# Patient Record
Sex: MALE | Race: WHITE | Employment: OTHER | URBAN - METROPOLITAN AREA
[De-identification: names, ages, dates, MRNs, and addresses within clinical notes are randomized per-mention and may not be internally consistent; named-entity substitution may affect disease eponyms.]

---

## 2020-06-24 ENCOUNTER — HOSPITAL ENCOUNTER (EMERGENCY)
Age: 75
Discharge: LEFT BEFORE DISCHARGE | DRG: 551 | End: 2020-06-24
Attending: EMERGENCY MEDICINE | Admitting: EMERGENCY MEDICINE
Payer: MEDICARE

## 2020-06-24 VITALS
TEMPERATURE: 98.7 F | DIASTOLIC BLOOD PRESSURE: 63 MMHG | OXYGEN SATURATION: 93 % | HEART RATE: 100 BPM | RESPIRATION RATE: 16 BRPM | WEIGHT: 235 LBS | SYSTOLIC BLOOD PRESSURE: 110 MMHG

## 2020-06-24 DIAGNOSIS — N17.9 AKI (ACUTE KIDNEY INJURY) (HCC): Primary | ICD-10-CM

## 2020-06-24 DIAGNOSIS — R53.1 WEAKNESS: ICD-10-CM

## 2020-06-24 LAB
ALBUMIN SERPL-MCNC: 3.3 G/DL (ref 3.5–5)
ALBUMIN/GLOB SERPL: 0.8 {RATIO} (ref 1.1–2.2)
ALP SERPL-CCNC: 56 U/L (ref 45–117)
ALT SERPL-CCNC: 8 U/L (ref 12–78)
ANION GAP SERPL CALC-SCNC: 5 MMOL/L (ref 5–15)
AST SERPL-CCNC: 24 U/L (ref 15–37)
BASOPHILS # BLD: 0.1 K/UL (ref 0–0.1)
BASOPHILS NFR BLD: 1 % (ref 0–1)
BILIRUB SERPL-MCNC: 0.6 MG/DL (ref 0.2–1)
BUN SERPL-MCNC: 53 MG/DL (ref 6–20)
BUN/CREAT SERPL: 18 (ref 12–20)
CALCIUM SERPL-MCNC: 8.8 MG/DL (ref 8.5–10.1)
CHLORIDE SERPL-SCNC: 101 MMOL/L (ref 97–108)
CO2 SERPL-SCNC: 25 MMOL/L (ref 21–32)
COMMENT, HOLDF: NORMAL
CREAT SERPL-MCNC: 3.01 MG/DL (ref 0.7–1.3)
DIFFERENTIAL METHOD BLD: ABNORMAL
EOSINOPHIL # BLD: 0.5 K/UL (ref 0–0.4)
EOSINOPHIL NFR BLD: 5 % (ref 0–7)
ERYTHROCYTE [DISTWIDTH] IN BLOOD BY AUTOMATED COUNT: 15.2 % (ref 11.5–14.5)
GLOBULIN SER CALC-MCNC: 4.1 G/DL (ref 2–4)
GLUCOSE SERPL-MCNC: 128 MG/DL (ref 65–100)
HCT VFR BLD AUTO: 29.2 % (ref 36.6–50.3)
HGB BLD-MCNC: 9.4 G/DL (ref 12.1–17)
IMM GRANULOCYTES # BLD AUTO: 0.1 K/UL (ref 0–0.04)
IMM GRANULOCYTES NFR BLD AUTO: 1 % (ref 0–0.5)
LYMPHOCYTES # BLD: 0.7 K/UL (ref 0.8–3.5)
LYMPHOCYTES NFR BLD: 7 % (ref 12–49)
MCH RBC QN AUTO: 29.1 PG (ref 26–34)
MCHC RBC AUTO-ENTMCNC: 32.2 G/DL (ref 30–36.5)
MCV RBC AUTO: 90.4 FL (ref 80–99)
MONOCYTES # BLD: 1 K/UL (ref 0–1)
MONOCYTES NFR BLD: 10 % (ref 5–13)
NEUTS SEG # BLD: 7.1 K/UL (ref 1.8–8)
NEUTS SEG NFR BLD: 76 % (ref 32–75)
NRBC # BLD: 0 K/UL (ref 0–0.01)
NRBC BLD-RTO: 0 PER 100 WBC
PLATELET # BLD AUTO: 151 K/UL (ref 150–400)
PMV BLD AUTO: 9.2 FL (ref 8.9–12.9)
POTASSIUM SERPL-SCNC: 5.7 MMOL/L (ref 3.5–5.1)
PROT SERPL-MCNC: 7.4 G/DL (ref 6.4–8.2)
RBC # BLD AUTO: 3.23 M/UL (ref 4.1–5.7)
RBC MORPH BLD: ABNORMAL
SAMPLES BEING HELD,HOLD: NORMAL
SODIUM SERPL-SCNC: 131 MMOL/L (ref 136–145)
WBC # BLD AUTO: 9.5 K/UL (ref 4.1–11.1)

## 2020-06-24 PROCEDURE — 93005 ELECTROCARDIOGRAM TRACING: CPT

## 2020-06-24 PROCEDURE — 80053 COMPREHEN METABOLIC PANEL: CPT

## 2020-06-24 PROCEDURE — 36415 COLL VENOUS BLD VENIPUNCTURE: CPT

## 2020-06-24 PROCEDURE — 99283 EMERGENCY DEPT VISIT LOW MDM: CPT

## 2020-06-24 PROCEDURE — 85025 COMPLETE CBC W/AUTO DIFF WBC: CPT

## 2020-06-25 ENCOUNTER — APPOINTMENT (OUTPATIENT)
Dept: CT IMAGING | Age: 75
DRG: 551 | End: 2020-06-25
Attending: INTERNAL MEDICINE
Payer: MEDICARE

## 2020-06-25 ENCOUNTER — APPOINTMENT (OUTPATIENT)
Dept: VASCULAR SURGERY | Age: 75
DRG: 551 | End: 2020-06-25
Attending: INTERNAL MEDICINE
Payer: MEDICARE

## 2020-06-25 ENCOUNTER — HOSPITAL ENCOUNTER (INPATIENT)
Age: 75
LOS: 7 days | Discharge: NURSING, REHAB OR LONG-TERM CARE | DRG: 551 | End: 2020-07-02
Attending: EMERGENCY MEDICINE | Admitting: INTERNAL MEDICINE
Payer: MEDICARE

## 2020-06-25 ENCOUNTER — APPOINTMENT (OUTPATIENT)
Dept: CT IMAGING | Age: 75
DRG: 551 | End: 2020-06-25
Attending: EMERGENCY MEDICINE
Payer: MEDICARE

## 2020-06-25 ENCOUNTER — APPOINTMENT (OUTPATIENT)
Dept: GENERAL RADIOLOGY | Age: 75
DRG: 551 | End: 2020-06-25
Attending: EMERGENCY MEDICINE
Payer: MEDICARE

## 2020-06-25 DIAGNOSIS — M48.00 SPINAL STENOSIS, MULTILEVEL: ICD-10-CM

## 2020-06-25 DIAGNOSIS — M79.604 BILATERAL LEG PAIN: ICD-10-CM
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
## 2020-06-25 DIAGNOSIS — G20 PARKINSON DISEASE (HCC): ICD-10-CM

## 2020-06-25 DIAGNOSIS — R53.81 DEBILITY: ICD-10-CM

## 2020-06-25 DIAGNOSIS — N17.9 AKI (ACUTE KIDNEY INJURY) (HCC): ICD-10-CM

## 2020-06-25 DIAGNOSIS — M79.605 BILATERAL LEG PAIN: ICD-10-CM

## 2020-06-25 DIAGNOSIS — A41.9 SEVERE SEPSIS (HCC): ICD-10-CM

## 2020-06-25 DIAGNOSIS — G93.41 METABOLIC ENCEPHALOPATHY: Primary | ICD-10-CM

## 2020-06-25 DIAGNOSIS — N28.9 RENAL INSUFFICIENCY: ICD-10-CM

## 2020-06-25 DIAGNOSIS — R65.20 SEVERE SEPSIS (HCC): ICD-10-CM

## 2020-06-25 PROBLEM — E11.9 TYPE II DIABETES MELLITUS (HCC): Status: ACTIVE | Noted: 2020-06-25

## 2020-06-25 PROBLEM — I10 HTN (HYPERTENSION): Status: ACTIVE | Noted: 2020-06-25

## 2020-06-25 PROBLEM — R79.89 ELEVATED LACTIC ACID LEVEL: Status: ACTIVE | Noted: 2020-06-25

## 2020-06-25 PROBLEM — R33.9 URINARY RETENTION: Status: ACTIVE | Noted: 2020-06-25

## 2020-06-25 PROBLEM — D64.9 ANEMIA: Status: ACTIVE | Noted: 2020-06-25
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
## 2020-06-25 LAB
ALBUMIN SERPL-MCNC: 3.5 G/DL (ref 3.5–5)
ALBUMIN/GLOB SERPL: 0.9 {RATIO} (ref 1.1–2.2)
ALP SERPL-CCNC: 61 U/L (ref 45–117)
ALT SERPL-CCNC: 23 U/L (ref 12–78)
AMPHET UR QL SCN: NEGATIVE
ANION GAP SERPL CALC-SCNC: 6 MMOL/L (ref 5–15)
APPEARANCE UR: CLEAR
AST SERPL-CCNC: 28 U/L (ref 15–37)
ATRIAL RATE: 88 BPM
ATRIAL RATE: 99 BPM
BACTERIA URNS QL MICRO: NEGATIVE /HPF
BARBITURATES UR QL SCN: NEGATIVE
BASOPHILS # BLD: 0.1 K/UL (ref 0–0.1)
BASOPHILS NFR BLD: 1 % (ref 0–1)
BENZODIAZ UR QL: NEGATIVE
BILIRUB SERPL-MCNC: 0.6 MG/DL (ref 0.2–1)
BILIRUB UR QL: NEGATIVE
BUN SERPL-MCNC: 47 MG/DL (ref 6–20)
BUN/CREAT SERPL: 20 (ref 12–20)
CALCIUM SERPL-MCNC: 9 MG/DL (ref 8.5–10.1)
CALCULATED P AXIS, ECG09: 36 DEGREES
CALCULATED P AXIS, ECG09: 42 DEGREES
CALCULATED R AXIS, ECG10: 1 DEGREES
CALCULATED R AXIS, ECG10: 12 DEGREES
CALCULATED T AXIS, ECG11: 16 DEGREES
CALCULATED T AXIS, ECG11: 35 DEGREES
CANNABINOIDS UR QL SCN: NEGATIVE
CHLORIDE SERPL-SCNC: 103 MMOL/L (ref 97–108)
CK MB CFR SERPL CALC: 0.4 % (ref 0–2.5)
CK MB SERPL-MCNC: 1.9 NG/ML (ref 5–25)
CK SERPL-CCNC: 518 U/L (ref 39–308)
CO2 SERPL-SCNC: 26 MMOL/L (ref 21–32)
COCAINE UR QL SCN: NEGATIVE
COLOR UR: ABNORMAL
COMMENT, HOLDF: NORMAL
CREAT SERPL-MCNC: 2.32 MG/DL (ref 0.7–1.3)
CREAT UR-MCNC: 24 MG/DL
CRP SERPL-MCNC: 12.8 MG/DL (ref 0–0.6)
D DIMER PPP FEU-MCNC: 2.06 MG/L FEU (ref 0–0.65)
DIAGNOSIS, 93000: NORMAL
DIAGNOSIS, 93000: NORMAL
DIFFERENTIAL METHOD BLD: ABNORMAL
DRUG SCRN COMMENT,DRGCM: ABNORMAL
EOSINOPHIL # BLD: 0.1 K/UL (ref 0–0.4)
EOSINOPHIL #/AREA URNS HPF: NEGATIVE /[HPF]
EOSINOPHIL NFR BLD: 1 % (ref 0–7)
EPITH CASTS URNS QL MICRO: ABNORMAL /LPF
ERYTHROCYTE [DISTWIDTH] IN BLOOD BY AUTOMATED COUNT: 15.5 % (ref 11.5–14.5)
ETHANOL SERPL-MCNC: <10 MG/DL
FERRITIN SERPL-MCNC: 104 NG/ML (ref 26–388)
GLOBULIN SER CALC-MCNC: 4.1 G/DL (ref 2–4)
GLUCOSE BLD STRIP.AUTO-MCNC: 153 MG/DL (ref 65–100)
GLUCOSE BLD STRIP.AUTO-MCNC: 185 MG/DL (ref 65–100)
GLUCOSE BLD STRIP.AUTO-MCNC: 207 MG/DL (ref 65–100)
GLUCOSE BLD STRIP.AUTO-MCNC: 207 MG/DL (ref 65–100)
GLUCOSE SERPL-MCNC: 125 MG/DL (ref 65–100)
GLUCOSE UR STRIP.AUTO-MCNC: NEGATIVE MG/DL
HCT VFR BLD AUTO: 30.6 % (ref 36.6–50.3)
HGB BLD-MCNC: 10 G/DL (ref 12.1–17)
HGB UR QL STRIP: NEGATIVE
HYALINE CASTS URNS QL MICRO: ABNORMAL /LPF (ref 0–5)
IMM GRANULOCYTES # BLD AUTO: 0.1 K/UL (ref 0–0.04)
IMM GRANULOCYTES NFR BLD AUTO: 1 % (ref 0–0.5)
KETONES UR QL STRIP.AUTO: NEGATIVE MG/DL
LACTATE SERPL-SCNC: 1.6 MMOL/L (ref 0.4–2)
LACTATE SERPL-SCNC: 2.2 MMOL/L (ref 0.4–2)
LEUKOCYTE ESTERASE UR QL STRIP.AUTO: ABNORMAL
LYMPHOCYTES # BLD: 0.7 K/UL (ref 0.8–3.5)
LYMPHOCYTES NFR BLD: 8 % (ref 12–49)
MAGNESIUM SERPL-MCNC: 1.6 MG/DL (ref 1.6–2.4)
MCH RBC QN AUTO: 29.2 PG (ref 26–34)
MCHC RBC AUTO-ENTMCNC: 32.7 G/DL (ref 30–36.5)
MCV RBC AUTO: 89.2 FL (ref 80–99)
METHADONE UR QL: NEGATIVE
MONOCYTES # BLD: 0.8 K/UL (ref 0–1)
MONOCYTES NFR BLD: 9 % (ref 5–13)
NEUTS SEG # BLD: 7.1 K/UL (ref 1.8–8)
NEUTS SEG NFR BLD: 81 % (ref 32–75)
NITRITE UR QL STRIP.AUTO: NEGATIVE
NRBC # BLD: 0 K/UL (ref 0–0.01)
NRBC BLD-RTO: 0 PER 100 WBC
OPIATES UR QL: POSITIVE
P-R INTERVAL, ECG05: 156 MS
P-R INTERVAL, ECG05: 172 MS
PCP UR QL: NEGATIVE
PH UR STRIP: 5.5 [PH] (ref 5–8)
PHOSPHATE SERPL-MCNC: 2.8 MG/DL (ref 2.6–4.7)
PLATELET # BLD AUTO: 162 K/UL (ref 150–400)
PMV BLD AUTO: 9.6 FL (ref 8.9–12.9)
POTASSIUM SERPL-SCNC: 4.7 MMOL/L (ref 3.5–5.1)
PROCALCITONIN SERPL-MCNC: 0.18 NG/ML
PROT SERPL-MCNC: 7.6 G/DL (ref 6.4–8.2)
PROT UR STRIP-MCNC: NEGATIVE MG/DL
Q-T INTERVAL, ECG07: 336 MS
Q-T INTERVAL, ECG07: 356 MS
QRS DURATION, ECG06: 74 MS
QRS DURATION, ECG06: 74 MS
QTC CALCULATION (BEZET), ECG08: 430 MS
QTC CALCULATION (BEZET), ECG08: 431 MS
RBC # BLD AUTO: 3.43 M/UL (ref 4.1–5.7)
RBC #/AREA URNS HPF: ABNORMAL /HPF (ref 0–5)
SAMPLES BEING HELD,HOLD: NORMAL
SARS-COV-2, COV2: NOT DETECTED
SERVICE CMNT-IMP: ABNORMAL
SODIUM SERPL-SCNC: 135 MMOL/L (ref 136–145)
SODIUM UR-SCNC: 53 MMOL/L
SOURCE, COVRS: NORMAL
SP GR UR REFRACTOMETRY: 1.01 (ref 1–1.03)
SPECIMEN SOURCE, FCOV2M: NORMAL
TROPONIN I SERPL-MCNC: <0.05 NG/ML
UA: UC IF INDICATED,UAUC: ABNORMAL
UROBILINOGEN UR QL STRIP.AUTO: 0.2 EU/DL (ref 0.2–1)
VENTRICULAR RATE, ECG03: 88 BPM
VENTRICULAR RATE, ECG03: 99 BPM
WBC # BLD AUTO: 8.8 K/UL (ref 4.1–11.1)
WBC URNS QL MICRO: ABNORMAL /HPF (ref 0–4)

## 2020-06-25 PROCEDURE — 70450 CT HEAD/BRAIN W/O DYE: CPT

## 2020-06-25 PROCEDURE — 85379 FIBRIN DEGRADATION QUANT: CPT

## 2020-06-25 PROCEDURE — 82962 GLUCOSE BLOOD TEST: CPT

## 2020-06-25 PROCEDURE — 99285 EMERGENCY DEPT VISIT HI MDM: CPT

## 2020-06-25 PROCEDURE — 82550 ASSAY OF CK (CPK): CPT

## 2020-06-25 PROCEDURE — 74176 CT ABD & PELVIS W/O CONTRAST: CPT

## 2020-06-25 PROCEDURE — 74011250637 HC RX REV CODE- 250/637: Performed by: NURSE PRACTITIONER

## 2020-06-25 PROCEDURE — 93005 ELECTROCARDIOGRAM TRACING: CPT

## 2020-06-25 PROCEDURE — 74011636637 HC RX REV CODE- 636/637: Performed by: INTERNAL MEDICINE

## 2020-06-25 PROCEDURE — 65270000029 HC RM PRIVATE

## 2020-06-25 PROCEDURE — 96374 THER/PROPH/DIAG INJ IV PUSH: CPT

## 2020-06-25 PROCEDURE — 51798 US URINE CAPACITY MEASURE: CPT

## 2020-06-25 PROCEDURE — 80053 COMPREHEN METABOLIC PANEL: CPT

## 2020-06-25 PROCEDURE — 84100 ASSAY OF PHOSPHORUS: CPT

## 2020-06-25 PROCEDURE — 87086 URINE CULTURE/COLONY COUNT: CPT

## 2020-06-25 PROCEDURE — 74011000250 HC RX REV CODE- 250: Performed by: EMERGENCY MEDICINE

## 2020-06-25 PROCEDURE — 81001 URINALYSIS AUTO W/SCOPE: CPT

## 2020-06-25 PROCEDURE — 86140 C-REACTIVE PROTEIN: CPT

## 2020-06-25 PROCEDURE — 94762 N-INVAS EAR/PLS OXIMTRY CONT: CPT

## 2020-06-25 PROCEDURE — 87040 BLOOD CULTURE FOR BACTERIA: CPT

## 2020-06-25 PROCEDURE — 74011250637 HC RX REV CODE- 250/637: Performed by: INTERNAL MEDICINE

## 2020-06-25 PROCEDURE — 84484 ASSAY OF TROPONIN QUANT: CPT

## 2020-06-25 PROCEDURE — 80307 DRUG TEST PRSMV CHEM ANLYZR: CPT

## 2020-06-25 PROCEDURE — 71250 CT THORAX DX C-: CPT

## 2020-06-25 PROCEDURE — 84300 ASSAY OF URINE SODIUM: CPT

## 2020-06-25 PROCEDURE — 77030019905 HC CATH URETH INTMIT MDII -A

## 2020-06-25 PROCEDURE — 82728 ASSAY OF FERRITIN: CPT

## 2020-06-25 PROCEDURE — 85025 COMPLETE CBC W/AUTO DIFF WBC: CPT

## 2020-06-25 PROCEDURE — 74011000258 HC RX REV CODE- 258: Performed by: INTERNAL MEDICINE

## 2020-06-25 PROCEDURE — 82553 CREATINE MB FRACTION: CPT

## 2020-06-25 PROCEDURE — 84145 PROCALCITONIN (PCT): CPT

## 2020-06-25 PROCEDURE — 87635 SARS-COV-2 COVID-19 AMP PRB: CPT

## 2020-06-25 PROCEDURE — 71045 X-RAY EXAM CHEST 1 VIEW: CPT

## 2020-06-25 PROCEDURE — 96375 TX/PRO/DX INJ NEW DRUG ADDON: CPT

## 2020-06-25 PROCEDURE — 74011250636 HC RX REV CODE- 250/636: Performed by: EMERGENCY MEDICINE

## 2020-06-25 PROCEDURE — 83605 ASSAY OF LACTIC ACID: CPT

## 2020-06-25 PROCEDURE — 82570 ASSAY OF URINE CREATININE: CPT

## 2020-06-25 PROCEDURE — 65660000000 HC RM CCU STEPDOWN

## 2020-06-25 PROCEDURE — 93970 EXTREMITY STUDY: CPT

## 2020-06-25 PROCEDURE — 87205 SMEAR GRAM STAIN: CPT

## 2020-06-25 PROCEDURE — 36415 COLL VENOUS BLD VENIPUNCTURE: CPT

## 2020-06-25 PROCEDURE — 74011250636 HC RX REV CODE- 250/636: Performed by: INTERNAL MEDICINE

## 2020-06-25 PROCEDURE — 83735 ASSAY OF MAGNESIUM: CPT

## 2020-06-25 RX ORDER — LORATADINE 10 MG/1
10 TABLET ORAL DAILY
Status: DISCONTINUED | OUTPATIENT
Start: 2020-06-26 | End: 2020-07-02 | Stop reason: HOSPADM

## 2020-06-25 RX ORDER — FINASTERIDE 5 MG/1
5 TABLET, FILM COATED ORAL DAILY
Status: DISCONTINUED | OUTPATIENT
Start: 2020-06-25 | End: 2020-07-02 | Stop reason: HOSPADM

## 2020-06-25 RX ORDER — GLIPIZIDE 5 MG/1
5 TABLET, FILM COATED, EXTENDED RELEASE ORAL DAILY
Status: ON HOLD | COMMUNITY
End: 2021-01-01

## 2020-06-25 RX ORDER — FLUTICASONE PROPIONATE 50 MCG
2 SPRAY, SUSPENSION (ML) NASAL DAILY
Status: ON HOLD | COMMUNITY
End: 2021-01-01

## 2020-06-25 RX ORDER — DEXTROSE 50 % IN WATER (D50W) INTRAVENOUS SYRINGE
12.5-25 AS NEEDED
Status: DISCONTINUED | OUTPATIENT
Start: 2020-06-25 | End: 2020-07-02 | Stop reason: HOSPADM

## 2020-06-25 RX ORDER — CARBIDOPA AND LEVODOPA 25; 100 MG/1; MG/1
2 TABLET ORAL 4 TIMES DAILY
Status: DISCONTINUED | OUTPATIENT
Start: 2020-06-25 | End: 2020-07-02 | Stop reason: HOSPADM

## 2020-06-25 RX ORDER — PRAMIPEXOLE DIHYDROCHLORIDE 0.25 MG/1
0.5 TABLET ORAL 2 TIMES DAILY
Status: DISCONTINUED | OUTPATIENT
Start: 2020-06-25 | End: 2020-07-02 | Stop reason: HOSPADM

## 2020-06-25 RX ORDER — SODIUM CHLORIDE 0.9 % (FLUSH) 0.9 %
5-10 SYRINGE (ML) INJECTION AS NEEDED
Status: DISCONTINUED | OUTPATIENT
Start: 2020-06-25 | End: 2020-07-02 | Stop reason: HOSPADM

## 2020-06-25 RX ORDER — PRAMIPEXOLE DIHYDROCHLORIDE 0.5 MG/1
0.25 TABLET ORAL 4 TIMES DAILY
Status: ON HOLD | COMMUNITY
End: 2021-01-01

## 2020-06-25 RX ORDER — MELATONIN
1000 DAILY
Status: DISCONTINUED | OUTPATIENT
Start: 2020-06-26 | End: 2020-07-02 | Stop reason: HOSPADM

## 2020-06-25 RX ORDER — TORSEMIDE 100 MG/1
50 TABLET ORAL DAILY
COMMUNITY
End: 2020-07-02

## 2020-06-25 RX ORDER — SODIUM CHLORIDE 0.9 % (FLUSH) 0.9 %
5-40 SYRINGE (ML) INJECTION EVERY 8 HOURS
Status: DISCONTINUED | OUTPATIENT
Start: 2020-06-25 | End: 2020-07-02 | Stop reason: HOSPADM

## 2020-06-25 RX ORDER — ATORVASTATIN CALCIUM 20 MG/1
20 TABLET, FILM COATED ORAL DAILY
COMMUNITY

## 2020-06-25 RX ORDER — ACETAMINOPHEN 325 MG/1
650 TABLET ORAL
Status: DISCONTINUED | OUTPATIENT
Start: 2020-06-25 | End: 2020-06-26

## 2020-06-25 RX ORDER — MELATONIN
2000 DAILY
COMMUNITY

## 2020-06-25 RX ORDER — GABAPENTIN 300 MG/1
300 CAPSULE ORAL 3 TIMES DAILY
Status: DISCONTINUED | OUTPATIENT
Start: 2020-06-25 | End: 2020-06-27

## 2020-06-25 RX ORDER — ENTACAPONE 200 MG/1
200 TABLET ORAL 4 TIMES DAILY
Status: DISCONTINUED | OUTPATIENT
Start: 2020-06-25 | End: 2020-07-02 | Stop reason: HOSPADM

## 2020-06-25 RX ORDER — CARBIDOPA AND LEVODOPA 25; 100 MG/1; MG/1
2 TABLET ORAL 4 TIMES DAILY
COMMUNITY
End: 2021-01-01 | Stop reason: SDUPTHER

## 2020-06-25 RX ORDER — INSULIN GLARGINE 100 [IU]/ML
40 INJECTION, SOLUTION SUBCUTANEOUS 2 TIMES DAILY
Status: ON HOLD | COMMUNITY
End: 2020-06-30 | Stop reason: SDUPTHER

## 2020-06-25 RX ORDER — PANTOPRAZOLE SODIUM 40 MG/1
40 TABLET, DELAYED RELEASE ORAL
Status: DISCONTINUED | OUTPATIENT
Start: 2020-06-26 | End: 2020-07-02 | Stop reason: HOSPADM

## 2020-06-25 RX ORDER — HEPARIN SODIUM 5000 [USP'U]/ML
5000 INJECTION, SOLUTION INTRAVENOUS; SUBCUTANEOUS EVERY 8 HOURS
Status: DISCONTINUED | OUTPATIENT
Start: 2020-06-25 | End: 2020-07-02 | Stop reason: HOSPADM

## 2020-06-25 RX ORDER — TAMSULOSIN HYDROCHLORIDE 0.4 MG/1
0.4 CAPSULE ORAL DAILY
Status: DISCONTINUED | OUTPATIENT
Start: 2020-06-26 | End: 2020-07-02 | Stop reason: HOSPADM

## 2020-06-25 RX ORDER — MAGNESIUM SULFATE 100 %
4 CRYSTALS MISCELLANEOUS AS NEEDED
Status: DISCONTINUED | OUTPATIENT
Start: 2020-06-25 | End: 2020-07-02 | Stop reason: HOSPADM

## 2020-06-25 RX ORDER — INSULIN LISPRO 100 [IU]/ML
INJECTION, SOLUTION INTRAVENOUS; SUBCUTANEOUS
Status: DISCONTINUED | OUTPATIENT
Start: 2020-06-25 | End: 2020-06-28

## 2020-06-25 RX ORDER — DOCUSATE SODIUM 100 MG/1
100 CAPSULE, LIQUID FILLED ORAL 2 TIMES DAILY
COMMUNITY
End: 2021-01-01

## 2020-06-25 RX ORDER — LORATADINE 10 MG/1
10 TABLET ORAL DAILY
Status: ON HOLD | COMMUNITY
End: 2021-01-01

## 2020-06-25 RX ORDER — ASPIRIN 325 MG
325 TABLET, DELAYED RELEASE (ENTERIC COATED) ORAL DAILY
COMMUNITY
End: 2021-01-01

## 2020-06-25 RX ORDER — GABAPENTIN 600 MG/1
600 TABLET ORAL 3 TIMES DAILY
COMMUNITY
End: 2021-01-01

## 2020-06-25 RX ORDER — HYDROCODONE BITARTRATE AND ACETAMINOPHEN 5; 325 MG/1; MG/1
1 TABLET ORAL
Status: DISCONTINUED | OUTPATIENT
Start: 2020-06-25 | End: 2020-06-26

## 2020-06-25 RX ORDER — INSULIN GLARGINE 100 [IU]/ML
10 INJECTION, SOLUTION SUBCUTANEOUS 2 TIMES DAILY
Status: DISCONTINUED | OUTPATIENT
Start: 2020-06-25 | End: 2020-06-28

## 2020-06-25 RX ORDER — INSULIN ASPART 100 [IU]/ML
INJECTION, SOLUTION INTRAVENOUS; SUBCUTANEOUS
COMMUNITY
End: 2020-07-02

## 2020-06-25 RX ORDER — SPIRONOLACTONE 25 MG/1
25 TABLET ORAL DAILY
COMMUNITY
End: 2020-07-02

## 2020-06-25 RX ORDER — VANCOMYCIN/0.9 % SOD CHLORIDE 1.5G/250ML
1500 PLASTIC BAG, INJECTION (ML) INTRAVENOUS EVERY 24 HOURS
Status: DISCONTINUED | OUTPATIENT
Start: 2020-06-26 | End: 2020-06-26

## 2020-06-25 RX ORDER — CARVEDILOL 6.25 MG/1
6.25 TABLET ORAL 2 TIMES DAILY WITH MEALS
COMMUNITY
End: 2021-01-01

## 2020-06-25 RX ORDER — NALOXONE HYDROCHLORIDE 0.4 MG/ML
0.4 INJECTION, SOLUTION INTRAMUSCULAR; INTRAVENOUS; SUBCUTANEOUS AS NEEDED
Status: DISCONTINUED | OUTPATIENT
Start: 2020-06-25 | End: 2020-07-02 | Stop reason: HOSPADM

## 2020-06-25 RX ORDER — CARVEDILOL 3.12 MG/1
6.25 TABLET ORAL 2 TIMES DAILY WITH MEALS
Status: DISCONTINUED | OUTPATIENT
Start: 2020-06-25 | End: 2020-07-02 | Stop reason: HOSPADM

## 2020-06-25 RX ORDER — ATORVASTATIN CALCIUM 20 MG/1
20 TABLET, FILM COATED ORAL
Status: DISCONTINUED | OUTPATIENT
Start: 2020-06-25 | End: 2020-07-02 | Stop reason: HOSPADM

## 2020-06-25 RX ORDER — SODIUM CHLORIDE 0.9 % (FLUSH) 0.9 %
5-40 SYRINGE (ML) INJECTION AS NEEDED
Status: DISCONTINUED | OUTPATIENT
Start: 2020-06-25 | End: 2020-07-02 | Stop reason: HOSPADM

## 2020-06-25 RX ORDER — ASPIRIN 325 MG
325 TABLET, DELAYED RELEASE (ENTERIC COATED) ORAL DAILY
Status: DISCONTINUED | OUTPATIENT
Start: 2020-06-25 | End: 2020-07-02 | Stop reason: HOSPADM

## 2020-06-25 RX ORDER — DOCUSATE SODIUM 100 MG/1
400 CAPSULE, LIQUID FILLED ORAL DAILY
Status: DISCONTINUED | OUTPATIENT
Start: 2020-06-25 | End: 2020-07-02 | Stop reason: HOSPADM

## 2020-06-25 RX ORDER — ENTACAPONE 200 MG/1
200 TABLET ORAL
COMMUNITY
End: 2021-01-01 | Stop reason: SDUPTHER

## 2020-06-25 RX ORDER — TRAZODONE HYDROCHLORIDE 50 MG/1
50-100 TABLET ORAL
Status: ON HOLD | COMMUNITY
End: 2021-01-01

## 2020-06-25 RX ORDER — B-COMPLEX WITH VITAMIN C
1 TABLET ORAL DAILY
Status: DISCONTINUED | OUTPATIENT
Start: 2020-06-25 | End: 2020-07-02 | Stop reason: HOSPADM

## 2020-06-25 RX ORDER — METFORMIN HYDROCHLORIDE 1000 MG/1
1000 TABLET ORAL 2 TIMES DAILY WITH MEALS
Status: ON HOLD | COMMUNITY
End: 2021-01-01

## 2020-06-25 RX ORDER — HYDROCODONE BITARTRATE AND ACETAMINOPHEN 5; 325 MG/1; MG/1
1 TABLET ORAL
COMMUNITY
End: 2020-07-02

## 2020-06-25 RX ORDER — FLUTICASONE PROPIONATE 50 MCG
2 SPRAY, SUSPENSION (ML) NASAL DAILY
Status: DISCONTINUED | OUTPATIENT
Start: 2020-06-26 | End: 2020-07-02 | Stop reason: HOSPADM

## 2020-06-25 RX ORDER — TRAZODONE HYDROCHLORIDE 50 MG/1
50 TABLET ORAL
Status: DISCONTINUED | OUTPATIENT
Start: 2020-06-25 | End: 2020-07-02 | Stop reason: HOSPADM

## 2020-06-25 RX ORDER — TAMSULOSIN HYDROCHLORIDE 0.4 MG/1
0.4 CAPSULE ORAL DAILY
COMMUNITY
End: 2021-01-01 | Stop reason: SDUPTHER

## 2020-06-25 RX ORDER — MULTIVIT WITH MINERALS/HERBS
1 TABLET ORAL DAILY
COMMUNITY
End: 2021-01-01

## 2020-06-25 RX ORDER — OMEPRAZOLE 40 MG/1
40 CAPSULE, DELAYED RELEASE ORAL DAILY
COMMUNITY
End: 2021-01-01 | Stop reason: SDUPTHER

## 2020-06-25 RX ORDER — LISINOPRIL 5 MG/1
5 TABLET ORAL DAILY
Status: ON HOLD | COMMUNITY
End: 2021-01-01

## 2020-06-25 RX ADMIN — VANCOMYCIN HYDROCHLORIDE 2000 MG: 1 INJECTION, POWDER, LYOPHILIZED, FOR SOLUTION INTRAVENOUS at 06:42

## 2020-06-25 RX ADMIN — HEPARIN SODIUM 5000 UNITS: 5000 INJECTION INTRAVENOUS; SUBCUTANEOUS at 17:55

## 2020-06-25 RX ADMIN — ENTACAPONE 200 MG: 200 TABLET, FILM COATED ORAL at 18:06

## 2020-06-25 RX ADMIN — TRAZODONE HYDROCHLORIDE 50 MG: 50 TABLET ORAL at 22:25

## 2020-06-25 RX ADMIN — ACETAMINOPHEN 650 MG: 325 TABLET ORAL at 21:23

## 2020-06-25 RX ADMIN — PRAMIPEXOLE DIHYDROCHLORIDE 0.5 MG: 0.25 TABLET ORAL at 17:54

## 2020-06-25 RX ADMIN — Medication 10 ML: at 21:24

## 2020-06-25 RX ADMIN — CARBIDOPA AND LEVODOPA 2 TABLET: 25; 100 TABLET ORAL at 21:23

## 2020-06-25 RX ADMIN — Medication 10 ML: at 09:15

## 2020-06-25 RX ADMIN — INSULIN LISPRO 2 UNITS: 100 INJECTION, SOLUTION INTRAVENOUS; SUBCUTANEOUS at 17:57

## 2020-06-25 RX ADMIN — ASPIRIN 325 MG: 325 TABLET, COATED ORAL at 17:55

## 2020-06-25 RX ADMIN — FINASTERIDE 5 MG: 5 TABLET, FILM COATED ORAL at 17:54

## 2020-06-25 RX ADMIN — GABAPENTIN 300 MG: 300 CAPSULE ORAL at 17:55

## 2020-06-25 RX ADMIN — WATER 2 G: 1 INJECTION INTRAMUSCULAR; INTRAVENOUS; SUBCUTANEOUS at 06:42

## 2020-06-25 RX ADMIN — CARVEDILOL 6.25 MG: 3.12 TABLET, FILM COATED ORAL at 17:55

## 2020-06-25 RX ADMIN — HEPARIN SODIUM 5000 UNITS: 5000 INJECTION INTRAVENOUS; SUBCUTANEOUS at 22:26

## 2020-06-25 RX ADMIN — VITAMIN C 1 TABLET: TAB at 18:06

## 2020-06-25 RX ADMIN — SODIUM CHLORIDE 2052 ML: 900 INJECTION, SOLUTION INTRAVENOUS at 05:39

## 2020-06-25 RX ADMIN — ATORVASTATIN CALCIUM 20 MG: 20 TABLET, FILM COATED ORAL at 21:23

## 2020-06-25 RX ADMIN — ENTACAPONE 200 MG: 200 TABLET, FILM COATED ORAL at 21:23

## 2020-06-25 RX ADMIN — Medication 10 ML: at 14:00

## 2020-06-25 RX ADMIN — CEFEPIME HYDROCHLORIDE 2 G: 2 INJECTION, POWDER, FOR SOLUTION INTRAVENOUS at 17:59

## 2020-06-25 RX ADMIN — HYDROCODONE BITARTRATE AND ACETAMINOPHEN 1 TABLET: 5; 325 TABLET ORAL at 17:53

## 2020-06-25 RX ADMIN — INSULIN GLARGINE 10 UNITS: 100 INJECTION, SOLUTION SUBCUTANEOUS at 17:57

## 2020-06-25 RX ADMIN — ACETAMINOPHEN 650 MG: 325 TABLET ORAL at 14:00

## 2020-06-25 RX ADMIN — CARBIDOPA AND LEVODOPA 2 TABLET: 25; 100 TABLET ORAL at 17:54

## 2020-06-25 RX ADMIN — GABAPENTIN 300 MG: 300 CAPSULE ORAL at 21:23

## 2020-06-25 NOTE — CONSULTS
Full note to follow. Asked to admit for \"severe sepsis\". No clear source of infection. Given fall and poor historian, would be prudent to check CT c/a/p to rule out fractures, acute intra-abd processes. Agree with empiric IV abx for now pending more data. SARS-COV-2 sent.

## 2020-06-25 NOTE — PROGRESS NOTES
11:04 AM 
CM reviewed EMR and spoke to pt's wife over the phone for the initial evaluation. Reason for Admission:   Fall RUR Score:   12% Plan for utilizing home health:    Was open to [facility] several weeks ago but pt quit after a week PCP: First and Last name: Jasmin Molina Name of Practice:  
 Are you a current patient: Yes/No: Yes Approximate date of last visit: 3 weeks ago Can you participate in a virtual visit with your PCP: Yes Current Advanced Directive/Advance Care Plan: Pt is a full code status, pt's wife is the emergency contact and can be reached at [phone]. Transition of Care Plan:   Pt resides at home with his spouse. They recently moved to the area in January 2020 and are working on establishing physicians. So far, he has established a PCP and was seen by an ortho specialist for gout-resulting in PT and OT at home. Pt has a rollator and quad cane at home. PTA he was completing all of his own care but has become weak. He has had several falls. He was seen three weeks ago at his PCP and he was diagnosed with dehydration. She has noticed a change in his mental status. 1). Pt admitted for medical management 2). Pt's spouse will transport at dc 
3). CM will follow for dc needs Care Management Interventions PCP Verified by CM: Yes(saw in office three weeks ago) Mode of Transport at Discharge: Other (see comment)(Spouse will transport at dc) Transition of Care Consult (CM Consult): Discharge Planning Discharge Durable Medical Equipment: No 
Physical Therapy Consult: No 
Occupational Therapy Consult: No 
Speech Therapy Consult: No 
Current Support Network: Lives with Spouse Discharge Location Discharge Placement: Unable to determine at this time

## 2020-06-25 NOTE — ED PROVIDER NOTES
The patient is a 41-year-old male with a past medical history significant for Parkinson's disease, diabetes, hypertension and hypercholesterolemia who presents to the ED by EMS for evaluation for altered mental status and generalized weakness. I spoke directly to the wife who called 911 and she stated that the patient was found on the floor this evening after he allegedly fell and appeared to be laying in the pool of urine while he was attempted to go to the bathroom. Wife states that the patient appeared to have skinned knee which led her to believe that he probably slipped and fell. The patient states that he has been extremely weak and his legs have been giving out for no apparent reason. Wife stated this is the third time this week that he has had similar symptoms. He was just evaluated in the ER several hours earlier for the same symptoms and decided to leave the hospital [address] because he was feeling better. The patient also appeared confused and disoriented this evening. The patient currently denies any headache, neck pain, back pain, sore throat, cough or congestion, chest pain or shortness of breath, abdominal pain, diarrhea, constipation, dysuria, skin rash, sick contact, unusual food sources and recent travel. Patient wife said that they had an appointment to see his neurologist today because of the frequency of the symptoms. No past medical history on file. No past surgical history on file. No family history on file. Social History Socioeconomic History  Marital status:  Spouse name: Not on file  Number of children: Not on file  Years of education: Not on file  Highest education level: Not on file Occupational History  Not on file Social Needs  Financial resource strain: Not on file  Food insecurity Worry: Not on file Inability: Not on file  Transportation needs Medical: Not on file Non-medical: Not on file Tobacco Use  Smoking status: Not on file Substance and Sexual Activity  Alcohol use: Not on file  Drug use: Not on file  Sexual activity: Not on file Lifestyle  Physical activity Days per week: Not on file Minutes per session: Not on file  Stress: Not on file Relationships  Social connections Talks on phone: Not on file Gets together: Not on file Attends Protestant service: Not on file Active member of club or organization: Not on file Attends meetings of clubs or organizations: Not on file Relationship status: Not on file  Intimate partner violence Fear of current or ex partner: Not on file Emotionally abused: Not on file Physically abused: Not on file Forced sexual activity: Not on file Other Topics Concern  Not on file Social History Narrative  Not on file ALLERGIES: Patient has no known allergies. Review of Systems All other systems reviewed and are negative. Vitals:  
 06/25/20 0424 BP: 109/64 Pulse: (!) 106 Resp: 16 Temp: 99.2 °F (37.3 °C) SpO2: 93% Physical Exam 
Vitals signs and nursing note reviewed. Exam conducted with a chaperone present. CONSTITUTIONAL: Well-appearing; well-nourished; in no apparent distress HEAD: Normocephalic; atraumatic EYES: PERRL; EOM intact; conjunctiva and sclera are clear bilaterally. ENT: No rhinorrhea; normal pharynx with no tonsillar hypertrophy; mucous membranes pink/moist, no erythema, no exudate. NECK: Supple; non-tender; no cervical lymphadenopathy CARD: Normal S1, S2; no murmurs, rubs, or gallops. Regular rate and rhythm. RESP: Normal respiratory effort; breath sounds clear and equal bilaterally; no wheezes, rhonchi, or rales. ABD: Normal bowel sounds; non-distended; non-tender; no palpable organomegaly, no masses, no bruits.  
Back Exam: Normal inspection; no vertebral point tenderness, no CVA tenderness. Normal range of motion. EXT: Normal ROM in all four extremities; non-tender to palpation; no swelling or deformity; distal pulses are normal, no edema. SKIN: Warm; dry; no rash. NEURO:Alert and oriented x place  and self, coherent, NICHOLAS-XII grossly intact, sensory and motor are non-focal. 
 
 
 
MDM Number of Diagnoses or Management Options Diagnosis management comments: Assessment: 75year-old altered mental status, frequent fall, generalized weakness and confusion rule out sepsis with occult bacteremia including COVID-19. The patient is hemodynamically stable at this time. The patient will also need evaluation for CVA however he has no focal deficit at his NIH stroke scale is 0 at this time. The patient is VAN negative Plan: CT scan of the head/lab/IV fluid/EKG/chest x-ray/broad-spectrum antibiotics/consult hospitalist/ Monitor and Reevaluate. Amount and/or Complexity of Data Reviewed Clinical lab tests: ordered and reviewed Tests in the radiology section of CPT®: reviewed and ordered Tests in the medicine section of CPT®: reviewed and ordered Discussion of test results with the performing providers: yes Decide to obtain previous medical records or to obtain history from someone other than the patient: yes Obtain history from someone other than the patient: yes Review and summarize past medical records: yes Discuss the patient with other providers: yes Independent visualization of images, tracings, or specimens: yes Risk of Complications, Morbidity, and/or Mortality Presenting problems: moderate Diagnostic procedures: moderate Management options: moderate Procedures ED EKG interpretation: 
Rhythm: normal sinus rhythm; and regular . Rate (approx.): 88; Axis: normal; P wave: normal; QRS interval: normal ; ST/T wave: normal; in  Lead: Diffusely; Other findings: abnormal ekg. This EKG was interpreted by Don Fraire MD,ED Provider.  06:20 AM 
 
 XRAY INTERPRETATION (ED MD) Chest Xray No acute process seen. Normal heart size. No bony abnormalities. No infiltrate. Ashley Schultz MD 6:10 AM 
 
 
PROGRESS NOTE: 
Pt has been reexamined by Ashley Schultz MD all available results have been reviewed with pt and any available family. Pt understands sx, dx, and tx in ED. Care plan has been outlined and questions have been answered. Pt and any available family understands and agrees to need for admission to hospital for further tx not available in ED. Pt is ready for admission. Written by Alfonso Goss MD,  6:10 AM 
 
Hospitalist Perfect Serve for Admission 6:31 AM 
 
ED Room Number: CQ77/31 Patient Name and age:  Ida De La Fuente 75 y.o.  male Working Diagnosis: 1. Metabolic encephalopathy 2. Severe sepsis ([address] 3. LYN (acute kidney injury) ([address] COVID-19 Suspicion:  yes Code Status:  Full Code Readmission: No 
Isolation Requirements:  no 
Recommended Level of Care:  telemetry Department:[facility] ED - [phone] Other:   
 
CONSULT NOTE: 
Ashley Schultz MD spoke with Dr. Leane Litten of the adult hospitalist team. Discussed patient's presentation, history, physical assessment, and available diagnostic results. He will evaluate, write orders and admit the patient to the hospital. 07:11 AM 
 
IMPRESSION: 
1. Metabolic encephalopathy 2. Severe sepsis ([address] 3. LYN (acute kidney injury) ([address] - Sepsis order set entered: YES 
- Broad Spectrum Antibiotics given: Cefepime and Vancomycin - Repeat lactic acid ordered for time 07:35 AM 
- Sepsis Re-assessment performed at time 07:45 and clinical condition stable. If Septic Shock (SBP<90, MAP<65, Lactate >4): - IVF:  30cc/kg ideal Body Weight, pt obese with BMI >30 
- Vasopressors: Not indicated due to Septic Shock not present Plan: 
Admit to Telemetry Total critical care time spent exclusive of procedures:  60 minutes Alfonso Goss MD 
 
.

## 2020-06-25 NOTE — PROGRESS NOTES
[facility] Dosing Services: Antimicrobial Stewardship Daily Doc Consult for antibiotic dosing of Vancomycin by Dr. Emily Rodriguez Indication: Sepsis - unclear source; UA - trace leukocytes; LYN: SCr = 2.32 (down from 3.01 yesterday pm; Uncertain baseline); Estimated CrCl ~30-40ml/min. WBC = 8.8; Lactic acid 2.2-->1.6; D-dimer = 2.06;  Procal = 0.18; Afeb. Per MD: Asked to admit for \"severe sepsis\". No clear source of infection. Given fall and poor historian, would be prudent to check CT c/a/p to rule out fractures, acute intra-abd processes. Agree with empiric IV abx for now pending more data. SARS-COV-2 sent. Ht Readings from Last 1 Encounters:  
06/25/20 172.7 cm (68\") Wt Readings from Last 1 Encounters:  
06/24/20 106.6 kg (235 lb) Vancomycin therapy: 
Loading dose: 2000mg IV x 1 at 8840 (6/25/20) Maintenance dose: 1500mg IV Q24hrs starting 6/26 at 0700; Dose may need to be modified after renal function assessed in am.  Current SCr = 2.32; Unclear baseline. Dose calculated to approximate a therapeutic trough of 15-20mcg/mL. Last trough level: New start Plan for level / Adjustment in Therapy: Prior to 3rd total dose or sooner if clinically warranted Dose administration notes:    
 
Date Dose & Interval Measured (mcg/mL) Extrapolated (mcg/mL) ? ? ? ?  
? ? ? ?  
? ? ? ? Non-Kinetic Antimicrobial Dosing Regimen:  
Current Regimen:  Cefepime 2g IV Q8hrs --> Changed to 2g IV Q12hrs for estimated CrCl ~30-40ml/min. Dose administration notes: Other Antimicrobial  
(not dosed by pharmacist) Cultures 6/25 SARS-COV-2 pending 6/25 Urine pending 6/25 Blood pending Serum Creatinine Lab Results Component Value Date/Time Creatinine 2.32 (H) 06/25/2020 04:59 AM  
  
  
Creatinine Clearance Estimated Creatinine Clearance: 32.6 mL/min (A) (based on SCr of 2.32 mg/dL (H)). Temp Temp: 100 °F (37.8 °C) WBC Lab Results Component Value Date/Time WBC 8.8 06/25/2020 04:59 AM  
 
  
H/H Lab Results Component Value Date/Time HGB 10.0 (L) 06/25/2020 04:59 AM  
 
  
Platelets Lab Results Component Value Date/Time PLATELET 546 79/18/4132 04:59 AM  
 
  
 
 
 
For Antifungals, Metronidazole, and Nafcillin:  ALT:        AST:      Alk Phos:      T Bili: 
 
Leila Clark, Pharm. D. [address] [place] [address] Dr Mustafa

## 2020-06-25 NOTE — CONSULTS
[place]: [address] [facility] [address] [facility] [phone] Name:   Dutch Frank Medical record #: [id number] Admission Date: 6/25/2020 Who Consulted: Dr. Catie Kam Reason for Consult:  Parkinson's, frequent falls HISTORY OF PRESENT ILLNESS:  
 
This is a 75 y.o. male who is admitted for AMS, weakness. Mr. Conrado Waters presented to the ED with after his wife found him on the floor tafter he allegedly fell and appeared to be laying in the pool of urine while he was attempted to go to the bathroom. Wife stated this is the third time this week that he has had similar symptoms. The patient states that he has been extremely weak and his legs have been giving out for no apparent reason. Per medical record review he was evaluated in the ER several hours earlier on the day of admission for the same symptoms and decided to leave the hospital [address] because he was feeling better. Upon arrival to the ED he appeared to be confused and disoriented and was oriented to place and self. The Neurology Service is asked to evaluate for weakness. He is on Sinemet  mg presumably for Parkinsons disease but we do not have any of his medical records. He has not been seen at [facility] or [facility] for this. Neuro-imaging:  
 
CT Head: No acute process. EKG: normal sinus rhythm. Care Plan discussed with: 
Patient x Family RN Care Manager Consultant/Specialist:    
 
 
Thank you for allowing the Neurology Service the pleasure of participating in the care of your patient. This patient will be discussed with my collaborating care team physician Dr. Digna Ram, and he may have further recommendations regarding this patient's care Mindy Magaña, OWENP-BC 
==================== Attending Attestation:  
 
 
 
NEUROLOGY ATTENDING ADDENDUM: 
 
June 26, 2020 Pt personally seen and examined. Chart reviewed.   Agree with advanced NP's history, exam and A/P with changes/additions. 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 Wife also helps with history. Dr Xi Winslow reports that he had 2 L-spine surgeries/ lumbar hardware many years ago and is certain he has cervical spondylosis. Wife describes that 2 days ago they were coming in from garage and pt's legs gave out, fell to ground and banged up left knee. Came to ER, evaluated, recommended to be admitted but pt decided to leave. She says early yesterday AM (~3AM), wife found him on bathroom floor, lying in urine. Pt vaguely recalls falling in the bathroom. Denies any muscle aching (arms or legs), upper extremity weakness, but has significant left knee pain. Also found to have urinary retention, renal insufficiency. Cr improved after sánchez placement. Pt to be evaluated for enlarged prostate as outpatient. Pt sees Dr Arce Sensor disorders specialist at [facility] for his Parkinson's. He/ wife describe his baseline as ambulating with forward leaning gait, using rolling walker or cane, sometimes withotu assistive device. Pt thinks he leans forward in part to alleivate severe back pain. Exam  
 
Awake, alert, conversant, appears to be in mild-moderate distress regarding left knee pain. Neck supple. Cardiac and Lungs not examined. CN: EOMI, Face symmetric, Facial sensation intact bilaterally, tongue midline, soft palate elevates symmetric, Hearing grossly normal, Language normal (no aphasia, no dysarthria), Shrug symmetric Motor: 5/5 strength in both arms and right leg. Limited movement of left leg due to knee pain (observed 2/5 strength). No muscle tenderness on exam 
 
Sensory: intact LT, vibration in all exts Cerebellar: no rest, postural, or intention tremor DTRs: 1+ biceps, 1+ right patellar, left patellar deferred, absent achilles Plantars: neutral bilateral 
 
Gait: not tested Impression/ Plan 
 
75 y.o. male with Parkinson's. 2 recent episodes of falls (6-24-20, 6-25-20) due to legs giving out. No upper extremity weakness or right lower extremity weakness on exam.  Left leg strength is limited due to knee pain. No muscle tenderness on exam (ie myopathic symptoms). Will check MRI Cervical and Thoracic Spine (w/o contrast) to eval for significant spinal stenosis causing episodic leg weakness and falls. He/ wife deny that he had any falls prior to the recent ones. Will leave his Parkinson's medications as is. Added CK to AM labs but I think myopathy is less likely. Dr Long Bhatt will be taking over this afternoon. Thank you for the consultation. Signed By: Mary Enrique MD   
 June 26, 2020 Review of Systems: 10 point ROS was performed. Pertinent positives listed in HPI. Negative ROS is as follows. Pt denies: angina, palpitations, paresthesias, weakness, vision loss, slurred speech, aphasia, confusion, fever, chills, falls, headache, diplopia, back pain, neck pain, prior episodes of vertigo, hallucinations, new medications or dosage changes. PHYSICAL EXAM:  
 
Visit Vitals /69 (BP 1 Location: Left arm, BP Patient Position: At rest) Pulse 92 Temp 98.2 °F (36.8 °C) Resp 16 Ht 5' 8\" (1.727 m) Wt 115.6 kg (254 lb 12.8 oz) SpO2 96% BMI 38.74 kg/m² Allergies:  
No Known Allergies Outpatient Meds No current facility-administered medications on file prior to encounter. Current Outpatient Medications on File Prior to Encounter Medication Sig Dispense Refill  carvediloL (COREG) 6.25 mg tablet Take 6.25 mg by mouth two (2) times daily (with meals).  aspirin delayed-release 325 mg tablet Take 325 mg by mouth daily.  glipiZIDE SR (GLUCOTROL XL) 5 mg CR tablet Take 5 mg by mouth daily.  lisinopriL (PRINIVIL, ZESTRIL) 5 mg tablet Take 5 mg by mouth daily.     
 metFORMIN (GLUCOPHAGE) 1,000 mg tablet Take 1,000 mg by mouth two (2) times daily (with meals).  tamsulosin (FLOMAX) 0.4 mg capsule Take 0.4 mg by mouth daily.  pramipexole (MIRAPEX) 0.5 mg tablet Take 0.5 mg by mouth four (4) times daily.  carbidopa-levodopa (Sinemet)  mg per tablet Take 2 Tabs by mouth four (4) times daily. Indications: parkinsonism due to degeneration in the brain  gabapentin (NEURONTIN) 600 mg tablet Take 600 mg by mouth four (4) times daily.  HYDROcodone-acetaminophen (Norco) 5-325 mg per tablet Take 1 Tab by mouth two (2) times daily as needed for Pain.  torsemide (DEMADEX) 100 mg tablet Take 50 mg by mouth daily.  omeprazole (PRILOSEC) 40 mg capsule Take 40 mg by mouth daily.  atorvastatin (LIPITOR) 20 mg tablet Take 20 mg by mouth daily.  docusate sodium (COLACE) 100 mg capsule Take 400 mg by mouth daily.  cholecalciferol (Vitamin D3) (1000 Units /25 mcg) tablet Take 1,000 Units by mouth daily.  loratadine (CLARITIN) 10 mg tablet Take 10 mg by mouth daily.  fluticasone propionate (Flonase Allergy Relief) 50 mcg/actuation nasal spray 2 Sprays by Both Nostrils route daily.  entacapone (COMTAN) 200 mg tablet Take 200 mg by mouth four (4) times daily.  b complex vitamins tablet Take 1 Tab by mouth daily.  spironolactone (ALDACTONE) 25 mg tablet Take 25 mg by mouth daily.  traZODone (DESYREL) 50 mg tablet Take  mg by mouth nightly as needed for Sleep.  insulin glargine (LANTUS,BASAGLAR) 100 unit/mL (3 mL) inpn 40 Units by SubCUTAneous route two (2) times a day.  insulin aspart U-100 (NovoLOG Flexpen U-100 Insulin) 100 unit/mL (3 mL) inpn by SubCUTAneous route Before breakfast, lunch, and dinner. Uses as sliding scale TIDAC (unknown scale) Inpatient Meds Current Facility-Administered Medications Medication Dose Route Frequency Provider Last Rate Last Dose  
 HYDROcodone-acetaminophen (NORCO) 5-325 mg per tablet 2 Tab  2 Tab Oral Q6H PRN Hilario Palumbo MD   2 Tab at 06/26/20 1302  sodium chloride (NS) flush 5-10 mL  5-10 mL IntraVENous PRN Hilario Palumbo MD      
 sodium chloride (NS) flush 5-40 mL  5-40 mL IntraVENous Q8H Hilario Palumbo MD   10 mL at 06/26/20 1303  sodium chloride (NS) flush 5-40 mL  5-40 mL IntraVENous PRN Hilario Palumbo MD      
 naloxone [facility]) injection 0.4 mg  0.4 mg IntraVENous PRN Hilario Palumbo MD      
 cefepime (MAXIPIME) 2 g in 0.9% sodium chloride (MBP/ADV) 100 mL  2 g IntraVENous Q12H Hilario Palumbo  mL/hr at 06/26/20 0631 2 g at 06/26/20 0631  
 heparin (porcine) injection 5,000 Units  5,000 Units SubCUTAneous Mack Elias MD   5,000 Units at 06/26/20 8427  aspirin delayed-release tablet 325 mg  325 mg Oral DAILY Hilario Palumbo MD   325 mg at 06/26/20 0747  
 atorvastatin (LIPITOR) tablet 20 mg  20 mg Oral Yudelka Amador MD   20 mg at 06/25/20 2123  carbidopa-levodopa (SINEMET)  mg per tablet 2 Tab  2 Tab Oral QID Hilario Palumbo MD   2 Tab at 06/26/20 1302  carvediloL (COREG) tablet 6.25 mg  6.25 mg Oral BID WITH MEALS Hilario Palumbo MD   6.25 mg at 06/26/20 3039  cholecalciferol (VITAMIN D3) (1000 Units /25 mcg) tablet 1 Tab  1,000 Units Oral DAILY Hilario Palumbo MD   1 Tab at 06/26/20 0747  
 docusate sodium (COLACE) capsule 400 mg  400 mg Oral DAILY Hilario Palumbo MD      
 entacapone Ragena Hidden) tablet 200 mg  200 mg Oral QID Hilario Palumbo MD   200 mg at 06/26/20 1302  fluticasone propionate (FLONASE) 50 mcg/actuation nasal spray 2 Spray  2 Spray Both Nostrils DAILY Hilario Palumbo MD   2 Spray at 06/26/20 1103  
 gabapentin (NEURONTIN) capsule 300 mg  300 mg Oral TID Hilario Palumbo MD   300 mg at 06/26/20 7874  insulin glargine (LANTUS) injection 10 Units  10 Units SubCUTAneous BID Hilario Palumbo MD   10 Units at 06/26/20 4444  loratadine (CLARITIN) tablet 10 mg  10 mg Oral DAILY Hilario Palumbo MD   10 mg at 06/26/20 1473  pantoprazole (PROTONIX) tablet 40 mg  40 mg Oral ACB Concetta Olszewski, MD      
 pramipexole (MIRAPEX) tablet 0.5 mg  0.5 mg Oral BID Concetta Olszewski, MD   0.5 mg at 06/26/20 8051  tamsulosin (FLOMAX) capsule 0.4 mg  0.4 mg Oral DAILY Concetta Olszewski, MD   0.4 mg at 06/26/20 2009  traZODone (DESYREL) tablet 50 mg  50 mg Oral QHS PRN Concetta Olszewski, MD   50 mg at 06/25/20 2225  insulin lispro (HUMALOG) injection   SubCUTAneous AC&HS Concetta Olszewski, MD   2 Units at 06/26/20 1302  
 glucose chewable tablet 16 g  4 Tab Oral PRN Concetta Olszewski, MD      
 dextrose (D50W) injection syrg 12.5-25 g  12.5-25 g IntraVENous PRN Concetta Olszewski, MD      
 glucagon [facility]) injection 1 mg  1 mg IntraMUSCular PRN Concetta Olszewski, MD      
 finasteride (PROSCAR) tablet 5 mg  5 mg Oral DAILY Roise Samples D, NP   5 mg at 06/26/20 5682  b-complex with vitamin c tablet 1 Tab  1 Tab Oral DAILY Concetta Olszewski, MD   1 Tab at 06/26/20 1131 No past medical history on file. No past surgical history on file. family history is not on file. Lab Results (last 24 hrs) Recent Results (from the past 24 hour(s)) GLUCOSE, POC Collection Time: 06/25/20  4:29 PM  
Result Value Ref Range Glucose (POC) 185 (H) 65 - 100 mg/dL Performed by Jerzy Ferreira (PCT) CREATININE, UR, RANDOM Collection Time: 06/25/20  4:30 PM  
Result Value Ref Range Creatinine, urine 24.00 mg/dL SODIUM, UR, RANDOM Collection Time: 06/25/20  4:30 PM  
Result Value Ref Range Sodium,urine random 53 MMOL/L  
EOSINOPHILS, URINE Collection Time: 06/25/20  4:30 PM  
Result Value Ref Range Eosinophils,urine Negative GLUCOSE, POC Collection Time: 06/25/20  9:48 PM  
Result Value Ref Range Glucose (POC) 153 (H) 65 - 100 mg/dL Performed by Trang Day (PCT) METABOLIC PANEL, COMPREHENSIVE Collection Time: 06/26/20  3:26 AM  
Result Value Ref Range  Sodium 139 136 - 145 mmol/L  
 Potassium 4.2 3.5 - 5.1 mmol/L Chloride 110 (H) 97 - 108 mmol/L  
 CO2 25 21 - 32 mmol/L Anion gap 4 (L) 5 - 15 mmol/L Glucose 115 (H) 65 - 100 mg/dL BUN 22 (H) 6 - 20 MG/DL Creatinine 1.41 (H) 0.70 - 1.30 MG/DL  
 BUN/Creatinine ratio 16 12 - 20 GFR est AA 59 (L) >60 ml/min/1.73m2 GFR est non-AA 49 (L) >60 ml/min/1.73m2 Calcium 9.0 8.5 - 10.1 MG/DL Bilirubin, total 0.8 0.2 - 1.0 MG/DL  
 ALT (SGPT) 7 (L) 12 - 78 U/L  
 AST (SGOT) 29 15 - 37 U/L Alk. phosphatase 62 45 - 117 U/L Protein, total 7.8 6.4 - 8.2 g/dL Albumin 3.2 (L) 3.5 - 5.0 g/dL Globulin 4.6 (H) 2.0 - 4.0 g/dL A-G Ratio 0.7 (L) 1.1 - 2.2    
CBC WITH AUTOMATED DIFF Collection Time: 06/26/20  3:26 AM  
Result Value Ref Range WBC 6.9 4.1 - 11.1 K/uL  
 RBC 3.73 (L) 4.10 - 5.70 M/uL  
 HGB 10.7 (L) 12.1 - 17.0 g/dL HCT 34.0 (L) 36.6 - 50.3 % MCV 91.2 80.0 - 99.0 FL  
 MCH 28.7 26.0 - 34.0 PG  
 MCHC 31.5 30.0 - 36.5 g/dL  
 RDW 15.1 (H) 11.5 - 14.5 % PLATELET 097 512 - 131 K/uL MPV 9.5 8.9 - 12.9 FL  
 NRBC 0.0 0  WBC ABSOLUTE NRBC 0.00 0.00 - 0.01 K/uL NEUTROPHILS 75 32 - 75 % LYMPHOCYTES 12 12 - 49 % MONOCYTES 8 5 - 13 % EOSINOPHILS 3 0 - 7 % BASOPHILS 1 0 - 1 % IMMATURE GRANULOCYTES 1 (H) 0.0 - 0.5 % ABS. NEUTROPHILS 5.3 1.8 - 8.0 K/UL  
 ABS. LYMPHOCYTES 0.8 0.8 - 3.5 K/UL  
 ABS. MONOCYTES 0.6 0.0 - 1.0 K/UL  
 ABS. EOSINOPHILS 0.2 0.0 - 0.4 K/UL  
 ABS. BASOPHILS 0.0 0.0 - 0.1 K/UL  
 ABS. IMM. GRANS. 0.1 (H) 0.00 - 0.04 K/UL  
 DF AUTOMATED MAGNESIUM Collection Time: 06/26/20  3:26 AM  
Result Value Ref Range Magnesium 1.7 1.6 - 2.4 mg/dL PHOSPHORUS Collection Time: 06/26/20  3:26 AM  
Result Value Ref Range Phosphorus 2.7 2.6 - 4.7 MG/DL FERRITIN Collection Time: 06/26/20  3:26 AM  
Result Value Ref Range Ferritin 135 26 - 388 NG/ML  
FOLATE Collection Time: 06/26/20  3:26 AM  
Result Value Ref Range Folate 25.7 (H) 5.0 - 21.0 ng/mL IRON PROFILE Collection Time: 06/26/20  3:26 AM  
Result Value Ref Range Iron 17 (L) 35 - 150 ug/dL TIBC 257 250 - 450 ug/dL Iron % saturation 7 (L) 20 - 50 % VITAMIN B12 Collection Time: 06/26/20  3:26 AM  
Result Value Ref Range Vitamin B12 1,019 (H) 193 - 986 pg/mL LD Collection Time: 06/26/20  3:26 AM  
Result Value Ref Range  85 - 241 U/L  
HAPTOGLOBIN Collection Time: 06/26/20  3:26 AM  
Result Value Ref Range Haptoglobin 387 (H) 30 - 200 mg/dL HEMOGLOBIN A1C WITH EAG Collection Time: 06/26/20  3:26 AM  
Result Value Ref Range Hemoglobin A1c 7.0 (H) 4.0 - 5.6 % Est. average glucose 154 mg/dL GLUCOSE, POC Collection Time: 06/26/20  6:39 AM  
Result Value Ref Range Glucose (POC) 187 (H) 65 - 100 mg/dL Performed by Ewa Benitez GLUCOSE, POC Collection Time: 06/26/20 12:50 PM  
Result Value Ref Range Glucose (POC) 192 (H) 65 - 100 mg/dL Performed by Carlos Mendez (PCT)

## 2020-06-25 NOTE — CONSULTS
New Urology Consult Note Patient: Sarthak Umanzor MRN: [id number]  SSN: [id number] YOB: 1945  Age: 75 y.o. Sex: male Assessment:  
 
Sarthak Umanzor is a 75 y.o. male with HTN, DM, ? CKD, BPH, Parkinson's disease who presents with falls. Currently admitted for metabolic encephalopathy, urinary retention, renal insufficiency, anemia and lung nodule. We are asked to see him in consultation for retention, enlarged prostate, and hydronephrosis. Recommendations:  
 
1. Will need sánchez for 5-7 days. 2. Avoid tamsulosin with recent falls. 3. Start finasteride. 4. Monitor renal function. Thank you for this consult. Please contact [facility] with any further questions/concerns. Tristian Stapleton NP [phone] History of Present Illness:  
 
Reason for Consult: LYN vs CKD4. Hydronephrosis from enlarged prostate. Sarthak Umanzor is seen in consultation for reasons noted above at the request of Adria Prince MD. This is a 75 y.o. male with a history of HTN, DM, ? CKD, BPH, Parkinson's disease who presents with falls. Chart reviewed and spoke to nursing. Was evaluated in the ED this morning for weakness and falls, left AMA. Creatinine 2.32, UA negative. CT with prostatomegaly with bladder distention and mild bilateral hydronephrosis. Apparently straight cath done in the ED and nurse reported some bleeding. PVR now > 1000cc. Subjective Past Medical History No past medical history on file. Past Surgical History: No past surgical history on file. Medication: 
Current Facility-Administered Medications Medication Dose Route Frequency Provider Last Rate Last Dose  sodium chloride (NS) flush 5-10 mL  5-10 mL IntraVENous PRN Isabel Jenkins MD      
 sodium chloride (NS) flush 5-40 mL  5-40 mL IntraVENous Q8H Adria Prince MD   10 mL at 06/25/20 1541  sodium chloride (NS) flush 5-40 mL  5-40 mL IntraVENous PRN Marshall Hutchison, Matilde Finn MD      
 acetaminophen (TYLENOL) tablet 650 mg  650 mg Oral Q6H PRN Lilli Womack MD      
 naloxone [facility]) injection 0.4 mg  0.4 mg IntraVENous PRN Lilli Womack MD      
[address] [place] [address] [START ON 6/26/2020] vancomycin (VANCOCIN) 1500 mg in  ml infusion  1,500 mg IntraVENous Q24H Lilli Womack MD      
 cefepime (MAXIPIME) 2 g in 0.9% sodium chloride (MBP/ADV) 100 mL  2 g IntraVENous Q12H Lilli Womack MD      
 heparin (porcine) injection 5,000 Units  5,000 Units SubCUTAneous Q8H Lilli Womack MD      
 insulin lispro (HUMALOG) injection   SubCUTAneous AC&HS Lilli Womack MD      
 glucose chewable tablet 16 g  4 Tab Oral PRN Lilli Womack MD      
 dextrose (D50W) injection syrg 12.5-25 g  12.5-25 g IntraVENous PRN Lilli Womack MD      
 glucagon [facility]) injection 1 mg  1 mg IntraMUSCular PRN Lilli Womack MD      
 
 
Allergies: 
No Known Allergies Social History: 
Social History Tobacco Use  Smoking status: Not on file Substance Use Topics  Alcohol use: Not on file  Drug use: Not on file Family History No family history on file. Review of Systems Patient not seen due to COVID precautions Objective:  
 
Vital signs in last 24 hours: 
Visit Vitals /87 (BP 1 Location: Right arm, BP Patient Position: Supine) Pulse 93 Temp 97.8 °F (36.6 °C) Resp 17 Ht 5' 8\" (1.727 m) SpO2 94% BMI 35.73 kg/m² Intake/Output last 3 shifts: 
 
 
 
Physical Exam 
Patient not seen due to COVID precautions. From outside room, NAD, No resp distress Lab/Imaging Review: Most Recent Labs: 
Lab Results Component Value Date/Time WBC 8.8 06/25/2020 04:59 AM  
 HGB 10.0 (L) 06/25/2020 04:59 AM  
 HCT 30.6 (L) 06/25/2020 04:59 AM  
 PLATELET 829 30/72/0299 04:59 AM  
 MCV 89.2 06/25/2020 04:59 AM  
  
 
Lab Results Component Value Date/Time  Sodium 135 (L) 06/25/2020 04:59 AM  
 Potassium 4.7 06/25/2020 04:59 AM  
 Chloride 103 06/25/2020 04:59 AM  
 CO2 26 06/25/2020 04:59 AM  
 Anion gap 6 06/25/2020 04:59 AM  
 Glucose 125 (H) 06/25/2020 04:59 AM  
 BUN 47 (H) 06/25/2020 04:59 AM  
 Creatinine 2.32 (H) 06/25/2020 04:59 AM  
 BUN/Creatinine ratio 20 06/25/2020 04:59 AM  
 GFR est AA 33 (L) 06/25/2020 04:59 AM  
 GFR est non-AA 28 (L) 06/25/2020 04:59 AM  
 Calcium 9.0 06/25/2020 04:59 AM  
 Bilirubin, total 0.6 06/25/2020 04:59 AM  
 Alk. phosphatase 61 06/25/2020 04:59 AM  
 Protein, total 7.6 06/25/2020 04:59 AM  
 Albumin 3.5 06/25/2020 04:59 AM  
 Globulin 4.1 (H) 06/25/2020 04:59 AM  
 A-G Ratio 0.9 (L) 06/25/2020 04:59 AM  
 ALT (SGPT) 23 06/25/2020 04:59 AM  
  
 
No results found for: PSA, Halina Sat, PSAR3, [id number], [id number], PSALT, 95963, PSAEXT COAGS:  No results found for: APTT, PTP, INR, INREXT No results found for: HBA1C, HGBE8, RCO0CXGU, UER0BHNY Lab Results Component Value Date/Time CK - MB 1.9 06/25/2020 04:59 AM  
 CK-MB Index 0.4 06/25/2020 04:59 AM  
 Troponin-I, Qt. <0.05 06/25/2020 04:59 AM  
  
 
 
Urine/Blood Cultures: 
Results Procedure Component Value Units Date/Time Brooke Rodarte [[id number]] Collected:  06/25/20 8978 Order Status:  Completed Specimen:  Urine from Clean catch Updated:  06/25/20 1102 CULTURE, BLOOD, PAIRED [[id number]] Collected:  06/25/20 0458 Order Status:  Completed Specimen:  Blood Updated:  06/25/20 0945 Special Requests: NO SPECIAL REQUESTS Culture result: NO GROWTH AFTER 1 HOUR     
 URINE CULTURE HOLD SAMPLE [[id number]] Order Status:  Sent Specimen:  Urine IMAGING: 
Ct Head Wo Cont Result Date: 6/25/2020 INDICATION: Weakness/ frequent fall/ AMS EXAM:  HEAD CT WITHOUT CONTRAST COMPARISON: None TECHNIQUE:  Routine noncontrast axial head CT was performed. Sagittal and coronal reconstructions were generated.   CT dose reduction was achieved through use of a standardized protocol tailored for this examination and automatic exposure control for dose modulation. FINDINGS: Ventricles: Midline, no hydrocephalus. Intracranial Hemorrhage: None. Brain Parenchyma/Brainstem: Normal for age. Basal Cisterns: Normal. Paranasal Sinuses: Visualized sinuses are clear. Additional Comments: N/A. IMPRESSION: No acute process. Ct Chest Wo Cont Result Date: 6/25/2020 INDICATION: Unwitnessed fall. Confusion change in mental status, weakness. Evaluation for sepsis. Clinical metabolic encephalopathy and acute kidney injury COMPARISON: None CONTRAST: None. TECHNIQUE:  5 mm axial images were obtained through the chest, abdomen, and pelvis. Oral contrast was not given. Coronal and sagittal reformats were generated. CT dose reduction was achieved through use of a standardized protocol tailored for this examination and automatic exposure control for dose modulation. The absence of intravenous contrast reduces the sensitivity for evaluation of the mediastinum, tyshawn, vasculature, and upper abdominal organs. FINDINGS: CHEST WALL: No mass or axillary lymphadenopathy. THYROID: No nodule. MEDIASTINUM: No mass or lymphadenopathy. TYSHAWN: No mass or lymphadenopathy. THORACIC AORTA: No aneurysm. MAIN PULMONARY ARTERY: Normal in caliber. TRACHEA/BRONCHI: Patent. ESOPHAGUS: No wall thickening or dilatation. HEART: Normal in size. Coronary artery calcification. PLEURA: No effusion or pneumothorax. LUNGS: 9.5 mm pleural parenchymal nodule in the left lower lobe (series 2, image 51). LIVER: No mass or biliary dilatation. GALLBLADDER: Unremarkable. SPLEEN: No mass. PANCREAS: No mass or ductal dilatation. ADRENALS: Unremarkable. KIDNEYS/URETERS: Mild bilateral hydronephrosis and hydroureter. STOMACH: Unremarkable. SMALL BOWEL: No dilatation or wall thickening. COLON: No dilatation or wall thickening. APPENDIX: Unremarkable. Axial image 89 PERITONEUM: No ascites or pneumoperitoneum. RETROPERITONEUM: No lymphadenopathy or aortic aneurysm. REPRODUCTIVE ORGANS: Prostate enlargement with the central prostate hypertrophy URINARY BLADDER: 14 x 7 mm calculus in the bladder (series 2, image 105). BONES: Posterior fusion of L3, L4 and L5 with hardware at L3-4. Disc desiccation at the L5-S1 level. Associated posterior decompression with placement of lateral bone graft material, incompletely incorporated. ADDITIONAL COMMENTS: N/A IMPRESSION: Prostatomegaly with bladder distention and mild bilateral hydroureter and hydronephrosis Left lower lobe 9.5 cm nodule. Coronary artery disease, bladder calculus Ct Abd Pelv Wo Cont Result Date: 6/25/2020 INDICATION: Unwitnessed fall. Confusion change in mental status, weakness. Evaluation for sepsis. Clinical metabolic encephalopathy and acute kidney injury COMPARISON: None CONTRAST: None. TECHNIQUE:  5 mm axial images were obtained through the chest, abdomen, and pelvis. Oral contrast was not given. Coronal and sagittal reformats were generated. CT dose reduction was achieved through use of a standardized protocol tailored for this examination and automatic exposure control for dose modulation. The absence of intravenous contrast reduces the sensitivity for evaluation of the mediastinum, tyshawn, vasculature, and upper abdominal organs. FINDINGS: CHEST WALL: No mass or axillary lymphadenopathy. THYROID: No nodule. MEDIASTINUM: No mass or lymphadenopathy. TYSHAWN: No mass or lymphadenopathy. THORACIC AORTA: No aneurysm. MAIN PULMONARY ARTERY: Normal in caliber. TRACHEA/BRONCHI: Patent. ESOPHAGUS: No wall thickening or dilatation. HEART: Normal in size. Coronary artery calcification. PLEURA: No effusion or pneumothorax. LUNGS: 9.5 mm pleural parenchymal nodule in the left lower lobe (series 2, image 51). LIVER: No mass or biliary dilatation. GALLBLADDER: Unremarkable. SPLEEN: No mass. PANCREAS: No mass or ductal dilatation. ADRENALS: Unremarkable.  KIDNEYS/URETERS: Mild bilateral hydronephrosis and hydroureter. STOMACH: Unremarkable. SMALL BOWEL: No dilatation or wall thickening. COLON: No dilatation or wall thickening. APPENDIX: Unremarkable. Axial image 89 PERITONEUM: No ascites or pneumoperitoneum. RETROPERITONEUM: No lymphadenopathy or aortic aneurysm. REPRODUCTIVE ORGANS: Prostate enlargement with the central prostate hypertrophy URINARY BLADDER: 14 x 7 mm calculus in the bladder (series 2, image 105). BONES: Posterior fusion of L3, L4 and L5 with hardware at L3-4. Disc desiccation at the L5-S1 level. Associated posterior decompression with placement of lateral bone graft material, incompletely incorporated. ADDITIONAL COMMENTS: N/A IMPRESSION: Prostatomegaly with bladder distention and mild bilateral hydroureter and hydronephrosis Left lower lobe 9.5 cm nodule. Coronary artery disease, bladder calculus Xr Chest [facility] Result Date: 6/25/2020 INDICATION: meets SIRS criteria EXAM:  AP CHEST RADIOGRAPH COMPARISON: None FINDINGS: AP portable view of the chest demonstrates a normal cardiomediastinal silhouette. There is no edema, effusion, consolidation, or pneumothorax. The osseous structures are unremarkable. IMPRESSION: No acute process. Signed By: Sia Orellana NP  - June 25, 2020

## 2020-06-25 NOTE — ED NOTES
TRANSFER - OUT REPORT: 
 
Verbal report given to pedro rn (name) on Gloria Luna  being transferred to [address] [place] [address] (unit) for routine progression of care Report consisted of patients Situation, Background, Assessment and  
Recommendations(SBAR). Information from the following report(s) SBAR, ED Summary, [facility] and Recent Results was reviewed with the receiving nurse. Lines:  
Peripheral IV 06/25/20 Left Antecubital (Active) Peripheral IV 06/25/20 Right Hand (Active) Site Assessment Clean, dry, & intact 6/25/2020  5:58 AM  
Phlebitis Assessment 0 6/25/2020  5:58 AM  
Infiltration Assessment 0 6/25/2020  5:58 AM  
Dressing Status Clean, dry, & intact 6/25/2020  5:58 AM  
Dressing Type Tape;Transparent 6/25/2020  5:58 AM  
Hub Color/Line Status Patent; Flushed;Blue 6/25/2020  5:58 AM  
  
 
Opportunity for questions and clarification was provided. Patient transported with: 
 Registered Nurse

## 2020-06-25 NOTE — PROGRESS NOTES
Asked to admit for \"severe sepsis\". No clear source of infection. Given fall and poor historian, would be prudent to check CT c/a/p to rule out fractures, acute intra-abd processes. Agree with empiric IV abx for now pending more data. SARS-COV-2 sent.

## 2020-06-25 NOTE — H&P
[address] 
[phone] Hospitalist Admission Note NAME:  Rubina Kothari :   1945 MRN:  [id number] PCP:  Boni Villegas MD  
 
Date of Service/Time:  2020 1:56 PM 
 
 
  
Assessment / Plan:   
  
76 yo WM w/ hx of HTN, DM, ? CKD, BPH, Parkinson's disease presenting with weakness, fall, admitted for acute encephalopathy Acute metabolic encephalopathy: improving already. Likely multifactorial, including urinary retention, LYN, etc. No focal s/sx to suggest primary CNS process. Head CT negative. Tx as below. Supportive care Tachycardia: Tmax 100. Given elevated elevated lactic acid level, there was concern for sepsis. However, pt quickly improving. Empiric IV vanc and cefepime for now but would deescalate ASAP. Follow cultures. Urinary retention: CT a/p showing distended bladder, enlarged prostate, and hydronephrosis. Bladder scan showed ~1L. Place sánchez catheter. Urology consult Renal insufficiency: unclear baseline renal function, but suspect LYN. HOLD metformin, ACEi, torsemide. Renally dose meds. Follow BMP Anemia: suspect ACD. Send iron panel, ferritin, B12, folate, FOBT. Parkinson disease: with falls at home. Continue Sinemet. PT/OT, fall precautions Type II diabetes mellitus: appears controlled. Hold glipizide and metformin. Start SSI alone for now. Send A1c.  
 
  HTN (hypertension): BP controlled. Monitor off lisinopril and torsemide. Continue Coreg with parameters Lung nodule: 9.5 mm pleural parenchymal nodule in the left lower lobe. Will need to clarify smoking hx and RFs. Will need repeat chest imaging Code Status: FULL Surrogate decision maker: wife Previous notes and lab results reviewed. Total time spent with patient: 70 Minutes Time spent in the care of this patient included reviewing records, discussing with nursing, obtaining history and examining the patient, and discussing treatment plans, with >50% time spent counseling/coordinating care Risk of deterioration: High Care Plan discussed with: ED provider, Patient, Nursing Staff and >50% of time spent in counseling and coordination of care Discussed:  Care Plan and D/C Planning Prophylaxis:  Hep SQ Disposition:   PT, OT, RN Subjective: CHIEF COMPLAINT: falling, weakness HISTORY OF PRESENT ILLNESS:    
Mr. Keegan Gibson is a 75 y.o. male w/ hx of HTN, DM, ? CKD, BPH, Parkinson's disease who presents with falls. Pt is a poor historian however he denies syncope, stating he fell to the ground as his legs gave out. Per chart review, pt was found on the floor yesterday evening after reportedly fell and appeared to be laying in a pool of urine while he was attempted to go to the bathroom. Wife and patient generalized weakness and his legs have been giving out for no apparent reason. Wife stated this is the third time this week that he has had similar symptoms. He was just evaluated in the ER several hours prior for the same however he left AMA after feeling better. Pt denied HA, dizziness, CP, SOB, cough, f/c, abd pain, n/v/d. ED workup showed Temp 100. Borderline hypotension on initial presentation. Elevated lactate. CXR, UA negative. Mr. Keegan Gibson is admitted for further evaluation and management. PMH:  HTN, DM, ? CKD, BPH, Parkinson's disease Social History Tobacco Use  Smoking status: denies Substance Use Topics  Alcohol use: denies Family History: unable to obtain, poor historian No Known Allergies Prior to Admission medications Medication Sig Start Date End Date Taking? Authorizing Provider  
carvediloL (COREG) 6.25 mg tablet Take 6.25 mg by mouth two (2) times daily (with meals).    Yes Provider, Historical  
 aspirin delayed-release 325 mg tablet Take 325 mg by mouth daily. Yes Provider, Historical  
glipiZIDE SR (GLUCOTROL XL) 5 mg CR tablet Take 5 mg by mouth daily. Yes Provider, Historical  
lisinopriL (PRINIVIL, ZESTRIL) 5 mg tablet Take 5 mg by mouth daily. Yes Provider, Historical  
metFORMIN (GLUCOPHAGE) 1,000 mg tablet Take 1,000 mg by mouth two (2) times daily (with meals). Yes Provider, Historical  
tamsulosin (FLOMAX) 0.4 mg capsule Take 0.4 mg by mouth daily. Yes Provider, Historical  
pramipexole (MIRAPEX) 0.5 mg tablet Take 0.5 mg by mouth four (4) times daily. Yes Provider, Historical  
carbidopa-levodopa (Sinemet)  mg per tablet Take 2 Tabs by mouth four (4) times daily. 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  
gabapentin (NEURONTIN) 600 mg tablet Take 600 mg by mouth four (4) times daily. Yes Provider, Historical  
HYDROcodone-acetaminophen (Norco) 5-325 mg per tablet Take 1 Tab by mouth two (2) times daily as needed for Pain. Yes Provider, Historical  
torsemide (DEMADEX) 100 mg tablet Take 50 mg by mouth daily. Yes Provider, Historical  
omeprazole (PRILOSEC) 40 mg capsule Take 40 mg by mouth daily. Yes Provider, Historical  
atorvastatin (LIPITOR) 20 mg tablet Take 20 mg by mouth daily. Yes Provider, Historical  
docusate sodium (COLACE) 100 mg capsule Take 400 mg by mouth daily. Yes Provider, Historical  
cholecalciferol (Vitamin D3) (1000 Units /25 mcg) tablet Take 1,000 Units by mouth daily. Yes Provider, Historical  
loratadine (CLARITIN) 10 mg tablet Take 10 mg by mouth daily. Yes Provider, Historical  
fluticasone propionate (Flonase Allergy Relief) 50 mcg/actuation nasal spray 2 Sprays by Both Nostrils route daily. Yes Provider, Historical  
entacapone (COMTAN) 200 mg tablet Take 200 mg by mouth four (4) times daily. Yes Provider, Historical  
b complex vitamins tablet Take 1 Tab by mouth daily.    Yes Provider, Historical  
spironolactone (ALDACTONE) 25 mg tablet Take 25 mg by mouth daily. Yes Provider, Historical  
traZODone (DESYREL) 50 mg tablet Take  mg by mouth nightly as needed for Sleep. Yes Provider, Historical  
insulin glargine (LANTUS,BASAGLAR) 100 unit/mL (3 mL) inpn 40 Units by SubCUTAneous route two (2) times a day. Yes Provider, Historical  
insulin aspart U-100 (NovoLOG Flexpen U-100 Insulin) 100 unit/mL (3 mL) inpn by SubCUTAneous route Before breakfast, lunch, and dinner. Uses as sliding scale TIDAC (unknown scale)   Yes Provider, Historical  
 
 
Review of Systems: 
(bold if positive, if negative) Gen:  fatigueEyes:  ENT:  CVS:  Pulm:  GI:  :  MS:Pain, weakness,Skin:  Psych:  Endo:  Hem:  Renal:  Neuro:  weakness Objective: VITALS:   
Vital signs reviewed; most recent are: 
 
Visit Vitals /87 (BP 1 Location: Right arm, BP Patient Position: Supine) Pulse 93 Temp 97.8 °F (36.6 °C) Resp 17 Ht 5' 8\" (1.727 m) SpO2 94% BMI 35.73 kg/m² SpO2 Readings from Last 6 Encounters:  
06/25/20 94% 06/24/20 93% No intake or output data in the 24 hours ending 06/25/20 1356 Exam:  
 
Physical Exam: 
 
Gen:  Elderly, well-developed, well-nourished, in no acute distress HEENT:  No scleral icterus, hearing intact to voice, moist mucous membranes Neck:  Supple, without masses. Thyroid non-tender Resp:  No accessory muscle use. CTAB without wheezing, rales, rhonchi 
Card: RRR. Normal S1 and S2 without murmurs, rubs, or gallops. No peripheral lower extremity edema. No JVD. Peripheral pulses in tact. Abd:  Normoactive bowel sounds. Soft, non-tender, non-distended. No rebound, no guarding. No appreciable hepatosplenomegaly Lymph:  No cervical adenopathy Musc:  No cyanosis or clubbing Skin:  No rashes or ulcers; turgor intact. Cap refill ~2 secs Neuro:  Cranial nerves are grossly intact, no focal motor weakness but rather generalized weakness, follows commands appropriately Psych:  Fair insight, normal affect. Alert, oriented to self and hospital and situation. Answers questions appropriately Labs: 
 
Recent Labs  
  06/25/20 0459 WBC 8.8 HGB 10.0* HCT 30.6*  Recent Labs  
  06/25/20 0459 * K 4.7  CO2 26 * BUN 47* CREA 2.32* CA 9.0 MG 1.6 PHOS 2.8 ALB 3.5 ALT 23 No components found for: [place] No results for input(s): PH, PCO2, PO2, HCO3, FIO2 in the last 72 hours. No results for input(s): INR, INREXT in the last 72 hours. No results found for: SDES Lab Results Component Value Date/Time Culture result: NO GROWTH AFTER 1 HOUR 06/25/2020 04:58 AM  
 
All other current labs reviewed in the computer. Imaging/Studies: 
 
Ct Head Wo Cont Result Date: 6/25/2020 IMPRESSION: No acute process. Ct Chest Wo Cont Result Date: 6/25/2020 IMPRESSION: Prostatomegaly with bladder distention and mild bilateral hydroureter and hydronephrosis Left lower lobe 9.5 cm nodule. Coronary artery disease, bladder calculus Ct Abd Pelv Wo Cont Result Date: 6/25/2020 IMPRESSION: Prostatomegaly with bladder distention and mild bilateral hydroureter and hydronephrosis Left lower lobe 9.5 cm nodule. Coronary artery disease, bladder calculus Xr Chest [facility] Result Date: 6/25/2020 IMPRESSION: No acute process. Imaging personally reviewed. EKG: NSR, Q waves EKG personally reviewed 
 
___________________________________________________ Attending Physician: Alejo Bethea MD

## 2020-06-25 NOTE — PROGRESS NOTES
[facility]: MED RECONCILIATION Comments/Recommendations:  
-Due to patient's confusion, contacted wife Cleo Gracia (Emergency Contact) to review his home medications.  
-Wife reviewed medication list - provided names and doses of meds; Stated she believed he had most of his morning meds yesterday. 
-Wife stated he had recently seen his NP who reduced his Pramipexole dose but she did not recall how dose had been reduced.   
-Upon review of patient's insulin regimen, she stated he was taking Lantus 40 units BID and had Novolog Flex pen sliding scale, however did not recall scale. Also reported recent low blood sugars and as a result had not been using the Novolog (x ~6 weeks); Was uncertain when last dose of Lantus was administered.   
-Currently taking Gabapentin 600mg PO 4x/day;  Given estimated CrCl~35ml/min, suggest reducing dose to 300mg PO TID. Medications added: All meds listed were added as new meds Medications removed: 
 
None Medications adjusted: 
 
None Information obtained from: Wife, Rx Query Allergies: Patient has no known allergies. Prior to Admission Medications:  
Prior to Admission Medications Prescriptions Last Dose Informant Taking? HYDROcodone-acetaminophen (Norco) 5-325 mg per tablet  Significant Other Yes Sig: Take 1 Tab by mouth two (2) times daily as needed for Pain. aspirin delayed-release 325 mg tablet 6/24/2020 at am Significant Other Yes Sig: Take 325 mg by mouth daily. atorvastatin (LIPITOR) 20 mg tablet 6/24/2020 at am Significant Other Yes Sig: Take 20 mg by mouth daily. b complex vitamins tablet 6/24/2020 at Unknown time Significant Other Yes Sig: Take 1 Tab by mouth daily. carbidopa-levodopa (Sinemet)  mg per tablet 6/24/2020 at Unknown time Significant Other Yes Sig: Take 2 Tabs by mouth four (4) times daily. 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 cholecalciferol (Vitamin D3) (1000 Units /25 mcg) tablet 2020 at am Significant Other Yes Sig: Take 1,000 Units by mouth daily. docusate sodium (COLACE) 100 mg capsule 2020 at Unknown time Significant Other Yes Sig: Take 400 mg by mouth daily. entacapone (COMTAN) 200 mg tablet 2020 at Unknown time Significant Other Yes Sig: Take 200 mg by mouth four (4) times daily. fluticasone propionate (Flonase Allergy Relief) 50 mcg/actuation nasal spray 2020 at Unknown time Significant Other Yes Si Sprays by Both Nostrils route daily. gabapentin (NEURONTIN) 600 mg tablet 2020 at Unknown time Significant Other Yes Sig: Take 600 mg by mouth four (4) times daily. glipiZIDE SR (GLUCOTROL XL) 5 mg CR tablet 2020 at am Significant Other Yes Sig: Take 5 mg by mouth daily. insulin aspart U-100 (NovoLOG Flexpen U-100 Insulin) 100 unit/mL (3 mL) inpn  Significant Other Yes Sig: by SubCUTAneous route Before breakfast, lunch, and dinner. Uses as sliding scale TIDAC (unknown scale)  
insulin glargine (LANTUS,BASAGLAR) 100 unit/mL (3 mL) inpn 2020 at Unknown time Significant Other Yes Si Units by SubCUTAneous route two (2) times a day. lisinopriL (PRINIVIL, ZESTRIL) 5 mg tablet 2020 at am Significant Other Yes Sig: Take 5 mg by mouth daily. loratadine (CLARITIN) 10 mg tablet 2020 at Unknown time Significant Other Yes Sig: Take 10 mg by mouth daily. metFORMIN (GLUCOPHAGE) 1,000 mg tablet 2020 at am Significant Other Yes Sig: Take 1,000 mg by mouth two (2) times daily (with meals). omeprazole (PRILOSEC) 40 mg capsule 2020 at am Significant Other Yes Sig: Take 40 mg by mouth daily. pramipexole (MIRAPEX) 0.5 mg tablet 2020 at Unknown time Significant Other Yes Sig: Take 0.5 mg by mouth four (4) times daily. spironolactone (ALDACTONE) 25 mg tablet 6/24/2020 at am Significant Other Yes Sig: Take 25 mg by mouth daily. tamsulosin (FLOMAX) 0.4 mg capsule 6/24/2020 at am Significant Other Yes Sig: Take 0.4 mg by mouth daily. torsemide (DEMADEX) 100 mg tablet 6/24/2020 at am Significant Other Yes Sig: Take 50 mg by mouth daily. traZODone (DESYREL) 50 mg tablet  Significant Other Yes Sig: Take  mg by mouth nightly as needed for Sleep. Facility-Administered Medications: None Pepe Muniz, Pharm. D. [address] [place] [address] Dr [address]

## 2020-06-25 NOTE — ACP (ADVANCE CARE PLANNING)
11:50 AM 
 
Advance Care Planning Advance Care Planning Activator (Inpatient) Conversation Note Date of ACP Conversation: 06/25/20 Conversation Conducted with:   Patient with Decision Making Capacity Spoke to pt's spouse--she will bring in pt's Advance Directives. ACP Activator: Jasper Cox *When Decision Maker makes decisions on behalf of the incapacitated patient: Decision Maker is asked to consider and make decisions based on patient values, known preferences, or best interests. Health Care Decision Maker: 
 
Current Designated Health Care Decision Maker:  
(If there is a valid [address] named in the [address] [place] [address] Makers\" box in the ACP activity, but it is not visible above, be sure to open that field and then select the health care decision maker relationship (ie \"primary\") in the blank space to the right of the name.) Validate  this information as still accurate & up-to-date; edit [address] field as needed.) Note: Assess and validate information in current ACP documents, as indicated. If no Decision Maker listed above or available through scanned documents, then: 
 
If no Authorized Decision Maker has previously been identified, then patient chooses [address]: \"Who would you like to name as your primary health care decision-maker? \" Name: Paresh Frausto    Relationship: Spouse Phone number: [phone] \"Can this person be reached easily? \" Brenda Sullivan Note: If the relationship of these Decision-Makers to the patient does NOT follow your state's Next of Kin hierarchy, recommend that patient complete ACP document that meets state-specific requirements to allow them to act on the patient's behalf when appropriate. Care Preferences Ventilation: \"If you were in your present state of health and suddenly became very ill and were unable to breathe on your own, what would your preference be about the use of a ventilator (breathing machine) if it were available to you? \" If patient would desire the use of a ventilator (breathing machine), answer \"yes\", if not \"no\":no \"If your health worsens and it becomes clear that your chance of recovery is unlikely, what would your preference be about the use of a ventilator (breathing machine) if it were available to you? \" Would the patient desire the use of a ventilator (breathing machine)? NO Resuscitation \"CPR works best to restart the heart when there is a sudden event, like a heart attack, in someone who is otherwise healthy. Unfortunately, CPR does not typically restart the heart for people who have serious health conditions or who are very sick. \" \"In the event your heart stopped as a result of an underlying serious health condition, would you want attempts to be made to restart your heart (answer \"yes\" for attempt to resuscitate) or would you prefer a natural death (answer \"no\" for do not attempt to resuscitate)? \" no NOTE: If the patient has a valid advance directive AND now provides care preference(s) that are inconsistent with that prior directive, advise the patient to consider either: creating a new advance directive that complies with state-specific requirements; or, if that is not possible, orally revoking that prior directive in accordance with state-specific requirements, which must be documented in the EHR. [x] Yes  [] No   Educated Patient / Addy Ebbs regarding differences between Advance Directives and portable DNR orders. Length of ACP Conversation in minutes:   
 
Conversation Outcomes: 
[x] ACP discussion completed 
[] Existing advance directive reviewed with patient; no changes to patient's previously recorded wishes 
 
 [] New Advance Directive completed 
 [] Portable Do Not Resuscitate prepared for Provider review and signature 
[] POLST/POST/MOLST/MOST prepared for Provider review and signature Follow-up plan:   
[] Schedule follow-up conversation to continue planning 
[] Referred individual to Provider for additional questions/concerns  
[] Advised patient/agent/surrogate to review completed ACP document and update if needed with changes in condition, patient preferences or care setting  
 
[] This note routed to one or more involved healthcare providers

## 2020-06-25 NOTE — ED PROVIDER NOTES
76 yo male with h/o DM, COPD, Parkinson presents with fatigue. Pt unsure of why he is in ED. Spoke to wife on phone: pt has been weak, not eating regularly for past 3-4 weeks. Family doc diagnosed with dehydration. Wife thinks he is getting dementia. Fatigue No past medical history on file. No past surgical history on file. No family history on file. Social History Socioeconomic History  Marital status:  Spouse name: Not on file  Number of children: Not on file  Years of education: Not on file  Highest education level: Not on file Occupational History  Not on file Social Needs  Financial resource strain: Not on file  Food insecurity Worry: Not on file Inability: Not on file  Transportation needs Medical: Not on file Non-medical: Not on file Tobacco Use  Smoking status: Not on file Substance and Sexual Activity  Alcohol use: Not on file  Drug use: Not on file  Sexual activity: Not on file Lifestyle  Physical activity Days per week: Not on file Minutes per session: Not on file  Stress: Not on file Relationships  Social connections Talks on phone: Not on file Gets together: Not on file Attends Jewish service: Not on file Active member of club or organization: Not on file Attends meetings of clubs or organizations: Not on file Relationship status: Not on file  Intimate partner violence Fear of current or ex partner: Not on file Emotionally abused: Not on file Physically abused: Not on file Forced sexual activity: Not on file Other Topics Concern  Not on file Social History Narrative  Not on file ALLERGIES: Patient has no known allergies. Review of Systems Unable to perform ROS: Dementia Constitutional: Positive for fatigue. Vitals:  
 06/24/20 1817 BP: 110/63 Pulse: 100 Resp: 16 Temp: 98.7 °F (37.1 °C) SpO2: 93% Weight: 106.6 kg (235 lb) Physical Exam 
Vitals signs and nursing note reviewed. Constitutional:   
   Appearance: He is well-developed. He is obese. HENT:  
   Head: Normocephalic and atraumatic. Eyes:  
   General: No scleral icterus. Neck: Musculoskeletal: No neck rigidity. Trachea: No tracheal deviation. Cardiovascular:  
   Rate and Rhythm: Normal rate. Pulmonary:  
   Effort: Pulmonary effort is normal.  
Abdominal:  
   General: There is no distension. Musculoskeletal:     
   General: No deformity. Skin: 
   General: Skin is warm and dry. Neurological:  
   General: No focal deficit present. Mental Status: He is alert. Psychiatric:     
   Mood and Affect: Mood normal.  
 
  
 
MDM Number of Diagnoses or Management Options LYN (acute kidney injury) [facility]):  
Weakness:  
Diagnosis management comments: Pt refused to stay for further evaluation and treatment. Unsure of what baseline creatinine. He left AMA but was given discharge instructions and follow-up. He was able to verbalize the risks of leaving which include death and permanent disability. I advised him to return if he should change his mind. I encouraged him to follow up with his family doctor which he agreed to see tomorrow. Procedures

## 2020-06-25 NOTE — ED NOTES
Bedside and Verbal shift change report given to [facility] by [facility]. Report included the following information SBAR, Kardex, ED Summary and Recent Results.

## 2020-06-26 ENCOUNTER — APPOINTMENT (OUTPATIENT)
Dept: MRI IMAGING | Age: 75
DRG: 551 | End: 2020-06-26
Attending: NURSE PRACTITIONER
Payer: MEDICARE

## 2020-06-26 ENCOUNTER — HOSPITAL ENCOUNTER (INPATIENT)
Dept: MRI IMAGING | Age: 75
Discharge: HOME | DRG: 551 | End: 2020-06-26
Attending: NURSE PRACTITIONER
Payer: MEDICARE

## 2020-06-26 ENCOUNTER — APPOINTMENT (OUTPATIENT)
Dept: MRI IMAGING | Age: 75
DRG: 551 | End: 2020-06-26
Attending: INTERNAL MEDICINE
Payer: MEDICARE

## 2020-06-26 LAB
ALBUMIN SERPL-MCNC: 3.2 G/DL (ref 3.5–5)
ALBUMIN/GLOB SERPL: 0.7 {RATIO} (ref 1.1–2.2)
ALP SERPL-CCNC: 62 U/L (ref 45–117)
ALT SERPL-CCNC: 7 U/L (ref 12–78)
ANION GAP SERPL CALC-SCNC: 4 MMOL/L (ref 5–15)
AST SERPL-CCNC: 29 U/L (ref 15–37)
BACTERIA SPEC CULT: NORMAL
BASOPHILS # BLD: 0 K/UL (ref 0–0.1)
BASOPHILS NFR BLD: 1 % (ref 0–1)
BILIRUB SERPL-MCNC: 0.8 MG/DL (ref 0.2–1)
BUN SERPL-MCNC: 22 MG/DL (ref 6–20)
BUN/CREAT SERPL: 16 (ref 12–20)
CALCIUM SERPL-MCNC: 9 MG/DL (ref 8.5–10.1)
CHLORIDE SERPL-SCNC: 110 MMOL/L (ref 97–108)
CO2 SERPL-SCNC: 25 MMOL/L (ref 21–32)
CREAT SERPL-MCNC: 1.41 MG/DL (ref 0.7–1.3)
DIFFERENTIAL METHOD BLD: ABNORMAL
EOSINOPHIL # BLD: 0.2 K/UL (ref 0–0.4)
EOSINOPHIL NFR BLD: 3 % (ref 0–7)
ERYTHROCYTE [DISTWIDTH] IN BLOOD BY AUTOMATED COUNT: 15.1 % (ref 11.5–14.5)
EST. AVERAGE GLUCOSE BLD GHB EST-MCNC: 154 MG/DL
FERRITIN SERPL-MCNC: 135 NG/ML (ref 26–388)
FOLATE SERPL-MCNC: 25.7 NG/ML (ref 5–21)
GLOBULIN SER CALC-MCNC: 4.6 G/DL (ref 2–4)
GLUCOSE BLD STRIP.AUTO-MCNC: 179 MG/DL (ref 65–100)
GLUCOSE BLD STRIP.AUTO-MCNC: 187 MG/DL (ref 65–100)
GLUCOSE BLD STRIP.AUTO-MCNC: 192 MG/DL (ref 65–100)
GLUCOSE BLD STRIP.AUTO-MCNC: 245 MG/DL (ref 65–100)
GLUCOSE SERPL-MCNC: 115 MG/DL (ref 65–100)
HAPTOGLOB SERPL-MCNC: 387 MG/DL (ref 30–200)
HBA1C MFR BLD: 7 % (ref 4–5.6)
HCT VFR BLD AUTO: 34 % (ref 36.6–50.3)
HGB BLD-MCNC: 10.7 G/DL (ref 12.1–17)
IMM GRANULOCYTES # BLD AUTO: 0.1 K/UL (ref 0–0.04)
IMM GRANULOCYTES NFR BLD AUTO: 1 % (ref 0–0.5)
IRON SATN MFR SERPL: 7 % (ref 20–50)
IRON SERPL-MCNC: 17 UG/DL (ref 35–150)
LDH SERPL L TO P-CCNC: 232 U/L (ref 85–241)
LYMPHOCYTES # BLD: 0.8 K/UL (ref 0.8–3.5)
LYMPHOCYTES NFR BLD: 12 % (ref 12–49)
MAGNESIUM SERPL-MCNC: 1.7 MG/DL (ref 1.6–2.4)
MCH RBC QN AUTO: 28.7 PG (ref 26–34)
MCHC RBC AUTO-ENTMCNC: 31.5 G/DL (ref 30–36.5)
MCV RBC AUTO: 91.2 FL (ref 80–99)
MONOCYTES # BLD: 0.6 K/UL (ref 0–1)
MONOCYTES NFR BLD: 8 % (ref 5–13)
NEUTS SEG # BLD: 5.3 K/UL (ref 1.8–8)
NEUTS SEG NFR BLD: 75 % (ref 32–75)
NRBC # BLD: 0 K/UL (ref 0–0.01)
NRBC BLD-RTO: 0 PER 100 WBC
PHOSPHATE SERPL-MCNC: 2.7 MG/DL (ref 2.6–4.7)
PLATELET # BLD AUTO: 163 K/UL (ref 150–400)
PMV BLD AUTO: 9.5 FL (ref 8.9–12.9)
POTASSIUM SERPL-SCNC: 4.2 MMOL/L (ref 3.5–5.1)
PROT SERPL-MCNC: 7.8 G/DL (ref 6.4–8.2)
RBC # BLD AUTO: 3.73 M/UL (ref 4.1–5.7)
SERVICE CMNT-IMP: ABNORMAL
SERVICE CMNT-IMP: NORMAL
SODIUM SERPL-SCNC: 139 MMOL/L (ref 136–145)
TIBC SERPL-MCNC: 257 UG/DL (ref 250–450)
VIT B12 SERPL-MCNC: 1019 PG/ML (ref 193–986)
WBC # BLD AUTO: 6.9 K/UL (ref 4.1–11.1)

## 2020-06-26 PROCEDURE — 72148 MRI LUMBAR SPINE W/O DYE: CPT

## 2020-06-26 PROCEDURE — 83540 ASSAY OF IRON: CPT

## 2020-06-26 PROCEDURE — 72141 MRI NECK SPINE W/O DYE: CPT

## 2020-06-26 PROCEDURE — 97161 PT EVAL LOW COMPLEX 20 MIN: CPT

## 2020-06-26 PROCEDURE — 82728 ASSAY OF FERRITIN: CPT

## 2020-06-26 PROCEDURE — 97530 THERAPEUTIC ACTIVITIES: CPT

## 2020-06-26 PROCEDURE — 74011250637 HC RX REV CODE- 250/637: Performed by: NURSE PRACTITIONER

## 2020-06-26 PROCEDURE — 84100 ASSAY OF PHOSPHORUS: CPT

## 2020-06-26 PROCEDURE — 65660000000 HC RM CCU STEPDOWN

## 2020-06-26 PROCEDURE — 74011636637 HC RX REV CODE- 636/637: Performed by: INTERNAL MEDICINE

## 2020-06-26 PROCEDURE — 74011250637 HC RX REV CODE- 250/637: Performed by: INTERNAL MEDICINE

## 2020-06-26 PROCEDURE — 74011250636 HC RX REV CODE- 250/636: Performed by: INTERNAL MEDICINE

## 2020-06-26 PROCEDURE — 83615 LACTATE (LD) (LDH) ENZYME: CPT

## 2020-06-26 PROCEDURE — 83010 ASSAY OF HAPTOGLOBIN QUANT: CPT

## 2020-06-26 PROCEDURE — 85025 COMPLETE CBC W/AUTO DIFF WBC: CPT

## 2020-06-26 PROCEDURE — 82962 GLUCOSE BLOOD TEST: CPT

## 2020-06-26 PROCEDURE — 80053 COMPREHEN METABOLIC PANEL: CPT

## 2020-06-26 PROCEDURE — 82607 VITAMIN B-12: CPT

## 2020-06-26 PROCEDURE — 83036 HEMOGLOBIN GLYCOSYLATED A1C: CPT

## 2020-06-26 PROCEDURE — 82746 ASSAY OF FOLIC ACID SERUM: CPT

## 2020-06-26 PROCEDURE — 72146 MRI CHEST SPINE W/O DYE: CPT

## 2020-06-26 PROCEDURE — 83735 ASSAY OF MAGNESIUM: CPT

## 2020-06-26 PROCEDURE — 74011000258 HC RX REV CODE- 258: Performed by: INTERNAL MEDICINE

## 2020-06-26 PROCEDURE — 36415 COLL VENOUS BLD VENIPUNCTURE: CPT

## 2020-06-26 RX ORDER — HYDROCODONE BITARTRATE AND ACETAMINOPHEN 5; 325 MG/1; MG/1
2 TABLET ORAL
Status: DISCONTINUED | OUTPATIENT
Start: 2020-06-26 | End: 2020-07-01

## 2020-06-26 RX ADMIN — HEPARIN SODIUM 5000 UNITS: 5000 INJECTION INTRAVENOUS; SUBCUTANEOUS at 18:02

## 2020-06-26 RX ADMIN — VITAMIN C 1 TABLET: TAB at 07:49

## 2020-06-26 RX ADMIN — INSULIN LISPRO 3 UNITS: 100 INJECTION, SOLUTION INTRAVENOUS; SUBCUTANEOUS at 18:02

## 2020-06-26 RX ADMIN — ACETAMINOPHEN 650 MG: 325 TABLET ORAL at 03:35

## 2020-06-26 RX ADMIN — ENTACAPONE 200 MG: 200 TABLET, FILM COATED ORAL at 13:02

## 2020-06-26 RX ADMIN — LORATADINE 10 MG: 10 TABLET ORAL at 07:48

## 2020-06-26 RX ADMIN — HYDROCODONE BITARTRATE AND ACETAMINOPHEN 2 TABLET: 5; 325 TABLET ORAL at 07:43

## 2020-06-26 RX ADMIN — CARVEDILOL 6.25 MG: 3.12 TABLET, FILM COATED ORAL at 07:47

## 2020-06-26 RX ADMIN — INSULIN LISPRO 2 UNITS: 100 INJECTION, SOLUTION INTRAVENOUS; SUBCUTANEOUS at 07:53

## 2020-06-26 RX ADMIN — FLUTICASONE PROPIONATE 2 SPRAY: 50 SPRAY, METERED NASAL at 11:03

## 2020-06-26 RX ADMIN — CEFEPIME HYDROCHLORIDE 2 G: 2 INJECTION, POWDER, FOR SOLUTION INTRAVENOUS at 18:02

## 2020-06-26 RX ADMIN — CEFEPIME HYDROCHLORIDE 2 G: 2 INJECTION, POWDER, FOR SOLUTION INTRAVENOUS at 06:31

## 2020-06-26 RX ADMIN — ENTACAPONE 200 MG: 200 TABLET, FILM COATED ORAL at 07:47

## 2020-06-26 RX ADMIN — HYDROCODONE BITARTRATE AND ACETAMINOPHEN 2 TABLET: 5; 325 TABLET ORAL at 13:02

## 2020-06-26 RX ADMIN — PRAMIPEXOLE DIHYDROCHLORIDE 0.5 MG: 0.25 TABLET ORAL at 18:02

## 2020-06-26 RX ADMIN — HYDROCODONE BITARTRATE AND ACETAMINOPHEN 1 TABLET: 5; 325 TABLET ORAL at 00:56

## 2020-06-26 RX ADMIN — GABAPENTIN 300 MG: 300 CAPSULE ORAL at 18:02

## 2020-06-26 RX ADMIN — CARBIDOPA AND LEVODOPA 2 TABLET: 25; 100 TABLET ORAL at 22:28

## 2020-06-26 RX ADMIN — CARBIDOPA AND LEVODOPA 2 TABLET: 25; 100 TABLET ORAL at 13:02

## 2020-06-26 RX ADMIN — FINASTERIDE 5 MG: 5 TABLET, FILM COATED ORAL at 07:48

## 2020-06-26 RX ADMIN — PRAMIPEXOLE DIHYDROCHLORIDE 0.5 MG: 0.25 TABLET ORAL at 07:47

## 2020-06-26 RX ADMIN — ENTACAPONE 200 MG: 200 TABLET, FILM COATED ORAL at 18:02

## 2020-06-26 RX ADMIN — CARBIDOPA AND LEVODOPA 2 TABLET: 25; 100 TABLET ORAL at 18:02

## 2020-06-26 RX ADMIN — ASPIRIN 325 MG: 325 TABLET, COATED ORAL at 07:47

## 2020-06-26 RX ADMIN — INSULIN GLARGINE 10 UNITS: 100 INJECTION, SOLUTION SUBCUTANEOUS at 22:29

## 2020-06-26 RX ADMIN — INSULIN LISPRO 2 UNITS: 100 INJECTION, SOLUTION INTRAVENOUS; SUBCUTANEOUS at 13:02

## 2020-06-26 RX ADMIN — TAMSULOSIN HYDROCHLORIDE 0.4 MG: 0.4 CAPSULE ORAL at 07:48

## 2020-06-26 RX ADMIN — Medication 10 ML: at 06:32

## 2020-06-26 RX ADMIN — GABAPENTIN 300 MG: 300 CAPSULE ORAL at 22:28

## 2020-06-26 RX ADMIN — ENTACAPONE 200 MG: 200 TABLET, FILM COATED ORAL at 22:28

## 2020-06-26 RX ADMIN — CARBIDOPA AND LEVODOPA 2 TABLET: 25; 100 TABLET ORAL at 07:47

## 2020-06-26 RX ADMIN — DOCUSATE SODIUM 400 MG: 100 CAPSULE, LIQUID FILLED ORAL at 22:29

## 2020-06-26 RX ADMIN — Medication 10 ML: at 22:29

## 2020-06-26 RX ADMIN — HEPARIN SODIUM 5000 UNITS: 5000 INJECTION INTRAVENOUS; SUBCUTANEOUS at 07:52

## 2020-06-26 RX ADMIN — GABAPENTIN 300 MG: 300 CAPSULE ORAL at 07:48

## 2020-06-26 RX ADMIN — ATORVASTATIN CALCIUM 20 MG: 20 TABLET, FILM COATED ORAL at 22:28

## 2020-06-26 RX ADMIN — CARVEDILOL 6.25 MG: 3.12 TABLET, FILM COATED ORAL at 18:02

## 2020-06-26 RX ADMIN — Medication 10 ML: at 13:03

## 2020-06-26 RX ADMIN — MELATONIN 1 TABLET: at 07:47

## 2020-06-26 RX ADMIN — HYDROCODONE BITARTRATE AND ACETAMINOPHEN 2 TABLET: 5; 325 TABLET ORAL at 19:13

## 2020-06-26 RX ADMIN — INSULIN GLARGINE 10 UNITS: 100 INJECTION, SOLUTION SUBCUTANEOUS at 07:53

## 2020-06-26 NOTE — PROGRESS NOTES
Order received and chart reviewed. Pt just returned back to bed after working with PT. Per PT, pt was max assist x 2 with chair to bed transfer. Wife present in the room. Wife asking OT to come back after he get his pain meds. Will f/u later if able or will place on weekend schedule. Thanks! Attempted 2x to evaluate pt. Pt wanted to rest and eat his yogurt. He kindly asked if OT could return to tomorrow. Will put pt on weekend schedule.

## 2020-06-26 NOTE — PROGRESS NOTES
Problem: Mobility Impaired (Adult and Pediatric) Goal: *Acute Goals and Plan of Care (Insert Text) Description: FUNCTIONAL STATUS PRIOR TO ADMISSION: Patient required wife's minimal assistance for basic and instrumental ADLs. For last 8+ months amb only short household distances with Rollator prior to 2 GLF. Unable to amb into ED- [facility] by ambulance HOME SUPPORT PRIOR TO ADMISSION: The patient lived with wife. Physical Therapy Goals Initiated 6/26/2020 1. Patient will move from supine to sit and sit to supine , scoot up and down and roll side to side in bed with minimal assistance/contact guard assist within 7 day(s). 2.  Patient will transfer from bed to chair and chair to bed with minimal assistance/contact guard assist using the least restrictive device within 7 day(s). 3.  Patient will perform sit to stand with minimal assistance/contact guard assist within 7 day(s). 4.  Patient will ambulate with minimal assistance/contact guard assist for 25 feet with the least restrictive device within 7 day(s). Note: PHYSICAL THERAPY EVALUATION Patient: Cesar Locke [de-identified]75 y.o. male) Date: 6/26/2020 Primary Diagnosis: Fall [W19. [place] Precautions:  Bed Alarm, Fall ASSESSMENT Based on the objective data described below, the patient presents with poor tolerance for activity, morbid obesity, left >right foot pain and edema, decreased strength and ROM throughout BLEs, intermittent confusion, and poor balance. This results in patient needing overall max-mod x 2 for transfers and to complete a few steps with RW today. Patient adm yesterday 6/25 via ambulance to ED after his second GLF at home. Wife and patient report his B knees just give way and he radha falling to the floor. Wife states after an injection to knee, patient slightly improved and participated in one week of HHPT and HHOT through [employer] -4 days that week.  He was worked hard in therapy and then declined to allow [facility] therapist to schedule to come back the next week. This was several weeks ago prior to this fall. In the interim patient has been sitting or laying in bed. Wife gives majority of history as patient appeared fatigued from transfer back to bed and bed mobility with PT. Patient has extensive DME at home: rollator, bathroom set up with [facility] and walk in shower chair with a back and the folded chair outside shower to sit after bathing (safe?) . Wife assists with all ADLs. She reports that at least for last 8 months patient has had decline in function and barely able to amb short distances in the home with rollator. Uses electric cart if in community. Patient should benefit from PT to improve functional mobility with RW. He will need a RW and a rental W/C at discharge if he returns home with wife. He will not consent to idea of DC to a SNF so will need to return to his PLOF of Minimal assistance level before return to home. No BRANDON at home and all one level. Current Level of Function Impacting Discharge (mobility/balance): max- mod x 2 overall today Functional Outcome Measure: The patient scored 15/100 on the Barthel Index outcome measure. Other factors to consider for discharge: wife's ability to assist, insight and realistic expectations Patient will benefit from skilled therapy intervention to address the above noted impairments. PLAN : 
Recommendations and Planned Interventions: bed mobility training, transfer training, gait training, therapeutic exercises, neuromuscular re-education, patient and family training/education, and therapeutic activities Frequency/Duration: Patient will be followed by physical therapy:  5 times a week to address goals. Recommendation for discharge: (in order for the patient to meet his/her long term goals) Therapy up to 5 days/week in SNF setting or intensive home health therapy program; 24/7 supervision This discharge recommendation: Has not yet been discussed the attending provider and/or case management IF patient discharges home will need the following DME: rolling walker and wheelchair SUBJECTIVE:  
Patient stated I think I will be able to go home again.  OBJECTIVE DATA SUMMARY:  
HISTORY:   
No past medical history on file. No past surgical history on file. Personal factors and/or comorbidities impacting plan of care: obesity, confusion, Falls, Parkinson's 
 
Home Situation Home Environment: Private residence # Steps to Enter: 0 One/Two Story Residence: One story Living Alone: No 
Support Systems: Spouse/Significant Other/Partner Patient Expects to be Discharged to[de-identified] Private residence Current DME Used/Available at Home: Cane, quad, Commode, bedside, Raised toilet seat, Shower chair, Walker, rollator Tub or Shower Type: Shower EXAMINATION/PRESENTATION/DECISION MAKING:  
Critical Behavior: 
Neurologic State: Alert Orientation Level: Oriented X4 Cognition: Decreased attention/concentration, Impaired decision making, Memory loss Safety/Judgement: Insight into deficits Hearing: Auditory Auditory Impairment: None Range Of Motion: 
AROM: Generally decreased, functional 
 
Strength:   
Strength: Grossly decreased, non-functional 
 
Tone & Sensation:  
 
Sensation: Impaired Coordination: 
Coordination: Grossly decreased, non-functional 
 
Functional Mobility: 
Bed Mobility: 
Rolling: Moderate assistance Supine to Sit: Moderate assistance; Additional time;Assist x2;Bed Modified Sit to Supine: Maximum assistance; Additional time;Assist x1 Scooting: Moderate assistance Transfers: 
Sit to Stand: Moderate assistance;Maximum assistance; Additional time;Assist x2 Stand to Sit: Minimum assistance; Additional time;Assist x2 Stand Pivot Transfers: Moderate assistance; Additional time;Assist x2 Bed to Chair: Moderate assistance;Maximum assistance; Additional time;Assist x2 Balance: Sitting: Intact; Without support Standing: Impaired Standing - Static: Constant support; Fair 
Standing - Dynamic : Constant support;Poor Ambulation/Gait Training: 
Distance (ft): 1 Feet (ft) Assistive Device: Gait belt;Walker, rolling Ambulation - Level of Assistance: Moderate assistance; Additional time;Assist x2 Gait Abnormalities: Antalgic;Decreased step clearance Right Side Weight Bearing: As tolerated Left Side Weight Bearing: As tolerated(painful ) Base of Support: Widened Speed/Jonelle: Shuffled; Slow Stairs - Level of Assistance: (NT) Therapeutic Exercises: Ankle pumps, hypersensitive to movement and tactile Left LE/foot Functional Measure: 
Barthel Index: 
 
Bathin Bladder: 0 Bowels: 0 Groomin Dressin Feedin Mobility: 0 Stairs: 0 Toilet Use: 5 Transfer (Bed to Chair and Back): 5 Total: 15/100 The Barthel ADL Index: Guidelines 1. The index should be used as a record of what a patient does, not as a record of what a patient could do. 2. The main aim is to establish degree of independence from any help, physical or verbal, however minor and for whatever reason. 3. The need for supervision renders the patient not independent. 4. A patient's performance should be established using the best available evidence. Asking the patient, friends/relatives and nurses are the usual sources, but direct observation and common sense are also important. However direct testing is not needed. 5. Usually the patient's performance over the preceding 24-48 hours is important, but occasionally longer periods will be relevant. 6. Middle categories imply that the patient supplies over 50 per cent of the effort. 7. Use of aids to be independent is allowed. Migel Ahn., Barthel, D.W. (8111). Functional evaluation: the Barthel Index. [facility] (14)2.  
YIFAN Steel, Ulices Shahid, Nessa Lozada, Cheyenne Fleming. (1999). Measuring the change indisability after inpatient rehabilitation; comparison of the responsiveness of the Barthel Index and Functional [place]lee Measure. Journal of Neurology, Neurosurgery, and Psychiatry, 66(4), 123-497. LYDIA Rodríguez, LIDIA Jurado, & Az Allison M.A. (2004.) Assessment of post-stroke quality of life in cost-effectiveness studies: The usefulness of the Barthel Index and the EuroQoL-5D. [facility], 13, 458-02 Physical Therapy Evaluation Charge Determination History Examination Presentation Decision-Making HIGH Complexity :3+ comorbidities / personal factors will impact the outcome/ POC  HIGH Complexity : 4+ Standardized tests and measures addressing body structure, function, activity limitation and / or participation in recreation  HIGH Complexity : Unstable and unpredictable characteristics  Other outcome measures Barthel Index  HIGH Based on the above components, the patient evaluation is determined to be of the following complexity level: HIGH Pain Ratin/10 left foot Activity Tolerance:  
Poor and requires rest breaks Please refer to the flowsheet for vital signs taken during this treatment. After treatment patient left in no apparent distress:  
Supine in bed, Call bell within reach, Bed / chair alarm activated, Caregiver / family present, and Side rails x 3 
 
COMMUNICATION/EDUCATION:  
The patients plan of care was discussed with: Registered nurse. Fall prevention education was provided and the patient/caregiver indicated understanding. and Patient understands intent and goals of therapy, but is neutral about his/her participation. Thank you for this referral. 
Maddy Griggs, PT, DPT Time Calculation: 30 mins

## 2020-06-26 NOTE — PROGRESS NOTES
Problem: Pressure Injury - Risk of 
Goal: *Prevention of pressure injury Description: Document Larry Scale and appropriate interventions in the flowsheet. Outcome: Progressing Towards Goal 
Note: Pressure Injury Interventions: 
  
 
Moisture Interventions: Apply protective barrier, creams and emollients Activity Interventions: Increase time out of bed, Pressure redistribution bed/mattress(bed type), PT/OT evaluation Mobility Interventions: HOB 30 degrees or less, Pressure redistribution bed/mattress (bed type), PT/OT evaluation Nutrition Interventions: Document food/fluid/supplement intake, Discuss nutritional consult with provider, Offer support with meals,snacks and hydration Friction and Shear Interventions: Apply protective barrier, creams and emollients, Minimize layers Problem: Patient Education: Go to Patient Education Activity Goal: Patient/Family Education Outcome: Progressing Towards Goal 
  
Problem: Falls - Risk of 
Goal: *Absence of Falls Description: Document Aleksandar Hayes Fall Risk and appropriate interventions in the flowsheet. Outcome: Progressing Towards Goal 
Note: Fall Risk Interventions: 
Mobility Interventions: Bed/chair exit alarm, Communicate number of staff needed for ambulation/transfer, Patient to call before getting OOB, Utilize walker, cane, or other assistive device, Utilize gait belt for transfers/ambulation Medication Interventions: Bed/chair exit alarm, Patient to call before getting OOB, Teach patient to arise slowly, Utilize gait belt for transfers/ambulation Elimination Interventions: Bed/chair exit alarm, Call light in reach, Patient to call for help with toileting needs, Stay With Me (per policy), Urinal in reach History of Falls Interventions: Bed/chair exit alarm, Investigate reason for fall, Room close to nurse's station Problem: Patient Education: Go to Patient Education Activity Goal: Patient/Family Education Outcome: Progressing Towards Goal 
  
Problem: General Medical Care Plan Goal: *Vital signs within specified parameters Outcome: Progressing Towards Goal 
Goal: *Labs within defined limits Outcome: Progressing Towards Goal 
Goal: *Absence of infection signs and symptoms Outcome: Progressing Towards Goal 
Goal: *Optimal pain control at patient's stated goal 
Outcome: Progressing Towards Goal 
Goal: *Skin integrity maintained Outcome: Progressing Towards Goal 
Goal: *Fluid volume balance Outcome: Progressing Towards Goal 
Goal: *Anxiety reduced or absent Outcome: Progressing Towards Goal 
  
Problem: Pain Goal: *Control of Pain Outcome: Progressing Towards Goal

## 2020-06-26 NOTE — PROGRESS NOTES
Transition of Care RUR 21 % 1- follow for progress and approvals of rehab referrals 2- coordinate transfer to accepting facility 3- transportation anticipated by juan jose. 4:16 PM 
Patient pleasantly confused, wife not in the room. He asked cm to take out all the drains and sánchez, said he appreciated the visits but he is tired of all this and plans to discuss with his wife and son that he has had  A long nice life and is ready to relax. Does not understand today the options being discussed but is not refusing. SLP will benefit him for cognition as well. Awaiting review of referrals from area SNF and IRF 
 
3:15 PM 
Wife came to [facility] and asked for discussion to occur with patient as well. She said she feels much better, not as upset as earlier and sees that this discussion of rehabilitation at IRF or SNF is to get him better to come home. She agreed for referrals to also go to SNF in their area, but not [facility], sent in [facility]. 2:25 PM 
Medicals sent in [employer] to [address] [place] [address] rehab and [facility], because [address] [place] [address] is uncertain as to their bed availability. [facility] does not have beds in [place] and wife does not want to consider [facility] at this time. 1:00pm 
Patient eating lunch, agreed for wife to meet with cm in the [place]. Reviewed discharge planning options and she would like to talk with a liaison with [address] [place], [place] [address]ab. SNF reviewed and she said patient refuses to discuss SNF, but with his mentation at this time, and their recent experience with him letting At [address] therapists go after a week, she feels he needs more in the way of rehab. Couple moved here in January and she said that he was diagnosed with Parkinsons three years ago, but has been in a form of denial until prior to their move he had started going to a parkinsons support group at their previous location.   Feels he needs more education and supports for Parkinsons and rehabilitation to resume previous level of function at home which was independence with ADLS and ambulation.

## 2020-06-26 NOTE — PROGRESS NOTES
Problem: Pressure Injury - Risk of 
Goal: *Prevention of pressure injury Description: Document Larry Scale and appropriate interventions in the flowsheet. Outcome: Progressing Towards Goal 
Note: Pressure Injury Interventions: 
  
 
Moisture Interventions: Absorbent underpads, Internal/External urinary devices Activity Interventions: Increase time out of bed, Pressure redistribution bed/mattress(bed type), PT/OT evaluation Mobility Interventions: HOB 30 degrees or less, Pressure redistribution bed/mattress (bed type), PT/OT evaluation, Turn and reposition approx. every two hours(pillow and wedges) Nutrition Interventions: Document food/fluid/supplement intake Friction and Shear Interventions: Apply protective barrier, creams and emollients Problem: Patient Education: Go to Patient Education Activity Goal: Patient/Family Education Outcome: Progressing Towards Goal 
  
Problem: Falls - Risk of 
Goal: *Absence of Falls Description: Document Gaston Trinh Fall Risk and appropriate interventions in the flowsheet. Outcome: Progressing Towards Goal 
Note: Fall Risk Interventions: 
Mobility Interventions: Bed/chair exit alarm, Communicate number of staff needed for ambulation/transfer, Patient to call before getting OOB, PT Consult for mobility concerns, PT Consult for assist device competence, Strengthening exercises (ROM-active/passive), Utilize walker, cane, or other assistive device, Utilize gait belt for transfers/ambulation Medication Interventions: Bed/chair exit alarm, Evaluate medications/consider consulting pharmacy, Patient to call before getting OOB, Teach patient to arise slowly, Utilize gait belt for transfers/ambulation Elimination Interventions: Bed/chair exit alarm, Call light in reach, Elevated toilet seat, Patient to call for help with toileting needs, Stay With Me (per policy), Toileting schedule/hourly rounds, Toilet paper/wipes in reach History of Falls Interventions: Bed/chair exit alarm, Consult care management for discharge planning, Door open when patient unattended, Evaluate medications/consider consulting pharmacy, Investigate reason for fall, Room close to nurse's station, Utilize gait belt for transfer/ambulation, Assess for delayed presentation/identification of injury for 48 hrs (comment for end date) Problem: Patient Education: Go to Patient Education Activity Goal: Patient/Family Education Outcome: Progressing Towards Goal 
  
Problem: Pain Goal: *Control of Pain Outcome: Progressing Towards Goal

## 2020-06-26 NOTE — PROGRESS NOTES
Consult Follow-up Note Patient: Naomy Bagley MRN: [id number]  SSN: [id number] YOB: 1945  Age: 75 y.o. Sex: male Assessment: - Naomy Bagley is a 75 y.o. male with HTN, DM, ? CKD, BPH, Parkinson's disease who presents with falls. Currently admitted for metabolic encephalopathy, urinary retention, renal insufficiency, anemia and lung nodule. We are asked to see him in consultation for retention, enlarged prostate, and hydronephrosis. Active Problems: 
  Acute metabolic encephalopathy () Renal insufficiency (2020) Elevated lactic acid level (2020) Anemia (2020) Fall (2020) Parkinson disease ([facility] [address] (2020) Urinary retention (2020) Type II diabetes mellitus ([facility] [address] (2020) HTN (hypertension) (2020) Plan: - LYN - Cr improved with sánchez placement . Will need for 5-7 days then will attempt VT . If pt discharged before 5 days then pt can be discharged with sánchez and call VU for void trail  [phone] 
 - Retention - will need outpt follow up with VU for evaluation of enlarged prostate with retention  
 
------------------------------------------------------------------------------------------------------------------- Naomy Bagley was admitted on 2020 to Lilli Womack MD by Minna Sahu MD for Fall [W19. XXXA]. Vitals: Temp (24hrs), Av.1 °F (36.7 °C), Min:97.7 °F (36.5 °C), Max:98.4 °F (36.9 °C) Blood pressure (!) 167/92, pulse 90, temperature 98.4 °F (36.9 °C), resp. rate 17, height 5' 8\" (1.727 m), weight 115.6 kg (254 lb 12.8 oz), SpO2 94 %. Intake and Output: 
1 -  0700 In: 125 [P.O.:125] Out: 3200 [[id number]] No intake/output data recorded. Current Facility-Administered Medications Medication Dose Route Frequency  HYDROcodone-acetaminophen (NORCO) 5-325 mg per tablet 2 Tab  2 Tab Oral Q6H PRN  
  sodium chloride (NS) flush 5-10 mL  5-10 mL IntraVENous PRN  
 sodium chloride (NS) flush 5-40 mL  5-40 mL IntraVENous Q8H  
 sodium chloride (NS) flush 5-40 mL  5-40 mL IntraVENous PRN  
 naloxone (NARCAN) injection 0.4 mg  0.4 mg IntraVENous PRN  
 cefepime (MAXIPIME) 2 g in 0.9% sodium chloride (MBP/ADV) 100 mL  2 g IntraVENous Q12H  
 heparin (porcine) injection 5,000 Units  5,000 Units SubCUTAneous Q8H  
 aspirin delayed-release tablet 325 mg  325 mg Oral DAILY  atorvastatin (LIPITOR) tablet 20 mg  20 mg Oral QHS  carbidopa-levodopa (SINEMET)  mg per tablet 2 Tab  2 Tab Oral QID  carvediloL (COREG) tablet 6.25 mg  6.25 mg Oral BID WITH MEALS  cholecalciferol (VITAMIN D3) (1000 Units /25 mcg) tablet 1 Tab  1,000 Units Oral DAILY  docusate sodium (COLACE) capsule 400 mg  400 mg Oral DAILY  entacapone (COMTAN) tablet 200 mg  200 mg Oral QID  fluticasone propionate (FLONASE) 50 mcg/actuation nasal spray 2 Spray  2 Spray Both Nostrils DAILY  gabapentin (NEURONTIN) capsule 300 mg  300 mg Oral TID  insulin glargine (LANTUS) injection 10 Units  10 Units SubCUTAneous BID  loratadine (CLARITIN) tablet 10 mg  10 mg Oral DAILY  pantoprazole (PROTONIX) tablet 40 mg  40 mg Oral ACB  pramipexole (MIRAPEX) tablet 0.5 mg  0.5 mg Oral BID  tamsulosin (FLOMAX) capsule 0.4 mg  0.4 mg Oral DAILY  traZODone (DESYREL) tablet 50 mg  50 mg Oral QHS PRN  
 insulin lispro (HUMALOG) injection   SubCUTAneous AC&HS  
 glucose chewable tablet 16 g  4 Tab Oral PRN  
 dextrose (D50W) injection syrg 12.5-25 g  12.5-25 g IntraVENous PRN  
 glucagon (GLUCAGEN) injection 1 mg  1 mg IntraMUSCular PRN  finasteride (PROSCAR) tablet 5 mg  5 mg Oral DAILY  b-complex with vitamin c tablet 1 Tab  1 Tab Oral DAILY Exam: EXAMINATION:   
Appearance: well-developed NAD Respiratory Effort:  breathing easily Abdomen/Flank:  soft non-tender without masses; no CVA tenderness Liver/Spleen:  no organomegaly Hernia: no ventral hernia Scrotum: without lesions Testes: bilaterally non-tender, no masses Epididymes: bilaterally no masses palpated, non-tender Penis: no plaques; no lesions Meatus: patent sánchez draining jabier urine Mood/Affect:  periods of confusion Labs: 
Recent Labs  
  06/26/20 
0326 06/25/20 
0459 06/24/20 
1829 WBC 6.9 8.8 9.5 HGB 10.7* 10.0* 9.4* HCT 34.0* 30.6* 29.2*  
 162 151 Recent Labs  
  06/26/20 
0326 06/25/20 
0459 06/24/20 
1829  135* 131* K 4.2 4.7 5.7*  
* 103 101 CO2 25 26 25 * 125* 128* BUN 22* 47* 53* CREA 1.41* 2.32* 3.01* CA 9.0 9.0 8.8 Plan continue with Sánchez for 5-7 days pt can be discharged home with sánchez and follow up with  Plan discussed with  Dr. Jone Arriaza Signed By: Natividad Sykes NP - June 26, 2020

## 2020-06-27 ENCOUNTER — APPOINTMENT (OUTPATIENT)
Dept: GENERAL RADIOLOGY | Age: 75
DRG: 551 | End: 2020-06-27
Attending: PHYSICIAN ASSISTANT
Payer: MEDICARE

## 2020-06-27 LAB
ALBUMIN SERPL-MCNC: 3 G/DL (ref 3.5–5)
ANION GAP SERPL CALC-SCNC: 4 MMOL/L (ref 5–15)
BUN SERPL-MCNC: 16 MG/DL (ref 6–20)
BUN/CREAT SERPL: 13 (ref 12–20)
CALCIUM SERPL-MCNC: 9.1 MG/DL (ref 8.5–10.1)
CHLORIDE SERPL-SCNC: 108 MMOL/L (ref 97–108)
CK SERPL-CCNC: 144 U/L (ref 39–308)
CO2 SERPL-SCNC: 26 MMOL/L (ref 21–32)
COMMENT, HOLDF: NORMAL
CREAT SERPL-MCNC: 1.27 MG/DL (ref 0.7–1.3)
GLUCOSE BLD STRIP.AUTO-MCNC: 177 MG/DL (ref 65–100)
GLUCOSE BLD STRIP.AUTO-MCNC: 212 MG/DL (ref 65–100)
GLUCOSE BLD STRIP.AUTO-MCNC: 246 MG/DL (ref 65–100)
GLUCOSE BLD STRIP.AUTO-MCNC: 360 MG/DL (ref 65–100)
GLUCOSE SERPL-MCNC: 165 MG/DL (ref 65–100)
MAGNESIUM SERPL-MCNC: 1.7 MG/DL (ref 1.6–2.4)
PHOSPHATE SERPL-MCNC: 2.1 MG/DL (ref 2.6–4.7)
POTASSIUM SERPL-SCNC: 4.6 MMOL/L (ref 3.5–5.1)
SAMPLES BEING HELD,HOLD: NORMAL
SERVICE CMNT-IMP: ABNORMAL
SODIUM SERPL-SCNC: 138 MMOL/L (ref 136–145)

## 2020-06-27 PROCEDURE — 83735 ASSAY OF MAGNESIUM: CPT

## 2020-06-27 PROCEDURE — 80069 RENAL FUNCTION PANEL: CPT

## 2020-06-27 PROCEDURE — 82962 GLUCOSE BLOOD TEST: CPT

## 2020-06-27 PROCEDURE — 73562 X-RAY EXAM OF KNEE 3: CPT

## 2020-06-27 PROCEDURE — 82550 ASSAY OF CK (CPK): CPT

## 2020-06-27 PROCEDURE — 74011000258 HC RX REV CODE- 258: Performed by: INTERNAL MEDICINE

## 2020-06-27 PROCEDURE — 74011250636 HC RX REV CODE- 250/636: Performed by: INTERNAL MEDICINE

## 2020-06-27 PROCEDURE — 74011250637 HC RX REV CODE- 250/637: Performed by: NURSE PRACTITIONER

## 2020-06-27 PROCEDURE — 65660000000 HC RM CCU STEPDOWN

## 2020-06-27 PROCEDURE — 74011636637 HC RX REV CODE- 636/637: Performed by: INTERNAL MEDICINE

## 2020-06-27 PROCEDURE — 74011250636 HC RX REV CODE- 250/636: Performed by: PHYSICIAN ASSISTANT

## 2020-06-27 PROCEDURE — 74011250637 HC RX REV CODE- 250/637: Performed by: INTERNAL MEDICINE

## 2020-06-27 RX ORDER — DEXAMETHASONE SODIUM PHOSPHATE 4 MG/ML
8 INJECTION, SOLUTION INTRA-ARTICULAR; INTRALESIONAL; INTRAMUSCULAR; INTRAVENOUS; SOFT TISSUE EVERY 6 HOURS
Status: COMPLETED | OUTPATIENT
Start: 2020-06-27 | End: 2020-06-29

## 2020-06-27 RX ORDER — AMLODIPINE BESYLATE 5 MG/1
5 TABLET ORAL DAILY
Status: DISCONTINUED | OUTPATIENT
Start: 2020-06-27 | End: 2020-07-02 | Stop reason: HOSPADM

## 2020-06-27 RX ADMIN — INSULIN LISPRO 10 UNITS: 100 INJECTION, SOLUTION INTRAVENOUS; SUBCUTANEOUS at 23:14

## 2020-06-27 RX ADMIN — ENTACAPONE 200 MG: 200 TABLET, FILM COATED ORAL at 12:35

## 2020-06-27 RX ADMIN — HEPARIN SODIUM 5000 UNITS: 5000 INJECTION INTRAVENOUS; SUBCUTANEOUS at 23:13

## 2020-06-27 RX ADMIN — PRAMIPEXOLE DIHYDROCHLORIDE 0.5 MG: 0.25 TABLET ORAL at 08:12

## 2020-06-27 RX ADMIN — CARBIDOPA AND LEVODOPA 2 TABLET: 25; 100 TABLET ORAL at 08:01

## 2020-06-27 RX ADMIN — ASPIRIN 325 MG: 325 TABLET, COATED ORAL at 08:01

## 2020-06-27 RX ADMIN — ENTACAPONE 200 MG: 200 TABLET, FILM COATED ORAL at 17:07

## 2020-06-27 RX ADMIN — CARVEDILOL 6.25 MG: 3.12 TABLET, FILM COATED ORAL at 17:07

## 2020-06-27 RX ADMIN — INSULIN GLARGINE 10 UNITS: 100 INJECTION, SOLUTION SUBCUTANEOUS at 08:00

## 2020-06-27 RX ADMIN — INSULIN LISPRO 3 UNITS: 100 INJECTION, SOLUTION INTRAVENOUS; SUBCUTANEOUS at 12:35

## 2020-06-27 RX ADMIN — INSULIN LISPRO 2 UNITS: 100 INJECTION, SOLUTION INTRAVENOUS; SUBCUTANEOUS at 17:10

## 2020-06-27 RX ADMIN — Medication 10 ML: at 17:10

## 2020-06-27 RX ADMIN — CARVEDILOL 6.25 MG: 3.12 TABLET, FILM COATED ORAL at 08:01

## 2020-06-27 RX ADMIN — AMLODIPINE BESYLATE 5 MG: 5 TABLET ORAL at 12:35

## 2020-06-27 RX ADMIN — HYDROCODONE BITARTRATE AND ACETAMINOPHEN 2 TABLET: 5; 325 TABLET ORAL at 17:06

## 2020-06-27 RX ADMIN — FLUTICASONE PROPIONATE 2 SPRAY: 50 SPRAY, METERED NASAL at 08:01

## 2020-06-27 RX ADMIN — Medication 10 ML: at 06:55

## 2020-06-27 RX ADMIN — MELATONIN 1 TABLET: at 08:01

## 2020-06-27 RX ADMIN — GABAPENTIN 300 MG: 300 CAPSULE ORAL at 08:01

## 2020-06-27 RX ADMIN — PANTOPRAZOLE SODIUM 40 MG: 40 TABLET, DELAYED RELEASE ORAL at 06:55

## 2020-06-27 RX ADMIN — INSULIN GLARGINE 10 UNITS: 100 INJECTION, SOLUTION SUBCUTANEOUS at 20:41

## 2020-06-27 RX ADMIN — ENTACAPONE 200 MG: 200 TABLET, FILM COATED ORAL at 08:01

## 2020-06-27 RX ADMIN — HYDROCODONE BITARTRATE AND ACETAMINOPHEN 2 TABLET: 5; 325 TABLET ORAL at 23:13

## 2020-06-27 RX ADMIN — INSULIN LISPRO 3 UNITS: 100 INJECTION, SOLUTION INTRAVENOUS; SUBCUTANEOUS at 08:00

## 2020-06-27 RX ADMIN — PRAMIPEXOLE DIHYDROCHLORIDE 0.5 MG: 0.25 TABLET ORAL at 17:06

## 2020-06-27 RX ADMIN — GABAPENTIN 400 MG: 300 CAPSULE ORAL at 17:07

## 2020-06-27 RX ADMIN — ENTACAPONE 200 MG: 200 TABLET, FILM COATED ORAL at 20:41

## 2020-06-27 RX ADMIN — ATORVASTATIN CALCIUM 20 MG: 20 TABLET, FILM COATED ORAL at 20:41

## 2020-06-27 RX ADMIN — HYDROCODONE BITARTRATE AND ACETAMINOPHEN 2 TABLET: 5; 325 TABLET ORAL at 07:01

## 2020-06-27 RX ADMIN — CARBIDOPA AND LEVODOPA 2 TABLET: 25; 100 TABLET ORAL at 20:41

## 2020-06-27 RX ADMIN — FINASTERIDE 5 MG: 5 TABLET, FILM COATED ORAL at 08:01

## 2020-06-27 RX ADMIN — CEFEPIME HYDROCHLORIDE 2 G: 2 INJECTION, POWDER, FOR SOLUTION INTRAVENOUS at 06:55

## 2020-06-27 RX ADMIN — DEXAMETHASONE SODIUM PHOSPHATE 8 MG: 4 INJECTION, SOLUTION INTRAMUSCULAR; INTRAVENOUS at 17:07

## 2020-06-27 RX ADMIN — HEPARIN SODIUM 5000 UNITS: 5000 INJECTION INTRAVENOUS; SUBCUTANEOUS at 17:07

## 2020-06-27 RX ADMIN — CARBIDOPA AND LEVODOPA 2 TABLET: 25; 100 TABLET ORAL at 17:06

## 2020-06-27 RX ADMIN — CARBIDOPA AND LEVODOPA 2 TABLET: 25; 100 TABLET ORAL at 12:35

## 2020-06-27 RX ADMIN — LORATADINE 10 MG: 10 TABLET ORAL at 08:01

## 2020-06-27 RX ADMIN — VITAMIN C 1 TABLET: TAB at 08:01

## 2020-06-27 RX ADMIN — HEPARIN SODIUM 5000 UNITS: 5000 INJECTION INTRAVENOUS; SUBCUTANEOUS at 08:01

## 2020-06-27 RX ADMIN — Medication 10 ML: at 20:41

## 2020-06-27 RX ADMIN — TAMSULOSIN HYDROCHLORIDE 0.4 MG: 0.4 CAPSULE ORAL at 08:01

## 2020-06-27 RX ADMIN — DOCUSATE SODIUM 400 MG: 100 CAPSULE, LIQUID FILLED ORAL at 20:41

## 2020-06-27 RX ADMIN — GABAPENTIN 400 MG: 300 CAPSULE ORAL at 20:41

## 2020-06-27 RX ADMIN — DEXAMETHASONE SODIUM PHOSPHATE 8 MG: 4 INJECTION, SOLUTION INTRAMUSCULAR; INTRAVENOUS at 23:13

## 2020-06-27 NOTE — PROGRESS NOTES
[id number] [address]  
[address] [id number] Fax Case discussed with Dr Otilia Burgess Pt with PD followed by Dr Nancey Bence falls C spine and t spine MRI ordered C spine without etiology ie no myelopathy T spine MRI pnd Await MRI t spine result If this shows anything ominus will return Hope Crystal MD

## 2020-06-27 NOTE — PROGRESS NOTES
Problem: Pressure Injury - Risk of 
Goal: *Prevention of pressure injury Description: Document Larry Scale and appropriate interventions in the flowsheet. Outcome: Progressing Towards Goal 
Note: Pressure Injury Interventions: 
  
 
Moisture Interventions: Absorbent underpads, Internal/External urinary devices Activity Interventions: Increase time out of bed, Pressure redistribution bed/mattress(bed type), PT/OT evaluation Mobility Interventions: HOB 30 degrees or less, Pressure redistribution bed/mattress (bed type), PT/OT evaluation, Turn and reposition approx. every two hours(pillow and wedges) Nutrition Interventions: Document food/fluid/supplement intake Friction and Shear Interventions: Apply protective barrier, creams and emollients Problem: Patient Education: Go to Patient Education Activity Goal: Patient/Family Education Outcome: Progressing Towards Goal

## 2020-06-27 NOTE — PROGRESS NOTES
Received phone call from MRI - Dr. Saida Shankar recommending MRI of lumbar spine without contrast based on initial MRI images. Telephone order with read-back received from Dr. Brian Guzman. Order placed in [facility] and primary RN notified.

## 2020-06-27 NOTE — CONSULTS
ORTHO CONSULT Subjective:  
 
Date of Consultation:  June 27, 2020 Referring Physician:  Dr. 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 ) we are asked to see for R knee pain and Lower extremity weakness with associated spinal stenosis. Pt. Proves to be a reliable historian for the most part, some forgetfulness at times during interview. States he fell earlier this week, doesn't remember it. Hx of lumbar stenosis, laminectomy and fusion L3-5 over 10 years ago. No spine issues since then. Not currently seeing NS or Ortho Spine in [place] where he has retired. Denies numbness, c/o severe pain from lower back into the LLE. Urinary retention per Urology associated with Enlarged Prostate and Hydronephrosis. Patient Active Problem List  
 Diagnosis Date Noted  Acute metabolic encephalopathy 79/47/7741  Renal insufficiency 06/25/2020  Elevated lactic acid level 06/25/2020  Anemia 06/25/2020  Fall 06/25/2020  Parkinson disease ([facility] [address] 06/25/2020  Urinary retention 06/25/2020  Type II diabetes mellitus ([facility] [address] 06/25/2020  
 HTN (hypertension) 06/25/2020 No family history on file. Social History Tobacco Use  Smoking status: Not on file Substance Use Topics  Alcohol use: Not on file No past medical history on file. No past surgical history on file. Prior to Admission medications Medication Sig Start Date End Date Taking? Authorizing Provider  
carvediloL (COREG) 6.25 mg tablet Take 6.25 mg by mouth two (2) times daily (with meals). Yes Provider, Historical  
aspirin delayed-release 325 mg tablet Take 325 mg by mouth daily. Yes Provider, Historical  
glipiZIDE SR (GLUCOTROL XL) 5 mg CR tablet Take 5 mg by mouth daily. Yes Provider, Historical  
lisinopriL (PRINIVIL, ZESTRIL) 5 mg tablet Take 5 mg by mouth daily.    Yes Provider, Historical  
metFORMIN (GLUCOPHAGE) 1,000 mg tablet Take 1,000 mg by mouth two (2) times daily (with meals). Yes Provider, Historical  
tamsulosin (FLOMAX) 0.4 mg capsule Take 0.4 mg by mouth daily. Yes Provider, Historical  
pramipexole (MIRAPEX) 0.5 mg tablet Take 0.5 mg by mouth four (4) times daily. Yes Provider, Historical  
carbidopa-levodopa (Sinemet)  mg per tablet Take 2 Tabs by mouth four (4) times daily. 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  
gabapentin (NEURONTIN) 600 mg tablet Take 600 mg by mouth four (4) times daily. Yes Provider, Historical  
HYDROcodone-acetaminophen (Norco) 5-325 mg per tablet Take 1 Tab by mouth two (2) times daily as needed for Pain. Yes Provider, Historical  
torsemide (DEMADEX) 100 mg tablet Take 50 mg by mouth daily. Yes Provider, Historical  
omeprazole (PRILOSEC) 40 mg capsule Take 40 mg by mouth daily. Yes Provider, Historical  
atorvastatin (LIPITOR) 20 mg tablet Take 20 mg by mouth daily. Yes Provider, Historical  
docusate sodium (COLACE) 100 mg capsule Take 400 mg by mouth daily. Yes Provider, Historical  
cholecalciferol (Vitamin D3) (1000 Units /25 mcg) tablet Take 1,000 Units by mouth daily. Yes Provider, Historical  
loratadine (CLARITIN) 10 mg tablet Take 10 mg by mouth daily. Yes Provider, Historical  
fluticasone propionate (Flonase Allergy Relief) 50 mcg/actuation nasal spray 2 Sprays by Both Nostrils route daily. Yes Provider, Historical  
entacapone (COMTAN) 200 mg tablet Take 200 mg by mouth four (4) times daily. Yes Provider, Historical  
b complex vitamins tablet Take 1 Tab by mouth daily. Yes Provider, Historical  
spironolactone (ALDACTONE) 25 mg tablet Take 25 mg by mouth daily. Yes Provider, Historical  
traZODone (DESYREL) 50 mg tablet Take  mg by mouth nightly as needed for Sleep.    Yes Provider, Historical  
insulin glargine (LANTUS,BASAGLAR) 100 unit/mL (3 mL) inpn 40 Units by SubCUTAneous route two (2) times a day. Yes Provider, Historical  
insulin aspart U-100 (NovoLOG Flexpen U-100 Insulin) 100 unit/mL (3 mL) inpn by SubCUTAneous route Before breakfast, lunch, and dinner. Uses as sliding scale TIDAC (unknown scale)   Yes Provider, Historical  
 
Current Facility-Administered Medications Medication Dose Route Frequency  amLODIPine (NORVASC) tablet 5 mg  5 mg Oral DAILY  gabapentin (NEURONTIN) capsule 400 mg  400 mg Oral TID  
 HYDROcodone-acetaminophen (NORCO) 5-325 mg per tablet 2 Tab  2 Tab Oral Q6H PRN  
 sodium chloride (NS) flush 5-10 mL  5-10 mL IntraVENous PRN  
 sodium chloride (NS) flush 5-40 mL  5-40 mL IntraVENous Q8H  
 sodium chloride (NS) flush 5-40 mL  5-40 mL IntraVENous PRN  
 naloxone (NARCAN) injection 0.4 mg  0.4 mg IntraVENous PRN  
 heparin (porcine) injection 5,000 Units  5,000 Units SubCUTAneous Q8H  
 aspirin delayed-release tablet 325 mg  325 mg Oral DAILY  atorvastatin (LIPITOR) tablet 20 mg  20 mg Oral QHS  carbidopa-levodopa (SINEMET)  mg per tablet 2 Tab  2 Tab Oral QID  carvediloL (COREG) tablet 6.25 mg  6.25 mg Oral BID WITH MEALS  cholecalciferol (VITAMIN D3) (1000 Units /25 mcg) tablet 1 Tab  1,000 Units Oral DAILY  docusate sodium (COLACE) capsule 400 mg  400 mg Oral DAILY  entacapone (COMTAN) tablet 200 mg  200 mg Oral QID  fluticasone propionate (FLONASE) 50 mcg/actuation nasal spray 2 Spray  2 Spray Both Nostrils DAILY  insulin glargine (LANTUS) injection 10 Units  10 Units SubCUTAneous BID  loratadine (CLARITIN) tablet 10 mg  10 mg Oral DAILY  pantoprazole (PROTONIX) tablet 40 mg  40 mg Oral ACB  pramipexole (MIRAPEX) tablet 0.5 mg  0.5 mg Oral BID  tamsulosin (FLOMAX) capsule 0.4 mg  0.4 mg Oral DAILY  traZODone (DESYREL) tablet 50 mg  50 mg Oral QHS PRN  
 insulin lispro (HUMALOG) injection   SubCUTAneous AC&HS  
 glucose chewable tablet 16 g  4 Tab Oral PRN  
  dextrose (D50W) injection syrg 12.5-25 g  12.5-25 g IntraVENous PRN  
 glucagon (GLUCAGEN) injection 1 mg  1 mg IntraMUSCular PRN  finasteride (PROSCAR) tablet 5 mg  5 mg Oral DAILY  b-complex with vitamin c tablet 1 Tab  1 Tab Oral DAILY No Known Allergies Review of Systems:  A comprehensive review of systems was negative except for that written in the HPI. Objective:  
 
Visit Vitals /82 (BP 1 Location: Left arm, BP Patient Position: At rest) Pulse 93 Temp 98.8 °F (37.1 °C) Resp 22 Ht 5' 8\" (1.727 m) Wt 116.6 kg (256 lb 15.8 oz) SpO2 95% BMI 39.08 kg/m² EXAM: I examined pt's R knee. Moderate effusion, no erythema, pain with flexion and extension in medial and lateral joint spaces. Motor/sensory intact distally RLE. I examined pt's C/T/L spine. No Spinous process pain. Bilateral L2-4 paraspinous process pain on exam. Pt. Unable to straight leg raise LLE due to severe radicular pain from the Lumbar. 4/5 straight leg raise in RLE, hindered due to knee pain. DTR 1+ BLE's, +Dorsi/plantar flexion BLE's. NVI distally BLE's. Cap. Refill <2secs all toes. No ankle clonus RLE, unable to assess due to radicular pain in LLE. Neg. Hoffmans sign. No saddle anesthesia. XR Results (most recent): IMPRESSION: 
Status post laminectomy and fusion L3-L4. Laminectomy L4-5. 
  
Moderate broad-based protrusion at L2-L3 with severe canal stenosis at L2-L3. Assessment/Plan:  
 
Encounter Diagnoses ICD-10-CM ICD-9-CM 1. Metabolic encephalopathy V85.12 348.31  
2. Severe sepsis (HCC) A41.9 038.9  
 R65.20 995.92  
3. LYN (acute kidney injury) ([facility] [address] N17.9 584.9 4. Parkinson disease ([facility] [address] G20 332.0 5. Fall, subsequent encounter W19. XXXD V58.89 I063.4 L2-3 Severe Stenosis No cuada equini at this time. Will start IV Decadron (ok with Hospitalist) to help decompress and reduce radicular symptoms. Xray of R knee pending s/p fall with effusion. Dr. Swati Jaime agree with current plan. Thank you for allowing us to take part in this patients care. Clair Duran PA-C Orthopaedic Surgery PA

## 2020-06-27 NOTE — PROGRESS NOTES
Vinicius Izquierdo [facility] 79 
[address] [facility], [place], [address] [place] [address] 
[phone] Medical Progress Note NAME: Leona Blanco :  1945 MRM:  [id number] Date/Time: 2020 Assessment / Plan:  
 
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 CKD, BPH, Parkinson's disease presenting with weakness, fall, admitted for acute encephalopathy. Hospital course complicated by problems as listed below: 
 
  Cervico-Thoraco-lumbar spinal stenosis: Multilevel degenerative change with extensive epidural lipomatosis. Moderate to severe canal stenosis with minimal cord compression large related to epidural lipomatosis at C6-7. Moderate to severe canal stenosis at T8-T9 and moderate canal stenosis at T7-T8. Status post laminectomy and fusion L3-L4. Laminectomy L4-5. Moderate broad-based protrusion at L2-L3 with severe canal stenosis at L2-L3. Discussed with orthopedic surgery PA. Starting IV dexamethasone today, to review case with ortho-spine. PT/OT as per ortho. Will need placement. Acute metabolic encephalopathy: with intermittent delirium but overall improved. Multifactorial, including urinary retention, LYN, etc. No focal s/sx to suggest primary CNS process. Head CT negative. Tx as below. Supportive care. UCx showed no UTI. Stopped IV abx (was on cefepime and vanc) 
  
  Urinary retention: CT a/p showing distended bladder, enlarged prostate, and hydronephrosis. Bladder scan showed ~1L. Greer catheter placed. Urology following 
  
 LYN: unclear baseline renal function but likely closely to normal. Renal function improving daily. Holding metformin, ACEi, torsemide. Renally dose meds. Follow BMP Parkinson disease: with frequent falls at home. MRI findings as above. Continue Sinemet.  PT/OT, fall precautions.   
  Anemia: suspect multifactorial. Needs age-appropriate CA screening including colon CA.  
   
 Type II diabetes mellitus: controlled. A1c 7.0%. SSI alone for now. Holding glipizide and metformin.  
  
  HTN (hypertension): BP controlled. Monitor off lisinopril and torsemide. Continue Coreg  
  
  Lung nodule: 9.5 mm pleural parenchymal nodule in the left lower lobe. Will need to clarify smoking hx and RFs. Will need repeat chest imaging 
  
 
Total time spent: 40 minutes, d/w wife at bedside. D/w ortho Time spent in the care of this patient including reviewing records, discussing with nursing and/or other providers on the treatment team, obtaining history and examining the patient, and discussing treatment plans. Care Plan discussed with: Patient, Nursing Staff and >50% of time spent in counseling and coordination of care Discussed:  Care Plan and D/C Planning Prophylaxis:  Hep SQ Disposition:  SNF/LTC Subjective: Chief Complaint:  Follow up weakness Chart/notes/labs/studies reviewed, patient examined. Confused last night, better today. No CP or SOB. No leg numbness, +weak. No fevers Objective:  
 
 
Vitals:  
 
  
Last 24hrs VS reviewed since prior progress note. Most recent are: 
 
Visit Vitals /82 (BP 1 Location: Right arm, BP Patient Position: At rest) Pulse 93 Temp 98.7 °F (37.1 °C) Resp 18 Ht 5' 8\" (1.727 m) Wt 116.6 kg (256 lb 15.8 oz) SpO2 95% BMI 39.08 kg/m² SpO2 Readings from Last 6 Encounters:  
06/27/20 95% 06/24/20 93% Intake/Output Summary (Last 24 hours) at 6/27/2020 1648 Last data filed at 6/27/2020 1426 Gross per 24 hour Intake 150 ml Output 1450 ml Net -1300 ml Exam:  
 
Physical Exam: 
 
Gen:  Elderly, well-developed, well-nourished, in no acute distress. Pleasant. Wife at bedside HEENT:  No scleral icterus, hearing intact to voice, moist mucous membranes Neck:  Supple, without masses. Resp:  No accessory muscle use. CTAB without wheezing, rales, rhonchi Card: RRR. Normal S1 and S2 without murmurs, rubs, or gallops. No peripheral lower extremity edema. No JVD. Peripheral pulses in tact. Abd:  Normoactive bowel sounds. Soft, non-tender, non-distended. No rebound, no guarding. Musc:  No cyanosis or clubbing Skin:  No rashes or ulcers; turgor intact. Neuro:  Cranial nerves are grossly intact, no focal motor weakness but rather generalized weakness, follows commands appropriately. No LE numbness Psych:  Fair insight, normal affect. Alert, oriented to self and hospital and situation. Answers questions appropriately 
  
  
Medications Reviewed: (see below) Lab Data Reviewed: (see below) 
 
______________________________________________________________________ Medications:  
 
Current Facility-Administered Medications Medication Dose Route Frequency  amLODIPine (NORVASC) tablet 5 mg  5 mg Oral DAILY  gabapentin (NEURONTIN) capsule 400 mg  400 mg Oral TID  dexamethasone (DECADRON) 4 mg/mL injection 8 mg  8 mg IntraVENous Q6H  
 HYDROcodone-acetaminophen (NORCO) 5-325 mg per tablet 2 Tab  2 Tab Oral Q6H PRN  
 sodium chloride (NS) flush 5-10 mL  5-10 mL IntraVENous PRN  
 sodium chloride (NS) flush 5-40 mL  5-40 mL IntraVENous Q8H  
 sodium chloride (NS) flush 5-40 mL  5-40 mL IntraVENous PRN  
 naloxone (NARCAN) injection 0.4 mg  0.4 mg IntraVENous PRN  
 heparin (porcine) injection 5,000 Units  5,000 Units SubCUTAneous Q8H  
 aspirin delayed-release tablet 325 mg  325 mg Oral DAILY  atorvastatin (LIPITOR) tablet 20 mg  20 mg Oral QHS  carbidopa-levodopa (SINEMET)  mg per tablet 2 Tab  2 Tab Oral QID  carvediloL (COREG) tablet 6.25 mg  6.25 mg Oral BID WITH MEALS  cholecalciferol (VITAMIN D3) (1000 Units /25 mcg) tablet 1 Tab  1,000 Units Oral DAILY  docusate sodium (COLACE) capsule 400 mg  400 mg Oral DAILY  entacapone (COMTAN) tablet 200 mg  200 mg Oral QID  fluticasone propionate (FLONASE) 50 mcg/actuation nasal spray 2 Spray  2 Spray Both Nostrils DAILY  insulin glargine (LANTUS) injection 10 Units  10 Units SubCUTAneous BID  loratadine (CLARITIN) tablet 10 mg  10 mg Oral DAILY  pantoprazole (PROTONIX) tablet 40 mg  40 mg Oral ACB  pramipexole (MIRAPEX) tablet 0.5 mg  0.5 mg Oral BID  tamsulosin (FLOMAX) capsule 0.4 mg  0.4 mg Oral DAILY  traZODone (DESYREL) tablet 50 mg  50 mg Oral QHS PRN  
 insulin lispro (HUMALOG) injection   SubCUTAneous AC&HS  
 glucose chewable tablet 16 g  4 Tab Oral PRN  
 dextrose (D50W) injection syrg 12.5-25 g  12.5-25 g IntraVENous PRN  
 glucagon (GLUCAGEN) injection 1 mg  1 mg IntraMUSCular PRN  finasteride (PROSCAR) tablet 5 mg  5 mg Oral DAILY  b-complex with vitamin c tablet 1 Tab  1 Tab Oral DAILY Lab Review:  
 
Recent Labs  
  06/26/20 
0326 06/25/20 0459 06/24/20 
1829 WBC 6.9 8.8 9.5 HGB 10.7* 10.0* 9.4* HCT 34.0* 30.6* 29.2*  
 162 151 Recent Labs  
  06/27/20 
0319 06/26/20 
0326 06/25/20 0459 06/24/20 
1829  139 135* 131* K 4.6 4.2 4.7 5.7*  
 110* 103 101 CO2 26 25 26 25 * 115* 125* 128* BUN 16 22* 47* 53* CREA 1.27 1.41* 2.32* 3.01* CA 9.1 9.0 9.0 8.8 MG 1.7 1.7 1.6  --   
PHOS 2.1* 2.7 2.8  --   
ALB 3.0* 3.2* 3.5 3.3* ALT  --  7* 23 8* No components found for: [place] No results for input(s): PH, PCO2, PO2, HCO3, FIO2 in the last 72 hours. No results for input(s): INR, INREXT, INREXT in the last 72 hours. No results found for: SDES Lab Results Component Value Date/Time Culture result: No significant growth, <10,000 CFU/mL 06/25/2020 06:07 AM  
 Culture result: NO GROWTH 2 DAYS 06/25/2020 04:58 AM  
 
        
___________________________________________________ Attending Physician: Eladio Slade MD

## 2020-06-27 NOTE — PROGRESS NOTES
Occupational Therapy Chart reviewed in prep for evaluation, noted patient pending additional MRI of thoracic spine, will follow up after completed as indicated. Thank you.  
Soumya Meyer OTR/L

## 2020-06-27 NOTE — PROGRESS NOTES
Vinicius Pruittelsen [facility] 79 
[address] [facility], [address] [place] [address] 
[phone] Medical Progress Note NAME: Cathy Small :  1945 MRM:  [id number] Date/Time: 2020 Assessment / Plan:  
 
76 yo WM w/ hx of HTN, DM, ? CKD, BPH, Parkinson's disease presenting with weakness, fall, admitted for acute encephalopathy  
  
  Acute metabolic encephalopathy: improved, likely close to baseline now. Likely multifactorial, including urinary retention, LYN, etc. No focal s/sx to suggest primary CNS process. Head CT negative. Tx as below. Supportive care 
  
 Tachycardia: Tmax 100. Given elevated elevated lactic acid level, there was concern for sepsis. However, pt quickly improved. Stop IV vanc. Cont cefepime. Follow cultures 
  
  Urinary retention: CT a/p showing distended bladder, enlarged prostate, and hydronephrosis. Bladder scan showed ~1L. Greer catheter placed. Urology following 
  
 LYN: unclear baseline renal function, but renal function much improved today. Holding metformin, ACEi, torsemide. Renally dose meds. Follow BMP Parkinson disease: with frequent falls at home. Continue Sinemet. PT/OT, fall precautions. Appreciate neurology eval, MRI C and T spine ordered. DC planning, may need placement 
  
  Anemia: suspect multifactorial. Needs age-appropriate CA screening including colon CA.  
   
  Type II diabetes mellitus: controlled. A1c 7.0%. SSI alone for now. Holding glipizide and metformin.  
  
  HTN (hypertension): BP controlled. Monitor off lisinopril and torsemide. Continue Coreg  
  
  Lung nodule: 9.5 mm pleural parenchymal nodule in the left lower lobe. Will need to clarify smoking hx and RFs. Will need repeat chest imaging 
  
 
Total time spent: 35 minutes, d/w wife at bedside Time spent in the care of this patient including reviewing records, discussing with nursing and/or other providers on the treatment team, obtaining history and examining the patient, and discussing treatment plans. Care Plan discussed with: Patient, Nursing Staff and >50% of time spent in counseling and coordination of care Discussed:  Care Plan and D/C Planning Prophylaxis:  Hep SQ Disposition:  SNF/LTC Subjective: Chief Complaint:  Follow up weakness Chart/notes/labs/studies reviewed, patient examined. Feels better. No fever. No CP or SOB Objective:  
 
 
Vitals:  
 
  
Last 24hrs VS reviewed since prior progress note. Most recent are: 
 
Visit Vitals /72 (BP 1 Location: Left arm, BP Patient Position: At rest) Pulse 95 Temp 98.2 °F (36.8 °C) Resp 16 Ht 5' 8\" (1.727 m) Wt 115.6 kg (254 lb 12.8 oz) SpO2 94% BMI 38.74 kg/m² SpO2 Readings from Last 6 Encounters:  
06/26/20 94% 06/24/20 93% Intake/Output Summary (Last 24 hours) at 6/26/2020 2034 Last data filed at 6/26/2020 9200 Gross per 24 hour Intake 200 ml Output 3250 ml Net -3050 ml Exam:  
 
Physical Exam: 
 
Gen:  Elderly, well-developed, well-nourished, in no acute distress. Pleasant. Wife at bedside HEENT:  No scleral icterus, hearing intact to voice, moist mucous membranes Neck:  Supple, without masses. Resp:  No accessory muscle use. CTAB without wheezing, rales, rhonchi 
Card: RRR. Normal S1 and S2 without murmurs, rubs, or gallops. No peripheral lower extremity edema. No JVD. Peripheral pulses in tact. Abd:  Normoactive bowel sounds. Soft, non-tender, non-distended. No rebound, no guarding. Musc:  No cyanosis or clubbing Skin:  No rashes or ulcers; turgor intact. Neuro:  Cranial nerves are grossly intact, no focal motor weakness but rather generalized weakness, follows commands appropriately Psych:  Fair insight, normal affect. Alert, oriented to self and hospital and situation. Answers questions appropriately 
  
  
Medications Reviewed: (see below) Lab Data Reviewed: (see below) 
 
______________________________________________________________________ Medications:  
 
Current Facility-Administered Medications Medication Dose Route Frequency  HYDROcodone-acetaminophen (NORCO) 5-325 mg per tablet 2 Tab  2 Tab Oral Q6H PRN  
 sodium chloride (NS) flush 5-10 mL  5-10 mL IntraVENous PRN  
 sodium chloride (NS) flush 5-40 mL  5-40 mL IntraVENous Q8H  
 sodium chloride (NS) flush 5-40 mL  5-40 mL IntraVENous PRN  
 naloxone (NARCAN) injection 0.4 mg  0.4 mg IntraVENous PRN  
 cefepime (MAXIPIME) 2 g in 0.9% sodium chloride (MBP/ADV) 100 mL  2 g IntraVENous Q12H  
 heparin (porcine) injection 5,000 Units  5,000 Units SubCUTAneous Q8H  
 aspirin delayed-release tablet 325 mg  325 mg Oral DAILY  atorvastatin (LIPITOR) tablet 20 mg  20 mg Oral QHS  carbidopa-levodopa (SINEMET)  mg per tablet 2 Tab  2 Tab Oral QID  carvediloL (COREG) tablet 6.25 mg  6.25 mg Oral BID WITH MEALS  cholecalciferol (VITAMIN D3) (1000 Units /25 mcg) tablet 1 Tab  1,000 Units Oral DAILY  docusate sodium (COLACE) capsule 400 mg  400 mg Oral DAILY  entacapone (COMTAN) tablet 200 mg  200 mg Oral QID  fluticasone propionate (FLONASE) 50 mcg/actuation nasal spray 2 Spray  2 Spray Both Nostrils DAILY  gabapentin (NEURONTIN) capsule 300 mg  300 mg Oral TID  insulin glargine (LANTUS) injection 10 Units  10 Units SubCUTAneous BID  loratadine (CLARITIN) tablet 10 mg  10 mg Oral DAILY  pantoprazole (PROTONIX) tablet 40 mg  40 mg Oral ACB  pramipexole (MIRAPEX) tablet 0.5 mg  0.5 mg Oral BID  tamsulosin (FLOMAX) capsule 0.4 mg  0.4 mg Oral DAILY  traZODone (DESYREL) tablet 50 mg  50 mg Oral QHS PRN  
 insulin lispro (HUMALOG) injection   SubCUTAneous AC&HS  
 glucose chewable tablet 16 g  4 Tab Oral PRN  
 dextrose (D50W) injection syrg 12.5-25 g  12.5-25 g IntraVENous PRN  
 glucagon (GLUCAGEN) injection 1 mg  1 mg IntraMUSCular PRN  
  finasteride (PROSCAR) tablet 5 mg  5 mg Oral DAILY  b-complex with vitamin c tablet 1 Tab  1 Tab Oral DAILY Lab Review:  
 
Recent Labs  
  06/26/20 0326 06/25/20 0459 06/24/20 
1829 WBC 6.9 8.8 9.5 HGB 10.7* 10.0* 9.4* HCT 34.0* 30.6* 29.2*  
 162 151 Recent Labs  
  06/26/20 0326 06/25/20 0459 06/24/20 
1829  135* 131* K 4.2 4.7 5.7*  
* 103 101 CO2 25 26 25 * 125* 128* BUN 22* 47* 53* CREA 1.41* 2.32* 3.01* CA 9.0 9.0 8.8 MG 1.7 1.6  --   
PHOS 2.7 2.8  --   
ALB 3.2* 3.5 3.3* ALT 7* 23 8* No components found for: [place] No results for input(s): PH, PCO2, PO2, HCO3, FIO2 in the last 72 hours. No results for input(s): INR, INREXT in the last 72 hours. No results found for: SDES Lab Results Component Value Date/Time Culture result: No significant growth, <10,000 CFU/mL 06/25/2020 06:07 AM  
 Culture result: NO GROWTH 1 DAY 06/25/2020 04:58 AM  
 
        
___________________________________________________ Attending Physician: Alona Hewitt MD

## 2020-06-28 ENCOUNTER — APPOINTMENT (OUTPATIENT)
Dept: VASCULAR SURGERY | Age: 75
DRG: 551 | End: 2020-06-28
Attending: INTERNAL MEDICINE
Payer: MEDICARE

## 2020-06-28 LAB
ALBUMIN SERPL-MCNC: 2.6 G/DL (ref 3.5–5)
ANION GAP SERPL CALC-SCNC: 6 MMOL/L (ref 5–15)
ANION GAP SERPL CALC-SCNC: 8 MMOL/L (ref 5–15)
BASOPHILS # BLD: 0 K/UL (ref 0–0.1)
BASOPHILS NFR BLD: 0 % (ref 0–1)
BUN SERPL-MCNC: 20 MG/DL (ref 6–20)
BUN SERPL-MCNC: 36 MG/DL (ref 6–20)
BUN/CREAT SERPL: 16 (ref 12–20)
BUN/CREAT SERPL: 25 (ref 12–20)
CALCIUM SERPL-MCNC: 9.2 MG/DL (ref 8.5–10.1)
CALCIUM SERPL-MCNC: 9.3 MG/DL (ref 8.5–10.1)
CHLORIDE SERPL-SCNC: 107 MMOL/L (ref 97–108)
CHLORIDE SERPL-SCNC: 109 MMOL/L (ref 97–108)
CO2 SERPL-SCNC: 21 MMOL/L (ref 21–32)
CO2 SERPL-SCNC: 23 MMOL/L (ref 21–32)
CREAT SERPL-MCNC: 1.25 MG/DL (ref 0.7–1.3)
CREAT SERPL-MCNC: 1.46 MG/DL (ref 0.7–1.3)
DIFFERENTIAL METHOD BLD: ABNORMAL
EOSINOPHIL # BLD: 0 K/UL (ref 0–0.4)
EOSINOPHIL NFR BLD: 0 % (ref 0–7)
ERYTHROCYTE [DISTWIDTH] IN BLOOD BY AUTOMATED COUNT: 14.9 % (ref 11.5–14.5)
GLUCOSE BLD STRIP.AUTO-MCNC: 297 MG/DL (ref 65–100)
GLUCOSE BLD STRIP.AUTO-MCNC: 364 MG/DL (ref 65–100)
GLUCOSE BLD STRIP.AUTO-MCNC: 367 MG/DL (ref 65–100)
GLUCOSE BLD STRIP.AUTO-MCNC: 462 MG/DL (ref 65–100)
GLUCOSE SERPL-MCNC: 273 MG/DL (ref 65–100)
GLUCOSE SERPL-MCNC: 388 MG/DL (ref 65–100)
HCT VFR BLD AUTO: 31.2 % (ref 36.6–50.3)
HGB BLD-MCNC: 9.9 G/DL (ref 12.1–17)
IMM GRANULOCYTES # BLD AUTO: 0.1 K/UL (ref 0–0.04)
IMM GRANULOCYTES NFR BLD AUTO: 1 % (ref 0–0.5)
LYMPHOCYTES # BLD: 0.5 K/UL (ref 0.8–3.5)
LYMPHOCYTES NFR BLD: 9 % (ref 12–49)
MAGNESIUM SERPL-MCNC: 2.2 MG/DL (ref 1.6–2.4)
MCH RBC QN AUTO: 28.4 PG (ref 26–34)
MCHC RBC AUTO-ENTMCNC: 31.7 G/DL (ref 30–36.5)
MCV RBC AUTO: 89.4 FL (ref 80–99)
MONOCYTES # BLD: 0.3 K/UL (ref 0–1)
MONOCYTES NFR BLD: 4 % (ref 5–13)
NEUTS SEG # BLD: 5.2 K/UL (ref 1.8–8)
NEUTS SEG NFR BLD: 86 % (ref 32–75)
NRBC # BLD: 0 K/UL (ref 0–0.01)
NRBC BLD-RTO: 0 PER 100 WBC
PHOSPHATE SERPL-MCNC: 2.7 MG/DL (ref 2.6–4.7)
PLATELET # BLD AUTO: 169 K/UL (ref 150–400)
PMV BLD AUTO: 9.4 FL (ref 8.9–12.9)
POTASSIUM SERPL-SCNC: 4.3 MMOL/L (ref 3.5–5.1)
POTASSIUM SERPL-SCNC: 4.7 MMOL/L (ref 3.5–5.1)
RBC # BLD AUTO: 3.49 M/UL (ref 4.1–5.7)
SERVICE CMNT-IMP: ABNORMAL
SODIUM SERPL-SCNC: 136 MMOL/L (ref 136–145)
SODIUM SERPL-SCNC: 138 MMOL/L (ref 136–145)
WBC # BLD AUTO: 6.1 K/UL (ref 4.1–11.1)

## 2020-06-28 PROCEDURE — 80048 BASIC METABOLIC PNL TOTAL CA: CPT

## 2020-06-28 PROCEDURE — 74011636637 HC RX REV CODE- 636/637: Performed by: INTERNAL MEDICINE

## 2020-06-28 PROCEDURE — 97535 SELF CARE MNGMENT TRAINING: CPT

## 2020-06-28 PROCEDURE — 97165 OT EVAL LOW COMPLEX 30 MIN: CPT

## 2020-06-28 PROCEDURE — 74011250637 HC RX REV CODE- 250/637: Performed by: NURSE PRACTITIONER

## 2020-06-28 PROCEDURE — 97530 THERAPEUTIC ACTIVITIES: CPT

## 2020-06-28 PROCEDURE — 82962 GLUCOSE BLOOD TEST: CPT

## 2020-06-28 PROCEDURE — 74011250636 HC RX REV CODE- 250/636: Performed by: INTERNAL MEDICINE

## 2020-06-28 PROCEDURE — 36415 COLL VENOUS BLD VENIPUNCTURE: CPT

## 2020-06-28 PROCEDURE — 80069 RENAL FUNCTION PANEL: CPT

## 2020-06-28 PROCEDURE — 74011250636 HC RX REV CODE- 250/636: Performed by: PHYSICIAN ASSISTANT

## 2020-06-28 PROCEDURE — 83735 ASSAY OF MAGNESIUM: CPT

## 2020-06-28 PROCEDURE — 65660000000 HC RM CCU STEPDOWN

## 2020-06-28 PROCEDURE — 74011250637 HC RX REV CODE- 250/637: Performed by: INTERNAL MEDICINE

## 2020-06-28 PROCEDURE — 85025 COMPLETE CBC W/AUTO DIFF WBC: CPT

## 2020-06-28 PROCEDURE — 93970 EXTREMITY STUDY: CPT

## 2020-06-28 RX ORDER — INSULIN LISPRO 100 [IU]/ML
INJECTION, SOLUTION INTRAVENOUS; SUBCUTANEOUS
Status: DISCONTINUED | OUTPATIENT
Start: 2020-06-28 | End: 2020-07-02 | Stop reason: HOSPADM

## 2020-06-28 RX ORDER — INSULIN LISPRO 100 [IU]/ML
16 INJECTION, SOLUTION INTRAVENOUS; SUBCUTANEOUS ONCE
Status: COMPLETED | OUTPATIENT
Start: 2020-06-28 | End: 2020-06-28

## 2020-06-28 RX ORDER — FLUTICASONE PROPIONATE AND SALMETEROL 250; 50 UG/1; UG/1
1 POWDER RESPIRATORY (INHALATION) DAILY
Status: DISCONTINUED | OUTPATIENT
Start: 2020-06-29 | End: 2020-07-02 | Stop reason: HOSPADM

## 2020-06-28 RX ORDER — GABAPENTIN 300 MG/1
600 CAPSULE ORAL 3 TIMES DAILY
Status: DISCONTINUED | OUTPATIENT
Start: 2020-06-28 | End: 2020-07-02 | Stop reason: HOSPADM

## 2020-06-28 RX ORDER — FLUTICASONE PROPIONATE AND SALMETEROL 250; 50 UG/1; UG/1
1 POWDER RESPIRATORY (INHALATION) DAILY
COMMUNITY
End: 2021-01-01

## 2020-06-28 RX ORDER — INSULIN GLARGINE 100 [IU]/ML
20 INJECTION, SOLUTION SUBCUTANEOUS 2 TIMES DAILY
Status: DISCONTINUED | OUTPATIENT
Start: 2020-06-28 | End: 2020-07-02 | Stop reason: HOSPADM

## 2020-06-28 RX ORDER — INSULIN GLARGINE 100 [IU]/ML
10 INJECTION, SOLUTION SUBCUTANEOUS ONCE
Status: COMPLETED | OUTPATIENT
Start: 2020-06-28 | End: 2020-06-28

## 2020-06-28 RX ORDER — SODIUM CHLORIDE 9 MG/ML
75 INJECTION, SOLUTION INTRAVENOUS CONTINUOUS
Status: DISCONTINUED | OUTPATIENT
Start: 2020-06-28 | End: 2020-06-29

## 2020-06-28 RX ADMIN — CARBIDOPA AND LEVODOPA 2 TABLET: 25; 100 TABLET ORAL at 12:19

## 2020-06-28 RX ADMIN — PANTOPRAZOLE SODIUM 40 MG: 40 TABLET, DELAYED RELEASE ORAL at 05:54

## 2020-06-28 RX ADMIN — PRAMIPEXOLE DIHYDROCHLORIDE 0.5 MG: 0.25 TABLET ORAL at 07:57

## 2020-06-28 RX ADMIN — SODIUM CHLORIDE 75 ML/HR: 900 INJECTION, SOLUTION INTRAVENOUS at 21:32

## 2020-06-28 RX ADMIN — PRAMIPEXOLE DIHYDROCHLORIDE 0.5 MG: 0.25 TABLET ORAL at 16:56

## 2020-06-28 RX ADMIN — CARBIDOPA AND LEVODOPA 2 TABLET: 25; 100 TABLET ORAL at 07:57

## 2020-06-28 RX ADMIN — CARBIDOPA AND LEVODOPA 2 TABLET: 25; 100 TABLET ORAL at 21:12

## 2020-06-28 RX ADMIN — GABAPENTIN 600 MG: 300 CAPSULE ORAL at 13:59

## 2020-06-28 RX ADMIN — GABAPENTIN 400 MG: 300 CAPSULE ORAL at 07:57

## 2020-06-28 RX ADMIN — ENTACAPONE 200 MG: 200 TABLET, FILM COATED ORAL at 07:57

## 2020-06-28 RX ADMIN — INSULIN GLARGINE 20 UNITS: 100 INJECTION, SOLUTION SUBCUTANEOUS at 21:12

## 2020-06-28 RX ADMIN — INSULIN LISPRO 5 UNITS: 100 INJECTION, SOLUTION INTRAVENOUS; SUBCUTANEOUS at 07:57

## 2020-06-28 RX ADMIN — HEPARIN SODIUM 5000 UNITS: 5000 INJECTION INTRAVENOUS; SUBCUTANEOUS at 23:45

## 2020-06-28 RX ADMIN — Medication 10 ML: at 21:12

## 2020-06-28 RX ADMIN — CARVEDILOL 6.25 MG: 3.12 TABLET, FILM COATED ORAL at 16:56

## 2020-06-28 RX ADMIN — FINASTERIDE 5 MG: 5 TABLET, FILM COATED ORAL at 07:57

## 2020-06-28 RX ADMIN — HYDROCODONE BITARTRATE AND ACETAMINOPHEN 2 TABLET: 5; 325 TABLET ORAL at 14:01

## 2020-06-28 RX ADMIN — INSULIN GLARGINE 10 UNITS: 100 INJECTION, SOLUTION SUBCUTANEOUS at 07:57

## 2020-06-28 RX ADMIN — DEXAMETHASONE SODIUM PHOSPHATE 8 MG: 4 INJECTION, SOLUTION INTRAMUSCULAR; INTRAVENOUS at 05:54

## 2020-06-28 RX ADMIN — ENTACAPONE 200 MG: 200 TABLET, FILM COATED ORAL at 21:12

## 2020-06-28 RX ADMIN — MELATONIN 1 TABLET: at 07:57

## 2020-06-28 RX ADMIN — Medication 10 ML: at 13:59

## 2020-06-28 RX ADMIN — HEPARIN SODIUM 5000 UNITS: 5000 INJECTION INTRAVENOUS; SUBCUTANEOUS at 07:58

## 2020-06-28 RX ADMIN — ASPIRIN 325 MG: 325 TABLET, COATED ORAL at 07:57

## 2020-06-28 RX ADMIN — ATORVASTATIN CALCIUM 20 MG: 20 TABLET, FILM COATED ORAL at 21:12

## 2020-06-28 RX ADMIN — DEXAMETHASONE SODIUM PHOSPHATE 8 MG: 4 INJECTION, SOLUTION INTRAMUSCULAR; INTRAVENOUS at 12:19

## 2020-06-28 RX ADMIN — Medication 10 ML: at 05:54

## 2020-06-28 RX ADMIN — TAMSULOSIN HYDROCHLORIDE 0.4 MG: 0.4 CAPSULE ORAL at 07:57

## 2020-06-28 RX ADMIN — CARBIDOPA AND LEVODOPA 2 TABLET: 25; 100 TABLET ORAL at 16:56

## 2020-06-28 RX ADMIN — INSULIN LISPRO 14 UNITS: 100 INJECTION, SOLUTION INTRAVENOUS; SUBCUTANEOUS at 16:54

## 2020-06-28 RX ADMIN — INSULIN LISPRO 12 UNITS: 100 INJECTION, SOLUTION INTRAVENOUS; SUBCUTANEOUS at 12:20

## 2020-06-28 RX ADMIN — HEPARIN SODIUM 5000 UNITS: 5000 INJECTION INTRAVENOUS; SUBCUTANEOUS at 16:54

## 2020-06-28 RX ADMIN — GABAPENTIN 600 MG: 300 CAPSULE ORAL at 21:13

## 2020-06-28 RX ADMIN — LORATADINE 10 MG: 10 TABLET ORAL at 07:57

## 2020-06-28 RX ADMIN — FLUTICASONE PROPIONATE 2 SPRAY: 50 SPRAY, METERED NASAL at 07:58

## 2020-06-28 RX ADMIN — HYDROCODONE BITARTRATE AND ACETAMINOPHEN 2 TABLET: 5; 325 TABLET ORAL at 07:07

## 2020-06-28 RX ADMIN — INSULIN GLARGINE 10 UNITS: 100 INJECTION, SOLUTION SUBCUTANEOUS at 10:22

## 2020-06-28 RX ADMIN — CARVEDILOL 6.25 MG: 3.12 TABLET, FILM COATED ORAL at 07:57

## 2020-06-28 RX ADMIN — INSULIN LISPRO 16 UNITS: 100 INJECTION, SOLUTION INTRAVENOUS; SUBCUTANEOUS at 21:12

## 2020-06-28 RX ADMIN — DEXAMETHASONE SODIUM PHOSPHATE 8 MG: 4 INJECTION, SOLUTION INTRAMUSCULAR; INTRAVENOUS at 23:45

## 2020-06-28 RX ADMIN — ENTACAPONE 200 MG: 200 TABLET, FILM COATED ORAL at 12:19

## 2020-06-28 RX ADMIN — DEXAMETHASONE SODIUM PHOSPHATE 8 MG: 4 INJECTION, SOLUTION INTRAMUSCULAR; INTRAVENOUS at 16:56

## 2020-06-28 RX ADMIN — ENTACAPONE 200 MG: 200 TABLET, FILM COATED ORAL at 16:56

## 2020-06-28 RX ADMIN — VITAMIN C 1 TABLET: TAB at 07:57

## 2020-06-28 RX ADMIN — AMLODIPINE BESYLATE 5 MG: 5 TABLET ORAL at 07:57

## 2020-06-28 RX ADMIN — DOCUSATE SODIUM 400 MG: 100 CAPSULE, LIQUID FILLED ORAL at 21:13

## 2020-06-28 NOTE — PROGRESS NOTES
Patient's HS blood sugar came back at 360. Dr. Chloé Osorio was called and informed. She ordered to give him 10 units of humalog. Will continue to monitor patient.

## 2020-06-28 NOTE — PROGRESS NOTES
Problem: Mobility Impaired (Adult and Pediatric) Goal: *Acute Goals and Plan of Care (Insert Text) Description: FUNCTIONAL STATUS PRIOR TO ADMISSION: Patient required wife's minimal assistance for basic and instrumental ADLs. For last 8+ months amb only short household distances with Rollator prior to 2 GLF. Unable to amb into ED- [facility] by ambulance HOME SUPPORT PRIOR TO ADMISSION: The patient lived with wife. Physical Therapy Goals Initiated 6/26/2020 1. Patient will move from supine to sit and sit to supine , scoot up and down and roll side to side in bed with minimal assistance/contact guard assist within 7 day(s). 2.  Patient will transfer from bed to chair and chair to bed with minimal assistance/contact guard assist using the least restrictive device within 7 day(s). 3.  Patient will perform sit to stand with minimal assistance/contact guard assist within 7 day(s). 4.  Patient will ambulate with minimal assistance/contact guard assist for 25 feet with the least restrictive device within 7 day(s). Outcome: Progressing Towards Goal 
 PHYSICAL THERAPY TREATMENT Patient: Urbano Starkey [de-identified]75 y.o. male) Date: 6/28/2020 Diagnosis: Fall [W19. Anderson Kevin <principal problem not specified> Precautions: Bed Alarm, Fall Chart, physical therapy assessment, plan of care and goals were reviewed. ASSESSMENT Patient continues with skilled PT services and is progressing towards goals. He requires decreased assistance for transfers and bed mobility this date, ambulates increased distance, and sits edge of bed for 12 mins during static and dynamic activities. Pt is eager to perform mobility as independently as possible. Pt educated regarding bed mobility techniques to minimize twisting at spine. He complaints intermittently of R knee pain as well as L knee pain. Current Level of Function Impacting Discharge (mobility/balance): up to moderate assistance x 2 for transfers Other factors to consider for discharge: none additional 
    
 
PLAN : 
Patient continues to benefit from skilled intervention to address the above impairments. Continue treatment per established plan of care. to address goals. Recommendation for discharge: (in order for the patient to meet his/her long term goals) Therapy up to 5 days/week in SNF setting This discharge recommendation: A follow-up discussion with the attending provider and/or case management is planned IF patient discharges home will need the following DME: to be determined (TBD) SUBJECTIVE:  
Patient stated I want to do it on my own, re: bed mobility. Pt received supine, agreeable to PT and cleared by RN. OBJECTIVE DATA SUMMARY:  
Critical Behavior: 
Neurologic State: Alert Orientation Level: Oriented to person, Oriented to place, Oriented to situation Cognition: Follows commands Safety/Judgement: Insight into deficits Functional Mobility Training: 
Bed Mobility: 
Rolling: Moderate assistance Supine to Sit: Additional time;Minimum assistance;Assist x2 (significantly increased time) Sit to Supine: Moderate assistance;Contact guard assistance; Additional time Scooting: Stand-by assistance Transfers: 
Sit to Stand: Moderate assistance;Assist x2; Additional time Stand to Sit: Moderate assistance;Assist x2(uncontrolled descent ) Balance: 
Sitting: Intact Standing: Impaired Standing - Static: Constant support Standing - Dynamic : Fair;Constant support Ambulation/Gait Training: 
Distance (ft): 3 Feet (ft) Assistive Device: Walker, rolling;Gait belt Ambulation - Level of Assistance: Minimal assistance; Additional time;Assist x2 Gait Abnormalities: Decreased step clearance Base of Support: Widened Speed/Jonelle: Pace decreased (<100 feet/min) Pain Ratin/10 \"right knee\" Offered repositioning, education, rest. 
 
 Activity Tolerance:  
requires rest breaks Please refer to the flowsheet for vital signs taken during this treatment. After treatment patient left in no apparent distress:  
Supine in bed, Call bell within reach, Bed / chair alarm activated, and Side rails x 3 
 
COMMUNICATION/COLLABORATION:  
The patients plan of care was discussed with: Occupational therapist, Registered nurse, and Rehabilitation technician. Carlon Brunner, PT, DPT Time Calculation: 35 mins

## 2020-06-28 NOTE — PROGRESS NOTES
Problem: Self Care Deficits Care Plan (Adult) Goal: *Acute Goals and Plan of Care (Insert Text) Description: Description: FUNCTIONAL STATUS PRIOR TO ADMISSION: Patient required wife's minimal assistance for basic and instrumental ADLs. For last 8+ months amb only short household distances with Rollator prior to 2 GLF. Unable to amb into ED- [facility] by ambulance HOME SUPPORT PRIOR TO ADMISSION: The patient lived with wife. Occupational Therapy Goals Initiated 6/28/2020 1. Patient will perform upper body dressing with supervision/set-up within 7 day(s). 2.  Patient will perform lower body dressing with moderate assistance, using AE PRN,  within 7 day(s). 3.  Patient will perform toilet transfers with minimal assistance/contact guard assist within 7 day(s). 4.  Patient will perform all aspects of toileting with minimal assistance/contact guard assist within 7 day(s). 5.  Patient will verbalize/demonstrate 3/3 back precautions during ADL tasks without cues within 7 days. Outcome: Progressing Towards Goal 
 OCCUPATIONAL THERAPY EVALUATION Patient: Gabriella Leo [de-identified]75 y.o. male) Date: 6/28/2020 Primary Diagnosis: Fall [W19. Jimmye Plough Precautions:   Bed Alarm, Fall ASSESSMENT Based on the objective data described below, the patient presents with decreased functional mobility, decreased activity tolerance, LLE pain with mobility, and decreased balance in standing, following hospital admission secondary to 2 ground level falls at home. Patient with history of PD and reports over past 8 months patient has required assistance with ADL tasks due to functional decline. Patient spouse present at start of evaluation but left during. Patient agreeable to activity today. Socks donned with total assist while patient in supine. Education for log roll technique provided and patient performed with mod assist to roll onto right side. Patient requesting time to perform tasks as independently as possible to minimize pain. Patient able to push from sidelying to sitting with min assist x 2 and verbal cues. Patient maintains balance in sitting. Mod/max assist x 2 for sit to stand with bed elevated. Patient requires mod x 2 for side stepping to [facility]. Returned to supine with mod x 2 and cues for spine safety. Patient pleased with activity today and left in no distress. . 
 
Current Level of Function Impacting Discharge (ADLs/self-care): mod x 2 for mobility, up to total assist for LE ADLs Functional Outcome Measure: The patient scored 40/100 on the Barthel Index  outcome measure. Other factors to consider for discharge: requires assist at home Patient will benefit from skilled therapy intervention to address the above noted impairments. PLAN : 
Recommendations and Planned Interventions: self care training, functional mobility training, therapeutic exercise, balance training, therapeutic activities, endurance activities, patient education, home safety training, and family training/education Frequency/Duration: Patient will be followed by occupational therapy 5 times a week to address goals. Recommendation for discharge: (in order for the patient to meet his/her long term goals) Therapy up to 5 days/week in SNF setting This discharge recommendation: 
Has not yet been discussed the attending provider and/or case management IF patient discharges home will need the following DME: TBD SUBJECTIVE:  
Patient stated Delight Bent you for your help.  OBJECTIVE DATA SUMMARY:  
HISTORY:  
No past medical history on file. No past surgical history on file. Expanded or extensive additional review of patient history:  
 
Home Situation Home Environment: Private residence # Steps to Enter: 0 One/Two Story Residence: One story Living Alone: No 
Support Systems: Spouse/Significant Other/Partner Patient Expects to be Discharged to[de-identified] Private residence Current DME Used/Available at Home: Walker, rolling Tub or Shower Type: Shower Hand dominance: Right EXAMINATION OF PERFORMANCE DEFICITS: 
Cognitive/Behavioral Status: 
Neurologic State: Alert Orientation Level: Oriented to person;Oriented to place;Oriented to situation Cognition: Follows commands Perception: Appears intact Perseveration: No perseveration noted Safety/Judgement: Insight into deficits Skin: intact as seen Edema: none noted in UEs Hearing: Auditory Auditory Impairment: None Vision/Perceptual:   
    
    
    
  
    
    
  
 
Range of Motion: 
AROM: Within functional limits Strength: 
Strength: Within functional limits Coordination: 
Coordination: Within functional limits Fine Motor Skills-Upper: Left Intact; Right Intact Gross Motor Skills-Upper: Left Intact; Right Intact Tone & Sensation: 
Tone: Normal 
Sensation: Intact(for UEs) Balance: 
Sitting: Intact Standing: Impaired Standing - Static: Constant support Standing - Dynamic : Fair;Constant support Functional Mobility and Transfers for ADLs: 
Bed Mobility: 
Rolling: Moderate assistance Supine to Sit: Additional time;Minimum assistance;Assist x2 Sit to Supine: Moderate assistance;Contact guard assistance; Additional time Scooting: Stand-by assistance Transfers: 
Sit to Stand: Moderate assistance;Assist x2; Additional time Stand to Sit: Moderate assistance;Assist x2(uncontrolled descent ) Toilet Transfer : Moderate assistance;Assist x2; Additional time(to [facility] due to min distance ) Assistive Device : Walker, rolling ADL Assessment: 
Feeding: Independent Oral Facial Hygiene/Grooming: Setup Bathing: Moderate assistance Upper Body Dressing: Minimum assistance Lower Body Dressing: Total assistance Toileting: Total assistance ADL Intervention and task modifications: 
  
 
  
 
  
 
  
 
  
 
  
 
  
 
Cognitive Retraining Safety/Judgement: Insight into deficits Therapeutic Exercise: 
  
Functional Measure: 
Barthel Index: 
 
Bathin Bladder: 0 Bowels: 10 
Groomin Dressin Feeding: 10 Mobility: 0 Stairs: 0 Toilet Use: 5 Transfer (Bed to Chair and Back): 5 Total: 40/100 The Barthel ADL Index: Guidelines 1. The index should be used as a record of what a patient does, not as a record of what a patient could do. 2. The main aim is to establish degree of independence from any help, physical or verbal, however minor and for whatever reason. 3. The need for supervision renders the patient not independent. 4. A patient's performance should be established using the best available evidence. Asking the patient, friends/relatives and nurses are the usual sources, but direct observation and common sense are also important. However direct testing is not needed. 5. Usually the patient's performance over the preceding 24-48 hours is important, but occasionally longer periods will be relevant. 6. Middle categories imply that the patient supplies over 50 per cent of the effort. 7. Use of aids to be independent is allowed. Tosha Cunningham., Barthel, DSinaiW. (7128). Functional evaluation: the Barthel Index. [facility] (14)2. YIFAN Roche, Monse Izquierdo., Sammuel Hatchet.[facility], [address] [place] [address] (). Measuring the change indisability after inpatient rehabilitation; comparison of the responsiveness of the Barthel Index and Functional [place] Measure. Journal of Neurology, Neurosurgery, and Psychiatry, 66(4), 065-246. Liliana Monet, [place].NADYA, LIDIA Jurado, & Raissa Stewart MSinaiA. (2004.) Assessment of post-stroke quality of life in cost-effectiveness studies: The usefulness of the Barthel Index and the EuroQoL-5D. [facility], 13, 730-57 Occupational Therapy Evaluation Charge Determination History Examination Decision-Making LOW Complexity : Brief history review  LOW Complexity : 1-3 performance deficits relating to physical, cognitive , or psychosocial skils that result in activity limitations and / or participation restrictions  LOW Complexity : No comorbidities that affect functional and no verbal or physical assistance needed to complete eval tasks Based on the above components, the patient evaluation is determined to be of the following complexity level: LOW Pain Rating: 
Patient does not rate pain, but reports pain with mobility. Reporting pain to LLE popliteal fossa Activity Tolerance:  
Fair and requires rest breaks Please refer to the flowsheet for vital signs taken during this treatment. After treatment patient left in no apparent distress:   
Supine in bed, Call bell within reach, Bed / chair alarm activated, and Side rails x 3 
 
COMMUNICATION/EDUCATION:  
The patients plan of care was discussed with: Physical therapist and Registered nurse. Home safety education was provided and the patient/caregiver indicated understanding., Patient/family have participated as able in goal setting and plan of care. , and Patient/family agree to work toward stated goals and plan of care. This patients plan of care is appropriate for delegation to [facility]. Thank you for this referral. 
Joana Noble, OTR/L Time Calculation: 28 mins

## 2020-06-28 NOTE — PROGRESS NOTES
Vinicius Izquierdo [facility] 
[address] [facility], [address] [place] [address] 
[phone] Medical Progress Note NAME: Jaylen Chávez :  1945 MRM:  [id number] Date/Time: 2020 Assessment / Plan:  
 
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 CKD, BPH, Parkinson's disease presenting with weakness, fall, admitted for acute encephalopathy. Hospital course complicated by problems as listed below: 
 
  Cervico-Thoraco-lumbar spinal stenosis: Multilevel degenerative change with extensive epidural lipomatosis. Moderate to severe canal stenosis with minimal cord compression large related to epidural lipomatosis at C6-7. Moderate to severe canal stenosis at T8-T9 and moderate canal stenosis at T7-T8. Status post laminectomy and fusion L3-L4. Laminectomy L4-5. Moderate broad-based protrusion at L2-L3 with severe canal stenosis at L2-L3. Discussed with orthopedic surgery PA. Continue IV dexamethasone. PT/OT as per ortho. DC planning for placement. Acute metabolic encephalopathy:  Multifactorial, including urinary retention, LYN, etc. With mild intermittent delirium but largely improved. No focal s/sx to suggest primary CNS process. Head CT negative. Tx as below. Supportive care. UCx showed no UTI. Off IV abx (was on cefepime and vanc) 
  
  Urinary retention: CT a/p showing distended bladder, enlarged prostate, and hydronephrosis. Bladder scan showed ~1L requiringfoley catheter placement. Will need to follow up with urology 
  
 LYN: unclear baseline renal function but likely closely to normal. Renal function improved. Holding metformin, ACEi, torsemide. Renally dose meds. Follow BMP Parkinson disease: with frequent falls at home. MRI findings as above. Continue Sinemet.  PT/OT, fall precautions.   
 
  Anemia: likely multifactorial. Needs age-appropriate CA screening including colon CA if not yet done 
   
 Type II diabetes mellitus: controlled. A1c 7.0%. SSI alone for now. Holding glipizide and metformin.  
  
  HTN (hypertension): BP controlled. Continue Coreg and monitor off lisinopril and torsemide. 
  
  Lung nodule: 9.5 mm pleural parenchymal nodule in the left lower lobe. Will need to clarify smoking hx and RFs. Will need repeat chest imaging 
  
 
Total time spent: 35 minutes, lengthy discussion w/ wife at bedside. D/w ortho Time spent in the care of this patient including reviewing records, discussing with nursing and/or other providers on the treatment team, obtaining history and examining the patient, and discussing treatment plans. Care Plan discussed with: Patient, Nursing Staff and >50% of time spent in counseling and coordination of care Discussed:  Care Plan and D/C Planning Prophylaxis:  Hep SQ Disposition:  SNF/LTC Subjective: Chief Complaint:  Follow up weakness Chart/notes/labs/studies reviewed, patient examined. Severe leg pain. BLE negative for DVT. No CP or SOB. No F/C Objective:  
 
 
Vitals:  
 
  
Last 24hrs VS reviewed since prior progress note. Most recent are: 
 
Visit Vitals /72 (BP 1 Location: Left arm, BP Patient Position: At rest) Pulse 87 Temp 97.8 °F (36.6 °C) Resp 18 Ht 5' 8\" (1.727 m) Wt 116.6 kg (256 lb 15.8 oz) SpO2 93% BMI 39.08 kg/m² SpO2 Readings from Last 6 Encounters:  
06/28/20 93% 06/24/20 93% Intake/Output Summary (Last 24 hours) at 6/28/2020 1601 Last data filed at 6/28/2020 1047 Gross per 24 hour Intake  Output 850 ml Net -850 ml Exam:  
 
Physical Exam: 
 
Gen:  Elderly, well-developed, well-nourished, in no acute distress. Pleasant. Wife at bedside HEENT:  No scleral icterus, hearing intact to voice, moist mucous membranes Neck:  Supple, without masses. Resp:  No accessory muscle use. CTAB without wheezing, rales, rhonchi Card: RRR. Normal S1 and S2 without murmurs, rubs, or gallops. trace peripheral lower extremity edema. No JVD. Peripheral pulses in tact. Abd:  Normoactive bowel sounds. Soft, non-tender, non-distended. No rebound, no guarding. Musc:  No cyanosis or clubbing Skin:  No rashes or ulcers; turgor intact. Neuro:  Cranial nerves are grossly intact, no focal motor weakness but rather generalized weakness, follows commands appropriately. No LE numbness Psych:  Fair insight, normal affect. Alert, oriented to self and hospital and situation. Answers questions appropriately 
  
  
Medications Reviewed: (see below) Lab Data Reviewed: (see below) 
 
______________________________________________________________________ Medications:  
 
Current Facility-Administered Medications Medication Dose Route Frequency  insulin glargine (LANTUS) injection 20 Units  20 Units SubCUTAneous BID  insulin lispro (HUMALOG) injection   SubCUTAneous AC&HS  
 gabapentin (NEURONTIN) capsule 600 mg  600 mg Oral TID  amLODIPine (NORVASC) tablet 5 mg  5 mg Oral DAILY  dexamethasone (DECADRON) 4 mg/mL injection 8 mg  8 mg IntraVENous Q6H  
 HYDROcodone-acetaminophen (NORCO) 5-325 mg per tablet 2 Tab  2 Tab Oral Q6H PRN  
 sodium chloride (NS) flush 5-10 mL  5-10 mL IntraVENous PRN  
 sodium chloride (NS) flush 5-40 mL  5-40 mL IntraVENous Q8H  
 sodium chloride (NS) flush 5-40 mL  5-40 mL IntraVENous PRN  
 naloxone (NARCAN) injection 0.4 mg  0.4 mg IntraVENous PRN  
 heparin (porcine) injection 5,000 Units  5,000 Units SubCUTAneous Q8H  
 aspirin delayed-release tablet 325 mg  325 mg Oral DAILY  atorvastatin (LIPITOR) tablet 20 mg  20 mg Oral QHS  carbidopa-levodopa (SINEMET)  mg per tablet 2 Tab  2 Tab Oral QID  carvediloL (COREG) tablet 6.25 mg  6.25 mg Oral BID WITH MEALS  cholecalciferol (VITAMIN D3) (1000 Units /25 mcg) tablet 1 Tab  1,000 Units Oral DAILY  docusate sodium (COLACE) capsule 400 mg  400 mg Oral DAILY  entacapone (COMTAN) tablet 200 mg  200 mg Oral QID  fluticasone propionate (FLONASE) 50 mcg/actuation nasal spray 2 Spray  2 Spray Both Nostrils DAILY  loratadine (CLARITIN) tablet 10 mg  10 mg Oral DAILY  pantoprazole (PROTONIX) tablet 40 mg  40 mg Oral ACB  pramipexole (MIRAPEX) tablet 0.5 mg  0.5 mg Oral BID  tamsulosin (FLOMAX) capsule 0.4 mg  0.4 mg Oral DAILY  traZODone (DESYREL) tablet 50 mg  50 mg Oral QHS PRN  
 glucose chewable tablet 16 g  4 Tab Oral PRN  
 dextrose (D50W) injection syrg 12.5-25 g  12.5-25 g IntraVENous PRN  
 glucagon (GLUCAGEN) injection 1 mg  1 mg IntraMUSCular PRN  finasteride (PROSCAR) tablet 5 mg  5 mg Oral DAILY  b-complex with vitamin c tablet 1 Tab  1 Tab Oral DAILY Lab Review:  
 
Recent Labs  
  06/28/20 
0431 06/26/20 
0326 WBC 6.1 6.9 HGB 9.9* 10.7* HCT 31.2* 34.0*  
 163 Recent Labs  
  06/28/20 
0431 06/27/20 
0319 06/26/20 
0326  138 139  
K 4.7 4.6 4.2 * 108 110* CO2 23 26 25 * 165* 115* BUN 20 16 22* CREA 1.25 1.27 1.41* CA 9.3 9.1 9.0 MG 2.2 1.7 1.7 PHOS 2.7 2.1* 2.7 ALB 2.6* 3.0* 3.2* ALT  --   --  7* No components found for: [place] No results for input(s): PH, PCO2, PO2, HCO3, FIO2 in the last 72 hours. No results for input(s): INR, INREXT, INREXT in the last 72 hours. No results found for: SDES Lab Results Component Value Date/Time Culture result: No significant growth, <10,000 CFU/mL 06/25/2020 06:07 AM  
 Culture result: NO GROWTH 3 DAYS 06/25/2020 04:58 AM  
 
        
___________________________________________________ Attending Physician: Maki Llanes MD

## 2020-06-28 NOTE — PROGRESS NOTES
Ortho Progress Note S:  Sitting up in bed. More alert today. O:  EXAM: I examined pt's R knee. Mild effusion, no erythema, no pain with flexion and extension. Motor/sensory intact distally RLE. 
  
I examined pt's C/T/L spine. No Spinous process pain. Bilateral L2-4 paraspinous process pain on exam. Pt. Unable to straight leg raise LLE due to severe radicular pain from the Lumbar. However, has increased rotation and movement of the leg today with pain controlled. 5/5 straight leg raise in RLE, DTR 1+ BLE's, +Dorsi/plantar flexion BLE's. NVI distally BLE's. Cap. Refill <2secs all toes. No ankle clonus RLE, unable to assess due to radicular pain in LLE. Neg. Hoffmans sign. No saddle anesthesia. A:  Lumbar Stenosis P:  Continue IV Decadron. Dr. Moe Guadalupe will discuss case with [facility] tomorrow. Continue PT Dr. Moe Guadalupe agrees with plan. Thank you for allowing us to take part in this patients care. Vinicio Martinez PA-C Orthopaedic Surgery PA

## 2020-06-29 LAB
ALBUMIN SERPL-MCNC: 2.5 G/DL (ref 3.5–5)
ANION GAP SERPL CALC-SCNC: 7 MMOL/L (ref 5–15)
BUN SERPL-MCNC: 37 MG/DL (ref 6–20)
BUN/CREAT SERPL: 27 (ref 12–20)
CALCIUM SERPL-MCNC: 8.9 MG/DL (ref 8.5–10.1)
CHLORIDE SERPL-SCNC: 109 MMOL/L (ref 97–108)
CO2 SERPL-SCNC: 21 MMOL/L (ref 21–32)
CREAT SERPL-MCNC: 1.36 MG/DL (ref 0.7–1.3)
GLUCOSE BLD STRIP.AUTO-MCNC: 346 MG/DL (ref 65–100)
GLUCOSE BLD STRIP.AUTO-MCNC: 356 MG/DL (ref 65–100)
GLUCOSE BLD STRIP.AUTO-MCNC: 365 MG/DL (ref 65–100)
GLUCOSE BLD STRIP.AUTO-MCNC: 387 MG/DL (ref 65–100)
GLUCOSE SERPL-MCNC: 354 MG/DL (ref 65–100)
MAGNESIUM SERPL-MCNC: 2.3 MG/DL (ref 1.6–2.4)
PHOSPHATE SERPL-MCNC: 2.9 MG/DL (ref 2.6–4.7)
POTASSIUM SERPL-SCNC: 4.4 MMOL/L (ref 3.5–5.1)
SERVICE CMNT-IMP: ABNORMAL
SODIUM SERPL-SCNC: 137 MMOL/L (ref 136–145)

## 2020-06-29 PROCEDURE — 74011636637 HC RX REV CODE- 636/637: Performed by: INTERNAL MEDICINE

## 2020-06-29 PROCEDURE — 97535 SELF CARE MNGMENT TRAINING: CPT

## 2020-06-29 PROCEDURE — 74011250637 HC RX REV CODE- 250/637: Performed by: INTERNAL MEDICINE

## 2020-06-29 PROCEDURE — 74011250636 HC RX REV CODE- 250/636: Performed by: PHYSICIAN ASSISTANT

## 2020-06-29 PROCEDURE — 74011250636 HC RX REV CODE- 250/636: Performed by: INTERNAL MEDICINE

## 2020-06-29 PROCEDURE — 97530 THERAPEUTIC ACTIVITIES: CPT

## 2020-06-29 PROCEDURE — 80069 RENAL FUNCTION PANEL: CPT

## 2020-06-29 PROCEDURE — 74011250637 HC RX REV CODE- 250/637: Performed by: NURSE PRACTITIONER

## 2020-06-29 PROCEDURE — 82962 GLUCOSE BLOOD TEST: CPT

## 2020-06-29 PROCEDURE — 97110 THERAPEUTIC EXERCISES: CPT

## 2020-06-29 PROCEDURE — 94640 AIRWAY INHALATION TREATMENT: CPT

## 2020-06-29 PROCEDURE — 83735 ASSAY OF MAGNESIUM: CPT

## 2020-06-29 PROCEDURE — 65660000000 HC RM CCU STEPDOWN

## 2020-06-29 PROCEDURE — 87635 SARS-COV-2 COVID-19 AMP PRB: CPT

## 2020-06-29 PROCEDURE — 36415 COLL VENOUS BLD VENIPUNCTURE: CPT

## 2020-06-29 PROCEDURE — 51798 US URINE CAPACITY MEASURE: CPT

## 2020-06-29 RX ORDER — IPRATROPIUM BROMIDE AND ALBUTEROL SULFATE 2.5; .5 MG/3ML; MG/3ML
3 SOLUTION RESPIRATORY (INHALATION)
Status: DISCONTINUED | OUTPATIENT
Start: 2020-06-29 | End: 2020-07-02 | Stop reason: HOSPADM

## 2020-06-29 RX ADMIN — CARBIDOPA AND LEVODOPA 2 TABLET: 25; 100 TABLET ORAL at 21:26

## 2020-06-29 RX ADMIN — INSULIN LISPRO 10 UNITS: 100 INJECTION, SOLUTION INTRAVENOUS; SUBCUTANEOUS at 17:35

## 2020-06-29 RX ADMIN — ATORVASTATIN CALCIUM 20 MG: 20 TABLET, FILM COATED ORAL at 21:27

## 2020-06-29 RX ADMIN — ASPIRIN 325 MG: 325 TABLET, COATED ORAL at 08:49

## 2020-06-29 RX ADMIN — PRAMIPEXOLE DIHYDROCHLORIDE 0.5 MG: 0.25 TABLET ORAL at 08:49

## 2020-06-29 RX ADMIN — VITAMIN C 1 TABLET: TAB at 08:49

## 2020-06-29 RX ADMIN — Medication 10 ML: at 05:20

## 2020-06-29 RX ADMIN — FLUTICASONE PROPIONATE AND SALMETEROL 1 PUFF: 250; 50 POWDER RESPIRATORY (INHALATION) at 08:01

## 2020-06-29 RX ADMIN — HEPARIN SODIUM 5000 UNITS: 5000 INJECTION INTRAVENOUS; SUBCUTANEOUS at 08:48

## 2020-06-29 RX ADMIN — GABAPENTIN 600 MG: 300 CAPSULE ORAL at 17:35

## 2020-06-29 RX ADMIN — INSULIN LISPRO 10 UNITS: 100 INJECTION, SOLUTION INTRAVENOUS; SUBCUTANEOUS at 12:50

## 2020-06-29 RX ADMIN — SODIUM CHLORIDE 75 ML/HR: 900 INJECTION, SOLUTION INTRAVENOUS at 11:23

## 2020-06-29 RX ADMIN — ENTACAPONE 200 MG: 200 TABLET, FILM COATED ORAL at 12:51

## 2020-06-29 RX ADMIN — PANTOPRAZOLE SODIUM 40 MG: 40 TABLET, DELAYED RELEASE ORAL at 05:20

## 2020-06-29 RX ADMIN — HYDROCODONE BITARTRATE AND ACETAMINOPHEN 2 TABLET: 5; 325 TABLET ORAL at 14:39

## 2020-06-29 RX ADMIN — Medication 10 ML: at 21:30

## 2020-06-29 RX ADMIN — GABAPENTIN 600 MG: 300 CAPSULE ORAL at 21:27

## 2020-06-29 RX ADMIN — ENTACAPONE 200 MG: 200 TABLET, FILM COATED ORAL at 08:49

## 2020-06-29 RX ADMIN — DEXAMETHASONE SODIUM PHOSPHATE 8 MG: 4 INJECTION, SOLUTION INTRAMUSCULAR; INTRAVENOUS at 05:20

## 2020-06-29 RX ADMIN — TAMSULOSIN HYDROCHLORIDE 0.4 MG: 0.4 CAPSULE ORAL at 08:50

## 2020-06-29 RX ADMIN — FLUTICASONE PROPIONATE 2 SPRAY: 50 SPRAY, METERED NASAL at 08:01

## 2020-06-29 RX ADMIN — INSULIN LISPRO 10 UNITS: 100 INJECTION, SOLUTION INTRAVENOUS; SUBCUTANEOUS at 08:44

## 2020-06-29 RX ADMIN — FINASTERIDE 5 MG: 5 TABLET, FILM COATED ORAL at 08:49

## 2020-06-29 RX ADMIN — GABAPENTIN 600 MG: 300 CAPSULE ORAL at 08:50

## 2020-06-29 RX ADMIN — CARBIDOPA AND LEVODOPA 2 TABLET: 25; 100 TABLET ORAL at 12:51

## 2020-06-29 RX ADMIN — HEPARIN SODIUM 5000 UNITS: 5000 INJECTION INTRAVENOUS; SUBCUTANEOUS at 17:35

## 2020-06-29 RX ADMIN — HYDROCODONE BITARTRATE AND ACETAMINOPHEN 2 TABLET: 5; 325 TABLET ORAL at 08:43

## 2020-06-29 RX ADMIN — INSULIN GLARGINE 20 UNITS: 100 INJECTION, SOLUTION SUBCUTANEOUS at 21:27

## 2020-06-29 RX ADMIN — LORATADINE 10 MG: 10 TABLET ORAL at 08:50

## 2020-06-29 RX ADMIN — PRAMIPEXOLE DIHYDROCHLORIDE 0.5 MG: 0.25 TABLET ORAL at 18:13

## 2020-06-29 RX ADMIN — MELATONIN 1 TABLET: at 08:49

## 2020-06-29 RX ADMIN — Medication 10 ML: at 13:05

## 2020-06-29 RX ADMIN — AMLODIPINE BESYLATE 5 MG: 5 TABLET ORAL at 08:49

## 2020-06-29 RX ADMIN — DOCUSATE SODIUM 400 MG: 100 CAPSULE, LIQUID FILLED ORAL at 21:27

## 2020-06-29 RX ADMIN — CARBIDOPA AND LEVODOPA 2 TABLET: 25; 100 TABLET ORAL at 08:49

## 2020-06-29 RX ADMIN — CARVEDILOL 6.25 MG: 3.12 TABLET, FILM COATED ORAL at 17:35

## 2020-06-29 RX ADMIN — ENTACAPONE 200 MG: 200 TABLET, FILM COATED ORAL at 21:27

## 2020-06-29 RX ADMIN — INSULIN LISPRO 5 UNITS: 100 INJECTION, SOLUTION INTRAVENOUS; SUBCUTANEOUS at 21:28

## 2020-06-29 RX ADMIN — ENTACAPONE 200 MG: 200 TABLET, FILM COATED ORAL at 18:41

## 2020-06-29 RX ADMIN — INSULIN GLARGINE 20 UNITS: 100 INJECTION, SOLUTION SUBCUTANEOUS at 08:44

## 2020-06-29 RX ADMIN — DEXAMETHASONE SODIUM PHOSPHATE 8 MG: 4 INJECTION, SOLUTION INTRAMUSCULAR; INTRAVENOUS at 12:51

## 2020-06-29 RX ADMIN — HEPARIN SODIUM 5000 UNITS: 5000 INJECTION INTRAVENOUS; SUBCUTANEOUS at 23:00

## 2020-06-29 RX ADMIN — TRAZODONE HYDROCHLORIDE 50 MG: 50 TABLET ORAL at 22:48

## 2020-06-29 RX ADMIN — CARVEDILOL 6.25 MG: 3.12 TABLET, FILM COATED ORAL at 08:49

## 2020-06-29 RX ADMIN — HYDROCODONE BITARTRATE AND ACETAMINOPHEN 2 TABLET: 5; 325 TABLET ORAL at 22:47

## 2020-06-29 RX ADMIN — CARBIDOPA AND LEVODOPA 2 TABLET: 25; 100 TABLET ORAL at 18:13

## 2020-06-29 NOTE — PROGRESS NOTES
2:25pm:  Pt has been accepted by [address] but he will need a more recent COVID-19 test prior to his admission. [facility], and [facility] do not have any available beds at this time. Joseph Lyons LCSW Transition of Care Plan - RUR 25%: 1. CM following for progress and approvals of rehab referrals. Referrals have been sent to the following facilities: [facility], [facility], [facility] and [facility] acute rehab. Awaiting responses on all facilities with the exception of [facility]- they do not anticipate any bed availability until mid-week. 2. coordinate transfer to accepting facility 3. transportation anticipated by stretcher.  
 
Joseph Lyons LCSW

## 2020-06-29 NOTE — PROGRESS NOTES
Ortho update: 
 
S: Pt resting in bed. Pt states his symptoms are improved. He has minimal back pain/ no further radicular pain. O: VSS Breathing even/ nonlabored. Bilateral LE: negative SLR bilaterally. 5/5 strength KE, KF, PF, DF, EHL, FHL. Remigio Negron NVI distally BLE's. No saddle anesthesia. A:  Cervico-Thoraco-lumbar spinal stenosis POA: imaging showed a multilevel degenerative change with extensive epidural lipomatosis. Moderate to severe canal stenosis with minimal cord compression large related to epidural lipomatosis at C6-7. Moderate to severe canal stenosis at T8-T9 and moderate canal stenosis at T7-T8. Status post laminectomy and fusion L3-L4. Laminectomy L4-5. Moderate broad-based protrusion at L2-L3 with severe canal stenosis at L2-L3. P: Continue IV dexamethasone through today. Remigio Negron PT/ OT. Plan for rehab today or tomorrow. Can follow up with spine at [address] [facility] in follow up. WIll sign off. Please reconsult as needed.  
 
Jeanne Arroyo NP

## 2020-06-29 NOTE — PROGRESS NOTES
Problem: Pressure Injury - Risk of 
Goal: *Prevention of pressure injury Description: Document Larry Scale and appropriate interventions in the flowsheet. Outcome: Progressing Towards Goal 
Note: Pressure Injury Interventions: 
Sensory Interventions: Assess changes in LOC, Assess need for specialty bed, Pressure redistribution bed/mattress (bed type) Moisture Interventions: Absorbent underpads Activity Interventions: Increase time out of bed, Pressure redistribution bed/mattress(bed type), PT/OT evaluation Mobility Interventions: HOB 30 degrees or less, Pressure redistribution bed/mattress (bed type), PT/OT evaluation Nutrition Interventions: Document food/fluid/supplement intake, Discuss nutritional consult with provider, Offer support with meals,snacks and hydration Friction and Shear Interventions: Lift team/patient mobility team, HOB 30 degrees or less, Minimize layers Problem: Patient Education: Go to Patient Education Activity Goal: Patient/Family Education Outcome: Progressing Towards Goal

## 2020-06-29 NOTE — PROGRESS NOTES
Problem: Mobility Impaired (Adult and Pediatric) Goal: *Acute Goals and Plan of Care (Insert Text) Description: FUNCTIONAL STATUS PRIOR TO ADMISSION: Patient required wife's minimal assistance for basic and instrumental ADLs. For last 8+ months amb only short household distances with Rollator prior to 2 GLF. Unable to amb into ED- [facility] by ambulance HOME SUPPORT PRIOR TO ADMISSION: The patient lived with wife. Physical Therapy Goals Initiated 6/26/2020 1. Patient will move from supine to sit and sit to supine , scoot up and down and roll side to side in bed with minimal assistance/contact guard assist within 7 day(s). 2.  Patient will transfer from bed to chair and chair to bed with minimal assistance/contact guard assist using the least restrictive device within 7 day(s). 3.  Patient will perform sit to stand with minimal assistance/contact guard assist within 7 day(s). 4.  Patient will ambulate with minimal assistance/contact guard assist for 25 feet with the least restrictive device within 7 day(s). Outcome: Progressing Towards Goal 
 PHYSICAL THERAPY TREATMENT Patient: Naomy Bagley [de-identified]75 y.o. male) Date: 6/29/2020 Diagnosis: Fall [W19. Vonda Sheldon <principal problem not specified> Precautions: Bed Alarm, Fall Chart, physical therapy assessment, plan of care and goals were reviewed. ASSESSMENT Patient continues with skilled PT services and is progressing towards goals. Demonstrated improved activity tolerance compared to last session. Completed 3 sit to stand trials to a RW requiring cues for hand placement and additional time. He required minimal assist x2 to stand from an elevated bed height and moderate x2 from an average height chair. He was able to stand approximately 1 minute to the walker to march in place x5 before fatiguing and requiring a seated rest break.   
The patient is making gains and motivated but below his baseline and at increased risk of falls. Recommend discharge to rehab when medically ready Current Level of Function Impacting Discharge (mobility/balance): moderate x2 for sit to stand from chair PLAN : 
Patient continues to benefit from skilled intervention to address the above impairments. Continue treatment per established plan of care. to address goals. Recommendation for discharge: (in order for the patient to meet his/her long term goals) Therapy up to 5 days/week in SNF setting This discharge recommendation: 
Has been made in collaboration with the attending provider and/or case management IF patient discharges home will need the following DME: to be determined (TBD) SUBJECTIVE:  
Patient stated I think I might need to sit now.  OBJECTIVE DATA SUMMARY:  
Critical Behavior: 
Neurologic State: Alert Orientation Level: Oriented to person, Oriented to place Cognition: Follows commands Safety/Judgement: Insight into deficits Functional Mobility Training: 
Bed Mobility: 
  
  
  
  
  
  
Transfers: 
Sit to Stand: Minimum assistance; Moderate assistance;Assist x2 Stand to Sit: Minimum assistance;Assist x2 Balance: 
Sitting: Intact(Simultaneous filing. User may not have seen previous data.) Standing: Impaired(Simultaneous filing. User may not have seen previous data.) Standing - Static: Fair Standing - Dynamic : Fair;Constant support Ambulation/Gait Training: 
Distance (ft): 4 Feet (ft) Assistive Device: Walker, rolling;Gait belt Ambulation - Level of Assistance: Minimal assistance Gait Abnormalities: Decreased step clearance;Shuffling gait Base of Support: Widened Speed/Jonelle: Pace decreased (<100 feet/min) Marching in place x5 reps with RW and CGA Activity Tolerance:  
Fair Please refer to the flowsheet for vital signs taken during this treatment. After treatment patient left in no apparent distress: Sitting in chair and Call bell within reach COMMUNICATION/COLLABORATION:  
The patients plan of care was discussed with: Registered nurse. Travis Salas, PT, DPT Time Calculation: 13 mins

## 2020-06-29 NOTE — PROGRESS NOTES
[facility] 
[address] [facility], [address] [place] [address] 
[phone] Medical Progress Note NAME:         Christa Mansfield :        1945 MRM:        [id number] Date of service: 2020 Subjective: Patient has been seen and examined as a follow up for multiple medical issues. Chart, labs, diagnostics reviewed. Back pain. Not much distress. No nausea or vomiting. Afebrile Objective: 
 
Vital Signs: 
 
Visit Vitals /83 (BP 1 Location: Left arm, BP Patient Position: At rest;Head of bed elevated (Comment degrees)) Pulse 73 Temp 97.6 °F (36.4 °C) Resp 18 Ht 5' 8\" (1.727 m) Wt 117.2 kg (258 lb 6.1 oz) SpO2 94% BMI 39.29 kg/m² Intake/Output Summary (Last 24 hours) at 2020 1128 Last data filed at 2020 1539 Gross per 24 hour Intake 466.25 ml Output 1200 ml Net -733.75 ml Physical Examination: 
 
General:   Weak looking but not in acute distress Eyes:   pink conjunctivae, PERRLA with no discharge. ENT:   no ottorrhea or rhinorrhea with dry mucous membranes Neck: no masses, thyroid non-tender and trachea central. 
Pulm:  no accessory muscle use, decreased but clear breath sounds without crackles or wheezes Card:  no JVD or murmurs, has regular and normal S1, S2 without thrills, bruits or peripheral edema Abd:  Soft, non-tender, non-distended, normoactive bowel sounds with no palpable organomegaly Musc:  No cyanosis, clubbing, atrophy or deformities. Skin:  No rashes, bruising or ulcers. Neuro: Awake and alert. Generally a non focal exam. Follows commands appropriately Psych:  Has a good insight and is oriented x 3 Current Facility-Administered Medications Medication Dose Route Frequency  insulin glargine (LANTUS) injection 20 Units  20 Units SubCUTAneous BID  insulin lispro (HUMALOG) injection   SubCUTAneous AC&HS  
  gabapentin (NEURONTIN) capsule 600 mg  600 mg Oral TID  
 0.9% sodium chloride infusion  75 mL/hr IntraVENous CONTINUOUS  
 fluticasone-salmeterol (ADVAIR/WIXELA) 250mcg-50mcg/puff (Patient Supplied)  1 Puff Inhalation DAILY  amLODIPine (NORVASC) tablet 5 mg  5 mg Oral DAILY  dexamethasone (DECADRON) 4 mg/mL injection 8 mg  8 mg IntraVENous Q6H  
 HYDROcodone-acetaminophen (NORCO) 5-325 mg per tablet 2 Tab  2 Tab Oral Q6H PRN  
 sodium chloride (NS) flush 5-10 mL  5-10 mL IntraVENous PRN  
 sodium chloride (NS) flush 5-40 mL  5-40 mL IntraVENous Q8H  
 sodium chloride (NS) flush 5-40 mL  5-40 mL IntraVENous PRN  
 naloxone (NARCAN) injection 0.4 mg  0.4 mg IntraVENous PRN  
 heparin (porcine) injection 5,000 Units  5,000 Units SubCUTAneous Q8H  
 aspirin delayed-release tablet 325 mg  325 mg Oral DAILY  atorvastatin (LIPITOR) tablet 20 mg  20 mg Oral QHS  carbidopa-levodopa (SINEMET)  mg per tablet 2 Tab  2 Tab Oral QID  carvediloL (COREG) tablet 6.25 mg  6.25 mg Oral BID WITH MEALS  cholecalciferol (VITAMIN D3) (1000 Units /25 mcg) tablet 1 Tab  1,000 Units Oral DAILY  docusate sodium (COLACE) capsule 400 mg  400 mg Oral DAILY  entacapone (COMTAN) tablet 200 mg  200 mg Oral QID  fluticasone propionate (FLONASE) 50 mcg/actuation nasal spray 2 Spray  2 Spray Both Nostrils DAILY  loratadine (CLARITIN) tablet 10 mg  10 mg Oral DAILY  pantoprazole (PROTONIX) tablet 40 mg  40 mg Oral ACB  pramipexole (MIRAPEX) tablet 0.5 mg  0.5 mg Oral BID  tamsulosin (FLOMAX) capsule 0.4 mg  0.4 mg Oral DAILY  traZODone (DESYREL) tablet 50 mg  50 mg Oral QHS PRN  
 glucose chewable tablet 16 g  4 Tab Oral PRN  
 dextrose (D50W) injection syrg 12.5-25 g  12.5-25 g IntraVENous PRN  
 glucagon (GLUCAGEN) injection 1 mg  1 mg IntraMUSCular PRN  finasteride (PROSCAR) tablet 5 mg  5 mg Oral DAILY  b-complex with vitamin c tablet 1 Tab  1 Tab Oral DAILY Laboratory data and review: 
 
Recent Labs  
  06/28/20 
0431 WBC 6.1 HGB 9.9*  
HCT 31.2*  
 Recent Labs  
  06/29/20 
0535 06/28/20 
2117 06/28/20 
0431 06/27/20 
0319  136 138 138  
K 4.4 4.3 4.7 4.6 * 107 109* 108 CO2 21 21 23 26 * 388* 273* 165* BUN 37* 36* 20 16 CREA 1.36* 1.46* 1.25 1.27  
CA 8.9 9.2 9.3 9.1 MG 2.3  --  2.2 1.7 PHOS 2.9  --  2.7 2.1* ALB 2.5*  --  2.6* 3.0* No components found for: [facility]: Imaging - all reviewed Assessment and Plan: 
 
Cervico-Thoraco-lumbar spinal stenosis POA: imaging showed a multilevel degenerative change with extensive epidural lipomatosis. Moderate to severe canal stenosis with minimal cord compression large related to epidural lipomatosis at C6-7. Moderate to severe canal stenosis at T8-T9 and moderate canal stenosis at T7-T8. Status post laminectomy and fusion L3-L4. Laminectomy L4-5. Moderate broad-based protrusion at L2-L3 with severe canal stenosis at L2-L3. Continue IV dexamethasone through today. PT/OT as tolerated. SNF likely tomorrow.    
  
Acute metabolic encephalopathy:  this seems to have now resolved. Likely multifactorial from urinary retention, LYN. No focal s/sx to suggest primary CNS process. Head CT negative. Supportive care. UCx showed no UTI. Off IV abx (was on cefepime and vanc). No further testing at this time 
  
Urinary retention: CT a/p showing distended bladder, enlarged prostate, and hydronephrosis. Bladder scan showed ~1L requiring sánchez catheter placement. Continue Flomax with an outpatient follow up with Urology.  
  
Type II diabetes mellitus: controlled. A1c 7.0%. Hyperglycemic partly due to steroids. Continue Lantus, SSi. DM diet. Monitor blood glucose once done with steroids. HTN (hypertension): BP controlled. Continue Coreg.  Continue to hold Lisinopril and torsemide. 
  
LYN: unclear baseline renal function but likely closely to normal. Renal function improved. Holding metformin, ACEi, torsemide. Renally dose meds. SCr continues to improve. Monitor  
  
Parkinson disease: with frequent falls at home. Continue Sinemet. PT/OT, fall precautions.   
  
Anemia: likely multifactorial. Needs age-appropriate CA screening including colon CA once he has recovered from current illness. Reiterated to patient. Lung nodule: 9.5 mm pleural parenchymal nodule in the left lower lobe. Needs outpatient follow up as well as re-imaging Total time spent for the patient's care: 30  Minutes Care Plan discussed with: Patient, Care Manager and Nursing Staff Discussed:  Care Plan and D/C Planning Prophylaxis:  Hep SQ Anticipated Disposition:  SNF/LTC 
        
___________________________________________________ Attending Physician:   Michael Crawford MD

## 2020-06-29 NOTE — PROGRESS NOTES
Problem: Self Care Deficits Care Plan (Adult) Goal: *Acute Goals and Plan of Care (Insert Text) Description: Description: FUNCTIONAL STATUS PRIOR TO ADMISSION: Patient required wife's minimal assistance for basic and instrumental ADLs. For last 8+ months amb only short household distances with Rollator prior to 2 GLF. Unable to amb into ED- [facility] by ambulance HOME SUPPORT PRIOR TO ADMISSION: The patient lived with wife. Occupational Therapy Goals Initiated 6/28/2020 1. Patient will perform upper body dressing with supervision/set-up within 7 day(s). 2.  Patient will perform lower body dressing with moderate assistance, using AE PRN,  within 7 day(s). 3.  Patient will perform toilet transfers with minimal assistance/contact guard assist within 7 day(s). 4.  Patient will perform all aspects of toileting with minimal assistance/contact guard assist within 7 day(s). 5.  Patient will verbalize/demonstrate 3/3 back precautions during ADL tasks without cues within 7 days. Outcome: Progressing Towards Goal 
 OCCUPATIONAL THERAPY TREATMENT Patient: Bree Schlute [de-identified]75 y.o. male) Date: 6/29/2020 Diagnosis: Fall [W19. Nicki Dory <principal problem not specified> Precautions: Bed Alarm, Fall Chart, occupational therapy assessment, plan of care, and goals were reviewed. ASSESSMENT Patient continues with skilled OT services and is progressing towards goals. Pt engaged with UE exercises prior to supine to sit and tried to use urinal. After transfer to chair he wanted to stand to use urinal and not able to void both times. Assist x 2 to stand and min assist for cloth mgt and pt not safe to release walker with hands. Pt left seated in chair, RN aware. Current Level of Function Impacting Discharge (ADLs): Min assist x 2 sit to stand Other factors to consider for discharge: PLAN : 
Patient continues to benefit from skilled intervention to address the above impairments. Continue treatment per established plan of care. to address goals. Recommend with staff: Out of bed to chair for activity/meals assist x 2 Recommend next OT session: Cont towards goals Recommendation for discharge: (in order for the patient to meet his/her long term goals) Therapy 3 hours per day 5-7 days per week This discharge recommendation: 
Has not yet been discussed the attending provider and/or case management IF patient discharges home will need the following DME:   
 
 
SUBJECTIVE:  
Patient stated \"I can try.  OBJECTIVE DATA SUMMARY:  
Cognitive/Behavioral Status: 
Neurologic State: Alert Orientation Level: Oriented to person;Oriented to place Cognition: Follows commands Functional Mobility and Transfers for ADLs: 
Bed Mobility: Moderate assist x 1 Transfers: Moderate assist x 2 Balance: 
Sitting: Intact Standing: Impaired ADL Intervention: Toileting Toileting Assistance: Minimum assistance Clothing Management: Minimum assistance Standing Therapeutic Exercises:  
 
EXERCISE Sets Reps Active Active Assist  
Passive Comments Shoulder flex/ext 1 10 [x]           []           [] Chest presses 1 10 [x]           []           [] Elbow flex/ext 1 10 [x]           []           []             
   []           []           []             
   []           []           [] Activity Tolerance:  
Fair Please refer to the flowsheet for vital signs taken during this treatment. After treatment patient left in no apparent distress:  
Sitting in chair COMMUNICATION/COLLABORATION:  
 The patients plan of care was discussed with: Physical therapist, Occupational therapist, and Registered nurse. DIANE Nix Time Calculation: 25 mins

## 2020-06-29 NOTE — PROGRESS NOTES
1650: notified Dr Frandy Harry of . Received order to administer 10 units of Humalog. 1234: Notified Dr. Frandy Harry of . He advised to administer 10 units of humalog . Will continue to monitor. 1944: Notified Dr. Frandy Harry of . He ordered to administer 10 units of humalog. Will continue to monitor.

## 2020-06-30 PROBLEM — M48.00 SPINAL STENOSIS, MULTILEVEL: Status: ACTIVE | Noted: 2020-06-30

## 2020-06-30 PROBLEM — N28.9 RENAL INSUFFICIENCY: Status: RESOLVED | Noted: 2020-06-25 | Resolved: 2020-06-30

## 2020-06-30 PROBLEM — R79.89 ELEVATED LACTIC ACID LEVEL: Status: RESOLVED | Noted: 2020-06-25 | Resolved: 2020-06-30

## 2020-06-30 LAB
BACTERIA SPEC CULT: NORMAL
GLUCOSE BLD STRIP.AUTO-MCNC: 220 MG/DL (ref 65–100)
GLUCOSE BLD STRIP.AUTO-MCNC: 224 MG/DL (ref 65–100)
GLUCOSE BLD STRIP.AUTO-MCNC: 302 MG/DL (ref 65–100)
GLUCOSE BLD STRIP.AUTO-MCNC: 318 MG/DL (ref 65–100)
SARS-COV-2, COV2: NOT DETECTED
SERVICE CMNT-IMP: ABNORMAL
SERVICE CMNT-IMP: NORMAL
SOURCE, COVRS: NORMAL
SPECIMEN SOURCE, FCOV2M: NORMAL

## 2020-06-30 PROCEDURE — 74011636637 HC RX REV CODE- 636/637: Performed by: INTERNAL MEDICINE

## 2020-06-30 PROCEDURE — 94640 AIRWAY INHALATION TREATMENT: CPT

## 2020-06-30 PROCEDURE — 94664 DEMO&/EVAL PT USE INHALER: CPT

## 2020-06-30 PROCEDURE — 74011250637 HC RX REV CODE- 250/637: Performed by: NURSE PRACTITIONER

## 2020-06-30 PROCEDURE — 74011250636 HC RX REV CODE- 250/636: Performed by: INTERNAL MEDICINE

## 2020-06-30 PROCEDURE — 65660000000 HC RM CCU STEPDOWN

## 2020-06-30 PROCEDURE — 74011000250 HC RX REV CODE- 250: Performed by: INTERNAL MEDICINE

## 2020-06-30 PROCEDURE — 97535 SELF CARE MNGMENT TRAINING: CPT

## 2020-06-30 PROCEDURE — 74011250637 HC RX REV CODE- 250/637: Performed by: INTERNAL MEDICINE

## 2020-06-30 PROCEDURE — 82962 GLUCOSE BLOOD TEST: CPT

## 2020-06-30 RX ORDER — FINASTERIDE 5 MG/1
5 TABLET, FILM COATED ORAL DAILY
Qty: 30 TAB | Refills: 0 | Status: SHIPPED
Start: 2020-06-30 | End: 2020-07-30

## 2020-06-30 RX ORDER — HYDROCODONE BITARTRATE AND ACETAMINOPHEN 5; 325 MG/1; MG/1
2 TABLET ORAL
Qty: 5 TAB | Refills: 0 | Status: SHIPPED | OUTPATIENT
Start: 2020-06-30 | End: 2020-07-01

## 2020-06-30 RX ORDER — AMLODIPINE BESYLATE 10 MG/1
10 TABLET ORAL DAILY
Qty: 30 TAB | Refills: 0 | Status: SHIPPED
Start: 2020-06-30 | End: 2020-07-30

## 2020-06-30 RX ORDER — INSULIN GLARGINE 100 [IU]/ML
25 INJECTION, SOLUTION SUBCUTANEOUS 2 TIMES DAILY
Qty: 15 ML | Refills: 0 | Status: SHIPPED
Start: 2020-06-30 | End: 2020-07-30

## 2020-06-30 RX ADMIN — LORATADINE 10 MG: 10 TABLET ORAL at 08:40

## 2020-06-30 RX ADMIN — HYDROCODONE BITARTRATE AND ACETAMINOPHEN 2 TABLET: 5; 325 TABLET ORAL at 09:37

## 2020-06-30 RX ADMIN — DOCUSATE SODIUM 400 MG: 100 CAPSULE, LIQUID FILLED ORAL at 21:28

## 2020-06-30 RX ADMIN — HYDROCODONE BITARTRATE AND ACETAMINOPHEN 2 TABLET: 5; 325 TABLET ORAL at 16:17

## 2020-06-30 RX ADMIN — INSULIN GLARGINE 20 UNITS: 100 INJECTION, SOLUTION SUBCUTANEOUS at 21:46

## 2020-06-30 RX ADMIN — IPRATROPIUM BROMIDE AND ALBUTEROL SULFATE 3 ML: .5; 3 SOLUTION RESPIRATORY (INHALATION) at 01:47

## 2020-06-30 RX ADMIN — ENTACAPONE 200 MG: 200 TABLET, FILM COATED ORAL at 08:40

## 2020-06-30 RX ADMIN — MELATONIN 1 TABLET: at 08:40

## 2020-06-30 RX ADMIN — PRAMIPEXOLE DIHYDROCHLORIDE 0.5 MG: 0.25 TABLET ORAL at 18:29

## 2020-06-30 RX ADMIN — Medication 10 ML: at 21:29

## 2020-06-30 RX ADMIN — INSULIN LISPRO 2 UNITS: 100 INJECTION, SOLUTION INTRAVENOUS; SUBCUTANEOUS at 21:45

## 2020-06-30 RX ADMIN — ENTACAPONE 200 MG: 200 TABLET, FILM COATED ORAL at 18:29

## 2020-06-30 RX ADMIN — ASPIRIN 325 MG: 325 TABLET, COATED ORAL at 08:40

## 2020-06-30 RX ADMIN — VITAMIN C 1 TABLET: TAB at 08:40

## 2020-06-30 RX ADMIN — FINASTERIDE 5 MG: 5 TABLET, FILM COATED ORAL at 08:40

## 2020-06-30 RX ADMIN — CARVEDILOL 6.25 MG: 3.12 TABLET, FILM COATED ORAL at 16:17

## 2020-06-30 RX ADMIN — PRAMIPEXOLE DIHYDROCHLORIDE 0.5 MG: 0.25 TABLET ORAL at 08:40

## 2020-06-30 RX ADMIN — ENTACAPONE 200 MG: 200 TABLET, FILM COATED ORAL at 21:28

## 2020-06-30 RX ADMIN — FLUTICASONE PROPIONATE AND SALMETEROL 1 PUFF: 250; 50 POWDER RESPIRATORY (INHALATION) at 07:53

## 2020-06-30 RX ADMIN — Medication 10 ML: at 14:00

## 2020-06-30 RX ADMIN — FLUTICASONE PROPIONATE 2 SPRAY: 50 SPRAY, METERED NASAL at 08:41

## 2020-06-30 RX ADMIN — TAMSULOSIN HYDROCHLORIDE 0.4 MG: 0.4 CAPSULE ORAL at 08:40

## 2020-06-30 RX ADMIN — GABAPENTIN 600 MG: 300 CAPSULE ORAL at 08:40

## 2020-06-30 RX ADMIN — Medication 10 ML: at 12:46

## 2020-06-30 RX ADMIN — CARVEDILOL 6.25 MG: 3.12 TABLET, FILM COATED ORAL at 08:40

## 2020-06-30 RX ADMIN — CARBIDOPA AND LEVODOPA 2 TABLET: 25; 100 TABLET ORAL at 12:46

## 2020-06-30 RX ADMIN — GABAPENTIN 600 MG: 300 CAPSULE ORAL at 21:28

## 2020-06-30 RX ADMIN — HEPARIN SODIUM 5000 UNITS: 5000 INJECTION INTRAVENOUS; SUBCUTANEOUS at 08:40

## 2020-06-30 RX ADMIN — PANTOPRAZOLE SODIUM 40 MG: 40 TABLET, DELAYED RELEASE ORAL at 06:52

## 2020-06-30 RX ADMIN — HYDROCODONE BITARTRATE AND ACETAMINOPHEN 2 TABLET: 5; 325 TABLET ORAL at 03:30

## 2020-06-30 RX ADMIN — ENTACAPONE 200 MG: 200 TABLET, FILM COATED ORAL at 12:46

## 2020-06-30 RX ADMIN — CARBIDOPA AND LEVODOPA 2 TABLET: 25; 100 TABLET ORAL at 08:40

## 2020-06-30 RX ADMIN — INSULIN LISPRO 10 UNITS: 100 INJECTION, SOLUTION INTRAVENOUS; SUBCUTANEOUS at 12:45

## 2020-06-30 RX ADMIN — HEPARIN SODIUM 5000 UNITS: 5000 INJECTION INTRAVENOUS; SUBCUTANEOUS at 16:18

## 2020-06-30 RX ADMIN — INSULIN LISPRO 4 UNITS: 100 INJECTION, SOLUTION INTRAVENOUS; SUBCUTANEOUS at 16:41

## 2020-06-30 RX ADMIN — GABAPENTIN 600 MG: 300 CAPSULE ORAL at 16:17

## 2020-06-30 RX ADMIN — HYDROCODONE BITARTRATE AND ACETAMINOPHEN 2 TABLET: 5; 325 TABLET ORAL at 21:28

## 2020-06-30 RX ADMIN — CARBIDOPA AND LEVODOPA 2 TABLET: 25; 100 TABLET ORAL at 18:29

## 2020-06-30 RX ADMIN — INSULIN GLARGINE 20 UNITS: 100 INJECTION, SOLUTION SUBCUTANEOUS at 08:41

## 2020-06-30 RX ADMIN — CARBIDOPA AND LEVODOPA 2 TABLET: 25; 100 TABLET ORAL at 21:28

## 2020-06-30 RX ADMIN — Medication 10 ML: at 06:52

## 2020-06-30 RX ADMIN — AMLODIPINE BESYLATE 5 MG: 5 TABLET ORAL at 08:40

## 2020-06-30 RX ADMIN — INSULIN LISPRO 10 UNITS: 100 INJECTION, SOLUTION INTRAVENOUS; SUBCUTANEOUS at 08:41

## 2020-06-30 RX ADMIN — ATORVASTATIN CALCIUM 20 MG: 20 TABLET, FILM COATED ORAL at 21:28

## 2020-06-30 NOTE — PROGRESS NOTES
Problem: Self Care Deficits Care Plan (Adult) Goal: *Acute Goals and Plan of Care (Insert Text) Description: Description: FUNCTIONAL STATUS PRIOR TO ADMISSION: Patient required wife's minimal assistance for basic and instrumental ADLs. For last 8+ months amb only short household distances with Rollator prior to 2 GLF. Unable to amb into ED- [facility] by ambulance HOME SUPPORT PRIOR TO ADMISSION: The patient lived with wife. Occupational Therapy Goals Initiated 6/28/2020 1. Patient will perform upper body dressing with supervision/set-up within 7 day(s). 2.  Patient will perform lower body dressing with moderate assistance, using AE PRN,  within 7 day(s). 3.  Patient will perform toilet transfers with minimal assistance/contact guard assist within 7 day(s). 4.  Patient will perform all aspects of toileting with minimal assistance/contact guard assist within 7 day(s). 5.  Patient will verbalize/demonstrate 3/3 back precautions during ADL tasks without cues within 7 days. Outcome: Progressing Towards Goal 
 OCCUPATIONAL THERAPY TREATMENT Patient: Aston Perez [de-identified]75 y.o. male) Date: 6/30/2020 Diagnosis: Fall [W19. Margert Kawasaki Spinal stenosis, multilevel Precautions: Bed Alarm, Fall Chart, occupational therapy assessment, plan of care, and goals were reviewed. ASSESSMENT Patient continues with skilled OT services and is progressing towards goals. Pt having pain in his shins today more so in L than R. He sat edge of bed and tried to use urinal. Good sitting balance. He stood with height of bed raised with min assist and took side steps towards head of bed. Current Level of Function Impacting Discharge (ADLs): Max assist Lb dress, min assist toileting Other factors to consider for discharge: PLAN : 
Patient continues to benefit from skilled intervention to address the above impairments. Continue treatment per established plan of care. to address goals. Recommend with staff: Out of bed for activity/meals Recommend next OT session: cont towards goals Recommendation for discharge: (in order for the patient to meet his/her long term goals) Therapy 3 hours per day 5-7 days per week This discharge recommendation: 
Has not yet been discussed the attending provider and/or case management IF patient discharges home will need the following DME:   
 
 
SUBJECTIVE:  
Patient stated I hope I can make progress at rehab.  OBJECTIVE DATA SUMMARY:  
Cognitive/Behavioral Status: 
Neurologic State: Alert Orientation Level: Oriented X4 Cognition: Follows commands Functional Mobility and Transfers for ADLs: 
Bed Mobility: 
 Min assist for supine to sit Transfers: 
  
 Min assist sit to stand Balance: 
Sitting: Intact Standing: Impaired ADL Intervention: 
  
Max assist Lb dressing Activity Tolerance:  
Fair Please refer to the flowsheet for vital signs taken during this treatment. After treatment patient left in no apparent distress:  
Supine in bed COMMUNICATION/COLLABORATION:  
The patients plan of care was discussed with: Occupational therapist.  
 
DIANE Scanlon Time Calculation: 15 mins

## 2020-06-30 NOTE — PROGRESS NOTES
Bedside and Verbal shift change report given to   Suki Mann (oncoming nurse) by   Naty Cardenas (offgoing nurse). Report included the following information SBAR, Kardex, Intake/Output, MAR, Accordion, Recent Results and Med Rec Status.

## 2020-06-30 NOTE — PROGRESS NOTES
Spiritual Care Assessment/Progress Note [address] 
 
 
NAME: Nati Muniz      MRN: [id number] AGE: 75 y.o. SEX: male Worship Affiliation: Meri Language: [place] 6/30/2020     Total Time (in minutes): 12 Spiritual Assessment begun in [facility]  MED SURG 2 through conversation with: 
  
    []Patient        [] Family    [] Friend(s) Reason for Consult: Initial/Spiritual assessment, patient floor Spiritual beliefs: (Please include comment if needed) 
   [] Identifies with a natalie tradition:     
   [] Supported by a natalie community:        
   [] Claims no spiritual orientation:       
   [] Seeking spiritual identity:            
   [] Adheres to an individual form of spirituality:       
   [] Not able to assess:                   
 
    
Identified resources for coping:  
   [] Prayer                           
   [] Music                  [] Guided Imagery 
   [] Family/friends                 [] Pet visits [] Devotional reading                         [] Unknown 
   [] Other:                                         
 
 
Interventions offered during this visit: (See comments for more details) Patient Interventions: Affirmation of emotions/emotional suffering, Affirmation of natalie, Normalization of emotional/spiritual concerns, Worship beliefs/image of God discussed, Iconic (affirming the presence of God/Higher Power), Initial/Spiritual assessment, patient floor Plan of Care: 
 
 [] Support spiritual and/or cultural needs  
 [] Support AMD and/or advance care planning process    
 [] Support grieving process 
 [] Coordinate Rites and/or Rituals  
 [] Coordination with community clergy [] No spiritual needs identified at this time 
 [] Detailed Plan of Care below (See Comments)  [] Make referral to Music Therapy 
[] Make referral to Pet Therapy    
[] Make referral to Addiction services 
[] Make referral to [facility] [] Make referral to Spiritual Care Partner 
[] No future visits requested       
[] Follow up visits as needed Comments:  visited pt Mr Latricia Barber, at the Med Surg unit for initial spiritual assessment. Pt welcomed  warmly and initiated story telling about his natalie and his family. Pt was born [place] but converted to [employer]. He stated that the [address] [place] [address] are the most meaningful to him. 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 Pt stated that his current situation has necessitated much spiritual reflection. Family is also a very important coping source, his wife, as well as his step daughter are a great support system.  provided supportive presence and affirmation. Pt was assured of prayer and he thanked  with a broad smile for the support. Spiritual care is still available as needed or as able. Visited by: Bin Beltrán. To page : [phone] ([phone])

## 2020-06-30 NOTE — PROGRESS NOTES
Transition Plan of Care RUR 24% Plan: 1. [address] did accept patient and was ready for him to come tomorrow at 11am. However [facility] the patient and the family would like to go there now. I am waiting to hear back when they can have the patient. 2. Case Management to follow.  
Bello Mondragon BS

## 2020-06-30 NOTE — PROGRESS NOTES
Pharmacist Discharge Medication Reconciliation Discharge Provider:  Dr. Edvin Bains Discharge Medications: My Medications START taking these medications Instructions Each Dose to Equal Morning Noon Evening Bedtime  
amLODIPine 10 mg tablet Commonly known as:  Sari Peterson Your last dose was: Your next dose is: Take 1 Tab by mouth daily for 30 days. Indications: high blood pressure 10 mg 
  
  
  
  
  
finasteride 5 mg tablet Commonly known as:  PROSCAR Your last dose was: Your next dose is: Take 1 Tab by mouth daily for 30 days. 5 mg CHANGE how you take these medications Instructions Each Dose to Equal Morning Noon Evening Bedtime HYDROcodone-acetaminophen 5-325 mg per tablet Commonly known as:  Norco 
What changed:   
how much to take 
when to take this Your last dose was: Your next dose is: Take 2 Tabs by mouth every six (6) hours as needed for Pain for up to 3 doses. Max Daily Amount: 8 Tabs. 2 Tab 
  
  
  
  
  
insulin glargine 100 unit/mL (3 mL) Inpn Commonly known as:  Brian Miranda What changed:  how much to take Your last dose was: Your next dose is:   
 
  
 25 Units by SubCUTAneous route two (2) times a day for 30 days. Indications: type 2 diabetes mellitus 25 Units CONTINUE taking these medications Instructions Each Dose to Equal Morning Noon Evening Bedtime  
aspirin delayed-release 325 mg tablet Your last dose was: Your next dose is: Take 325 mg by mouth daily. 325 mg 
  
  
  
  
  
atorvastatin 20 mg tablet Commonly known as:  LIPITOR Your last dose was: Your next dose is: Take 20 mg by mouth daily. 20 mg 
  
  
  
  
  
b complex vitamins tablet Your last dose was: Your next dose is: Take 1 Tab by mouth daily. 1 Tab 
  
  
  
  
  
carvediloL 6.25 mg tablet Commonly known as:  Nellie Ngo 
 
 Your last dose was: Your next dose is: Take 6.25 mg by mouth two (2) times daily (with meals). 6.25 mg 
  
  
  
  
  
docusate sodium 100 mg capsule Commonly known as:  Culdesac Rafat Your last dose was: Your next dose is: Take 400 mg by mouth daily. 400 mg 
  
  
  
  
  
entacapone 200 mg tablet Commonly known as:  Newaygo Dom Your last dose was: Your next dose is: Take 200 mg by mouth four (4) times daily. 200 mg * Flonase Allergy Relief 50 mcg/actuation nasal spray Generic drug:  fluticasone propionate Your last dose was: Your next dose is: 2 Sprays by Both Nostrils route daily. 2 Spray * fluticasone propionate 250 mcg/actuation Dsdv Your last dose was: Your next dose is: Take 1 Puff by inhalation every morning. 1 Puff 
  
  
  
  
  
gabapentin 600 mg tablet Commonly known as:  NEURONTIN Your last dose was: Your next dose is: Take 600 mg by mouth four (4) times daily. 600 mg 
  
  
  
  
  
glipiZIDE SR 5 mg CR tablet Commonly known as:  GLUCOTROL XL Your last dose was: Your next dose is: Take 5 mg by mouth daily. 5 mg 
  
  
  
  
  
loratadine 10 mg tablet Commonly known as:  [place] Your last dose was: Your next dose is: Take 10 mg by mouth daily. 10 mg 
  
  
  
  
  
metFORMIN 1,000 mg tablet Commonly known as:  GLUCOPHAGE Your last dose was: Your next dose is: Take 1,000 mg by mouth two (2) times daily (with meals). 1,000 mg 
  
  
  
  
  
omeprazole 40 mg capsule Commonly known as:  PRILOSEC Your last dose was: Your next dose is: Take 40 mg by mouth daily. 40 mg 
  
  
  
  
  
pramipexole 0.5 mg tablet Commonly known as:  MIRAPEX Your last dose was: Your next dose is: Take 0.5 mg by mouth four (4) times daily. 0.5 mg 
  
  
  
  
  
Sinemet  mg per tablet Generic drug:  carbidopa-levodopa Your last dose was: Your next dose is: Take 2 Tabs by mouth four (4) times daily. Indications: parkinsonism due to degeneration in the brain 2 Tab 
  
  
  
  
  
tamsulosin 0.4 mg capsule Commonly known as:  FLOMAX Your last dose was: Your next dose is: Take 0.4 mg by mouth daily. 0.4 mg 
  
  
  
  
  
traZODone 50 mg tablet Commonly known as:  [place] Your last dose was: Your next dose is: Take  mg by mouth nightly as needed for Sleep. 
  mg Vitamin D3 (1000 Units /25 mcg) tablet Generic drug:  cholecalciferol Your last dose was: Your next dose is: Take 1,000 Units by mouth daily. 1,000 Units Wixela Inhub 250-50 mcg/dose diskus inhaler Generic drug:  fluticasone propion-salmeteroL Your last dose was: Your next dose is: Take 1 Puff by inhalation daily. 1 Puff * This list has 2 medication(s) that are the same as other medications prescribed for you. Read the directions carefully, and ask your doctor or other care provider to review them with you. STOP taking these medications   
 
lisinopriL 5 mg tablet Commonly known as:  Randesme Ahmadi NovoLOG Flexpen U-100 Insulin 100 unit/mL (3 mL) Inpn Generic drug:  insulin aspart U-100 
  
  
spironolactone 25 mg tablet Commonly known as:  ALDACTONE 
  
  
torsemide 100 mg tablet Commonly known as:  DEMADEX Where to Get Your Medications Information on where to get these meds will be given to you by the nurse or doctor. Ask your nurse or doctor about these medications 
amLODIPine 10 mg tablet 
finasteride 5 mg tablet HYDROcodone-acetaminophen 5-325 mg per tablet 
insulin glargine 100 unit/mL (3 mL) Inpn Holding lisinopril, torsemide, and spironolactone d/t renal dysfunctio Amlodipine and finasteride added Home Lantus dose reduced and Novolog dc'd d/t reported low BG at home. BG elevated during admission d/t dexamethasone which was completed on 6/29. Will need to monitor BG closely following discharge. The patient's chart, MAR, and AVS were reviewed by 
 Joshua Merino, PHARMD, Contact: [phone]

## 2020-06-30 NOTE — PROGRESS NOTES
Vinicius Reyes [place] [address] 
[address] [facility], [address] [place] [address] 
[phone] Medical Progress Note NAME:         Cesar Locke :        1945 MRM:        [id number] Date of service: 2020 Subjective: Patient has been seen and examined as a follow up for multiple medical issues. Chart, labs, diagnostics reviewed. Back pain better. Worse with movement. Now without sánchez catheter. No fever or chills. Objective: 
 
Vital Signs: 
 
Visit Vitals /83 (BP 1 Location: Left arm, BP Patient Position: At rest;Supine) Pulse 73 Temp 97.9 °F (36.6 °C) Resp 17 Ht 5' 8\" (1.727 m) Wt 112.8 kg (248 lb 9.6 oz) SpO2 97% BMI 37.80 kg/m² Intake/Output Summary (Last 24 hours) at 2020 1709 Last data filed at 2020 1637 Gross per 24 hour Intake 430 ml Output 200 ml Net 230 ml Physical Examination: 
 
General:   Weak looking but not in acute distress Eyes:   pink conjunctivae, PERRLA with no discharge. ENT:   no ottorrhea or rhinorrhea with dry mucous membranes Pulm:  decreased but clear breath sounds without crackles or wheezes Card:  no JVD or murmurs, has regular and normal S1, S2 without thrills, bruits or peripheral edema Abd:  Soft, non-tender, non-distended, normoactive bowel sounds Musc:  No cyanosis, clubbing, atrophy or deformities. Skin:  No rashes, bruising or ulcers. Neuro: Awake and alert. Generally a non focal exam. Follows commands appropriately Psych:  Has a good insight and is oriented x 3 Current Facility-Administered Medications Medication Dose Route Frequency  albuterol-ipratropium (DUO-NEB) 2.5 MG-0.5 MG/3 ML  3 mL Nebulization Q6H PRN  
 insulin glargine (LANTUS) injection 20 Units  20 Units SubCUTAneous BID  insulin lispro (HUMALOG) injection   SubCUTAneous AC&HS  
 gabapentin (NEURONTIN) capsule 600 mg  600 mg Oral TID  fluticasone-salmeterol (ADVAIR/WIXELA) 250mcg-50mcg/puff (Patient Supplied)  1 Puff Inhalation DAILY  amLODIPine (NORVASC) tablet 5 mg  5 mg Oral DAILY  HYDROcodone-acetaminophen (NORCO) 5-325 mg per tablet 2 Tab  2 Tab Oral Q6H PRN  
 sodium chloride (NS) flush 5-10 mL  5-10 mL IntraVENous PRN  
 sodium chloride (NS) flush 5-40 mL  5-40 mL IntraVENous Q8H  
 sodium chloride (NS) flush 5-40 mL  5-40 mL IntraVENous PRN  
 naloxone (NARCAN) injection 0.4 mg  0.4 mg IntraVENous PRN  
 heparin (porcine) injection 5,000 Units  5,000 Units SubCUTAneous Q8H  
 aspirin delayed-release tablet 325 mg  325 mg Oral DAILY  atorvastatin (LIPITOR) tablet 20 mg  20 mg Oral QHS  carbidopa-levodopa (SINEMET)  mg per tablet 2 Tab  2 Tab Oral QID  carvediloL (COREG) tablet 6.25 mg  6.25 mg Oral BID WITH MEALS  cholecalciferol (VITAMIN D3) (1000 Units /25 mcg) tablet 1 Tab  1,000 Units Oral DAILY  docusate sodium (COLACE) capsule 400 mg  400 mg Oral DAILY  entacapone (COMTAN) tablet 200 mg  200 mg Oral QID  fluticasone propionate (FLONASE) 50 mcg/actuation nasal spray 2 Spray  2 Spray Both Nostrils DAILY  loratadine (CLARITIN) tablet 10 mg  10 mg Oral DAILY  pantoprazole (PROTONIX) tablet 40 mg  40 mg Oral ACB  pramipexole (MIRAPEX) tablet 0.5 mg  0.5 mg Oral BID  tamsulosin (FLOMAX) capsule 0.4 mg  0.4 mg Oral DAILY  traZODone (DESYREL) tablet 50 mg  50 mg Oral QHS PRN  
 glucose chewable tablet 16 g  4 Tab Oral PRN  
 dextrose (D50W) injection syrg 12.5-25 g  12.5-25 g IntraVENous PRN  
 glucagon (GLUCAGEN) injection 1 mg  1 mg IntraMUSCular PRN  finasteride (PROSCAR) tablet 5 mg  5 mg Oral DAILY  b-complex with vitamin c tablet 1 Tab  1 Tab Oral DAILY Laboratory data and review: 
 
Recent Labs  
  06/28/20 
0431 WBC 6.1 HGB 9.9*  
HCT 31.2*  
 Recent Labs  
  06/29/20 
0535 06/28/20 
2117 06/28/20 
0431  136 138  
K 4.4 4.3 4.7 * 107 109* CO2 21 21 23 * 388* 273* BUN 37* 36* 20  
CREA 1.36* 1.46* 1.25  
CA 8.9 9.2 9.3 MG 2.3  --  2.2 PHOS 2.9  --  2.7 ALB 2.5*  --  2.6* No components found for: [place] [facility]: Imaging - all reviewed Assessment and Plan: 
 
Cervico-Thoraco-lumbar spinal stenosis POA: imaging showed a multilevel degenerative change with extensive epidural lipomatosis. Moderate to severe canal stenosis with minimal cord compression large related to epidural lipomatosis at C6-7. Moderate to severe canal stenosis at T8-T9 and moderate canal stenosis at T7-T8. Status post laminectomy and fusion L3-L4. Laminectomy L4-5. Moderate broad-based protrusion at L2-L3 with severe canal stenosis at L2-L3. Completed IV dexamethasone 6/29. Continue PT/OT as tolerated. SNF for skilled rehab once arrangements are made.     
  
Acute metabolic encephalopathy:  this seems to have now resolved. Likely multifactorial from urinary retention, LYN. No focal s/sx to suggest primary CNS process. Head CT negative. Supportive care. UCx showed no UTI. Off IV abx (was on cefepime and vanc). No further testing at this time 
  
Urinary retention: CT a/p showing distended bladder, enlarged prostate, and hydronephrosis. Needed a sánchez catheter which was discontinued and he has voided well. Continue Flomax with an outpatient follow up with Urology.  
  
Type II diabetes mellitus: controlled. A1c 7.0%. Hyperglycemic partly due to steroids. Continue Lantus, SSi. DM diet. Monitor blood glucose once done with steroids. HTN (hypertension): BP controlled. Continue Coreg. Continue to hold Lisinopril and torsemide. 
  
LYN: unclear baseline renal function but likely closely to normal. Renal function improved. Holding metformin, ACEi, torsemide. SCr continues to improve.  
  
Parkinson disease: with frequent falls at home. Continue Sinemet.  PT/OT, fall precautions.   
  
 Anemia: likely multifactorial. Needs age-appropriate CA screening including colon CA once he has recovered from current illness. Reiterated to patient. Lung nodule: 9.5 mm pleural parenchymal nodule in the left lower lobe. Needs outpatient follow up as well as re-imaging Total time spent for the patient's care: 30  Minutes Care Plan discussed with: Patient, Care Manager and Nursing Staff Discussed:  Care Plan and D/C Planning Prophylaxis:  Hep SQ Anticipated Disposition:  SNF/LTC 
        
___________________________________________________ Attending Physician:   Michael Crawford MD

## 2020-06-30 NOTE — PROGRESS NOTES
Nutrition Assessment: 
 
RECOMMENDATIONS/INTERVENTION(S):  
1. Continue consistent carb diet. 2. Continue to monitor intakes, wt changes, labs/BG. ASSESSMENT:  
6/30: Pt assessed for LOS. Admitted with fall. PMH includes DM, Parkinson's. BMI 37.8, c/w obesity. Pt reports good appetite. Breakfast tray in room, 100% eggs and 50% english muffin eaten. Recorded intake of 100% breakfast yesterday. Says he was eating well PTA , 3 meals/d. Denies any recent weight changes PTA, says it appears he has gained weight since being here but thinks it is just because of the difference between his scale at home and the bed weight. Pt checks BG reguarly at home with CGM. Pt's wife had some questions about consistent carb diet, answered questions. Gave menu. No c/o N/V. Labs- A1c 7.0, -810-206. Meds- B complex w/ Vit. C, insulin. Diet Order: Consistent carb 
% Eaten:   
Patient Vitals for the past 72 hrs: 
 % Diet Eaten  
06/29/20 0801 100 % Pertinent Medications: [x] Reviewed Labs: [x] Reviewed Anthropometrics: Height: 5' 8\" (172.7 cm) Weight: 112.8 kg (248 lb 9.6 oz) IBW (%IBW):   ( ) UBW (%UBW):   (  %) BMI: Body mass index is 37.8 kg/m². This BMI is indicative of: 
 [] Underweight    [] Normal    [] Overweight    [x]  Obesity    []  Extreme Obesity (BMI>40) Estimated Nutrition Needs (Based on): 5621 Kcals/day(1839 x 1.3 AF) , 112 g(0.8-1 g/kg) Protein Carbohydrate: At Least 130 g/day  Fluids: 2391 mL/day (1 ml/kcal) Last BM: 6/25   [x]Active     []Hyperactive  []Hypoactive       [] Absent   BS Skin:    [x] Intact   [] Incision  [] Breakdown   [] DTI   [] Tears/Excoriation/Abrasion  []Edema [] Other: Wt Readings from Last 30 Encounters:  
06/29/20 112.8 kg (248 lb 9.6 oz)  
06/24/20 106.6 kg (235 lb) NUTRITION DIAGNOSES:  
Problem:  Altered nutrition-related lab values Etiology: related to endocrine dysfunction Signs/Symptoms: as evidenced by A1c 7.0, -346-365 mg/dL NUTRITION INTERVENTIONS: 
Meals/Snacks: General/healthful diet GOAL:  
PO intake >75% meals on consistent carb diet with BG <180mg/dL next 5-7 days Cultural, Scientologist, or Ethnic Dietary Needs: None EDUCATION & DISCHARGE NEEDS:  
 [x] None Identified 
 [] Identified and Education Provided/Documented 
 [] Identified and Pt declined/was not appropriate 
  
 [] Interdisciplinary Care Plan Reviewed/Documented  
 [x] Discharge Needs:  Consistent carb diet 
 [] No Nutrition Related Discharge Needs NUTRITION RISK:  
Pt Is At Nutrition Risk  [x] No Nutrition Risk Identified  [] PT SEEN FOR:  
 []  MD Consult: []Calorie Count []Diabetic Diet Education []Diet Education []Electrolyte Management []General Nutrition Management and Supplements []Management of Tube Feeding []TPN Recommendations []  RN Referral:  []MST score >=2 
   []Enteral/Parenteral Nutrition PTA []Pregnant: Gestational DM or Multigestation  
              [] Pressure Ulcer 
 
[]  Low BMI      [x]  Length of Stay       [] Dysphagia Diet         [] Ventilator 
[]  Follow-up Previous Recommendations: 
 [] Implemented          [] Not Implemented          [x] Not Applicable Previous Goal: 
 [] Met              [] Progressing Towards Goal              [] Not Progressing Towards Goal   [x] Not Applicable Solis Corrigan, LISAN Pager [phone] Phone [phone]

## 2020-07-01 LAB
GLUCOSE BLD STRIP.AUTO-MCNC: 200 MG/DL (ref 65–100)
GLUCOSE BLD STRIP.AUTO-MCNC: 205 MG/DL (ref 65–100)
GLUCOSE BLD STRIP.AUTO-MCNC: 350 MG/DL (ref 65–100)
GLUCOSE BLD STRIP.AUTO-MCNC: 433 MG/DL (ref 65–100)
SERVICE CMNT-IMP: ABNORMAL

## 2020-07-01 PROCEDURE — 82962 GLUCOSE BLOOD TEST: CPT

## 2020-07-01 PROCEDURE — 65660000000 HC RM CCU STEPDOWN

## 2020-07-01 PROCEDURE — 74011636637 HC RX REV CODE- 636/637: Performed by: INTERNAL MEDICINE

## 2020-07-01 PROCEDURE — 94664 DEMO&/EVAL PT USE INHALER: CPT

## 2020-07-01 PROCEDURE — 94760 N-INVAS EAR/PLS OXIMETRY 1: CPT

## 2020-07-01 PROCEDURE — 74011250637 HC RX REV CODE- 250/637: Performed by: NURSE PRACTITIONER

## 2020-07-01 PROCEDURE — 74011250636 HC RX REV CODE- 250/636: Performed by: INTERNAL MEDICINE

## 2020-07-01 PROCEDURE — 74011250637 HC RX REV CODE- 250/637: Performed by: INTERNAL MEDICINE

## 2020-07-01 RX ORDER — METHYLPREDNISOLONE 4 MG/1
4 TABLET ORAL
Status: DISCONTINUED | OUTPATIENT
Start: 2020-07-01 | End: 2020-07-02 | Stop reason: HOSPADM

## 2020-07-01 RX ORDER — OXYCODONE HYDROCHLORIDE 5 MG/1
10 TABLET ORAL
Status: DISCONTINUED | OUTPATIENT
Start: 2020-07-01 | End: 2020-07-02 | Stop reason: HOSPADM

## 2020-07-01 RX ORDER — ACETAMINOPHEN 500 MG
1000 TABLET ORAL 3 TIMES DAILY
Status: DISCONTINUED | OUTPATIENT
Start: 2020-07-01 | End: 2020-07-02 | Stop reason: HOSPADM

## 2020-07-01 RX ORDER — FENTANYL CITRATE 50 UG/ML
12.5 INJECTION, SOLUTION INTRAMUSCULAR; INTRAVENOUS
Status: DISCONTINUED | OUTPATIENT
Start: 2020-07-01 | End: 2020-07-02 | Stop reason: HOSPADM

## 2020-07-01 RX ORDER — DEXAMETHASONE SODIUM PHOSPHATE 10 MG/ML
10 INJECTION INTRAMUSCULAR; INTRAVENOUS ONCE
Status: COMPLETED | OUTPATIENT
Start: 2020-07-01 | End: 2020-07-01

## 2020-07-01 RX ORDER — INSULIN LISPRO 100 [IU]/ML
10 INJECTION, SOLUTION INTRAVENOUS; SUBCUTANEOUS ONCE
Status: COMPLETED | OUTPATIENT
Start: 2020-07-01 | End: 2020-07-01

## 2020-07-01 RX ADMIN — CARBIDOPA AND LEVODOPA 2 TABLET: 25; 100 TABLET ORAL at 09:17

## 2020-07-01 RX ADMIN — Medication 10 ML: at 21:27

## 2020-07-01 RX ADMIN — METHYLPREDNISOLONE 4 MG: 4 TABLET ORAL at 11:31

## 2020-07-01 RX ADMIN — INSULIN LISPRO 10 UNITS: 100 INJECTION, SOLUTION INTRAVENOUS; SUBCUTANEOUS at 21:26

## 2020-07-01 RX ADMIN — ENTACAPONE 200 MG: 200 TABLET, FILM COATED ORAL at 22:31

## 2020-07-01 RX ADMIN — ENTACAPONE 200 MG: 200 TABLET, FILM COATED ORAL at 14:07

## 2020-07-01 RX ADMIN — INSULIN GLARGINE 20 UNITS: 100 INJECTION, SOLUTION SUBCUTANEOUS at 09:18

## 2020-07-01 RX ADMIN — INSULIN LISPRO 4 UNITS: 100 INJECTION, SOLUTION INTRAVENOUS; SUBCUTANEOUS at 09:18

## 2020-07-01 RX ADMIN — DOCUSATE SODIUM 400 MG: 100 CAPSULE, LIQUID FILLED ORAL at 21:26

## 2020-07-01 RX ADMIN — ASPIRIN 325 MG: 325 TABLET, COATED ORAL at 09:17

## 2020-07-01 RX ADMIN — PANTOPRAZOLE SODIUM 40 MG: 40 TABLET, DELAYED RELEASE ORAL at 06:52

## 2020-07-01 RX ADMIN — GABAPENTIN 600 MG: 300 CAPSULE ORAL at 07:13

## 2020-07-01 RX ADMIN — CARBIDOPA AND LEVODOPA 2 TABLET: 25; 100 TABLET ORAL at 21:27

## 2020-07-01 RX ADMIN — VITAMIN C 1 TABLET: TAB at 09:17

## 2020-07-01 RX ADMIN — ACETAMINOPHEN 1000 MG: 500 TABLET ORAL at 21:27

## 2020-07-01 RX ADMIN — MELATONIN 1 TABLET: at 09:17

## 2020-07-01 RX ADMIN — PRAMIPEXOLE DIHYDROCHLORIDE 0.5 MG: 0.25 TABLET ORAL at 09:17

## 2020-07-01 RX ADMIN — FINASTERIDE 5 MG: 5 TABLET, FILM COATED ORAL at 09:17

## 2020-07-01 RX ADMIN — INSULIN LISPRO 10 UNITS: 100 INJECTION, SOLUTION INTRAVENOUS; SUBCUTANEOUS at 16:42

## 2020-07-01 RX ADMIN — GABAPENTIN 600 MG: 300 CAPSULE ORAL at 15:33

## 2020-07-01 RX ADMIN — INSULIN LISPRO 4 UNITS: 100 INJECTION, SOLUTION INTRAVENOUS; SUBCUTANEOUS at 11:38

## 2020-07-01 RX ADMIN — AMLODIPINE BESYLATE 5 MG: 5 TABLET ORAL at 09:17

## 2020-07-01 RX ADMIN — TAMSULOSIN HYDROCHLORIDE 0.4 MG: 0.4 CAPSULE ORAL at 09:17

## 2020-07-01 RX ADMIN — PRAMIPEXOLE DIHYDROCHLORIDE 0.5 MG: 0.25 TABLET ORAL at 19:01

## 2020-07-01 RX ADMIN — FLUTICASONE PROPIONATE AND SALMETEROL 1 PUFF: 250; 50 POWDER RESPIRATORY (INHALATION) at 07:45

## 2020-07-01 RX ADMIN — HEPARIN SODIUM 5000 UNITS: 5000 INJECTION INTRAVENOUS; SUBCUTANEOUS at 00:08

## 2020-07-01 RX ADMIN — Medication 10 ML: at 06:53

## 2020-07-01 RX ADMIN — DEXAMETHASONE SODIUM PHOSPHATE 10 MG: 10 INJECTION, SOLUTION INTRAMUSCULAR; INTRAVENOUS at 11:31

## 2020-07-01 RX ADMIN — HYDROCODONE BITARTRATE AND ACETAMINOPHEN 2 TABLET: 5; 325 TABLET ORAL at 02:43

## 2020-07-01 RX ADMIN — HEPARIN SODIUM 5000 UNITS: 5000 INJECTION INTRAVENOUS; SUBCUTANEOUS at 09:17

## 2020-07-01 RX ADMIN — ENTACAPONE 200 MG: 200 TABLET, FILM COATED ORAL at 09:17

## 2020-07-01 RX ADMIN — CARVEDILOL 6.25 MG: 3.12 TABLET, FILM COATED ORAL at 09:17

## 2020-07-01 RX ADMIN — OXYCODONE 10 MG: 5 TABLET ORAL at 15:33

## 2020-07-01 RX ADMIN — Medication 10 ML: at 14:00

## 2020-07-01 RX ADMIN — ACETAMINOPHEN 1000 MG: 500 TABLET ORAL at 15:33

## 2020-07-01 RX ADMIN — INSULIN GLARGINE 20 UNITS: 100 INJECTION, SOLUTION SUBCUTANEOUS at 21:26

## 2020-07-01 RX ADMIN — ENTACAPONE 200 MG: 200 TABLET, FILM COATED ORAL at 19:01

## 2020-07-01 RX ADMIN — HYDROCODONE BITARTRATE AND ACETAMINOPHEN 2 TABLET: 5; 325 TABLET ORAL at 09:17

## 2020-07-01 RX ADMIN — CARBIDOPA AND LEVODOPA 2 TABLET: 25; 100 TABLET ORAL at 19:01

## 2020-07-01 RX ADMIN — GABAPENTIN 600 MG: 300 CAPSULE ORAL at 21:26

## 2020-07-01 RX ADMIN — LORATADINE 10 MG: 10 TABLET ORAL at 09:17

## 2020-07-01 RX ADMIN — CARVEDILOL 6.25 MG: 3.12 TABLET, FILM COATED ORAL at 16:34

## 2020-07-01 RX ADMIN — FLUTICASONE PROPIONATE 2 SPRAY: 50 SPRAY, METERED NASAL at 09:16

## 2020-07-01 RX ADMIN — CARBIDOPA AND LEVODOPA 2 TABLET: 25; 100 TABLET ORAL at 14:07

## 2020-07-01 RX ADMIN — ATORVASTATIN CALCIUM 20 MG: 20 TABLET, FILM COATED ORAL at 21:26

## 2020-07-01 NOTE — PROGRESS NOTES
5:00 PM 
We will see how the patient feels overnight. If he still has pain he will not be able to go to [facility]. However if patient's pain is controlled then [facility] have a bed-[facility] [phone]. Instructions if pt is discharge are in [employer]. Transportation has not been set up. He will need transportation there. ELIZA Christy Transition Plan of Care RUR 24% Plan: 1. Patient was accepted to [address] [address] yesterday, but decided on [facility] they can accept today as well-however pt is in a lot of pain over night Dr. Franyd Harry has consulted a few different doctors regarding his pain as well as palliative. Patient is worried right now over his pain and going to rehab-he stated he did not want to go today until the other doctors have met him and tried to figure this out. He did mention to me about going to [place] where his son lives who is in the medical field and might be a good place to receive care around family. 2. Case Management to follow.  
ELIZA Christy

## 2020-07-01 NOTE — PROGRESS NOTES
Occupational Therapy Note: Pt recently transferred to chair, declined further activity at this time. Will cont to follow.

## 2020-07-01 NOTE — CONSULTS
ORTHOPEDIC SURGERY CONSULT Subjective:  
 
Date of Consultation:  July 1, 2020 Referring Physician:  Dr. Arsh Herrerachristi Chávez is a 75 y.o. male who is being seen for continuing lumbar and bilateral leg pain. Patient is well known to the orthopedic service as we have been managing his lumbar pain since admission. He was initially encephalopathic, but has improved over this admission. He is scheduled to be discharged to [facility] for rehab. He is currently on ASA 325mg and heparin 5000IU q 8 hours. He reports worsening symptoms in both legs last night after cessation of his decadron. He denies bowel or bladder incontinence. Patient Active Problem List  
 Diagnosis Date Noted  Spinal stenosis, multilevel 06/30/2020  Acute metabolic encephalopathy 95/95/0537  Anemia 06/25/2020  Fall 06/25/2020  Parkinson disease ([facility] [address] 06/25/2020  Urinary retention 06/25/2020  Type II diabetes mellitus ([facility] [address] 06/25/2020  
 HTN (hypertension) 06/25/2020 No family history on file. Social History Tobacco Use  Smoking status: Not on file Substance Use Topics  Alcohol use: Not on file No past medical history on file. No past surgical history on file. Prior to Admission medications Medication Sig Start Date End Date Taking? Authorizing Provider  
finasteride (PROSCAR) 5 mg tablet Take 1 Tab by mouth daily for 30 days. 6/30/20 7/30/20 Yes Kayla Marlow MD  
amLODIPine (NORVASC) 10 mg tablet Take 1 Tab by mouth daily for 30 days. Indications: high blood pressure 6/30/20 7/30/20 Yes Kayla Marlow MD  
insulin glargine (LANTUS,BASAGLAR) 100 unit/mL (3 mL) inpn 25 Units by SubCUTAneous route two (2) times a day for 30 days. Indications: type 2 diabetes mellitus 6/30/20 7/30/20 Yes Kayla Marlow MD  
HYDROcodone-acetaminophen (Norco) 5-325 mg per tablet Take 2 Tabs by mouth every six (6) hours as needed for Pain for up to 3 doses.  Max Daily Amount: 8 Tabs. 6/30/20 7/1/20 Yes George Morris MD  
fluticasone propionate 250 mcg/actuation dsdv Take 1 Puff by inhalation every morning. Yes Provider, Historical  
fluticasone propion-salmeteroL (Wixela Inhub) 250-50 mcg/dose diskus inhaler Take 1 Puff by inhalation daily. Yes Provider, Historical  
carvediloL (COREG) 6.25 mg tablet Take 6.25 mg by mouth two (2) times daily (with meals). Yes Provider, Historical  
aspirin delayed-release 325 mg tablet Take 325 mg by mouth daily. Yes Provider, Historical  
glipiZIDE SR (GLUCOTROL XL) 5 mg CR tablet Take 5 mg by mouth daily. Yes Provider, Historical  
lisinopriL (PRINIVIL, ZESTRIL) 5 mg tablet Take 5 mg by mouth daily. Yes Provider, Historical  
metFORMIN (GLUCOPHAGE) 1,000 mg tablet Take 1,000 mg by mouth two (2) times daily (with meals). Yes Provider, Historical  
tamsulosin (FLOMAX) 0.4 mg capsule Take 0.4 mg by mouth daily. Yes Provider, Historical  
pramipexole (MIRAPEX) 0.5 mg tablet Take 0.5 mg by mouth four (4) times daily. Yes Provider, Historical  
carbidopa-levodopa (Sinemet)  mg per tablet Take 2 Tabs by mouth four (4) times daily. 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  
gabapentin (NEURONTIN) 600 mg tablet Take 600 mg by mouth four (4) times daily. Yes Provider, Historical  
HYDROcodone-acetaminophen (Norco) 5-325 mg per tablet Take 1 Tab by mouth two (2) times daily as needed for Pain. Yes Provider, Historical  
torsemide (DEMADEX) 100 mg tablet Take 50 mg by mouth daily. Yes Provider, Historical  
omeprazole (PRILOSEC) 40 mg capsule Take 40 mg by mouth daily. Yes Provider, Historical  
atorvastatin (LIPITOR) 20 mg tablet Take 20 mg by mouth daily. Yes Provider, Historical  
docusate sodium (COLACE) 100 mg capsule Take 400 mg by mouth daily.    Yes Provider, Historical  
cholecalciferol (Vitamin D3) (1000 Units /25 mcg) tablet Take 1,000 Units by mouth daily. Yes Provider, Historical  
loratadine (CLARITIN) 10 mg tablet Take 10 mg by mouth daily. Yes Provider, Historical  
fluticasone propionate (Flonase Allergy Relief) 50 mcg/actuation nasal spray 2 Sprays by Both Nostrils route daily. Yes Provider, Historical  
entacapone (COMTAN) 200 mg tablet Take 200 mg by mouth four (4) times daily. Yes Provider, Historical  
b complex vitamins tablet Take 1 Tab by mouth daily. Yes Provider, Historical  
spironolactone (ALDACTONE) 25 mg tablet Take 25 mg by mouth daily. Yes Provider, Historical  
traZODone (DESYREL) 50 mg tablet Take  mg by mouth nightly as needed for Sleep. Yes Provider, Historical  
insulin aspart U-100 (NovoLOG Flexpen U-100 Insulin) 100 unit/mL (3 mL) inpn by SubCUTAneous route Before breakfast, lunch, and dinner. Uses as sliding scale TIDAC (unknown scale)   Yes Provider, Historical  
 
Current Facility-Administered Medications Medication Dose Route Frequency  methylPREDNISolone (MEDROL) tablet 4 mg  4 mg Oral DAILY WITH BREAKFAST  albuterol-ipratropium (DUO-NEB) 2.5 MG-0.5 MG/3 ML  3 mL Nebulization Q6H PRN  
 insulin glargine (LANTUS) injection 20 Units  20 Units SubCUTAneous BID  insulin lispro (HUMALOG) injection   SubCUTAneous AC&HS  
 gabapentin (NEURONTIN) capsule 600 mg  600 mg Oral TID  fluticasone-salmeterol (ADVAIR/WIXELA) 250mcg-50mcg/puff (Patient Supplied)  1 Puff Inhalation DAILY  amLODIPine (NORVASC) tablet 5 mg  5 mg Oral DAILY  HYDROcodone-acetaminophen (NORCO) 5-325 mg per tablet 2 Tab  2 Tab Oral Q6H PRN  
 sodium chloride (NS) flush 5-10 mL  5-10 mL IntraVENous PRN  
 sodium chloride (NS) flush 5-40 mL  5-40 mL IntraVENous Q8H  
 sodium chloride (NS) flush 5-40 mL  5-40 mL IntraVENous PRN  
 naloxone (NARCAN) injection 0.4 mg  0.4 mg IntraVENous PRN  
 heparin (porcine) injection 5,000 Units  5,000 Units SubCUTAneous Q8H  
  aspirin delayed-release tablet 325 mg  325 mg Oral DAILY  atorvastatin (LIPITOR) tablet 20 mg  20 mg Oral QHS  carbidopa-levodopa (SINEMET)  mg per tablet 2 Tab  2 Tab Oral QID  carvediloL (COREG) tablet 6.25 mg  6.25 mg Oral BID WITH MEALS  cholecalciferol (VITAMIN D3) (1000 Units /25 mcg) tablet 1 Tab  1,000 Units Oral DAILY  docusate sodium (COLACE) capsule 400 mg  400 mg Oral DAILY  entacapone (COMTAN) tablet 200 mg  200 mg Oral QID  fluticasone propionate (FLONASE) 50 mcg/actuation nasal spray 2 Spray  2 Spray Both Nostrils DAILY  loratadine (CLARITIN) tablet 10 mg  10 mg Oral DAILY  pantoprazole (PROTONIX) tablet 40 mg  40 mg Oral ACB  pramipexole (MIRAPEX) tablet 0.5 mg  0.5 mg Oral BID  tamsulosin (FLOMAX) capsule 0.4 mg  0.4 mg Oral DAILY  traZODone (DESYREL) tablet 50 mg  50 mg Oral QHS PRN  
 glucose chewable tablet 16 g  4 Tab Oral PRN  
 dextrose (D50W) injection syrg 12.5-25 g  12.5-25 g IntraVENous PRN  
 glucagon (GLUCAGEN) injection 1 mg  1 mg IntraMUSCular PRN  finasteride (PROSCAR) tablet 5 mg  5 mg Oral DAILY  b-complex with vitamin c tablet 1 Tab  1 Tab Oral DAILY No Known Allergies Review of Systems:  Pertinent items are noted in HPI. Objective:  
 
Patient Vitals for the past 8 hrs: 
 BP Temp Pulse Resp SpO2 Weight 20 0748 163/88 98.1 °F (36.7 °C) 82 18 97 %   
20 0745     98 %   
20 0700   76     
20 0622      114.2 kg (251 lb 12.8 oz) Temp (24hrs), Av.1 °F (36.7 °C), Min:97.8 °F (36.6 °C), Max:98.6 °F (37 °C) EXAM: GEN: Well appearing male in NAD 
PSYCH:  AAO x 3 MUSC/NEURO: Lumbar spine without ecchymosis, erythema or rashes. Left: PF: 4+/5  DF: 5/5 EHL: 5/5 EFL: 5/5 Right: PF: 4+/5. DF: 5/5 EHL: 5/5 EFL: 5/5. S/LT decreased in L1-S1. 1+ right ankle clonus beat. +DP and PT pulses. IMAGING: 
IMPRESSION IMPRESSION: 
 Status post laminectomy and fusion L3-L4. Laminectomy L4-5. 
  
Moderate broad-based protrusion at L2-L3 with severe canal stenosis at L2-L3. Data Review Recent Results (from the past 24 hour(s)) GLUCOSE, POC Collection Time: 06/30/20  4:17 PM  
Result Value Ref Range Glucose (POC) 220 (H) 65 - 100 mg/dL Performed by Karen England (PCT) GLUCOSE, POC Collection Time: 06/30/20  9:14 PM  
Result Value Ref Range Glucose (POC) 224 (H) 65 - 100 mg/dL Performed by Manfred Sanderson (PCT) GLUCOSE, POC Collection Time: 07/01/20  6:20 AM  
Result Value Ref Range Glucose (POC) 205 (H) 65 - 100 mg/dL Performed by Manfred Sanderson (PCT) GLUCOSE, POC Collection Time: 07/01/20 11:29 AM  
Result Value Ref Range Glucose (POC) 200 (H) 65 - 100 mg/dL Performed by Yoana Hess (PCT) Assessment/Plan:  
A: 1. L2-L3 central stenosis 2. T6-7 central stenosis with mild cord compression P:  
1. Long discussion with wife and patient about treatment. I recommended an DIONNA of L2-L3 interlaminar. This can not be done here until Friday or Monday due to staffing/anticoagulant use. Explained to him and his wife that future surgical options would be discussed as outpatient and certainly carry risk considering age, debility, and previous results with surgery. Will resume prednisone taper. Decadron 10 mg IV x 1. I would recommend transfer to [address] [place] [address] rehab- consider DIONNA at [address] [place] [address] since we can not get this done this week. Followup with [facility] once DC from hospital.   
 
Berton Ormond, PA Orthopaedic Surgery  [facility]

## 2020-07-01 NOTE — CONSULTS
Palliative Medicine Consult [place]: [phone] ([phone]) Patient Name: Gabriella Leo YOB: 1945 Date of Initial Consult: 7/1/20 Reason for Consult: overwhelming symptoms Requesting Provider: Dr. Raymundo Villagomez Primary Care Physician: Eliu Tejada MD 
 
 SUMMARY:  
Gabriella Leo is a 75 y.o. male with medical history significant for Parkinson's disease with mild dementia, DM, hypertension, h/o lumbar laminectomy. He presented after a 2nd fall at home in 24 hrs and referred for admission from the ED for acute metabolic encephalopathy. His confusion quickly resolved. Neurology ordered an MRI of his entire spine which revealed cervical degenerative change, stenosis at C6-7 and T8-T9 related to epidural lipomatosis and severe stenosis L2-L3. Palliative medicine was consulted for assistance with pain control in the setting of chronic illness and debility. Psychosocial Hx- Retired orthopaedic surgeon.  18 yrs to [place]. He has two children, a daughter (an NP) in [address] [place] [address] and a son. Justin Trinh has a daughter and grandchild that live locally. PALLIATIVE DIAGNOSES:  
1. Bilateral lower leg pain 2. Debility 3. Severe multilevel spinal stenosis 4. Parkinson's disease with mild dementia 5. DM type 2 
6. Anemia 7. Probable CKD PLAN:  
1. Met with patient and wife at bedside. He presented as alert with good insight. He recognizes he has had some decline in memory. Wife has to correct some history as we talk. 2. He has experienced a significant decline in function over the past 8 months. He walks with a walker, leaning forward. Wife assists with most ADLs. 3. Typically takes Hydrocodone 5/325 mg 1 tab qhs, nothing more. 4. Pain was fairly well controlled this hospitalization with initiation of IV steroids and Hydrocodone-APAP 2 tabs q6h until last night when his pain significantly worsened causing sleep disruption and delay in hospital discharge. 5. Recommend to discontinue Hydrocodone. Schedule APAP 1000 mg TID. Start Oxycodone 10 mg q4h prn. Use IV Fentanyl 12.5 mcg q4h prn only in case of severe episode unrelieved by oral oxycodone. 6. Agree with reduced dose of gabapentin 600 mg TID (from QID) due to renal insufficiency. 7. Reviewed potential SEs of opioids, he does not report lethargy, cognitive changes or constipation thus far related to opioid use. 8. Docusate 400 mg nightly as bowel regimen? Consider stimulant laxative such as senna as a better agent, would start 2 tabs qhs (I didn't change this order yet as did not discuss with patient). 9. Orthopaedic service recommends DIONNA to the lumbar region, this may be deferred to the outpatient setting. 10. Goals of care- patient and wife both hopeful for him to regain balance and strength. 11. Code status- I brought this up because case management initial review states he would not want resuscitative efforts but he is listed as a full code. He confirms he has a DDNR at home. Code status changed to DNR. 12. Thank you for the opportunity to participate in the care of [facility] 13. Please call our team with further questions. 14. Communicated plan of care with: Palliative [facility] Team 
 
 GOALS OF CARE / TREATMENT PREFERENCES:  
 
GOALS OF CARE: 
Patient/Health Care Proxy Stated Goals: Rehabilitation TREATMENT PREFERENCES:  
Code Status: Full Code Advance Care Planning: 
[x] [facility] Interdisciplinary Team has updated the ACP Navigator with Karoline 8 and Patient Capacity Advance Care Planning 6/25/2020 Confirm Advance Directive Yes, not on file Medical Interventions: Limited additional interventions(DNR/I) Other: As far as possible, the palliative care team has discussed with patient / health care proxy about goals of care / treatment preferences for patient.  
 
 HISTORY:  
 
 History obtained from: patient, wife, chart review CHIEF COMPLAINT: pain in calves HPI/SUBJECTIVE: The patient is:  
[x] Verbal and participatory [] Non-participatory due to:  
 
76 yo male admitted after a 2nd fall at home where his knees buckled and legs gave way. He was confused upon arrival in the ED but this apparently cleared by the time our hospitalist performed the admission. He does admit to mild memory loss or \"dementia\" related to his Parkinson's disease. Pain was controlled and he was making progress with therapy until overnight 6/30-7/1 when his pain significantly worsened in his lower legs (calves). Clinical Pain Assessment (nonverbal scale for severity on nonverbal patients):  
Clinical Pain Assessment Severity: 6 Location: both calves Character: burning Duration: weeks to months Effect: limits mobility, frequent falls Factors: worse with ambulation Frequency: constant Duration: for how long has pt been experiencing pain (e.g., 2 days, 1 month, years) Frequency: how often pain is an issue (e.g., several times per day, once every few days, constant) FUNCTIONAL ASSESSMENT:  
 
Palliative Performance Scale (PPS): PPS: 50 PSYCHOSOCIAL/SPIRITUAL SCREENING:  
 
Palliative IDT has assessed this patient for cultural preferences / practices and a referral made as appropriate to needs (Cultural Services, Patient Advocacy, Ethics, etc.) Any spiritual / Anabaptism concerns: 
[] Yes /  [x] No 
 
Caregiver Burnout: 
[] Yes /  [x] No /  [] No Caregiver Present Anticipatory grief assessment:  
[x] Normal  / [] Maladaptive ESAS Anxiety: Anxiety: 0 
 
ESAS Depression:    
 
 
 REVIEW OF SYSTEMS:  
 
Positive and pertinent negative findings in ROS are noted above in HPI. The following systems were [x] reviewed / [] unable to be reviewed as noted in HPI Other findings are noted below.  
Systems: constitutional, ears/nose/mouth/throat, respiratory, gastrointestinal, genitourinary, musculoskeletal, integumentary, neurologic, psychiatric, endocrine. Positive findings noted below. Modified ESAS Completed by: provider Pain: 6 Anxiety: 0 Nausea: 0 Anorexia: 2 Dyspnea: 0 Constipation: No  
  Stool Occurrence(s): 0 PHYSICAL EXAM:  
 
From RN flowsheet: 
Wt Readings from Last 3 Encounters:  
07/01/20 114.2 kg (251 lb 12.8 oz) 06/24/20 106.6 kg (235 lb) Blood pressure 135/72, pulse 86, temperature 98 °F (36.7 °C), resp. rate 18, height 5' 8\" (1.727 m), weight 114.2 kg (251 lb 12.8 oz), SpO2 97 %. Pain Scale 1: Numeric (0 - 10) Pain Intensity 1: 3 Pain Onset 1: 1 week Pain Location 1: Knee Pain Orientation 1: Left Pain Description 1: Aching, Burning, Hypersensitivity Pain Intervention(s) 1: Medication (see MAR) Last bowel movement, if known: Obese white male sitting up in chair, NAD. Sclerae anicteric. Respirations unlabored, lungs clear throughout. RRR, no audible murmur. No peripheral edema. Abd obese, soft, ntnd. bs active. No tenderness on palpation of the entire spine. Skin warm, diaphoretic on the back. A&Ox4. Moves all 4, no tremor or rigidity. Calm, cooperative. No agitation. HISTORY:  
 
Principal Problem: 
  Spinal stenosis, multilevel (6/30/2020) Active Problems: 
  Acute metabolic encephalopathy (5/97/2649) Anemia (6/25/2020) Fall (6/25/2020) Parkinson disease ([facility] [address] (6/25/2020) Urinary retention (6/25/2020) Type II diabetes mellitus ([address] (6/25/2020) HTN (hypertension) (6/25/2020) No past medical history on file. No past surgical history on file. No family history on file. History reviewed, no pertinent family history. Social History Tobacco Use  Smoking status: Not on file Substance Use Topics  Alcohol use: Not on file No Known Allergies Current Facility-Administered Medications Medication Dose Route Frequency  methylPREDNISolone (MEDROL) tablet 4 mg  4 mg Oral DAILY WITH BREAKFAST  acetaminophen (TYLENOL) tablet 1,000 mg  1,000 mg Oral TID  oxyCODONE IR (ROXICODONE) tablet 10 mg  10 mg Oral Q4H PRN  
 fentaNYL citrate (PF) injection 12.5 mcg  12.5 mcg IntraVENous Q4H PRN  
 albuterol-ipratropium (DUO-NEB) 2.5 MG-0.5 MG/3 ML  3 mL Nebulization Q6H PRN  
 insulin glargine (LANTUS) injection 20 Units  20 Units SubCUTAneous BID  insulin lispro (HUMALOG) injection   SubCUTAneous AC&HS  
 gabapentin (NEURONTIN) capsule 600 mg  600 mg Oral TID  fluticasone-salmeterol (ADVAIR/WIXELA) 250mcg-50mcg/puff (Patient Supplied)  1 Puff Inhalation DAILY  amLODIPine (NORVASC) tablet 5 mg  5 mg Oral DAILY  sodium chloride (NS) flush 5-10 mL  5-10 mL IntraVENous PRN  
 sodium chloride (NS) flush 5-40 mL  5-40 mL IntraVENous Q8H  
 sodium chloride (NS) flush 5-40 mL  5-40 mL IntraVENous PRN  
 naloxone (NARCAN) injection 0.4 mg  0.4 mg IntraVENous PRN  
 heparin (porcine) injection 5,000 Units  5,000 Units SubCUTAneous Q8H  
 aspirin delayed-release tablet 325 mg  325 mg Oral DAILY  atorvastatin (LIPITOR) tablet 20 mg  20 mg Oral QHS  carbidopa-levodopa (SINEMET)  mg per tablet 2 Tab  2 Tab Oral QID  carvediloL (COREG) tablet 6.25 mg  6.25 mg Oral BID WITH MEALS  cholecalciferol (VITAMIN D3) (1000 Units /25 mcg) tablet 1 Tab  1,000 Units Oral DAILY  docusate sodium (COLACE) capsule 400 mg  400 mg Oral DAILY  entacapone (COMTAN) tablet 200 mg  200 mg Oral QID  fluticasone propionate (FLONASE) 50 mcg/actuation nasal spray 2 Spray  2 Spray Both Nostrils DAILY  loratadine (CLARITIN) tablet 10 mg  10 mg Oral DAILY  pantoprazole (PROTONIX) tablet 40 mg  40 mg Oral ACB  pramipexole (MIRAPEX) tablet 0.5 mg  0.5 mg Oral BID  tamsulosin (FLOMAX) capsule 0.4 mg  0.4 mg Oral DAILY  traZODone (DESYREL) tablet 50 mg  50 mg Oral QHS PRN  
 glucose chewable tablet 16 g  4 Tab Oral PRN  
  dextrose (D50W) injection syrg 12.5-25 g  12.5-25 g IntraVENous PRN  
 glucagon (GLUCAGEN) injection 1 mg  1 mg IntraMUSCular PRN  finasteride (PROSCAR) tablet 5 mg  5 mg Oral DAILY  b-complex with vitamin c tablet 1 Tab  1 Tab Oral DAILY  
 
 
 
 LAB AND IMAGING FINDINGS:  
 
Lab Results Component Value Date/Time WBC 6.1 06/28/2020 04:31 AM  
 HGB 9.9 (L) 06/28/2020 04:31 AM  
 PLATELET 415 00/28/1048 04:31 AM  
 
Lab Results Component Value Date/Time Sodium 137 06/29/2020 05:35 AM  
 Potassium 4.4 06/29/2020 05:35 AM  
 Chloride 109 (H) 06/29/2020 05:35 AM  
 CO2 21 06/29/2020 05:35 AM  
 BUN 37 (H) 06/29/2020 05:35 AM  
 Creatinine 1.36 (H) 06/29/2020 05:35 AM  
 Calcium 8.9 06/29/2020 05:35 AM  
 Magnesium 2.3 06/29/2020 05:35 AM  
 Phosphorus 2.9 06/29/2020 05:35 AM  
  
Lab Results Component Value Date/Time Alk. phosphatase 62 06/26/2020 03:26 AM  
 Protein, total 7.8 06/26/2020 03:26 AM  
 Albumin 2.5 (L) 06/29/2020 05:35 AM  
 Globulin 4.6 (H) 06/26/2020 03:26 AM  
 
No results found for: INR, PTMR, PTP, PT1, PT2, APTT, INREXT, INREXT Lab Results Component Value Date/Time Iron 17 (L) 06/26/2020 03:26 AM  
 TIBC 257 06/26/2020 03:26 AM  
 Iron % saturation 7 (L) 06/26/2020 03:26 AM  
 Ferritin 135 06/26/2020 03:26 AM  
  
No results found for: PH, PCO2, PO2 No components found for: [place] Lab Results Component Value Date/Time  06/27/2020 03:19 AM  
 CK - MB 1.9 06/25/2020 04:59 AM  
  
 
 
   
 
Total time:  
Counseling / coordination time, spent as noted above:  
> 50% counseling / coordination?:  
 
Prolonged service was provided for  []30 min   []75 min in face to face time in the presence of the patient, spent as noted above. Time Start:  
Time End:  
Note: this can only be billed with 72833 (initial) or 31687 (follow up). If multiple start / stop times, list each separately.

## 2020-07-01 NOTE — PROGRESS NOTES
Physical Therapy Note Attempted to see pt for PT treatment session. Pt recently transferred to chair and declining further activity at this time. Will follow up when appropriate. Thank you.  
 
Jhony Jones PT, DPT

## 2020-07-01 NOTE — PROGRESS NOTES
CMS Note 7/1/2020 Patient received their 2nd IMM letter, patient did not feel comfortable signing the letter for CMS. Patient was given a copy for their record. [place] CMS

## 2020-07-01 NOTE — PROGRESS NOTES
Vinicius Felecia [facility] 79 
380 [facility], [address] [place] [address] 
[phone] Medical Progress Note NAME:         Ray Muñoz :        1945 MRM:        [id number] Date of service: 2020 Subjective: Patient has been seen and examined as a follow up for multiple medical issues. Chart, labs, diagnostics reviewed. He still says he has severe back pain, now limiting his ability to ambulate. No nausea or vomiting. No fever or chills. Objective: 
 
Vital Signs: 
 
Visit Vitals /88 (BP 1 Location: Right arm, BP Patient Position: At rest) Pulse 82 Temp 98.1 °F (36.7 °C) Resp 18 Ht 5' 8\" (1.727 m) Wt 114.2 kg (251 lb 12.8 oz) SpO2 97% BMI 38.29 kg/m² Intake/Output Summary (Last 24 hours) at 2020 1011 Last data filed at 2020 5452 Gross per 24 hour Intake 250 ml Output 725 ml Net -475 ml Physical Examination: 
 
General:   Weak looking but uncomfortable due to pain Eyes:   pink conjunctivae, PERRLA with no discharge. ENT:   no ottorrhea or rhinorrhea with dry mucous membranes Pulm:  decreased but clear breath sounds without crackles or wheezes Card:  has regular and normal S1, S2 without thrills, bruits or peripheral edema Abd:  Soft, non-tender, non-distended, normoactive bowel sounds Musc:  No cyanosis, clubbing, atrophy or deformities. Skin:  No rashes, bruising or ulcers. Neuro: Awake and alert. Generally decreased muscle power especially lower extremities Psych:  Has a fair insight to illness Current Facility-Administered Medications Medication Dose Route Frequency  dexamethasone (PF) (DECADRON) 10 mg/mL injection 10 mg  10 mg IntraVENous ONCE  
 methylPREDNISolone (MEDROL) tablet 4 mg  4 mg Oral DAILY WITH BREAKFAST  albuterol-ipratropium (DUO-NEB) 2.5 MG-0.5 MG/3 ML  3 mL Nebulization Q6H PRN  
  insulin glargine (LANTUS) injection 20 Units  20 Units SubCUTAneous BID  insulin lispro (HUMALOG) injection   SubCUTAneous AC&HS  
 gabapentin (NEURONTIN) capsule 600 mg  600 mg Oral TID  fluticasone-salmeterol (ADVAIR/WIXELA) 250mcg-50mcg/puff (Patient Supplied)  1 Puff Inhalation DAILY  amLODIPine (NORVASC) tablet 5 mg  5 mg Oral DAILY  HYDROcodone-acetaminophen (NORCO) 5-325 mg per tablet 2 Tab  2 Tab Oral Q6H PRN  
 sodium chloride (NS) flush 5-10 mL  5-10 mL IntraVENous PRN  
 sodium chloride (NS) flush 5-40 mL  5-40 mL IntraVENous Q8H  
 sodium chloride (NS) flush 5-40 mL  5-40 mL IntraVENous PRN  
 naloxone (NARCAN) injection 0.4 mg  0.4 mg IntraVENous PRN  
 heparin (porcine) injection 5,000 Units  5,000 Units SubCUTAneous Q8H  
 aspirin delayed-release tablet 325 mg  325 mg Oral DAILY  atorvastatin (LIPITOR) tablet 20 mg  20 mg Oral QHS  carbidopa-levodopa (SINEMET)  mg per tablet 2 Tab  2 Tab Oral QID  carvediloL (COREG) tablet 6.25 mg  6.25 mg Oral BID WITH MEALS  cholecalciferol (VITAMIN D3) (1000 Units /25 mcg) tablet 1 Tab  1,000 Units Oral DAILY  docusate sodium (COLACE) capsule 400 mg  400 mg Oral DAILY  entacapone (COMTAN) tablet 200 mg  200 mg Oral QID  fluticasone propionate (FLONASE) 50 mcg/actuation nasal spray 2 Spray  2 Spray Both Nostrils DAILY  loratadine (CLARITIN) tablet 10 mg  10 mg Oral DAILY  pantoprazole (PROTONIX) tablet 40 mg  40 mg Oral ACB  pramipexole (MIRAPEX) tablet 0.5 mg  0.5 mg Oral BID  tamsulosin (FLOMAX) capsule 0.4 mg  0.4 mg Oral DAILY  traZODone (DESYREL) tablet 50 mg  50 mg Oral QHS PRN  
 glucose chewable tablet 16 g  4 Tab Oral PRN  
 dextrose (D50W) injection syrg 12.5-25 g  12.5-25 g IntraVENous PRN  
 glucagon (GLUCAGEN) injection 1 mg  1 mg IntraMUSCular PRN  finasteride (PROSCAR) tablet 5 mg  5 mg Oral DAILY  b-complex with vitamin c tablet 1 Tab  1 Tab Oral DAILY Laboratory data and review: No results for input(s): WBC, HGB, HCT, PLT, HGBEXT, HCTEXT, PLTEXT, HGBEXT, HCTEXT, PLTEXT in the last 72 hours. Recent Labs  
  06/29/20 
0535 06/28/20 
2117  136  
K 4.4 4.3 * 107 CO2 21 21 * 388* BUN 37* 36* CREA 1.36* 1.46* CA 8.9 9.2 MG 2.3  --   
PHOS 2.9  --   
ALB 2.5*  -- No components found for: [place] [facility]: Imaging - all reviewed Assessment and Plan: 
 
Cervico-Thoraco-lumbar spinal stenosis POA: imaging showed a multilevel degenerative change with extensive epidural lipomatosis. Moderate to severe canal stenosis with minimal cord compression large related to epidural lipomatosis at C6-7. Moderate to severe canal stenosis at T8-T9 and moderate canal stenosis at T7-T8. Status post laminectomy and fusion L3-L4. Laminectomy L4-5. Moderate broad-based protrusion at L2-L3 with severe canal stenosis at L2-L3. Completed IV dexamethasone 6/29. Discussed with Ortho who will re-evaluate him today. Continue Dexamethasone. Will consult palliative care for symptoms management. Continue PT/OT as tolerated. SNF for skilled rehab.    
  
Type II diabetes mellitus: controlled. A1c 7.0%. Hyperglycemic partly due to steroids. Continue Lantus, SSi. DM diet. Monitor blood glucose HTN (hypertension): BP controlled. Continue Coreg. Continue to hold Lisinopril and torsemide. Parkinson disease: with frequent falls at home. Continue Sinemet. PT/OT, fall precautions.   
 
Acute metabolic encephalopathy:  this seems to have now resolved. Likely multifactorial from urinary retention, LYN. No focal s/sx to suggest primary CNS process. Head CT negative. Continue supportive care. UCx showed no UTI. Off IV abx (was on cefepime and vanc). No further testing at this time 
  
Urinary retention: CT a/p showing distended bladder, enlarged prostate, and hydronephrosis.  Needed a sánchez catheter which was discontinued and he has voided well. Continue Flomax with an outpatient follow up with Urology.  
  
LYN: unclear baseline renal function but likely closely to normal. Renal function improved. Holding metformin, ACEi, torsemide. SCr continues to improve.  
  
Anemia: likely multifactorial. Needs age-appropriate CA screening including colon CA once he has recovered from current illness. Reiterated to patient. Lung nodule: 9.5 mm pleural parenchymal nodule in the left lower lobe. Needs outpatient follow up as well as re-imaging Total time spent for the patient's care: 30  Minutes Care Plan discussed with: Patient, Care Manager and Nursing Staff Discussed:  Care Plan and D/C Planning Prophylaxis:  Hep SQ Anticipated Disposition:  SNF/LTC 
        
___________________________________________________ Attending Physician:   Neelam Cary MD

## 2020-07-01 NOTE — PROGRESS NOTES
2355 
 
Bedside and Verbal shift change report given to [id number] (oncoming nurse) by Denver Lora (offgoing nurse). Report included the following information SBAR, Kardex, MAR and Cardiac Rhythm NSR.  
 
9208 Notified Dr. Chelsy Leiva of pt's complaint of neuropathic foot pain due to diabetes. Pt requesting his Norco early. Dr. Chelsy Leiva states to give now. 0414 Per sangeetha Holloway to give gabapentin early for neuropathic pain in his feet. 
 
1721 Bedside and Verbal shift change report given to Zabrina Prakash (oncoming nurse) by [id number] (offgoing nurse). Report included the following information SBAR, Kardex and MAR.

## 2020-07-02 VITALS
WEIGHT: 254.19 LBS | TEMPERATURE: 97.4 F | RESPIRATION RATE: 18 BRPM | HEART RATE: 88 BPM | OXYGEN SATURATION: 97 % | DIASTOLIC BLOOD PRESSURE: 79 MMHG | SYSTOLIC BLOOD PRESSURE: 114 MMHG | BODY MASS INDEX: 38.52 KG/M2 | HEIGHT: 68 IN

## 2020-07-02 LAB
GLUCOSE BLD STRIP.AUTO-MCNC: 293 MG/DL (ref 65–100)
GLUCOSE BLD STRIP.AUTO-MCNC: 324 MG/DL (ref 65–100)
SERVICE CMNT-IMP: ABNORMAL
SERVICE CMNT-IMP: ABNORMAL

## 2020-07-02 PROCEDURE — 74011250636 HC RX REV CODE- 250/636: Performed by: INTERNAL MEDICINE

## 2020-07-02 PROCEDURE — 74011250637 HC RX REV CODE- 250/637: Performed by: NURSE PRACTITIONER

## 2020-07-02 PROCEDURE — 74011250637 HC RX REV CODE- 250/637: Performed by: INTERNAL MEDICINE

## 2020-07-02 PROCEDURE — 74011636637 HC RX REV CODE- 636/637: Performed by: INTERNAL MEDICINE

## 2020-07-02 PROCEDURE — 82962 GLUCOSE BLOOD TEST: CPT

## 2020-07-02 RX ORDER — OXYCODONE HYDROCHLORIDE 10 MG/1
10 TABLET ORAL
Qty: 15 TAB | Refills: 0 | Status: SHIPPED | OUTPATIENT
Start: 2020-07-02 | End: 2020-07-17

## 2020-07-02 RX ORDER — METHYLPREDNISOLONE 4 MG/1
4 TABLET ORAL
Qty: 6 TAB | Refills: 0 | Status: SHIPPED
Start: 2020-07-02 | End: 2020-07-08

## 2020-07-02 RX ORDER — ACETAMINOPHEN 500 MG
1000 TABLET ORAL 3 TIMES DAILY
Qty: 180 TAB | Refills: 0 | Status: SHIPPED
Start: 2020-07-02 | End: 2020-08-01

## 2020-07-02 RX ORDER — INSULIN LISPRO 100 [IU]/ML
INJECTION, SOLUTION INTRAVENOUS; SUBCUTANEOUS
Qty: 1 VIAL | Refills: 0 | Status: ON HOLD
Start: 2020-07-02 | End: 2021-01-01 | Stop reason: SDUPTHER

## 2020-07-02 RX ADMIN — ENTACAPONE 200 MG: 200 TABLET, FILM COATED ORAL at 09:27

## 2020-07-02 RX ADMIN — INSULIN LISPRO 7 UNITS: 100 INJECTION, SOLUTION INTRAVENOUS; SUBCUTANEOUS at 09:27

## 2020-07-02 RX ADMIN — FLUTICASONE PROPIONATE AND SALMETEROL 1 PUFF: 250; 50 POWDER RESPIRATORY (INHALATION) at 09:29

## 2020-07-02 RX ADMIN — FLUTICASONE PROPIONATE 2 SPRAY: 50 SPRAY, METERED NASAL at 09:29

## 2020-07-02 RX ADMIN — OXYCODONE 10 MG: 5 TABLET ORAL at 07:05

## 2020-07-02 RX ADMIN — CARBIDOPA AND LEVODOPA 2 TABLET: 25; 100 TABLET ORAL at 13:31

## 2020-07-02 RX ADMIN — VITAMIN C 1 TABLET: TAB at 09:27

## 2020-07-02 RX ADMIN — GABAPENTIN 600 MG: 300 CAPSULE ORAL at 09:26

## 2020-07-02 RX ADMIN — ENTACAPONE 200 MG: 200 TABLET, FILM COATED ORAL at 13:31

## 2020-07-02 RX ADMIN — ACETAMINOPHEN 1000 MG: 500 TABLET ORAL at 09:27

## 2020-07-02 RX ADMIN — MELATONIN 1 TABLET: at 09:26

## 2020-07-02 RX ADMIN — HEPARIN SODIUM 5000 UNITS: 5000 INJECTION INTRAVENOUS; SUBCUTANEOUS at 00:08

## 2020-07-02 RX ADMIN — METHYLPREDNISOLONE 4 MG: 4 TABLET ORAL at 09:27

## 2020-07-02 RX ADMIN — Medication 10 ML: at 07:05

## 2020-07-02 RX ADMIN — CARBIDOPA AND LEVODOPA 2 TABLET: 25; 100 TABLET ORAL at 09:27

## 2020-07-02 RX ADMIN — FINASTERIDE 5 MG: 5 TABLET, FILM COATED ORAL at 09:27

## 2020-07-02 RX ADMIN — AMLODIPINE BESYLATE 5 MG: 5 TABLET ORAL at 09:27

## 2020-07-02 RX ADMIN — PRAMIPEXOLE DIHYDROCHLORIDE 0.5 MG: 0.25 TABLET ORAL at 09:26

## 2020-07-02 RX ADMIN — INSULIN GLARGINE 20 UNITS: 100 INJECTION, SOLUTION SUBCUTANEOUS at 09:28

## 2020-07-02 RX ADMIN — INSULIN LISPRO 10 UNITS: 100 INJECTION, SOLUTION INTRAVENOUS; SUBCUTANEOUS at 11:05

## 2020-07-02 RX ADMIN — OXYCODONE 10 MG: 5 TABLET ORAL at 00:36

## 2020-07-02 RX ADMIN — TAMSULOSIN HYDROCHLORIDE 0.4 MG: 0.4 CAPSULE ORAL at 09:27

## 2020-07-02 RX ADMIN — ACETAMINOPHEN 1000 MG: 500 TABLET ORAL at 13:31

## 2020-07-02 RX ADMIN — LORATADINE 10 MG: 10 TABLET ORAL at 09:27

## 2020-07-02 RX ADMIN — CARVEDILOL 6.25 MG: 3.12 TABLET, FILM COATED ORAL at 09:26

## 2020-07-02 RX ADMIN — PANTOPRAZOLE SODIUM 40 MG: 40 TABLET, DELAYED RELEASE ORAL at 07:05

## 2020-07-02 RX ADMIN — OXYCODONE 10 MG: 5 TABLET ORAL at 11:05

## 2020-07-02 NOTE — PROGRESS NOTES
PIV removed. Discharge instructions given and gone over with Pt and wife, who is at bedside. Opportunity to ask questions given. Report called to [address] [place], [place] [address]ab. Report given in SBAR format to [address] [place] [address]

## 2020-07-02 NOTE — DISCHARGE SUMMARY
Vinicius Izquierdo [facility] 79 
5204 [facility], [address] [place] [address] Tel: [phone] Physician Discharge Summary Patient ID:    Tawny Freeman Age:              75 y.o.    : 1945 MRN:             [id number] PCP: Janay Zee MD  
 
Date of Admission: 2020 Date of Discharge:  2020 Principal admission Diagnosis: 
 Fall [W19. Kelsie Mourning Discharge Diagnoses: 
Principal Problem: 
  Spinal stenosis, multilevel (2020) Acute metabolic encephalopathy () Anemia (2020) Fall (2020) Parkinson disease ([facility] (2020) Type II diabetes mellitus ([facility] (2020) HTN (hypertension) (2020) Consults: Neurology, Orthopedic Surgery and Urology Hospital Course:  
 
Mr. Mariana Grover is a 75 y.o. admitted to [facility] and treated for the following: 
 
Cervico-Thoraco-lumbar spinal stenosis POA: imaging showed a multilevel degenerative change with extensive epidural lipomatosis. Moderate to severe canal stenosis with minimal cord compression large related to epidural lipomatosis at C6-7. Moderate to severe canal stenosis at T8-T9 and moderate canal stenosis at T7-T8. Status post laminectomy and fusion L3-L4. Laminectomy L4-5. Moderate broad-based protrusion at L2-L3 with severe canal stenosis at L2-L3. He was initially reviewed by ortho and responded to IV dexamethasone. He was re- evaluated by orthopedic surgery and palliative care due to continued pain and medications adjusted. Pain much better controlled this morning. Will be discharged to rehab with dexamethasone and adjusted pain medications. Out patient follow up with ortho.     
  
Type II diabetes mellitus: controlled. A1c 7.0%. Hyperglycemic partly due to steroids. Continue Lantus, Glipizide and metformin as well as sliding scale. DM diet. Monitor blood glucose.  
  
HTN (hypertension): BP controlled. Continue Coreg and Lisinopril.  Continue to hold torsemide due to acute kidney injury 
  
Parkinson disease: with frequent falls at home. Seen by neurology. Continue Sinemet. PT/OT, fall precautions.   
  
Acute metabolic encephalopathy:  this seems to have now resolved. Likely multifactorial from urinary retention, LYN. No focal s/sx to suggest primary CNS process. Head CT negative. Continue supportive care. UCx showed no UTI. Off IV abx (was on cefepime and vanc). No further testing at this time 
  
Urinary retention: CT a/p showing distended bladder, enlarged prostate, and hydronephrosis. Needed a sánchez catheter which was discontinued and he has voided well. Continue Flomax with an outpatient follow up with Urology.  
  
LYN: unclear baseline renal function but likely closely to normal. Renal function improved. Holding ACEi, torsemide. SCr much improved.   
  
Anemia: likely multifactorial. Needs age-appropriate CA screening including colon CA once he has recovered from current illness. Reiterated to patient.  
  
Lung nodule: 9.5 mm pleural parenchymal nodule in the left lower lobe. Needs outpatient follow up as well as re-imaging Discharge Exam:   
Visit Vitals /69 (BP 1 Location: Right arm, BP Patient Position: At rest) Pulse 79 Temp 98.1 °F (36.7 °C) Resp 18 Ht 5' 8\" (1.727 m) Wt 115.3 kg (254 lb 3.1 oz) SpO2 95% BMI 38.65 kg/m² Patient has been seen and examined. General: well looking and stable patient in no acute distress Pulm: clear breath sounds without wheezes Card: no murmurs, normal S1, S2 without thrills, bruits Abd:    soft, non-tender, normoactive bowel sounds Skin: no rashes or ulcers, skin turgor is good Neuro: awake, alert and has a non focal  
 
Activity: Activity as tolerated Diet: Diabetic Diet Current Discharge Medication List  
  
START taking these medications Details  
acetaminophen (TYLENOL) 500 mg tablet Take 2 Tabs by mouth three (3) times daily for 30 days. Indications: pain Qty: 180 Tab, Refills: 0  
  
methylPREDNISolone (MEDROL) 4 mg tab Take 1 Tab by mouth daily (with breakfast) for 6 days. Qty: 6 Tab, Refills: 0  
  
oxyCODONE IR (ROXICODONE) 10 mg tab immediate release tablet Take 1 Tab by mouth every four (4) hours as needed for Pain for up to 15 days. Max Daily Amount: 60 mg. 
Qty: 15 Tab, Refills: 0 Associated Diagnoses: Spinal stenosis, multilevel  
  
insulin lispro (HUMALOG) 100 unit/mL injection Use as directed  Indications: type 2 diabetes mellitus Qty: 1 Vial, Refills: 0  
  
finasteride (PROSCAR) 5 mg tablet Take 1 Tab by mouth daily for 30 days. Qty: 30 Tab, Refills: 0  
  
amLODIPine (NORVASC) 10 mg tablet Take 1 Tab by mouth daily for 30 days. Indications: high blood pressure Qty: 30 Tab, Refills: 0 CONTINUE these medications which have CHANGED Details  
insulin glargine (LANTUS,BASAGLAR) 100 unit/mL (3 mL) inpn 25 Units by SubCUTAneous route two (2) times a day for 30 days. Indications: type 2 diabetes mellitus Qty: 15 mL, Refills: 0 HYDROcodone-acetaminophen (Norco) 5-325 mg per tablet Take 2 Tabs by mouth every six (6) hours as needed for Pain for up to 3 doses. Max Daily Amount: 8 Tabs. Qty: 5 Tab, Refills: 0 Associated Diagnoses: Spinal stenosis, multilevel CONTINUE these medications which have NOT CHANGED Details  
fluticasone propionate 250 mcg/actuation dsdv Take 1 Puff by inhalation every morning. fluticasone propion-salmeteroL (Wixela Inhub) 250-50 mcg/dose diskus inhaler Take 1 Puff by inhalation daily. carvediloL (COREG) 6.25 mg tablet Take 6.25 mg by mouth two (2) times daily (with meals). aspirin delayed-release 325 mg tablet Take 325 mg by mouth daily. glipiZIDE SR (GLUCOTROL XL) 5 mg CR tablet Take 5 mg by mouth daily. lisinopriL (PRINIVIL, ZESTRIL) 5 mg tablet Take 5 mg by mouth daily. metFORMIN (GLUCOPHAGE) 1,000 mg tablet Take 1,000 mg by mouth two (2) times daily (with meals). tamsulosin (FLOMAX) 0.4 mg capsule Take 0.4 mg by mouth daily. pramipexole (MIRAPEX) 0.5 mg tablet Take 0.5 mg by mouth four (4) times daily. carbidopa-levodopa (Sinemet)  mg per tablet Take 2 Tabs by mouth four (4) times daily. Indications: parkinsonism due to degeneration in the brain  
  
gabapentin (NEURONTIN) 600 mg tablet Take 600 mg by mouth four (4) times daily. omeprazole (PRILOSEC) 40 mg capsule Take 40 mg by mouth daily. atorvastatin (LIPITOR) 20 mg tablet Take 20 mg by mouth daily. docusate sodium (COLACE) 100 mg capsule Take 400 mg by mouth daily. cholecalciferol (Vitamin D3) (1000 Units /25 mcg) tablet Take 1,000 Units by mouth daily. loratadine (CLARITIN) 10 mg tablet Take 10 mg by mouth daily. fluticasone propionate (Flonase Allergy Relief) 50 mcg/actuation nasal spray 2 Sprays by Both Nostrils route daily. entacapone (COMTAN) 200 mg tablet Take 200 mg by mouth four (4) times daily. b complex vitamins tablet Take 1 Tab by mouth daily. traZODone (DESYREL) 50 mg tablet Take  mg by mouth nightly as needed for Sleep. STOP taking these medications  
  
 torsemide (DEMADEX) 100 mg tablet Comments:  
Reason for Stopping:   
   
 spironolactone (ALDACTONE) 25 mg tablet Comments:  
Reason for Stopping:   
   
 insulin aspart U-100 (NovoLOG Flexpen U-100 Insulin) 100 unit/mL (3 mL) inpn Comments:  
Reason for Stopping: Follow-up Information Follow up With Specialties Details Why Contact Info Dasia Sheldon MD Neurology In 2 weeks Hospital Follow up [address] [facility] Leroy Morgan 13 
[phone] 200 [address] Villan[address] [facility] [address] [facility] [address] [phone] Karen Reyes MD Orthopedic Surgery Call As needed for increase in symptoms  [address] [address] [facility] 99 [address] [phone] [facility]  Call to schedule follow up and review for prostate enlargement Gale Rucker 38 
[facility] [address] Sonia Gonzalez MD [facility] Schedule an appointment as soon as possible for a visit for the regular follow up and monitoring of your lung nodules [facility] 4 1007 [facility] 
[phone] Verna Preston MD Orthopedic Surgery, Sports Medicine In 3 weeks  [address] [place] [address] 
[phone] Follow-up tests or labs: None Discharge Condition: Stable Disposition: rehab Time taken to arrange discharge:  35 minutes. Signed: 
Jenna Hollingsworth MD     [facility] 7/2/2020   9:43 AM

## 2020-07-02 NOTE — PALLIATIVE CARE DISCHARGE
Goals of Care/Treatment Preferences The Palliative Medicine team was consulted as part of your/your loved one's care in the hospital. Our team is a supportive service; we strive to relieve suffering and improve quality of life. We reviewed advance care planning information, which includes the following: 
Patient's [place] is[de-identified] Legal Next of Kin Confirm Advance Directive: Yes, not on file Does the patient have other document types: Do Not Resuscitate, Power  [address] [facility] Patient/Health Care Proxy Stated Goals: Rehabilitation We reviewed / discussed your code status as: Full Code Full Code means perform CPR in the event of cardiac arrest. 
    DNR means do NOT perform CPR in the event of cardiac arrest. 
    Partial Code means you have specific preferences, please discuss with your healthcare team. 
    Dorcus Ritu means this issue was not addressed / resolved during your stay Medical Interventions: Limited additional interventions(DNR/I) Other Instructions: You shared that you have a Durable Do Not Resuscitate Order in place; this should travel with you. When you are in a facility, this form should be placed on your chart. Once you are home, it is recommended that the [facility] form be placed in a visible location such as on the refrigerator or bedroom door. Because of the importance of this information, we are providing you with a printed copy to share with other healthcare providers after this hospitalization is complete.

## 2020-07-02 NOTE — PROGRESS NOTES
Bedside and Verbal shift change report given to  Jeremie Hyde Rn (oncoming nurse) by   Juventino Harding (offgoing nurse). Report included the following information SBAR, Kardex, Intake/Output, MAR, Accordion, Recent Results, Med Rec Status and Cardiac Rhythm NSR/ Sinus Glorious Begin.

## 2020-07-02 NOTE — PROGRESS NOTES
Pharmacist Discharge Medication Reconciliation Discharge Provider:  Dr. Cordell Padilla Discharge Medications: My Medications START taking these medications Instructions Each Dose to Equal Morning Noon Evening Bedtime  
acetaminophen 500 mg tablet Commonly known as:  TYLENOL Your last dose was: Your next dose is: Take 2 Tabs by mouth three (3) times daily for 30 days. Indications: pain 
 1,000 mg 
  
  
  
  
  
amLODIPine 10 mg tablet Commonly known as:  Artie Colon Your last dose was: Your next dose is: Take 1 Tab by mouth daily for 30 days. Indications: high blood pressure 10 mg 
  
  
  
  
  
finasteride 5 mg tablet Commonly known as:  PROSCAR Your last dose was: Your next dose is: Take 1 Tab by mouth daily for 30 days. 5 mg 
  
  
  
  
  
insulin lispro 100 unit/mL injection Commonly known as:  HUMALOG Your last dose was: Your next dose is:   
 
  
 Use as directed  Indications: type 2 diabetes mellitus 
   
  
  
  
  
methylPREDNISolone 4 mg Tab Commonly known as:  MEDROL Your last dose was: Your next dose is: Take 1 Tab by mouth daily (with breakfast) for 6 days. 4 mg 
  
  
  
  
  
oxyCODONE IR 10 mg Tab immediate release tablet Commonly known as:  Tommie Cart Your last dose was: Your next dose is: Take 1 Tab by mouth every four (4) hours as needed for Pain for up to 15 days. Max Daily Amount: 60 mg. 
 10 mg CHANGE how you take these medications Instructions Each Dose to Equal Morning Noon Evening Bedtime  
insulin glargine 100 unit/mL (3 mL) Inpn Commonly known as:  Brigida Mike What changed:  how much to take Your last dose was: Your next dose is:   
 
  
 25 Units by SubCUTAneous route two (2) times a day for 30 days. Indications: type 2 diabetes mellitus 25 Units CONTINUE taking these medications Instructions Each Dose to Equal Morning Noon Evening Bedtime  
aspirin delayed-release 325 mg tablet Your last dose was: Your next dose is: Take 325 mg by mouth daily. 325 mg 
  
  
  
  
  
atorvastatin 20 mg tablet Commonly known as:  LIPITOR Your last dose was: Your next dose is: Take 20 mg by mouth daily. 20 mg 
  
  
  
  
  
b complex vitamins tablet Your last dose was: Your next dose is: Take 1 Tab by mouth daily. 1 Tab 
  
  
  
  
  
carvediloL 6.25 mg tablet Commonly known as:  Karen Magen Your last dose was: Your next dose is: Take 6.25 mg by mouth two (2) times daily (with meals). 6.25 mg 
  
  
  
  
  
docusate sodium 100 mg capsule Commonly known as:  Jason Duffy Your last dose was: Your next dose is: Take 400 mg by mouth daily. 400 mg 
  
  
  
  
  
entacapone 200 mg tablet Commonly known as:  Princess Curling Your last dose was: Your next dose is: Take 200 mg by mouth four (4) times daily. 200 mg * Flonase Allergy Relief 50 mcg/actuation nasal spray Generic drug:  fluticasone propionate Your last dose was: Your next dose is: 2 Sprays by Both Nostrils route daily. 2 Spray * fluticasone propionate 250 mcg/actuation Dsdv Your last dose was: Your next dose is: Take 1 Puff by inhalation every morning. 1 Puff 
  
  
  
  
  
gabapentin 600 mg tablet Commonly known as:  NEURONTIN Your last dose was: Your next dose is: Take 600 mg by mouth four (4) times daily. 600 mg 
  
  
  
  
  
glipiZIDE SR 5 mg CR tablet Commonly known as:  GLUCOTROL XL Your last dose was: Your next dose is: Take 5 mg by mouth daily. 5 mg 
  
  
  
  
  
lisinopriL 5 mg tablet Commonly known as:  [place] Your last dose was: Your next dose is: Take 5 mg by mouth daily. 5 mg loratadine 10 mg tablet Commonly known as:  Buster Coleman Your last dose was: Your next dose is: Take 10 mg by mouth daily. 10 mg 
  
  
  
  
  
metFORMIN 1,000 mg tablet Commonly known as:  GLUCOPHAGE Your last dose was: Your next dose is: Take 1,000 mg by mouth two (2) times daily (with meals). 1,000 mg 
  
  
  
  
  
omeprazole 40 mg capsule Commonly known as:  PRILOSEC Your last dose was: Your next dose is: Take 40 mg by mouth daily. 40 mg 
  
  
  
  
  
pramipexole 0.5 mg tablet Commonly known as:  MIRAPEX Your last dose was: Your next dose is: Take 0.5 mg by mouth four (4) times daily. 0.5 mg 
  
  
  
  
  
Sinemet  mg per tablet Generic drug:  carbidopa-levodopa Your last dose was: Your next dose is: Take 2 Tabs by mouth four (4) times daily. Indications: parkinsonism due to degeneration in the brain 2 Tab 
  
  
  
  
  
tamsulosin 0.4 mg capsule Commonly known as:  FLOMAX Your last dose was: Your next dose is: Take 0.4 mg by mouth daily. 0.4 mg 
  
  
  
  
  
traZODone 50 mg tablet Commonly known as:  Jam Tony Your last dose was: Your next dose is: Take  mg by mouth nightly as needed for Sleep. 
  mg Vitamin D3 (1000 Units /25 mcg) tablet Generic drug:  cholecalciferol Your last dose was: Your next dose is: Take 1,000 Units by mouth daily. 1,000 Units Wixela Inhub 250-50 mcg/dose diskus inhaler Generic drug:  fluticasone propion-salmeteroL Your last dose was: Your next dose is: Take 1 Puff by inhalation daily. 1 Puff * This list has 2 medication(s) that are the same as other medications prescribed for you. Read the directions carefully, and ask your doctor or other care provider to review them with you. STOP taking these medications Norco 5-325 mg per tablet Generic drug:  HYDROcodone-acetaminophen NovoLOG Flexpen U-100 Insulin 100 unit/mL (3 mL) Inpn Generic drug:  insulin aspart U-100 
  
  
spironolactone 25 mg tablet Commonly known as:  ALDACTONE 
  
  
torsemide 100 mg tablet Commonly known as:  DEMADEX ASK your doctor about these medications Instructions Each Dose to Equal Morning Noon Evening Bedtime HYDROcodone-acetaminophen 5-325 mg per tablet Commonly known as:  Norco 
Ask about: Should I take this medication? Your last dose was: Your next dose is: Take 2 Tabs by mouth every six (6) hours as needed for Pain for up to 3 doses. Max Daily Amount: 8 Tabs. 2 Tab Where to Get Your Medications Information on where to get these meds will be given to you by the nurse or doctor. Ask your nurse or doctor about these medications 
acetaminophen 500 mg tablet 
amLODIPine 10 mg tablet 
finasteride 5 mg tablet HYDROcodone-acetaminophen 5-325 mg per tablet 
insulin glargine 100 unit/mL (3 mL) Inpn 
insulin lispro 100 unit/mL injection 
methylPREDNISolone 4 mg Tab 
oxyCODONE IR 10 mg Tab immediate release tablet The patient's chart, MAR, and AVS were reviewed by 
 Sonia Mccartney, EVERARDOD, Contact: [phone]

## 2020-07-02 NOTE — PROGRESS NOTES
2300: Bedside and Verbal shift change report given to Blanka Martin RN (oncoming nurse) by Ketan Iverson RN (offgoing nurse). Report included the following information SBAR, Kardex, MAR, and Accordion. 0730: Bedside and Verbal shift change report given to Yogesh Mercado RN (oncoming nurse) by Blanka Martin RN (offgoing nurse). Report included the following information SBAR, Kardex, MAR, and Accordion. Problem: Pressure Injury - Risk of 
Goal: *Prevention of pressure injury Description: Document Larry Scale and appropriate interventions in the flowsheet. Outcome: Progressing Towards Goal 
Note: Pressure Injury Interventions: 
Sensory Interventions: Assess changes in LOC, Assess need for specialty bed, Float heels, Keep linens dry and wrinkle-free, Maintain/enhance activity level, Minimize linen layers, Turn and reposition approx. every two hours (pillows and wedges if needed) Moisture Interventions: Absorbent underpads, Maintain skin hydration (lotion/cream) Activity Interventions: Increase time out of bed, Pressure redistribution bed/mattress(bed type) Mobility Interventions: Float heels, HOB 30 degrees or less, Pressure redistribution bed/mattress (bed type), Turn and reposition approx. every two hours(pillow and wedges) Nutrition Interventions: Document food/fluid/supplement intake Friction and Shear Interventions: Lift sheet, Lift team/patient mobility team, Minimize layers

## 2020-07-02 NOTE — PROGRESS NOTES
7-2-2020 CASE MANAGEMENT NOTE: The patient has been accepted by [facility] and is scheduled to be transferred there today by [facility] ambulance- scheduled for 2:00 PM. The patient and his wife have been informed of the arrangements and the RN has been instructed to call report to [phone]. No further needs identified at this time. Care Management Interventions PCP Verified by CM: Yes(Dr. Emiliano Bruce) Mode of Transport at Discharge: BLS Transition of Care Consult (CM Consult): SNF Partner SNF: No 
Reason Why Partner SNF Not Chosen: (Going to [facility]) Discharge Durable Medical Equipment: No 
Physical Therapy Consult: Yes Occupational Therapy Consult: Yes Speech Therapy Consult: No 
Current Support Network: Lives with Spouse, Own Home Confirm Follow Up Transport: Family The Plan for Transition of Care is Related to the Following Treatment Goals : Acute Encephalopathy The Patient and/or Patient Representative was Provided with a Choice of Provider and Agrees with the Discharge Plan?: Yes Name of the Patient Representative Who was Provided with a Choice of Provider and Agrees with the Discharge Plan: Pt and wife Freedom of Choice List was Provided with Basic Dialogue that Supports the Patient's Individualized Plan of Care/Goals, Treatment Preferences and Shares the Quality Data Associated with the Providers?: Yes Discharge Location Discharge Placement: Rehab hospital/unit acute BISHOP Jamil, CM

## 2020-07-02 NOTE — DISCHARGE INSTRUCTIONS
TRANSFER  INSTRUCTIONS    NAME: Brandie Manual   :  1945   MRN:  [id number]     Date/Time:  2020 9:24 AM    ADMIT DATE: 2020     TRANSFER DATE: 2020     DISPOSITION: SNF    DISCHARGE DIAGNOSIS:  Principal Problem:    Spinal stenosis, multilevel (2020)    Acute metabolic encephalopathy (6423)    Anemia (2020)    Fall (2020)    Parkinson disease ([facility] [address] (2020)    Urinary retention (2020)    Type II diabetes mellitus ([facility] [address] (2020)    HTN (hypertension) (2020)    MEDICATIONS: See medication list on the AVS. Monitor blood glucose three times before meals and adjust medications as needed    Recommended diet:  Diabetic Diet    Recommended activity: Activity as tolerated. Fall precautions    Follow Up: Follow-up Information     Follow up With Specialties Details Why Contact Info    Angelia Ma MD Neurology In 2 weeks Hospital Follow up [address] [facility] [address]    [address] [facility]  [phone]    Andrez Awan MD Orthopedic Surgery Call As needed for increase in symptoms  [address] [facility]  [address] [place] [address]  [phone]      [facility]  Call to schedule follow up and review for prostate enlargement Diana Crowley MD [facility] Schedule an appointment as soon as possible for a visit for the regular follow up and monitoring of your lung nodules [address]      Merlinda Rosales, MD Orthopedic Surgery, Sports Medicine In 3 weeks  [address]  [address] [facility]  [phone]            Information obtained by :  I understand that if any problems occur once I am at home I am to contact my physician. I understand and acknowledge receipt of the instructions indicated above. Physician's or R.N.'s Signature                                                                  Date/Time                                                                                                                                              Patient or Representative Signature                                                          Date/Time

## 2020-09-22 ENCOUNTER — HOSPITAL ENCOUNTER (OUTPATIENT)
Dept: PET IMAGING | Age: 75
Discharge: HOME | End: 2020-09-22
Attending: INTERNAL MEDICINE
Payer: MEDICARE

## 2020-09-22 VITALS — HEIGHT: 63 IN | WEIGHT: 251 LBS | BODY MASS INDEX: 44.47 KG/M2

## 2020-09-22 DIAGNOSIS — R91.1 SOLITARY LUNG NODULE: ICD-10-CM

## 2020-09-22 PROCEDURE — A9552 F18 FDG: HCPCS

## 2020-09-22 RX ORDER — SODIUM CHLORIDE 0.9 % (FLUSH) 0.9 %
10 SYRINGE (ML) INJECTION
Status: COMPLETED | OUTPATIENT
Start: 2020-09-22 | End: 2020-09-22

## 2020-09-22 RX ADMIN — Medication 10 ML: at 11:07

## 2021-01-01 ENCOUNTER — HOME CARE VISIT (OUTPATIENT)
Dept: HOSPICE | Facility: HOSPICE | Age: 76
End: 2021-01-01
Payer: MEDICARE

## 2021-01-01 ENCOUNTER — HOME CARE VISIT (OUTPATIENT)
Dept: SCHEDULING | Facility: HOME HEALTH | Age: 76
End: 2021-01-01
Payer: MEDICARE

## 2021-01-01 ENCOUNTER — APPOINTMENT (OUTPATIENT)
Dept: MRI IMAGING | Age: 76
DRG: 864 | End: 2021-01-01
Attending: HOSPITALIST
Payer: MEDICARE

## 2021-01-01 ENCOUNTER — HOSPICE ADMISSION (OUTPATIENT)
Dept: HOSPICE | Facility: HOSPICE | Age: 76
End: 2021-01-01
Payer: MEDICARE

## 2021-01-01 ENCOUNTER — HOSPITAL ENCOUNTER (INPATIENT)
Age: 76
LOS: 4 days | Discharge: HOSPICE | DRG: 698 | End: 2021-09-20
Attending: EMERGENCY MEDICINE | Admitting: INTERNAL MEDICINE
Payer: MEDICARE

## 2021-01-01 ENCOUNTER — APPOINTMENT (OUTPATIENT)
Dept: CT IMAGING | Age: 76
DRG: 698 | End: 2021-01-01
Attending: EMERGENCY MEDICINE
Payer: MEDICARE

## 2021-01-01 ENCOUNTER — HOSPITAL ENCOUNTER (INPATIENT)
Age: 76
LOS: 10 days | Discharge: NURSING, REHAB OR LONG-TERM CARE | DRG: 864 | End: 2021-06-17
Attending: INTERNAL MEDICINE | Admitting: FAMILY MEDICINE
Payer: MEDICARE

## 2021-01-01 ENCOUNTER — APPOINTMENT (OUTPATIENT)
Dept: GENERAL RADIOLOGY | Age: 76
DRG: 698 | End: 2021-01-01
Attending: EMERGENCY MEDICINE
Payer: MEDICARE

## 2021-01-01 ENCOUNTER — APPOINTMENT (OUTPATIENT)
Dept: GENERAL RADIOLOGY | Age: 76
DRG: 864 | End: 2021-01-01
Attending: HOSPITALIST
Payer: MEDICARE

## 2021-01-01 ENCOUNTER — APPOINTMENT (OUTPATIENT)
Dept: MRI IMAGING | Age: 76
DRG: 698 | End: 2021-01-01
Attending: INTERNAL MEDICINE
Payer: MEDICARE

## 2021-01-01 ENCOUNTER — APPOINTMENT (OUTPATIENT)
Dept: GENERAL RADIOLOGY | Age: 76
DRG: 864 | End: 2021-01-01
Attending: INTERNAL MEDICINE
Payer: MEDICARE

## 2021-01-01 VITALS
BODY MASS INDEX: 35.04 KG/M2 | DIASTOLIC BLOOD PRESSURE: 78 MMHG | SYSTOLIC BLOOD PRESSURE: 132 MMHG | OXYGEN SATURATION: 96 % | WEIGHT: 197.8 LBS | RESPIRATION RATE: 16 BRPM | TEMPERATURE: 97.6 F | HEART RATE: 96 BPM

## 2021-01-01 VITALS
RESPIRATION RATE: 14 BRPM | SYSTOLIC BLOOD PRESSURE: 123 MMHG | TEMPERATURE: 98.2 F | DIASTOLIC BLOOD PRESSURE: 61 MMHG | HEART RATE: 97 BPM

## 2021-01-01 VITALS
SYSTOLIC BLOOD PRESSURE: 108 MMHG | TEMPERATURE: 97.9 F | DIASTOLIC BLOOD PRESSURE: 70 MMHG | RESPIRATION RATE: 18 BRPM | OXYGEN SATURATION: 95 % | HEART RATE: 76 BPM

## 2021-01-01 VITALS
DIASTOLIC BLOOD PRESSURE: 72 MMHG | HEART RATE: 75 BPM | SYSTOLIC BLOOD PRESSURE: 120 MMHG | RESPIRATION RATE: 14 BRPM | TEMPERATURE: 97.5 F

## 2021-01-01 VITALS
DIASTOLIC BLOOD PRESSURE: 58 MMHG | TEMPERATURE: 98.2 F | RESPIRATION RATE: 16 BRPM | SYSTOLIC BLOOD PRESSURE: 108 MMHG | HEART RATE: 76 BPM

## 2021-01-01 VITALS
TEMPERATURE: 98.3 F | HEART RATE: 90 BPM | SYSTOLIC BLOOD PRESSURE: 93 MMHG | RESPIRATION RATE: 16 BRPM | DIASTOLIC BLOOD PRESSURE: 59 MMHG

## 2021-01-01 VITALS
SYSTOLIC BLOOD PRESSURE: 120 MMHG | RESPIRATION RATE: 18 BRPM | TEMPERATURE: 97.2 F | HEART RATE: 70 BPM | DIASTOLIC BLOOD PRESSURE: 68 MMHG

## 2021-01-01 VITALS
RESPIRATION RATE: 14 BRPM | DIASTOLIC BLOOD PRESSURE: 64 MMHG | SYSTOLIC BLOOD PRESSURE: 110 MMHG | HEART RATE: 70 BPM | TEMPERATURE: 98.4 F

## 2021-01-01 VITALS
DIASTOLIC BLOOD PRESSURE: 72 MMHG | RESPIRATION RATE: 18 BRPM | SYSTOLIC BLOOD PRESSURE: 117 MMHG | OXYGEN SATURATION: 97 % | HEART RATE: 70 BPM | TEMPERATURE: 98.4 F

## 2021-01-01 VITALS
TEMPERATURE: 97.5 F | DIASTOLIC BLOOD PRESSURE: 57 MMHG | SYSTOLIC BLOOD PRESSURE: 106 MMHG | HEART RATE: 82 BPM | RESPIRATION RATE: 14 BRPM

## 2021-01-01 VITALS — RESPIRATION RATE: 20 BRPM

## 2021-01-01 VITALS
HEART RATE: 78 BPM | DIASTOLIC BLOOD PRESSURE: 54 MMHG | TEMPERATURE: 98.3 F | SYSTOLIC BLOOD PRESSURE: 83 MMHG | OXYGEN SATURATION: 90 % | RESPIRATION RATE: 10 BRPM

## 2021-01-01 VITALS
TEMPERATURE: 98.2 F | DIASTOLIC BLOOD PRESSURE: 68 MMHG | RESPIRATION RATE: 16 BRPM | HEART RATE: 74 BPM | SYSTOLIC BLOOD PRESSURE: 116 MMHG

## 2021-01-01 VITALS
HEART RATE: 77 BPM | SYSTOLIC BLOOD PRESSURE: 102 MMHG | RESPIRATION RATE: 14 BRPM | TEMPERATURE: 97.5 F | DIASTOLIC BLOOD PRESSURE: 61 MMHG

## 2021-01-01 VITALS
HEART RATE: 81 BPM | RESPIRATION RATE: 17 BRPM | TEMPERATURE: 98.6 F | DIASTOLIC BLOOD PRESSURE: 69 MMHG | SYSTOLIC BLOOD PRESSURE: 115 MMHG

## 2021-01-01 VITALS
DIASTOLIC BLOOD PRESSURE: 76 MMHG | TEMPERATURE: 97.5 F | SYSTOLIC BLOOD PRESSURE: 117 MMHG | BODY MASS INDEX: 29.98 KG/M2 | HEART RATE: 76 BPM | HEIGHT: 63 IN | OXYGEN SATURATION: 96 % | RESPIRATION RATE: 18 BRPM | WEIGHT: 169.2 LBS

## 2021-01-01 VITALS
DIASTOLIC BLOOD PRESSURE: 65 MMHG | HEART RATE: 84 BPM | RESPIRATION RATE: 14 BRPM | TEMPERATURE: 97.8 F | SYSTOLIC BLOOD PRESSURE: 110 MMHG

## 2021-01-01 VITALS — SYSTOLIC BLOOD PRESSURE: 111 MMHG | TEMPERATURE: 97.4 F | HEART RATE: 81 BPM | DIASTOLIC BLOOD PRESSURE: 69 MMHG

## 2021-01-01 VITALS
TEMPERATURE: 97.6 F | RESPIRATION RATE: 14 BRPM | SYSTOLIC BLOOD PRESSURE: 112 MMHG | DIASTOLIC BLOOD PRESSURE: 81 MMHG | HEART RATE: 90 BPM

## 2021-01-01 VITALS
SYSTOLIC BLOOD PRESSURE: 123 MMHG | HEART RATE: 70 BPM | TEMPERATURE: 97.6 F | RESPIRATION RATE: 14 BRPM | OXYGEN SATURATION: 97 % | DIASTOLIC BLOOD PRESSURE: 74 MMHG

## 2021-01-01 VITALS
OXYGEN SATURATION: 100 % | RESPIRATION RATE: 16 BRPM | SYSTOLIC BLOOD PRESSURE: 92 MMHG | HEART RATE: 83 BPM | DIASTOLIC BLOOD PRESSURE: 64 MMHG

## 2021-01-01 VITALS
HEART RATE: 67 BPM | RESPIRATION RATE: 14 BRPM | SYSTOLIC BLOOD PRESSURE: 116 MMHG | TEMPERATURE: 97.6 F | DIASTOLIC BLOOD PRESSURE: 68 MMHG

## 2021-01-01 VITALS — RESPIRATION RATE: 18 BRPM | SYSTOLIC BLOOD PRESSURE: 120 MMHG | DIASTOLIC BLOOD PRESSURE: 70 MMHG | HEART RATE: 72 BPM

## 2021-01-01 VITALS
TEMPERATURE: 97.8 F | SYSTOLIC BLOOD PRESSURE: 121 MMHG | DIASTOLIC BLOOD PRESSURE: 72 MMHG | RESPIRATION RATE: 14 BRPM | HEART RATE: 82 BPM

## 2021-01-01 VITALS
RESPIRATION RATE: 14 BRPM | HEART RATE: 72 BPM | DIASTOLIC BLOOD PRESSURE: 57 MMHG | SYSTOLIC BLOOD PRESSURE: 102 MMHG | TEMPERATURE: 97.6 F

## 2021-01-01 VITALS — RESPIRATION RATE: 24 BRPM | HEART RATE: 149 BPM

## 2021-01-01 DIAGNOSIS — R53.81 DEBILITY: ICD-10-CM

## 2021-01-01 DIAGNOSIS — G93.41 ACUTE METABOLIC ENCEPHALOPATHY: ICD-10-CM

## 2021-01-01 DIAGNOSIS — Z71.89 GOALS OF CARE, COUNSELING/DISCUSSION: ICD-10-CM

## 2021-01-01 DIAGNOSIS — G20 PARKINSON DISEASE (HCC): ICD-10-CM

## 2021-01-01 DIAGNOSIS — F02.80 DEMENTIA DUE TO PARKINSON'S DISEASE WITHOUT BEHAVIORAL DISTURBANCE (HCC): ICD-10-CM

## 2021-01-01 DIAGNOSIS — M48.00 SPINAL STENOSIS, MULTILEVEL: Primary | ICD-10-CM

## 2021-01-01 DIAGNOSIS — R64 CACHEXIA (HCC): ICD-10-CM

## 2021-01-01 DIAGNOSIS — R40.1 OBTUNDATION: ICD-10-CM

## 2021-01-01 DIAGNOSIS — G20 DEMENTIA DUE TO PARKINSON'S DISEASE WITHOUT BEHAVIORAL DISTURBANCE (HCC): ICD-10-CM

## 2021-01-01 DIAGNOSIS — R41.82 ALTERED MENTAL STATUS, UNSPECIFIED ALTERED MENTAL STATUS TYPE: Primary | ICD-10-CM

## 2021-01-01 DIAGNOSIS — M48.00 SPINAL STENOSIS, MULTILEVEL: ICD-10-CM

## 2021-01-01 LAB
25(OH)D3 SERPL-MCNC: 33.7 NG/ML (ref 30–100)
ALBUMIN SERPL-MCNC: 2 G/DL (ref 3.5–5)
ALBUMIN SERPL-MCNC: 2.5 G/DL (ref 3.5–5)
ALBUMIN SERPL-MCNC: 2.9 G/DL (ref 3.5–5)
ALBUMIN SERPL-MCNC: 3.1 G/DL (ref 3.5–5)
ALBUMIN/GLOB SERPL: 0.4 {RATIO} (ref 1.1–2.2)
ALBUMIN/GLOB SERPL: 0.7 {RATIO} (ref 1.1–2.2)
ALBUMIN/GLOB SERPL: 0.7 {RATIO} (ref 1.1–2.2)
ALP SERPL-CCNC: 101 U/L (ref 45–117)
ALP SERPL-CCNC: 108 U/L (ref 45–117)
ALP SERPL-CCNC: 92 U/L (ref 45–117)
ALT SERPL-CCNC: 13 U/L (ref 12–78)
ALT SERPL-CCNC: 14 U/L (ref 12–78)
ALT SERPL-CCNC: 16 U/L (ref 12–78)
AMMONIA PLAS-SCNC: 11 UMOL/L
ANION GAP SERPL CALC-SCNC: 4 MMOL/L (ref 5–15)
ANION GAP SERPL CALC-SCNC: 4 MMOL/L (ref 5–15)
ANION GAP SERPL CALC-SCNC: 5 MMOL/L (ref 5–15)
ANION GAP SERPL CALC-SCNC: 7 MMOL/L (ref 5–15)
ANION GAP SERPL CALC-SCNC: 8 MMOL/L (ref 5–15)
APPEARANCE UR: CLEAR
ARTERIAL PATENCY WRIST A: YES
AST SERPL-CCNC: 10 U/L (ref 15–37)
AST SERPL-CCNC: 15 U/L (ref 15–37)
AST SERPL-CCNC: 57 U/L (ref 15–37)
ATRIAL RATE: 113 BPM
ATRIAL RATE: 80 BPM
BACTERIA SPEC CULT: ABNORMAL
BACTERIA SPEC CULT: ABNORMAL
BACTERIA SPEC CULT: NORMAL
BACTERIA SPEC CULT: NORMAL
BACTERIA URNS QL MICRO: NEGATIVE /HPF
BASE EXCESS BLDA CALC-SCNC: 1.5 MMOL/L
BASOPHILS # BLD: 0 K/UL (ref 0–0.1)
BASOPHILS # BLD: 0.1 K/UL (ref 0–0.1)
BASOPHILS # BLD: 0.1 K/UL (ref 0–0.1)
BASOPHILS NFR BLD: 0 % (ref 0–1)
BASOPHILS NFR BLD: 1 % (ref 0–1)
BDY SITE: ABNORMAL
BILIRUB DIRECT SERPL-MCNC: 0.2 MG/DL (ref 0–0.2)
BILIRUB SERPL-MCNC: 0.6 MG/DL (ref 0.2–1)
BILIRUB SERPL-MCNC: 0.7 MG/DL (ref 0.2–1)
BILIRUB SERPL-MCNC: 1.1 MG/DL (ref 0.2–1)
BILIRUB UR QL: NEGATIVE
BNP SERPL-MCNC: 295 PG/ML
BUN SERPL-MCNC: 16 MG/DL (ref 6–20)
BUN SERPL-MCNC: 16 MG/DL (ref 6–20)
BUN SERPL-MCNC: 18 MG/DL (ref 6–20)
BUN SERPL-MCNC: 18 MG/DL (ref 6–20)
BUN SERPL-MCNC: 20 MG/DL (ref 6–20)
BUN SERPL-MCNC: 23 MG/DL (ref 6–20)
BUN SERPL-MCNC: 8 MG/DL (ref 6–20)
BUN SERPL-MCNC: 9 MG/DL (ref 6–20)
BUN/CREAT SERPL: 12 (ref 12–20)
BUN/CREAT SERPL: 12 (ref 12–20)
BUN/CREAT SERPL: 13 (ref 12–20)
BUN/CREAT SERPL: 13 (ref 12–20)
BUN/CREAT SERPL: 18 (ref 12–20)
BUN/CREAT SERPL: 19 (ref 12–20)
BUN/CREAT SERPL: 20 (ref 12–20)
BUN/CREAT SERPL: 21 (ref 12–20)
BUN/CREAT SERPL: 23 (ref 12–20)
BUN/CREAT SERPL: 26 (ref 12–20)
CALCIUM SERPL-MCNC: 8.5 MG/DL (ref 8.5–10.1)
CALCIUM SERPL-MCNC: 8.5 MG/DL (ref 8.5–10.1)
CALCIUM SERPL-MCNC: 8.6 MG/DL (ref 8.5–10.1)
CALCIUM SERPL-MCNC: 8.7 MG/DL (ref 8.5–10.1)
CALCIUM SERPL-MCNC: 8.8 MG/DL (ref 8.5–10.1)
CALCIUM SERPL-MCNC: 9 MG/DL (ref 8.5–10.1)
CALCIUM SERPL-MCNC: 9.1 MG/DL (ref 8.5–10.1)
CALCULATED P AXIS, ECG09: 29 DEGREES
CALCULATED P AXIS, ECG09: 43 DEGREES
CALCULATED R AXIS, ECG10: -15 DEGREES
CALCULATED R AXIS, ECG10: 18 DEGREES
CALCULATED T AXIS, ECG11: 13 DEGREES
CALCULATED T AXIS, ECG11: 33 DEGREES
CC UR VC: ABNORMAL
CHLORIDE SERPL-SCNC: 104 MMOL/L (ref 97–108)
CHLORIDE SERPL-SCNC: 106 MMOL/L (ref 97–108)
CHLORIDE SERPL-SCNC: 107 MMOL/L (ref 97–108)
CHLORIDE SERPL-SCNC: 107 MMOL/L (ref 97–108)
CHLORIDE SERPL-SCNC: 108 MMOL/L (ref 97–108)
CHLORIDE SERPL-SCNC: 108 MMOL/L (ref 97–108)
CHLORIDE SERPL-SCNC: 109 MMOL/L (ref 97–108)
CHLORIDE SERPL-SCNC: 109 MMOL/L (ref 97–108)
CHLORIDE SERPL-SCNC: 110 MMOL/L (ref 97–108)
CHLORIDE SERPL-SCNC: 110 MMOL/L (ref 97–108)
CK SERPL-CCNC: 83 U/L (ref 39–308)
CO2 SERPL-SCNC: 21 MMOL/L (ref 21–32)
CO2 SERPL-SCNC: 22 MMOL/L (ref 21–32)
CO2 SERPL-SCNC: 23 MMOL/L (ref 21–32)
CO2 SERPL-SCNC: 23 MMOL/L (ref 21–32)
CO2 SERPL-SCNC: 24 MMOL/L (ref 21–32)
CO2 SERPL-SCNC: 25 MMOL/L (ref 21–32)
CO2 SERPL-SCNC: 25 MMOL/L (ref 21–32)
CO2 SERPL-SCNC: 28 MMOL/L (ref 21–32)
COLOR UR: ABNORMAL
COMMENT, HOLDF: NORMAL
COMMENT, HOLDF: NORMAL
COVID-19 RAPID TEST, COVR: NOT DETECTED
CREAT SERPL-MCNC: 0.62 MG/DL (ref 0.7–1.3)
CREAT SERPL-MCNC: 0.64 MG/DL (ref 0.7–1.3)
CREAT SERPL-MCNC: 0.66 MG/DL (ref 0.7–1.3)
CREAT SERPL-MCNC: 0.74 MG/DL (ref 0.7–1.3)
CREAT SERPL-MCNC: 0.84 MG/DL (ref 0.7–1.3)
CREAT SERPL-MCNC: 0.85 MG/DL (ref 0.7–1.3)
CREAT SERPL-MCNC: 0.86 MG/DL (ref 0.7–1.3)
CREAT SERPL-MCNC: 0.88 MG/DL (ref 0.7–1.3)
CREAT SERPL-MCNC: 0.88 MG/DL (ref 0.7–1.3)
CREAT SERPL-MCNC: 0.89 MG/DL (ref 0.7–1.3)
CRP SERPL-MCNC: 18.2 MG/DL (ref 0–0.6)
CRP SERPL-MCNC: 3.81 MG/DL (ref 0–0.6)
CRP SERPL-MCNC: 7.55 MG/DL (ref 0–0.6)
DIAGNOSIS, 93000: NORMAL
DIAGNOSIS, 93000: NORMAL
DIFFERENTIAL METHOD BLD: ABNORMAL
EOSINOPHIL # BLD: 0 K/UL (ref 0–0.4)
EOSINOPHIL # BLD: 0.1 K/UL (ref 0–0.4)
EOSINOPHIL # BLD: 0.2 K/UL (ref 0–0.4)
EOSINOPHIL # BLD: 0.3 K/UL (ref 0–0.4)
EOSINOPHIL # BLD: 0.4 K/UL (ref 0–0.4)
EOSINOPHIL NFR BLD: 0 % (ref 0–7)
EOSINOPHIL NFR BLD: 1 % (ref 0–7)
EOSINOPHIL NFR BLD: 2 % (ref 0–7)
EOSINOPHIL NFR BLD: 3 % (ref 0–7)
EOSINOPHIL NFR BLD: 3 % (ref 0–7)
EOSINOPHIL NFR BLD: 5 % (ref 0–7)
EPITH CASTS URNS QL MICRO: NORMAL /LPF
ERYTHROCYTE [DISTWIDTH] IN BLOOD BY AUTOMATED COUNT: 13.5 % (ref 11.5–14.5)
ERYTHROCYTE [DISTWIDTH] IN BLOOD BY AUTOMATED COUNT: 13.7 % (ref 11.5–14.5)
ERYTHROCYTE [DISTWIDTH] IN BLOOD BY AUTOMATED COUNT: 13.9 % (ref 11.5–14.5)
ERYTHROCYTE [DISTWIDTH] IN BLOOD BY AUTOMATED COUNT: 16.6 % (ref 11.5–14.5)
ERYTHROCYTE [DISTWIDTH] IN BLOOD BY AUTOMATED COUNT: 16.7 % (ref 11.5–14.5)
ERYTHROCYTE [DISTWIDTH] IN BLOOD BY AUTOMATED COUNT: 16.8 % (ref 11.5–14.5)
ERYTHROCYTE [SEDIMENTATION RATE] IN BLOOD: 100 MM/HR (ref 0–20)
ERYTHROCYTE [SEDIMENTATION RATE] IN BLOOD: 106 MM/HR (ref 0–20)
ERYTHROCYTE [SEDIMENTATION RATE] IN BLOOD: 109 MM/HR (ref 0–20)
ERYTHROCYTE [SEDIMENTATION RATE] IN BLOOD: 129 MM/HR (ref 0–20)
ERYTHROCYTE [SEDIMENTATION RATE] IN BLOOD: 85 MM/HR (ref 0–20)
EST. AVERAGE GLUCOSE BLD GHB EST-MCNC: 240 MG/DL
FERRITIN SERPL-MCNC: 397 NG/ML (ref 26–388)
GAS FLOW.O2 O2 DELIVERY SYS: 4 L/MIN
GLOBULIN SER CALC-MCNC: 4.4 G/DL (ref 2–4)
GLOBULIN SER CALC-MCNC: 4.5 G/DL (ref 2–4)
GLOBULIN SER CALC-MCNC: 5.6 G/DL (ref 2–4)
GLUCOSE BLD STRIP.AUTO-MCNC: 133 MG/DL (ref 65–117)
GLUCOSE BLD STRIP.AUTO-MCNC: 146 MG/DL (ref 65–117)
GLUCOSE BLD STRIP.AUTO-MCNC: 149 MG/DL (ref 65–117)
GLUCOSE BLD STRIP.AUTO-MCNC: 156 MG/DL (ref 65–117)
GLUCOSE BLD STRIP.AUTO-MCNC: 165 MG/DL (ref 65–117)
GLUCOSE BLD STRIP.AUTO-MCNC: 165 MG/DL (ref 65–117)
GLUCOSE BLD STRIP.AUTO-MCNC: 178 MG/DL (ref 65–117)
GLUCOSE BLD STRIP.AUTO-MCNC: 179 MG/DL (ref 65–117)
GLUCOSE BLD STRIP.AUTO-MCNC: 192 MG/DL (ref 65–117)
GLUCOSE BLD STRIP.AUTO-MCNC: 195 MG/DL (ref 65–117)
GLUCOSE BLD STRIP.AUTO-MCNC: 198 MG/DL (ref 65–117)
GLUCOSE BLD STRIP.AUTO-MCNC: 202 MG/DL (ref 65–117)
GLUCOSE BLD STRIP.AUTO-MCNC: 204 MG/DL (ref 65–117)
GLUCOSE BLD STRIP.AUTO-MCNC: 211 MG/DL (ref 65–117)
GLUCOSE BLD STRIP.AUTO-MCNC: 211 MG/DL (ref 65–117)
GLUCOSE BLD STRIP.AUTO-MCNC: 212 MG/DL (ref 65–117)
GLUCOSE BLD STRIP.AUTO-MCNC: 215 MG/DL (ref 65–117)
GLUCOSE BLD STRIP.AUTO-MCNC: 217 MG/DL (ref 65–117)
GLUCOSE BLD STRIP.AUTO-MCNC: 219 MG/DL (ref 65–117)
GLUCOSE BLD STRIP.AUTO-MCNC: 220 MG/DL (ref 65–117)
GLUCOSE BLD STRIP.AUTO-MCNC: 221 MG/DL (ref 65–117)
GLUCOSE BLD STRIP.AUTO-MCNC: 223 MG/DL (ref 65–117)
GLUCOSE BLD STRIP.AUTO-MCNC: 228 MG/DL (ref 65–117)
GLUCOSE BLD STRIP.AUTO-MCNC: 228 MG/DL (ref 65–117)
GLUCOSE BLD STRIP.AUTO-MCNC: 236 MG/DL (ref 65–117)
GLUCOSE BLD STRIP.AUTO-MCNC: 244 MG/DL (ref 65–117)
GLUCOSE BLD STRIP.AUTO-MCNC: 246 MG/DL (ref 65–117)
GLUCOSE BLD STRIP.AUTO-MCNC: 249 MG/DL (ref 65–117)
GLUCOSE BLD STRIP.AUTO-MCNC: 252 MG/DL (ref 65–117)
GLUCOSE BLD STRIP.AUTO-MCNC: 261 MG/DL (ref 65–117)
GLUCOSE BLD STRIP.AUTO-MCNC: 266 MG/DL (ref 65–117)
GLUCOSE BLD STRIP.AUTO-MCNC: 290 MG/DL (ref 65–117)
GLUCOSE BLD STRIP.AUTO-MCNC: 294 MG/DL (ref 65–117)
GLUCOSE BLD STRIP.AUTO-MCNC: 305 MG/DL (ref 65–117)
GLUCOSE BLD STRIP.AUTO-MCNC: 309 MG/DL (ref 65–117)
GLUCOSE BLD STRIP.AUTO-MCNC: 311 MG/DL (ref 65–117)
GLUCOSE BLD STRIP.AUTO-MCNC: 314 MG/DL (ref 65–117)
GLUCOSE BLD STRIP.AUTO-MCNC: 316 MG/DL (ref 65–117)
GLUCOSE BLD STRIP.AUTO-MCNC: 318 MG/DL (ref 65–117)
GLUCOSE BLD STRIP.AUTO-MCNC: 318 MG/DL (ref 65–117)
GLUCOSE BLD STRIP.AUTO-MCNC: 320 MG/DL (ref 65–117)
GLUCOSE BLD STRIP.AUTO-MCNC: 323 MG/DL (ref 65–117)
GLUCOSE BLD STRIP.AUTO-MCNC: 328 MG/DL (ref 65–117)
GLUCOSE BLD STRIP.AUTO-MCNC: 349 MG/DL (ref 65–117)
GLUCOSE BLD STRIP.AUTO-MCNC: 360 MG/DL (ref 65–117)
GLUCOSE BLD STRIP.AUTO-MCNC: 397 MG/DL (ref 65–117)
GLUCOSE BLD STRIP.AUTO-MCNC: 410 MG/DL (ref 65–117)
GLUCOSE BLD STRIP.AUTO-MCNC: 418 MG/DL (ref 65–117)
GLUCOSE BLD STRIP.AUTO-MCNC: 421 MG/DL (ref 65–117)
GLUCOSE BLD STRIP.AUTO-MCNC: 423 MG/DL (ref 65–117)
GLUCOSE BLD STRIP.AUTO-MCNC: 425 MG/DL (ref 65–117)
GLUCOSE BLD STRIP.AUTO-MCNC: 435 MG/DL (ref 65–117)
GLUCOSE BLD STRIP.AUTO-MCNC: 445 MG/DL (ref 65–117)
GLUCOSE BLD STRIP.AUTO-MCNC: 469 MG/DL (ref 65–117)
GLUCOSE BLD STRIP.AUTO-MCNC: 475 MG/DL (ref 65–117)
GLUCOSE BLD STRIP.AUTO-MCNC: NORMAL MG/DL (ref 65–117)
GLUCOSE SERPL-MCNC: 119 MG/DL (ref 65–100)
GLUCOSE SERPL-MCNC: 124 MG/DL (ref 65–100)
GLUCOSE SERPL-MCNC: 139 MG/DL (ref 65–100)
GLUCOSE SERPL-MCNC: 156 MG/DL (ref 65–100)
GLUCOSE SERPL-MCNC: 159 MG/DL (ref 65–100)
GLUCOSE SERPL-MCNC: 198 MG/DL (ref 65–100)
GLUCOSE SERPL-MCNC: 247 MG/DL (ref 65–100)
GLUCOSE SERPL-MCNC: 267 MG/DL (ref 65–100)
GLUCOSE SERPL-MCNC: 273 MG/DL (ref 65–100)
GLUCOSE SERPL-MCNC: 286 MG/DL (ref 65–100)
GLUCOSE UR STRIP.AUTO-MCNC: NEGATIVE MG/DL
HBA1C MFR BLD: 10 % (ref 4–5.6)
HCO3 BLDA-SCNC: 26 MMOL/L (ref 22–26)
HCT VFR BLD AUTO: 30.8 % (ref 36.6–50.3)
HCT VFR BLD AUTO: 32.8 % (ref 36.6–50.3)
HCT VFR BLD AUTO: 33.6 % (ref 36.6–50.3)
HCT VFR BLD AUTO: 33.8 % (ref 36.6–50.3)
HCT VFR BLD AUTO: 33.8 % (ref 36.6–50.3)
HCT VFR BLD AUTO: 35.9 % (ref 36.6–50.3)
HCT VFR BLD AUTO: 36.6 % (ref 36.6–50.3)
HCT VFR BLD AUTO: 39.5 % (ref 36.6–50.3)
HCT VFR BLD AUTO: 40 % (ref 36.6–50.3)
HGB BLD-MCNC: 10.1 G/DL (ref 12.1–17)
HGB BLD-MCNC: 10.4 G/DL (ref 12.1–17)
HGB BLD-MCNC: 10.4 G/DL (ref 12.1–17)
HGB BLD-MCNC: 10.8 G/DL (ref 12.1–17)
HGB BLD-MCNC: 11.1 G/DL (ref 12.1–17)
HGB BLD-MCNC: 11.4 G/DL (ref 12.1–17)
HGB BLD-MCNC: 11.8 G/DL (ref 12.1–17)
HGB BLD-MCNC: 12.1 G/DL (ref 12.1–17)
HGB BLD-MCNC: 9.7 G/DL (ref 12.1–17)
HGB UR QL STRIP: ABNORMAL
IMM GRANULOCYTES # BLD AUTO: 0 K/UL (ref 0–0.04)
IMM GRANULOCYTES # BLD AUTO: 0.1 K/UL (ref 0–0.04)
IMM GRANULOCYTES NFR BLD AUTO: 0 % (ref 0–0.5)
IMM GRANULOCYTES NFR BLD AUTO: 1 % (ref 0–0.5)
KETONES UR QL STRIP.AUTO: NEGATIVE MG/DL
LACTATE SERPL-SCNC: 0.9 MMOL/L (ref 0.4–2)
LACTATE SERPL-SCNC: 1.1 MMOL/L (ref 0.4–2)
LEUKOCYTE ESTERASE UR QL STRIP.AUTO: ABNORMAL
LIPASE SERPL-CCNC: 21 U/L (ref 73–393)
LYMPHOCYTES # BLD: 0.6 K/UL (ref 0.8–3.5)
LYMPHOCYTES # BLD: 0.7 K/UL (ref 0.8–3.5)
LYMPHOCYTES # BLD: 0.8 K/UL (ref 0.8–3.5)
LYMPHOCYTES # BLD: 0.8 K/UL (ref 0.8–3.5)
LYMPHOCYTES # BLD: 1.2 K/UL (ref 0.8–3.5)
LYMPHOCYTES # BLD: 1.4 K/UL (ref 0.8–3.5)
LYMPHOCYTES # BLD: 1.4 K/UL (ref 0.8–3.5)
LYMPHOCYTES # BLD: 1.5 K/UL (ref 0.8–3.5)
LYMPHOCYTES NFR BLD: 10 % (ref 12–49)
LYMPHOCYTES NFR BLD: 18 % (ref 12–49)
LYMPHOCYTES NFR BLD: 18 % (ref 12–49)
LYMPHOCYTES NFR BLD: 24 % (ref 12–49)
LYMPHOCYTES NFR BLD: 25 % (ref 12–49)
LYMPHOCYTES NFR BLD: 8 % (ref 12–49)
LYMPHOCYTES NFR BLD: 9 % (ref 12–49)
LYMPHOCYTES NFR BLD: 9 % (ref 12–49)
MAGNESIUM SERPL-MCNC: 1.9 MG/DL (ref 1.6–2.4)
MAGNESIUM SERPL-MCNC: 1.9 MG/DL (ref 1.6–2.4)
MAGNESIUM SERPL-MCNC: 2 MG/DL (ref 1.6–2.4)
MAGNESIUM SERPL-MCNC: 2 MG/DL (ref 1.6–2.4)
MAGNESIUM SERPL-MCNC: 2.1 MG/DL (ref 1.6–2.4)
MAGNESIUM SERPL-MCNC: 2.1 MG/DL (ref 1.6–2.4)
MAGNESIUM SERPL-MCNC: 2.2 MG/DL (ref 1.6–2.4)
MAGNESIUM SERPL-MCNC: 2.5 MG/DL (ref 1.6–2.4)
MCH RBC QN AUTO: 25.3 PG (ref 26–34)
MCH RBC QN AUTO: 25.4 PG (ref 26–34)
MCH RBC QN AUTO: 25.6 PG (ref 26–34)
MCH RBC QN AUTO: 27.4 PG (ref 26–34)
MCH RBC QN AUTO: 27.4 PG (ref 26–34)
MCH RBC QN AUTO: 27.5 PG (ref 26–34)
MCH RBC QN AUTO: 27.7 PG (ref 26–34)
MCHC RBC AUTO-ENTMCNC: 29.9 G/DL (ref 30–36.5)
MCHC RBC AUTO-ENTMCNC: 30.3 G/DL (ref 30–36.5)
MCHC RBC AUTO-ENTMCNC: 30.3 G/DL (ref 30–36.5)
MCHC RBC AUTO-ENTMCNC: 30.8 G/DL (ref 30–36.5)
MCHC RBC AUTO-ENTMCNC: 30.8 G/DL (ref 30–36.5)
MCHC RBC AUTO-ENTMCNC: 31 G/DL (ref 30–36.5)
MCHC RBC AUTO-ENTMCNC: 31.5 G/DL (ref 30–36.5)
MCHC RBC AUTO-ENTMCNC: 31.8 G/DL (ref 30–36.5)
MCHC RBC AUTO-ENTMCNC: 32 G/DL (ref 30–36.5)
MCV RBC AUTO: 83.8 FL (ref 80–99)
MCV RBC AUTO: 84.6 FL (ref 80–99)
MCV RBC AUTO: 84.7 FL (ref 80–99)
MCV RBC AUTO: 86.7 FL (ref 80–99)
MCV RBC AUTO: 87 FL (ref 80–99)
MCV RBC AUTO: 87.1 FL (ref 80–99)
MCV RBC AUTO: 88.9 FL (ref 80–99)
MCV RBC AUTO: 88.9 FL (ref 80–99)
MCV RBC AUTO: 90.1 FL (ref 80–99)
MONOCYTES # BLD: 0.3 K/UL (ref 0–1)
MONOCYTES # BLD: 0.4 K/UL (ref 0–1)
MONOCYTES # BLD: 0.5 K/UL (ref 0–1)
MONOCYTES NFR BLD: 4 % (ref 5–13)
MONOCYTES NFR BLD: 4 % (ref 5–13)
MONOCYTES NFR BLD: 5 % (ref 5–13)
MONOCYTES NFR BLD: 6 % (ref 5–13)
MONOCYTES NFR BLD: 6 % (ref 5–13)
MONOCYTES NFR BLD: 7 % (ref 5–13)
NEUTS SEG # BLD: 3.9 K/UL (ref 1.8–8)
NEUTS SEG # BLD: 3.9 K/UL (ref 1.8–8)
NEUTS SEG # BLD: 4.8 K/UL (ref 1.8–8)
NEUTS SEG # BLD: 5.9 K/UL (ref 1.8–8)
NEUTS SEG # BLD: 6.1 K/UL (ref 1.8–8)
NEUTS SEG # BLD: 6.3 K/UL (ref 1.8–8)
NEUTS SEG # BLD: 7.2 K/UL (ref 1.8–8)
NEUTS SEG # BLD: 7.3 K/UL (ref 1.8–8)
NEUTS SEG NFR BLD: 67 % (ref 32–75)
NEUTS SEG NFR BLD: 68 % (ref 32–75)
NEUTS SEG NFR BLD: 69 % (ref 32–75)
NEUTS SEG NFR BLD: 73 % (ref 32–75)
NEUTS SEG NFR BLD: 81 % (ref 32–75)
NEUTS SEG NFR BLD: 81 % (ref 32–75)
NEUTS SEG NFR BLD: 85 % (ref 32–75)
NEUTS SEG NFR BLD: 86 % (ref 32–75)
NITRITE UR QL STRIP.AUTO: NEGATIVE
NRBC # BLD: 0 K/UL (ref 0–0.01)
NRBC BLD-RTO: 0 PER 100 WBC
P-R INTERVAL, ECG05: 142 MS
P-R INTERVAL, ECG05: 148 MS
PCO2 BLDA: 39 MMHG (ref 35–45)
PH BLDA: 7.44 [PH] (ref 7.35–7.45)
PH UR STRIP: 7.5 [PH] (ref 5–8)
PHOSPHATE SERPL-MCNC: 2.6 MG/DL (ref 2.6–4.7)
PHOSPHATE SERPL-MCNC: 2.8 MG/DL (ref 2.6–4.7)
PLATELET # BLD AUTO: 197 K/UL (ref 150–400)
PLATELET # BLD AUTO: 231 K/UL (ref 150–400)
PLATELET # BLD AUTO: 240 K/UL (ref 150–400)
PLATELET # BLD AUTO: 302 K/UL (ref 150–400)
PLATELET # BLD AUTO: 312 K/UL (ref 150–400)
PLATELET # BLD AUTO: 339 K/UL (ref 150–400)
PLATELET # BLD AUTO: 355 K/UL (ref 150–400)
PLATELET # BLD AUTO: 378 K/UL (ref 150–400)
PLATELET # BLD AUTO: 396 K/UL (ref 150–400)
PLATELET COMMENTS,PCOM: ABNORMAL
PMV BLD AUTO: 10 FL (ref 8.9–12.9)
PMV BLD AUTO: 9 FL (ref 8.9–12.9)
PMV BLD AUTO: 9.2 FL (ref 8.9–12.9)
PMV BLD AUTO: 9.3 FL (ref 8.9–12.9)
PMV BLD AUTO: 9.5 FL (ref 8.9–12.9)
PMV BLD AUTO: 9.6 FL (ref 8.9–12.9)
PMV BLD AUTO: 9.6 FL (ref 8.9–12.9)
PMV BLD AUTO: 9.9 FL (ref 8.9–12.9)
PMV BLD AUTO: 9.9 FL (ref 8.9–12.9)
PO2 BLDA: 122 MMHG (ref 80–100)
POTASSIUM SERPL-SCNC: 3.4 MMOL/L (ref 3.5–5.1)
POTASSIUM SERPL-SCNC: 3.4 MMOL/L (ref 3.5–5.1)
POTASSIUM SERPL-SCNC: 4 MMOL/L (ref 3.5–5.1)
POTASSIUM SERPL-SCNC: 4 MMOL/L (ref 3.5–5.1)
POTASSIUM SERPL-SCNC: 4.2 MMOL/L (ref 3.5–5.1)
POTASSIUM SERPL-SCNC: 4.2 MMOL/L (ref 3.5–5.1)
POTASSIUM SERPL-SCNC: 4.3 MMOL/L (ref 3.5–5.1)
POTASSIUM SERPL-SCNC: 4.3 MMOL/L (ref 3.5–5.1)
POTASSIUM SERPL-SCNC: 4.5 MMOL/L (ref 3.5–5.1)
POTASSIUM SERPL-SCNC: 5.2 MMOL/L (ref 3.5–5.1)
PROT SERPL-MCNC: 7.3 G/DL (ref 6.4–8.2)
PROT SERPL-MCNC: 7.6 G/DL (ref 6.4–8.2)
PROT SERPL-MCNC: 7.6 G/DL (ref 6.4–8.2)
PROT UR STRIP-MCNC: 30 MG/DL
Q-T INTERVAL, ECG07: 326 MS
Q-T INTERVAL, ECG07: 416 MS
QRS DURATION, ECG06: 70 MS
QRS DURATION, ECG06: 74 MS
QTC CALCULATION (BEZET), ECG08: 447 MS
QTC CALCULATION (BEZET), ECG08: 479 MS
RBC # BLD AUTO: 3.54 M/UL (ref 4.1–5.7)
RBC # BLD AUTO: 3.64 M/UL (ref 4.1–5.7)
RBC # BLD AUTO: 3.78 M/UL (ref 4.1–5.7)
RBC # BLD AUTO: 3.8 M/UL (ref 4.1–5.7)
RBC # BLD AUTO: 3.9 M/UL (ref 4.1–5.7)
RBC # BLD AUTO: 4.12 M/UL (ref 4.1–5.7)
RBC # BLD AUTO: 4.37 M/UL (ref 4.1–5.7)
RBC # BLD AUTO: 4.67 M/UL (ref 4.1–5.7)
RBC # BLD AUTO: 4.72 M/UL (ref 4.1–5.7)
RBC #/AREA URNS HPF: NORMAL /HPF (ref 0–5)
RBC MORPH BLD: ABNORMAL
RHEUMATOID FACT SERPL-ACNC: <10 IU/ML
SAMPLES BEING HELD,HOLD: NORMAL
SAMPLES BEING HELD,HOLD: NORMAL
SAO2 % BLD: 99 % (ref 92–97)
SAO2% DEVICE SAO2% SENSOR NAME: ABNORMAL
SARS-COV-2, COV2: NORMAL
SARS-COV-2, XPLCVT: NOT DETECTED
SERVICE CMNT-IMP: ABNORMAL
SERVICE CMNT-IMP: NORMAL
SODIUM SERPL-SCNC: 134 MMOL/L (ref 136–145)
SODIUM SERPL-SCNC: 135 MMOL/L (ref 136–145)
SODIUM SERPL-SCNC: 136 MMOL/L (ref 136–145)
SODIUM SERPL-SCNC: 136 MMOL/L (ref 136–145)
SODIUM SERPL-SCNC: 137 MMOL/L (ref 136–145)
SODIUM SERPL-SCNC: 138 MMOL/L (ref 136–145)
SODIUM SERPL-SCNC: 138 MMOL/L (ref 136–145)
SODIUM SERPL-SCNC: 139 MMOL/L (ref 136–145)
SODIUM SERPL-SCNC: 139 MMOL/L (ref 136–145)
SODIUM SERPL-SCNC: 140 MMOL/L (ref 136–145)
SOURCE, COVRS: NORMAL
SOURCE, COVRS: NORMAL
SP GR UR REFRACTOMETRY: 1.01 (ref 1–1.03)
SPECIMEN SITE: ABNORMAL
TROPONIN I SERPL-MCNC: <0.05 NG/ML
UR CULT HOLD, URHOLD: NORMAL
URATE SERPL-MCNC: 4.5 MG/DL (ref 3.5–7.2)
UROBILINOGEN UR QL STRIP.AUTO: 0.2 EU/DL (ref 0.2–1)
VENTRICULAR RATE, ECG03: 113 BPM
VENTRICULAR RATE, ECG03: 80 BPM
VIT B12 SERPL-MCNC: 688 PG/ML (ref 193–986)
WBC # BLD AUTO: 11.3 K/UL (ref 4.1–11.1)
WBC # BLD AUTO: 5.7 K/UL (ref 4.1–11.1)
WBC # BLD AUTO: 5.8 K/UL (ref 4.1–11.1)
WBC # BLD AUTO: 6.7 K/UL (ref 4.1–11.1)
WBC # BLD AUTO: 7.1 K/UL (ref 4.1–11.1)
WBC # BLD AUTO: 7.8 K/UL (ref 4.1–11.1)
WBC # BLD AUTO: 8.4 K/UL (ref 4.1–11.1)
WBC # BLD AUTO: 8.6 K/UL (ref 4.1–11.1)
WBC # BLD AUTO: 8.9 K/UL (ref 4.1–11.1)
WBC URNS QL MICRO: NORMAL /HPF (ref 0–4)

## 2021-01-01 PROCEDURE — 82607 VITAMIN B-12: CPT

## 2021-01-01 PROCEDURE — G0155 HHCP-SVS OF CSW,EA 15 MIN: HCPCS

## 2021-01-01 PROCEDURE — 80053 COMPREHEN METABOLIC PANEL: CPT

## 2021-01-01 PROCEDURE — 84100 ASSAY OF PHOSPHORUS: CPT

## 2021-01-01 PROCEDURE — 74011250636 HC RX REV CODE- 250/636: Performed by: FAMILY MEDICINE

## 2021-01-01 PROCEDURE — 0651 HSPC ROUTINE HOME CARE

## 2021-01-01 PROCEDURE — G0299 HHS/HOSPICE OF RN EA 15 MIN: HCPCS

## 2021-01-01 PROCEDURE — 74011250637 HC RX REV CODE- 250/637: Performed by: INTERNAL MEDICINE

## 2021-01-01 PROCEDURE — 74011250636 HC RX REV CODE- 250/636: Performed by: INTERNAL MEDICINE

## 2021-01-01 PROCEDURE — 72146 MRI CHEST SPINE W/O DYE: CPT

## 2021-01-01 PROCEDURE — 85025 COMPLETE CBC W/AUTO DIFF WBC: CPT

## 2021-01-01 PROCEDURE — 82962 GLUCOSE BLOOD TEST: CPT

## 2021-01-01 PROCEDURE — 3336500001 HSPC ELECTION

## 2021-01-01 PROCEDURE — HOSPICE MEDICATION HC HH HOSPICE MEDICATION

## 2021-01-01 PROCEDURE — 80048 BASIC METABOLIC PNL TOTAL CA: CPT

## 2021-01-01 PROCEDURE — 82803 BLOOD GASES ANY COMBINATION: CPT

## 2021-01-01 PROCEDURE — 36415 COLL VENOUS BLD VENIPUNCTURE: CPT

## 2021-01-01 PROCEDURE — 65660000000 HC RM CCU STEPDOWN

## 2021-01-01 PROCEDURE — 2709999900 HC NON-CHARGEABLE SUPPLY

## 2021-01-01 PROCEDURE — 74011250637 HC RX REV CODE- 250/637: Performed by: NURSE PRACTITIONER

## 2021-01-01 PROCEDURE — 82306 VITAMIN D 25 HYDROXY: CPT

## 2021-01-01 PROCEDURE — 74011250636 HC RX REV CODE- 250/636: Performed by: HOSPITALIST

## 2021-01-01 PROCEDURE — 83735 ASSAY OF MAGNESIUM: CPT

## 2021-01-01 PROCEDURE — 74011636637 HC RX REV CODE- 636/637: Performed by: INTERNAL MEDICINE

## 2021-01-01 PROCEDURE — 99285 EMERGENCY DEPT VISIT HI MDM: CPT

## 2021-01-01 PROCEDURE — 77030040393 HC DRSG OPTIFOAM GENT MDII -B

## 2021-01-01 PROCEDURE — 70450 CT HEAD/BRAIN W/O DYE: CPT

## 2021-01-01 PROCEDURE — 87040 BLOOD CULTURE FOR BACTERIA: CPT

## 2021-01-01 PROCEDURE — 93005 ELECTROCARDIOGRAM TRACING: CPT

## 2021-01-01 PROCEDURE — 97163 PT EVAL HIGH COMPLEX 45 MIN: CPT

## 2021-01-01 PROCEDURE — 86140 C-REACTIVE PROTEIN: CPT

## 2021-01-01 PROCEDURE — 74011000250 HC RX REV CODE- 250: Performed by: NURSE PRACTITIONER

## 2021-01-01 PROCEDURE — 97110 THERAPEUTIC EXERCISES: CPT

## 2021-01-01 PROCEDURE — 81001 URINALYSIS AUTO W/SCOPE: CPT

## 2021-01-01 PROCEDURE — 74011636637 HC RX REV CODE- 636/637: Performed by: HOSPITALIST

## 2021-01-01 PROCEDURE — 85652 RBC SED RATE AUTOMATED: CPT

## 2021-01-01 PROCEDURE — 65270000029 HC RM PRIVATE

## 2021-01-01 PROCEDURE — 74011636637 HC RX REV CODE- 636/637: Performed by: NURSE PRACTITIONER

## 2021-01-01 PROCEDURE — 51702 INSERT TEMP BLADDER CATH: CPT

## 2021-01-01 PROCEDURE — 74011250637 HC RX REV CODE- 250/637: Performed by: HOSPITALIST

## 2021-01-01 PROCEDURE — C9113 INJ PANTOPRAZOLE SODIUM, VIA: HCPCS | Performed by: FAMILY MEDICINE

## 2021-01-01 PROCEDURE — 74011250637 HC RX REV CODE- 250/637: Performed by: FAMILY MEDICINE

## 2021-01-01 PROCEDURE — 94640 AIRWAY INHALATION TREATMENT: CPT

## 2021-01-01 PROCEDURE — 74011000250 HC RX REV CODE- 250: Performed by: INTERNAL MEDICINE

## 2021-01-01 PROCEDURE — 84550 ASSAY OF BLOOD/URIC ACID: CPT

## 2021-01-01 PROCEDURE — 77030005513 HC CATH URETH FOL11 MDII -B

## 2021-01-01 PROCEDURE — 82728 ASSAY OF FERRITIN: CPT

## 2021-01-01 PROCEDURE — 99231 SBSQ HOSP IP/OBS SF/LOW 25: CPT | Performed by: PSYCHIATRY & NEUROLOGY

## 2021-01-01 PROCEDURE — 95816 EEG AWAKE AND DROWSY: CPT | Performed by: PSYCHIATRY & NEUROLOGY

## 2021-01-01 PROCEDURE — 97530 THERAPEUTIC ACTIVITIES: CPT

## 2021-01-01 PROCEDURE — 97535 SELF CARE MNGMENT TRAINING: CPT

## 2021-01-01 PROCEDURE — 94664 DEMO&/EVAL PT USE INHALER: CPT

## 2021-01-01 PROCEDURE — 97165 OT EVAL LOW COMPLEX 30 MIN: CPT

## 2021-01-01 PROCEDURE — 72148 MRI LUMBAR SPINE W/O DYE: CPT

## 2021-01-01 PROCEDURE — 83605 ASSAY OF LACTIC ACID: CPT

## 2021-01-01 PROCEDURE — 84484 ASSAY OF TROPONIN QUANT: CPT

## 2021-01-01 PROCEDURE — 73090 X-RAY EXAM OF FOREARM: CPT

## 2021-01-01 PROCEDURE — 70551 MRI BRAIN STEM W/O DYE: CPT

## 2021-01-01 PROCEDURE — 74011636637 HC RX REV CODE- 636/637: Performed by: FAMILY MEDICINE

## 2021-01-01 PROCEDURE — 96365 THER/PROPH/DIAG IV INF INIT: CPT

## 2021-01-01 PROCEDURE — 82550 ASSAY OF CK (CPK): CPT

## 2021-01-01 PROCEDURE — 87635 SARS-COV-2 COVID-19 AMP PRB: CPT

## 2021-01-01 PROCEDURE — 83880 ASSAY OF NATRIURETIC PEPTIDE: CPT

## 2021-01-01 PROCEDURE — 77030037878 HC DRSG MEPILEX >48IN BORD MOLN -B

## 2021-01-01 PROCEDURE — 99223 1ST HOSP IP/OBS HIGH 75: CPT | Performed by: PSYCHIATRY & NEUROLOGY

## 2021-01-01 PROCEDURE — 77010033678 HC OXYGEN DAILY

## 2021-01-01 PROCEDURE — 73130 X-RAY EXAM OF HAND: CPT

## 2021-01-01 PROCEDURE — 3331090004 HSPC SERVICE INTENSITY ADD-ON

## 2021-01-01 PROCEDURE — 96375 TX/PRO/DX INJ NEW DRUG ADDON: CPT

## 2021-01-01 PROCEDURE — 85027 COMPLETE CBC AUTOMATED: CPT

## 2021-01-01 PROCEDURE — 74011000250 HC RX REV CODE- 250: Performed by: EMERGENCY MEDICINE

## 2021-01-01 PROCEDURE — 99223 1ST HOSP IP/OBS HIGH 75: CPT | Performed by: INTERNAL MEDICINE

## 2021-01-01 PROCEDURE — 99232 SBSQ HOSP IP/OBS MODERATE 35: CPT | Performed by: PSYCHIATRY & NEUROLOGY

## 2021-01-01 PROCEDURE — 74011000250 HC RX REV CODE- 250: Performed by: FAMILY MEDICINE

## 2021-01-01 PROCEDURE — 80076 HEPATIC FUNCTION PANEL: CPT

## 2021-01-01 PROCEDURE — 87186 SC STD MICRODIL/AGAR DIL: CPT

## 2021-01-01 PROCEDURE — 87086 URINE CULTURE/COLONY COUNT: CPT

## 2021-01-01 PROCEDURE — 86431 RHEUMATOID FACTOR QUANT: CPT

## 2021-01-01 PROCEDURE — G0300 HHS/HOSPICE OF LPN EA 15 MIN: HCPCS

## 2021-01-01 PROCEDURE — 83036 HEMOGLOBIN GLYCOSYLATED A1C: CPT

## 2021-01-01 PROCEDURE — U0005 INFEC AGEN DETEC AMPLI PROBE: HCPCS

## 2021-01-01 PROCEDURE — 71045 X-RAY EXAM CHEST 1 VIEW: CPT

## 2021-01-01 PROCEDURE — 72141 MRI NECK SPINE W/O DYE: CPT

## 2021-01-01 PROCEDURE — 74011250636 HC RX REV CODE- 250/636: Performed by: EMERGENCY MEDICINE

## 2021-01-01 PROCEDURE — 87077 CULTURE AEROBIC IDENTIFY: CPT

## 2021-01-01 PROCEDURE — 83690 ASSAY OF LIPASE: CPT

## 2021-01-01 PROCEDURE — 36600 WITHDRAWAL OF ARTERIAL BLOOD: CPT

## 2021-01-01 PROCEDURE — 82140 ASSAY OF AMMONIA: CPT

## 2021-01-01 PROCEDURE — 80069 RENAL FUNCTION PANEL: CPT

## 2021-01-01 PROCEDURE — 92610 EVALUATE SWALLOWING FUNCTION: CPT

## 2021-01-01 RX ORDER — IPRATROPIUM BROMIDE AND ALBUTEROL SULFATE 2.5; .5 MG/3ML; MG/3ML
3 SOLUTION RESPIRATORY (INHALATION)
Status: CANCELLED | OUTPATIENT
Start: 2021-01-01

## 2021-01-01 RX ORDER — ONDANSETRON 4 MG/1
4 TABLET, ORALLY DISINTEGRATING ORAL
Status: DISCONTINUED | OUTPATIENT
Start: 2021-01-01 | End: 2021-01-01 | Stop reason: HOSPADM

## 2021-01-01 RX ORDER — INSULIN GLARGINE 100 [IU]/ML
25 INJECTION, SOLUTION SUBCUTANEOUS
COMMUNITY
End: 2021-01-01

## 2021-01-01 RX ORDER — INSULIN LISPRO 100 [IU]/ML
6 INJECTION, SOLUTION INTRAVENOUS; SUBCUTANEOUS ONCE
Status: COMPLETED | OUTPATIENT
Start: 2021-01-01 | End: 2021-01-01

## 2021-01-01 RX ORDER — INSULIN LISPRO 100 [IU]/ML
INJECTION, SOLUTION INTRAVENOUS; SUBCUTANEOUS
Status: DISCONTINUED | OUTPATIENT
Start: 2021-01-01 | End: 2021-01-01

## 2021-01-01 RX ORDER — ACETAMINOPHEN 650 MG/1
650 SUPPOSITORY RECTAL
Status: DISCONTINUED | OUTPATIENT
Start: 2021-01-01 | End: 2021-01-01 | Stop reason: HOSPADM

## 2021-01-01 RX ORDER — INSULIN GLARGINE 100 [IU]/ML
5 INJECTION, SOLUTION SUBCUTANEOUS DAILY
Status: DISCONTINUED | OUTPATIENT
Start: 2021-01-01 | End: 2021-01-01

## 2021-01-01 RX ORDER — CARVEDILOL 3.12 MG/1
3.12 TABLET ORAL 2 TIMES DAILY WITH MEALS
COMMUNITY
End: 2021-01-01 | Stop reason: SDUPTHER

## 2021-01-01 RX ORDER — PRAMIPEXOLE DIHYDROCHLORIDE 0.25 MG/1
0.25 TABLET ORAL 2 TIMES DAILY
Status: DISCONTINUED | OUTPATIENT
Start: 2021-01-01 | End: 2021-01-01 | Stop reason: HOSPADM

## 2021-01-01 RX ORDER — DONEPEZIL HYDROCHLORIDE 10 MG/1
10 TABLET, FILM COATED ORAL
COMMUNITY
End: 2021-01-01

## 2021-01-01 RX ORDER — INSULIN LISPRO 100 [IU]/ML
INJECTION, SOLUTION INTRAVENOUS; SUBCUTANEOUS
Status: DISCONTINUED | OUTPATIENT
Start: 2021-01-01 | End: 2021-01-01 | Stop reason: HOSPADM

## 2021-01-01 RX ORDER — ARFORMOTEROL TARTRATE 15 UG/2ML
15 SOLUTION RESPIRATORY (INHALATION)
Status: DISCONTINUED | OUTPATIENT
Start: 2021-01-01 | End: 2021-01-01 | Stop reason: HOSPADM

## 2021-01-01 RX ORDER — PREDNISONE 20 MG/1
20 TABLET ORAL
Status: DISCONTINUED | OUTPATIENT
Start: 2021-01-01 | End: 2021-01-01

## 2021-01-01 RX ORDER — GABAPENTIN 100 MG/1
200 CAPSULE ORAL EVERY 8 HOURS
Qty: 30 CAPSULE | Refills: 0 | Status: SHIPPED | OUTPATIENT
Start: 2021-01-01

## 2021-01-01 RX ORDER — SODIUM CHLORIDE 0.9 % (FLUSH) 0.9 %
5-40 SYRINGE (ML) INJECTION EVERY 8 HOURS
Status: DISCONTINUED | OUTPATIENT
Start: 2021-01-01 | End: 2021-01-01 | Stop reason: HOSPADM

## 2021-01-01 RX ORDER — CARVEDILOL 3.12 MG/1
3.12 TABLET ORAL 2 TIMES DAILY WITH MEALS
Status: DISCONTINUED | OUTPATIENT
Start: 2021-01-01 | End: 2021-01-01 | Stop reason: HOSPADM

## 2021-01-01 RX ORDER — ASPIRIN 325 MG
325 TABLET, DELAYED RELEASE (ENTERIC COATED) ORAL DAILY
Status: DISCONTINUED | OUTPATIENT
Start: 2021-01-01 | End: 2021-01-01 | Stop reason: HOSPADM

## 2021-01-01 RX ORDER — FINASTERIDE 5 MG/1
5 TABLET, FILM COATED ORAL DAILY
COMMUNITY
End: 2021-01-01 | Stop reason: SDUPTHER

## 2021-01-01 RX ORDER — ONDANSETRON 2 MG/ML
4 INJECTION INTRAMUSCULAR; INTRAVENOUS
Status: DISCONTINUED | OUTPATIENT
Start: 2021-01-01 | End: 2021-01-01 | Stop reason: HOSPADM

## 2021-01-01 RX ORDER — MAGNESIUM SULFATE 100 %
4 CRYSTALS MISCELLANEOUS AS NEEDED
Status: DISCONTINUED | OUTPATIENT
Start: 2021-01-01 | End: 2021-01-01 | Stop reason: SDUPTHER

## 2021-01-01 RX ORDER — CARBIDOPA AND LEVODOPA 25; 100 MG/1; MG/1
1.5 TABLET, EXTENDED RELEASE ORAL 3 TIMES DAILY
Status: DISCONTINUED | OUTPATIENT
Start: 2021-01-01 | End: 2021-01-01 | Stop reason: HOSPADM

## 2021-01-01 RX ORDER — CARBIDOPA AND LEVODOPA 25; 100 MG/1; MG/1
2 TABLET ORAL 4 TIMES DAILY
Status: DISCONTINUED | OUTPATIENT
Start: 2021-01-01 | End: 2021-01-01 | Stop reason: HOSPADM

## 2021-01-01 RX ORDER — DEXTROSE 50 % IN WATER (D50W) INTRAVENOUS SYRINGE
12.5-25 AS NEEDED
Status: DISCONTINUED | OUTPATIENT
Start: 2021-01-01 | End: 2021-01-01 | Stop reason: HOSPADM

## 2021-01-01 RX ORDER — GABAPENTIN 600 MG/1
600 TABLET ORAL 3 TIMES DAILY
Status: CANCELLED | OUTPATIENT
Start: 2021-01-01

## 2021-01-01 RX ORDER — GABAPENTIN 100 MG/1
200 CAPSULE ORAL 3 TIMES DAILY
Status: DISCONTINUED | OUTPATIENT
Start: 2021-01-01 | End: 2021-01-01

## 2021-01-01 RX ORDER — CARBIDOPA AND LEVODOPA 25; 100 MG/1; MG/1
1 TABLET, EXTENDED RELEASE ORAL 3 TIMES DAILY
Status: DISCONTINUED | OUTPATIENT
Start: 2021-01-01 | End: 2021-01-01

## 2021-01-01 RX ORDER — INSULIN LISPRO 100 [IU]/ML
12 INJECTION, SOLUTION INTRAVENOUS; SUBCUTANEOUS
Status: DISCONTINUED | OUTPATIENT
Start: 2021-01-01 | End: 2021-01-01 | Stop reason: HOSPADM

## 2021-01-01 RX ORDER — FLUTICASONE PROPIONATE AND SALMETEROL 100; 50 UG/1; UG/1
1 POWDER RESPIRATORY (INHALATION) EVERY 12 HOURS
COMMUNITY
End: 2021-01-01 | Stop reason: SDUPTHER

## 2021-01-01 RX ORDER — PANTOPRAZOLE SODIUM 40 MG/1
40 TABLET, DELAYED RELEASE ORAL
Qty: 60 TABLET | Refills: 1 | Status: SHIPPED
Start: 2021-01-01 | End: 2021-01-01

## 2021-01-01 RX ORDER — HYDROCODONE BITARTRATE AND ACETAMINOPHEN 5; 325 MG/1; MG/1
1 TABLET ORAL
Qty: 12 TABLET | Refills: 0 | Status: SHIPPED | OUTPATIENT
Start: 2021-01-01 | End: 2021-01-01

## 2021-01-01 RX ORDER — ATORVASTATIN CALCIUM 10 MG/1
20 TABLET, FILM COATED ORAL DAILY
Status: DISCONTINUED | OUTPATIENT
Start: 2021-01-01 | End: 2021-01-01 | Stop reason: HOSPADM

## 2021-01-01 RX ORDER — GUAIFENESIN DEXTROMETHORPHAN HYDROBROMIDE ORAL SOLUTION 10; 100 MG/5ML; MG/5ML
10 SOLUTION ORAL
COMMUNITY
End: 2021-01-01 | Stop reason: SDUPTHER

## 2021-01-01 RX ORDER — INSULIN LISPRO 100 [IU]/ML
10 INJECTION, SOLUTION INTRAVENOUS; SUBCUTANEOUS ONCE
Status: COMPLETED | OUTPATIENT
Start: 2021-01-01 | End: 2021-01-01

## 2021-01-01 RX ORDER — ACETAMINOPHEN 325 MG/1
650 TABLET ORAL
Qty: 30 TABLET | Refills: 0 | Status: SHIPPED
Start: 2021-01-01

## 2021-01-01 RX ORDER — PRAMIPEXOLE DIHYDROCHLORIDE 0.25 MG/1
0.12 TABLET ORAL
Status: DISCONTINUED | OUTPATIENT
Start: 2021-01-01 | End: 2021-01-01 | Stop reason: HOSPADM

## 2021-01-01 RX ORDER — NIFEDIPINE 30 MG/1
30 TABLET, EXTENDED RELEASE ORAL DAILY
Qty: 30 TABLET | Refills: 0 | Status: SHIPPED
Start: 2021-01-01 | End: 2021-01-01

## 2021-01-01 RX ORDER — ACETAMINOPHEN 325 MG/1
650 TABLET ORAL
Status: DISCONTINUED | OUTPATIENT
Start: 2021-01-01 | End: 2021-01-01 | Stop reason: HOSPADM

## 2021-01-01 RX ORDER — DONEPEZIL HYDROCHLORIDE 10 MG/1
10 TABLET, FILM COATED ORAL
Status: DISCONTINUED | OUTPATIENT
Start: 2021-01-01 | End: 2021-01-01 | Stop reason: HOSPADM

## 2021-01-01 RX ORDER — DONEPEZIL HYDROCHLORIDE 5 MG/1
10 TABLET, FILM COATED ORAL
Status: DISCONTINUED | OUTPATIENT
Start: 2021-01-01 | End: 2021-01-01 | Stop reason: HOSPADM

## 2021-01-01 RX ORDER — KETOROLAC TROMETHAMINE 30 MG/ML
15 INJECTION, SOLUTION INTRAMUSCULAR; INTRAVENOUS
Status: DISPENSED | OUTPATIENT
Start: 2021-01-01 | End: 2021-01-01

## 2021-01-01 RX ORDER — NIFEDIPINE 30 MG/1
30 TABLET, EXTENDED RELEASE ORAL DAILY
Status: DISCONTINUED | OUTPATIENT
Start: 2021-01-01 | End: 2021-01-01 | Stop reason: HOSPADM

## 2021-01-01 RX ORDER — PANTOPRAZOLE SODIUM 40 MG/1
40 TABLET, DELAYED RELEASE ORAL
Status: DISCONTINUED | OUTPATIENT
Start: 2021-01-01 | End: 2021-01-01 | Stop reason: HOSPADM

## 2021-01-01 RX ORDER — INSULIN LISPRO 100 [IU]/ML
8 INJECTION, SOLUTION INTRAVENOUS; SUBCUTANEOUS ONCE
Status: COMPLETED | OUTPATIENT
Start: 2021-01-01 | End: 2021-01-01

## 2021-01-01 RX ORDER — FLUCONAZOLE 100 MG/1
200 TABLET ORAL DAILY
Status: DISCONTINUED | OUTPATIENT
Start: 2021-01-01 | End: 2021-01-01 | Stop reason: HOSPADM

## 2021-01-01 RX ORDER — FINASTERIDE 5 MG/1
5 TABLET, FILM COATED ORAL DAILY
Status: DISCONTINUED | OUTPATIENT
Start: 2021-01-01 | End: 2021-01-01 | Stop reason: HOSPADM

## 2021-01-01 RX ORDER — PRAMIPEXOLE DIHYDROCHLORIDE 0.25 MG/1
0.5 TABLET ORAL 3 TIMES DAILY
Status: DISCONTINUED | OUTPATIENT
Start: 2021-01-01 | End: 2021-01-01

## 2021-01-01 RX ORDER — POLYETHYLENE GLYCOL 3350 17 G/17G
17 POWDER, FOR SOLUTION ORAL DAILY PRN
Status: DISCONTINUED | OUTPATIENT
Start: 2021-01-01 | End: 2021-01-01 | Stop reason: HOSPADM

## 2021-01-01 RX ORDER — FLUCONAZOLE 2 MG/ML
400 INJECTION, SOLUTION INTRAVENOUS ONCE
Status: COMPLETED | OUTPATIENT
Start: 2021-01-01 | End: 2021-01-01

## 2021-01-01 RX ORDER — VANCOMYCIN 2 GRAM/500 ML IN 0.9 % SODIUM CHLORIDE INTRAVENOUS
2000 ONCE
Status: COMPLETED | OUTPATIENT
Start: 2021-01-01 | End: 2021-01-01

## 2021-01-01 RX ORDER — SODIUM CHLORIDE 0.9 % (FLUSH) 0.9 %
5-40 SYRINGE (ML) INJECTION AS NEEDED
Status: DISCONTINUED | OUTPATIENT
Start: 2021-01-01 | End: 2021-01-01 | Stop reason: HOSPADM

## 2021-01-01 RX ORDER — ENTACAPONE 200 MG/1
200 TABLET ORAL 3 TIMES DAILY
Status: DISCONTINUED | OUTPATIENT
Start: 2021-01-01 | End: 2021-01-01 | Stop reason: HOSPADM

## 2021-01-01 RX ORDER — SODIUM CHLORIDE 0.9 % (FLUSH) 0.9 %
5-10 SYRINGE (ML) INJECTION AS NEEDED
Status: DISCONTINUED | OUTPATIENT
Start: 2021-01-01 | End: 2021-01-01 | Stop reason: HOSPADM

## 2021-01-01 RX ORDER — VANCOMYCIN 2 GRAM/500 ML IN 0.9 % SODIUM CHLORIDE INTRAVENOUS
2000 ONCE
Status: DISCONTINUED | OUTPATIENT
Start: 2021-01-01 | End: 2021-01-01

## 2021-01-01 RX ORDER — NYSTATIN 100000 [USP'U]/G
POWDER TOPICAL 2 TIMES DAILY
COMMUNITY
End: 2021-01-01 | Stop reason: SDUPTHER

## 2021-01-01 RX ORDER — MAGNESIUM SULFATE 100 %
4 CRYSTALS MISCELLANEOUS AS NEEDED
Status: DISCONTINUED | OUTPATIENT
Start: 2021-01-01 | End: 2021-01-01 | Stop reason: HOSPADM

## 2021-01-01 RX ORDER — INSULIN LISPRO 100 [IU]/ML
12 INJECTION, SOLUTION INTRAVENOUS; SUBCUTANEOUS ONCE
Status: COMPLETED | OUTPATIENT
Start: 2021-01-01 | End: 2021-01-01

## 2021-01-01 RX ORDER — ENTACAPONE 200 MG/1
200 TABLET ORAL 4 TIMES DAILY
Status: DISCONTINUED | OUTPATIENT
Start: 2021-01-01 | End: 2021-01-01

## 2021-01-01 RX ORDER — LORAZEPAM 2 MG/ML
1 INJECTION INTRAMUSCULAR
Status: COMPLETED | OUTPATIENT
Start: 2021-01-01 | End: 2021-01-01

## 2021-01-01 RX ORDER — QUETIAPINE FUMARATE 25 MG/1
12.5 TABLET, FILM COATED ORAL EVERY EVENING
Status: DISCONTINUED | OUTPATIENT
Start: 2021-01-01 | End: 2021-01-01 | Stop reason: HOSPADM

## 2021-01-01 RX ORDER — DEXTROSE 50 % IN WATER (D50W) INTRAVENOUS SYRINGE
25-50 AS NEEDED
Status: DISCONTINUED | OUTPATIENT
Start: 2021-01-01 | End: 2021-01-01 | Stop reason: SDUPTHER

## 2021-01-01 RX ORDER — PREDNISONE 10 MG/1
TABLET ORAL
Qty: 84 TABLET | Refills: 0 | Status: SHIPPED | OUTPATIENT
Start: 2021-01-01 | End: 2021-01-01

## 2021-01-01 RX ORDER — GABAPENTIN 300 MG/1
300 CAPSULE ORAL 3 TIMES DAILY
Status: DISCONTINUED | OUTPATIENT
Start: 2021-01-01 | End: 2021-01-01

## 2021-01-01 RX ORDER — IPRATROPIUM BROMIDE AND ALBUTEROL SULFATE 2.5; .5 MG/3ML; MG/3ML
3 SOLUTION RESPIRATORY (INHALATION)
COMMUNITY
End: 2021-01-01 | Stop reason: SDUPTHER

## 2021-01-01 RX ORDER — OXYCODONE HYDROCHLORIDE 5 MG/1
5 TABLET ORAL EVERY 12 HOURS
Status: ON HOLD | COMMUNITY
End: 2021-01-01 | Stop reason: SDUPTHER

## 2021-01-01 RX ORDER — INSULIN GLARGINE 100 [IU]/ML
45 INJECTION, SOLUTION SUBCUTANEOUS 2 TIMES DAILY
Qty: 1 VIAL | Refills: 0 | Status: SHIPPED
Start: 2021-01-01 | End: 2021-01-01

## 2021-01-01 RX ORDER — INSULIN LISPRO 100 [IU]/ML
5 INJECTION, SOLUTION INTRAVENOUS; SUBCUTANEOUS
Status: DISCONTINUED | OUTPATIENT
Start: 2021-01-01 | End: 2021-01-01

## 2021-01-01 RX ORDER — MICONAZOLE NITRATE 2 %
POWDER (GRAM) TOPICAL 2 TIMES DAILY
Status: DISCONTINUED | OUTPATIENT
Start: 2021-01-01 | End: 2021-01-01 | Stop reason: HOSPADM

## 2021-01-01 RX ORDER — LINEZOLID 600 MG/1
600 TABLET, FILM COATED ORAL EVERY 12 HOURS
Status: DISCONTINUED | OUTPATIENT
Start: 2021-01-01 | End: 2021-01-01

## 2021-01-01 RX ORDER — IPRATROPIUM BROMIDE AND ALBUTEROL SULFATE 2.5; .5 MG/3ML; MG/3ML
3 SOLUTION RESPIRATORY (INHALATION)
Status: DISCONTINUED | OUTPATIENT
Start: 2021-01-01 | End: 2021-01-01 | Stop reason: HOSPADM

## 2021-01-01 RX ORDER — HYDROCODONE BITARTRATE AND ACETAMINOPHEN 5; 325 MG/1; MG/1
1 TABLET ORAL
Status: DISCONTINUED | OUTPATIENT
Start: 2021-01-01 | End: 2021-01-01 | Stop reason: HOSPADM

## 2021-01-01 RX ORDER — INSULIN GLARGINE 100 [IU]/ML
30 INJECTION, SOLUTION SUBCUTANEOUS 2 TIMES DAILY
Status: ON HOLD | COMMUNITY
End: 2021-01-01 | Stop reason: SDUPTHER

## 2021-01-01 RX ORDER — MAG CARB/ALUMINUM HYDROX/ALGIN 237.5-254
30 SUSPENSION, ORAL (FINAL DOSE FORM) ORAL
COMMUNITY
Start: 2021-01-01

## 2021-01-01 RX ORDER — ENTACAPONE 200 MG/1
200 TABLET ORAL
Status: DISCONTINUED | OUTPATIENT
Start: 2021-01-01 | End: 2021-01-01 | Stop reason: HOSPADM

## 2021-01-01 RX ORDER — ATORVASTATIN CALCIUM 20 MG/1
20 TABLET, FILM COATED ORAL DAILY
Status: DISCONTINUED | OUTPATIENT
Start: 2021-01-01 | End: 2021-01-01 | Stop reason: HOSPADM

## 2021-01-01 RX ORDER — LINEZOLID 600 MG/1
600 TABLET, FILM COATED ORAL EVERY 12 HOURS
Qty: 10 TABLET | Refills: 0 | Status: SHIPPED | OUTPATIENT
Start: 2021-01-01 | End: 2021-01-01

## 2021-01-01 RX ORDER — LANOLIN ALCOHOL/MO/W.PET/CERES
6 CREAM (GRAM) TOPICAL
COMMUNITY
End: 2021-01-01

## 2021-01-01 RX ORDER — INSULIN LISPRO 100 [IU]/ML
8 INJECTION, SOLUTION INTRAVENOUS; SUBCUTANEOUS
Status: DISCONTINUED | OUTPATIENT
Start: 2021-01-01 | End: 2021-01-01

## 2021-01-01 RX ORDER — GABAPENTIN 300 MG/1
600 CAPSULE ORAL 3 TIMES DAILY
Status: DISCONTINUED | OUTPATIENT
Start: 2021-01-01 | End: 2021-01-01 | Stop reason: HOSPADM

## 2021-01-01 RX ORDER — INSULIN LISPRO 100 [IU]/ML
INJECTION, SOLUTION INTRAVENOUS; SUBCUTANEOUS
COMMUNITY
End: 2021-01-01 | Stop reason: SDUPTHER

## 2021-01-01 RX ORDER — NITROFURANTOIN 25; 75 MG/1; MG/1
100 CAPSULE ORAL 2 TIMES DAILY
Qty: 20 CAPSULE | Refills: 0 | Status: SHIPPED | OUTPATIENT
Start: 2021-01-01 | End: 2021-01-01

## 2021-01-01 RX ORDER — TAMSULOSIN HYDROCHLORIDE 0.4 MG/1
0.4 CAPSULE ORAL DAILY
Status: DISCONTINUED | OUTPATIENT
Start: 2021-01-01 | End: 2021-01-01 | Stop reason: HOSPADM

## 2021-01-01 RX ORDER — PREDNISONE 20 MG/1
40 TABLET ORAL
Status: DISCONTINUED | OUTPATIENT
Start: 2021-01-01 | End: 2021-01-01 | Stop reason: HOSPADM

## 2021-01-01 RX ORDER — NITROFURANTOIN 25; 75 MG/1; MG/1
100 CAPSULE ORAL 2 TIMES DAILY
Status: DISCONTINUED | OUTPATIENT
Start: 2021-01-01 | End: 2021-01-01 | Stop reason: HOSPADM

## 2021-01-01 RX ORDER — TORSEMIDE 100 MG/1
100 TABLET ORAL DAILY
COMMUNITY
End: 2021-01-01

## 2021-01-01 RX ORDER — IPRATROPIUM BROMIDE AND ALBUTEROL SULFATE 2.5; .5 MG/3ML; MG/3ML
3 SOLUTION RESPIRATORY (INHALATION)
Qty: 30 NEBULE | Refills: 0 | Status: SHIPPED
Start: 2021-01-01 | End: 2021-01-01

## 2021-01-01 RX ORDER — FLUCONAZOLE 200 MG/1
200 TABLET ORAL DAILY
Qty: 5 TABLET | Refills: 0 | Status: SHIPPED | OUTPATIENT
Start: 2021-01-01 | End: 2021-01-01

## 2021-01-01 RX ORDER — TRAZODONE HYDROCHLORIDE 50 MG/1
50 TABLET ORAL
Status: ON HOLD | COMMUNITY
End: 2021-01-01

## 2021-01-01 RX ORDER — GABAPENTIN 100 MG/1
200 CAPSULE ORAL EVERY 8 HOURS
Status: DISCONTINUED | OUTPATIENT
Start: 2021-01-01 | End: 2021-01-01 | Stop reason: HOSPADM

## 2021-01-01 RX ORDER — BUDESONIDE 0.25 MG/2ML
250 INHALANT ORAL
Status: DISCONTINUED | OUTPATIENT
Start: 2021-01-01 | End: 2021-01-01 | Stop reason: HOSPADM

## 2021-01-01 RX ORDER — ENOXAPARIN SODIUM 100 MG/ML
40 INJECTION SUBCUTANEOUS DAILY
Status: DISCONTINUED | OUTPATIENT
Start: 2021-01-01 | End: 2021-01-01 | Stop reason: HOSPADM

## 2021-01-01 RX ORDER — PRAMIPEXOLE DIHYDROCHLORIDE 0.25 MG/1
0.12 TABLET ORAL
COMMUNITY
End: 2021-01-01 | Stop reason: SDUPTHER

## 2021-01-01 RX ORDER — DICLOFENAC SODIUM 10 MG/G
4 GEL TOPICAL
COMMUNITY
End: 2021-01-01 | Stop reason: SDUPTHER

## 2021-01-01 RX ORDER — CETIRIZINE HCL 10 MG
10 TABLET ORAL DAILY
COMMUNITY
End: 2021-01-01 | Stop reason: SDUPTHER

## 2021-01-01 RX ORDER — AMOXICILLIN AND CLAVULANATE POTASSIUM 875; 125 MG/1; MG/1
1 TABLET, FILM COATED ORAL EVERY 12 HOURS
Status: DISCONTINUED | OUTPATIENT
Start: 2021-01-01 | End: 2021-01-01

## 2021-01-01 RX ORDER — OXYCODONE HYDROCHLORIDE 5 MG/1
5 TABLET ORAL
Qty: 10 TABLET | Refills: 0 | Status: SHIPPED | OUTPATIENT
Start: 2021-01-01 | End: 2021-01-01

## 2021-01-01 RX ORDER — POTASSIUM CHLORIDE 750 MG/1
20 TABLET, FILM COATED, EXTENDED RELEASE ORAL DAILY
COMMUNITY
End: 2021-01-01

## 2021-01-01 RX ORDER — SCOLOPAMINE TRANSDERMAL SYSTEM 1 MG/1
1 PATCH, EXTENDED RELEASE TRANSDERMAL
COMMUNITY
End: 2021-01-01

## 2021-01-01 RX ORDER — PREDNISONE 10 MG/1
10 TABLET ORAL
Status: DISCONTINUED | OUTPATIENT
Start: 2021-01-01 | End: 2021-01-01 | Stop reason: HOSPADM

## 2021-01-01 RX ORDER — INSULIN LISPRO 100 [IU]/ML
INJECTION, SOLUTION INTRAVENOUS; SUBCUTANEOUS
Qty: 1 VIAL | Refills: 0 | Status: SHIPPED
Start: 2021-01-01 | End: 2021-01-01

## 2021-01-01 RX ORDER — QUETIAPINE FUMARATE 25 MG/1
12.5 TABLET, FILM COATED ORAL EVERY EVENING
COMMUNITY
End: 2021-01-01 | Stop reason: SDUPTHER

## 2021-01-01 RX ORDER — PREDNISONE 10 MG/1
10 TABLET ORAL
COMMUNITY
End: 2021-01-01 | Stop reason: SDUPTHER

## 2021-01-01 RX ADMIN — PRAMIPEXOLE DIHYDROCHLORIDE 0.25 MG: 0.25 TABLET ORAL at 08:50

## 2021-01-01 RX ADMIN — CARBIDOPA AND LEVODOPA 2 TABLET: 25; 100 TABLET ORAL at 22:07

## 2021-01-01 RX ADMIN — ENOXAPARIN SODIUM 40 MG: 40 INJECTION SUBCUTANEOUS at 10:50

## 2021-01-01 RX ADMIN — KETOROLAC TROMETHAMINE 15 MG: 30 INJECTION, SOLUTION INTRAMUSCULAR at 10:42

## 2021-01-01 RX ADMIN — CEFEPIME HYDROCHLORIDE 2 G: 2 INJECTION, POWDER, FOR SOLUTION INTRAVENOUS at 14:35

## 2021-01-01 RX ADMIN — INSULIN LISPRO 4 UNITS: 100 INJECTION, SOLUTION INTRAVENOUS; SUBCUTANEOUS at 21:30

## 2021-01-01 RX ADMIN — CARBIDOPA AND LEVODOPA 1 TABLET: 25; 100 TABLET, EXTENDED RELEASE ORAL at 17:29

## 2021-01-01 RX ADMIN — Medication 10 ML: at 22:06

## 2021-01-01 RX ADMIN — BUDESONIDE 250 MCG: 0.25 INHALANT RESPIRATORY (INHALATION) at 21:00

## 2021-01-01 RX ADMIN — CARBIDOPA AND LEVODOPA 1 TABLET: 25; 100 TABLET, EXTENDED RELEASE ORAL at 16:56

## 2021-01-01 RX ADMIN — INSULIN LISPRO 12 UNITS: 100 INJECTION, SOLUTION INTRAVENOUS; SUBCUTANEOUS at 12:37

## 2021-01-01 RX ADMIN — PRAMIPEXOLE DIHYDROCHLORIDE 0.5 MG: 0.25 TABLET ORAL at 17:29

## 2021-01-01 RX ADMIN — HUMAN INSULIN 50 UNITS: 100 INJECTION, SUSPENSION SUBCUTANEOUS at 17:48

## 2021-01-01 RX ADMIN — HYDROCODONE BITARTRATE AND ACETAMINOPHEN 1 TABLET: 5; 325 TABLET ORAL at 16:14

## 2021-01-01 RX ADMIN — PRAMIPEXOLE DIHYDROCHLORIDE 0.25 MG: 0.25 TABLET ORAL at 17:52

## 2021-01-01 RX ADMIN — CARBIDOPA AND LEVODOPA 2 TABLET: 25; 100 TABLET ORAL at 22:26

## 2021-01-01 RX ADMIN — HYDROCODONE BITARTRATE AND ACETAMINOPHEN 1 TABLET: 5; 325 TABLET ORAL at 08:00

## 2021-01-01 RX ADMIN — BUDESONIDE 250 MCG: 0.25 INHALANT RESPIRATORY (INHALATION) at 10:05

## 2021-01-01 RX ADMIN — FLUCONAZOLE 200 MG: 100 TABLET ORAL at 10:31

## 2021-01-01 RX ADMIN — Medication 10 ML: at 06:59

## 2021-01-01 RX ADMIN — CARBIDOPA AND LEVODOPA 1 TABLET: 25; 100 TABLET, EXTENDED RELEASE ORAL at 16:09

## 2021-01-01 RX ADMIN — CARBIDOPA AND LEVODOPA 1 TABLET: 25; 100 TABLET, EXTENDED RELEASE ORAL at 17:36

## 2021-01-01 RX ADMIN — PANTOPRAZOLE SODIUM 40 MG: 40 TABLET, DELAYED RELEASE ORAL at 18:28

## 2021-01-01 RX ADMIN — PRAMIPEXOLE DIHYDROCHLORIDE 0.25 MG: 0.25 TABLET ORAL at 17:58

## 2021-01-01 RX ADMIN — ENTACAPONE 200 MG: 200 TABLET, FILM COATED ORAL at 22:18

## 2021-01-01 RX ADMIN — NIFEDIPINE 30 MG: 30 TABLET, FILM COATED, EXTENDED RELEASE ORAL at 10:07

## 2021-01-01 RX ADMIN — ACETAMINOPHEN 650 MG: 325 TABLET ORAL at 01:24

## 2021-01-01 RX ADMIN — ENOXAPARIN SODIUM 40 MG: 40 INJECTION SUBCUTANEOUS at 09:40

## 2021-01-01 RX ADMIN — ENTACAPONE 200 MG: 200 TABLET, FILM COATED ORAL at 07:30

## 2021-01-01 RX ADMIN — FINASTERIDE 5 MG: 5 TABLET, FILM COATED ORAL at 10:12

## 2021-01-01 RX ADMIN — ENTACAPONE 200 MG: 200 TABLET, FILM COATED ORAL at 22:26

## 2021-01-01 RX ADMIN — INSULIN LISPRO 2 UNITS: 100 INJECTION, SOLUTION INTRAVENOUS; SUBCUTANEOUS at 22:07

## 2021-01-01 RX ADMIN — FINASTERIDE 5 MG: 5 TABLET, FILM COATED ORAL at 09:48

## 2021-01-01 RX ADMIN — PANTOPRAZOLE SODIUM 40 MG: 40 TABLET, DELAYED RELEASE ORAL at 17:30

## 2021-01-01 RX ADMIN — ATORVASTATIN CALCIUM 20 MG: 10 TABLET, FILM COATED ORAL at 10:49

## 2021-01-01 RX ADMIN — PREDNISONE 10 MG: 5 TABLET ORAL at 09:46

## 2021-01-01 RX ADMIN — FINASTERIDE 5 MG: 5 TABLET, FILM COATED ORAL at 09:51

## 2021-01-01 RX ADMIN — CARBIDOPA AND LEVODOPA 2 TABLET: 25; 100 TABLET ORAL at 17:26

## 2021-01-01 RX ADMIN — PRAMIPEXOLE DIHYDROCHLORIDE 0.25 MG: 0.25 TABLET ORAL at 09:46

## 2021-01-01 RX ADMIN — GABAPENTIN 600 MG: 300 CAPSULE ORAL at 23:09

## 2021-01-01 RX ADMIN — NIFEDIPINE 30 MG: 30 TABLET, FILM COATED, EXTENDED RELEASE ORAL at 10:20

## 2021-01-01 RX ADMIN — HYDROCODONE BITARTRATE AND ACETAMINOPHEN 1 TABLET: 5; 325 TABLET ORAL at 14:09

## 2021-01-01 RX ADMIN — GABAPENTIN 200 MG: 100 CAPSULE ORAL at 06:25

## 2021-01-01 RX ADMIN — ENTACAPONE 200 MG: 200 TABLET, FILM COATED ORAL at 06:32

## 2021-01-01 RX ADMIN — INSULIN LISPRO 5 UNITS: 100 INJECTION, SOLUTION INTRAVENOUS; SUBCUTANEOUS at 14:08

## 2021-01-01 RX ADMIN — INSULIN LISPRO 4 UNITS: 100 INJECTION, SOLUTION INTRAVENOUS; SUBCUTANEOUS at 12:33

## 2021-01-01 RX ADMIN — PREDNISONE 20 MG: 20 TABLET ORAL at 07:25

## 2021-01-01 RX ADMIN — ARFORMOTEROL TARTRATE 15 MCG: 15 SOLUTION RESPIRATORY (INHALATION) at 20:17

## 2021-01-01 RX ADMIN — HYDROCODONE BITARTRATE AND ACETAMINOPHEN 1 TABLET: 5; 325 TABLET ORAL at 03:03

## 2021-01-01 RX ADMIN — INSULIN LISPRO 3 UNITS: 100 INJECTION, SOLUTION INTRAVENOUS; SUBCUTANEOUS at 12:42

## 2021-01-01 RX ADMIN — INSULIN LISPRO 12 UNITS: 100 INJECTION, SOLUTION INTRAVENOUS; SUBCUTANEOUS at 17:54

## 2021-01-01 RX ADMIN — INSULIN LISPRO 7 UNITS: 100 INJECTION, SOLUTION INTRAVENOUS; SUBCUTANEOUS at 12:38

## 2021-01-01 RX ADMIN — BUDESONIDE 250 MCG: 0.25 INHALANT RESPIRATORY (INHALATION) at 21:13

## 2021-01-01 RX ADMIN — PANTOPRAZOLE SODIUM 40 MG: 40 TABLET, DELAYED RELEASE ORAL at 08:00

## 2021-01-01 RX ADMIN — Medication 5 ML: at 14:00

## 2021-01-01 RX ADMIN — INSULIN LISPRO 3 UNITS: 100 INJECTION, SOLUTION INTRAVENOUS; SUBCUTANEOUS at 16:58

## 2021-01-01 RX ADMIN — INSULIN LISPRO 2 UNITS: 100 INJECTION, SOLUTION INTRAVENOUS; SUBCUTANEOUS at 07:30

## 2021-01-01 RX ADMIN — DONEPEZIL HYDROCHLORIDE 10 MG: 10 TABLET, FILM COATED ORAL at 21:45

## 2021-01-01 RX ADMIN — HYDROCODONE BITARTRATE AND ACETAMINOPHEN 1 TABLET: 5; 325 TABLET ORAL at 10:06

## 2021-01-01 RX ADMIN — INSULIN LISPRO 7 UNITS: 100 INJECTION, SOLUTION INTRAVENOUS; SUBCUTANEOUS at 07:00

## 2021-01-01 RX ADMIN — GABAPENTIN 300 MG: 300 CAPSULE ORAL at 23:16

## 2021-01-01 RX ADMIN — INSULIN LISPRO 10 UNITS: 100 INJECTION, SOLUTION INTRAVENOUS; SUBCUTANEOUS at 18:16

## 2021-01-01 RX ADMIN — CARVEDILOL 3.12 MG: 3.12 TABLET, FILM COATED ORAL at 17:26

## 2021-01-01 RX ADMIN — ASPIRIN 325 MG: 325 TABLET, COATED ORAL at 10:08

## 2021-01-01 RX ADMIN — VANCOMYCIN HYDROCHLORIDE 2000 MG: 10 INJECTION, POWDER, LYOPHILIZED, FOR SOLUTION INTRAVENOUS at 14:22

## 2021-01-01 RX ADMIN — CARBIDOPA AND LEVODOPA 1 TABLET: 25; 100 TABLET, EXTENDED RELEASE ORAL at 22:31

## 2021-01-01 RX ADMIN — PANTOPRAZOLE SODIUM 40 MG: 40 TABLET, DELAYED RELEASE ORAL at 06:51

## 2021-01-01 RX ADMIN — MICONAZOLE NITRATE 2 % TOPICAL POWDER: at 19:08

## 2021-01-01 RX ADMIN — HYDROCODONE BITARTRATE AND ACETAMINOPHEN 1 TABLET: 5; 325 TABLET ORAL at 03:55

## 2021-01-01 RX ADMIN — FINASTERIDE 5 MG: 5 TABLET, FILM COATED ORAL at 09:46

## 2021-01-01 RX ADMIN — BUDESONIDE 250 MCG: 0.25 INHALANT RESPIRATORY (INHALATION) at 20:17

## 2021-01-01 RX ADMIN — FINASTERIDE 5 MG: 5 TABLET, FILM COATED ORAL at 10:42

## 2021-01-01 RX ADMIN — PANTOPRAZOLE SODIUM 40 MG: 40 TABLET, DELAYED RELEASE ORAL at 07:30

## 2021-01-01 RX ADMIN — HUMAN INSULIN 20 UNITS: 100 INJECTION, SUSPENSION SUBCUTANEOUS at 23:16

## 2021-01-01 RX ADMIN — BUDESONIDE 250 MCG: 0.25 INHALANT RESPIRATORY (INHALATION) at 20:38

## 2021-01-01 RX ADMIN — INSULIN LISPRO 8 UNITS: 100 INJECTION, SOLUTION INTRAVENOUS; SUBCUTANEOUS at 13:12

## 2021-01-01 RX ADMIN — GABAPENTIN 600 MG: 300 CAPSULE ORAL at 15:57

## 2021-01-01 RX ADMIN — ENOXAPARIN SODIUM 40 MG: 40 INJECTION SUBCUTANEOUS at 08:49

## 2021-01-01 RX ADMIN — INSULIN LISPRO 10 UNITS: 100 INJECTION, SOLUTION INTRAVENOUS; SUBCUTANEOUS at 17:09

## 2021-01-01 RX ADMIN — CARBIDOPA AND LEVODOPA 1 TABLET: 25; 100 TABLET, EXTENDED RELEASE ORAL at 22:27

## 2021-01-01 RX ADMIN — NIFEDIPINE 30 MG: 30 TABLET, FILM COATED, EXTENDED RELEASE ORAL at 10:31

## 2021-01-01 RX ADMIN — ENTACAPONE 200 MG: 200 TABLET, FILM COATED ORAL at 21:45

## 2021-01-01 RX ADMIN — INSULIN LISPRO 2 UNITS: 100 INJECTION, SOLUTION INTRAVENOUS; SUBCUTANEOUS at 21:34

## 2021-01-01 RX ADMIN — ENTACAPONE 200 MG: 200 TABLET, FILM COATED ORAL at 15:57

## 2021-01-01 RX ADMIN — Medication 10 ML: at 06:00

## 2021-01-01 RX ADMIN — NIFEDIPINE 30 MG: 30 TABLET, FILM COATED, EXTENDED RELEASE ORAL at 09:48

## 2021-01-01 RX ADMIN — PREDNISONE 20 MG: 20 TABLET ORAL at 07:58

## 2021-01-01 RX ADMIN — CARVEDILOL 3.12 MG: 3.12 TABLET, FILM COATED ORAL at 08:55

## 2021-01-01 RX ADMIN — ATORVASTATIN CALCIUM 20 MG: 10 TABLET, FILM COATED ORAL at 10:31

## 2021-01-01 RX ADMIN — CARBIDOPA AND LEVODOPA 1 TABLET: 25; 100 TABLET, EXTENDED RELEASE ORAL at 22:20

## 2021-01-01 RX ADMIN — CARBIDOPA AND LEVODOPA 1 TABLET: 25; 100 TABLET, EXTENDED RELEASE ORAL at 10:19

## 2021-01-01 RX ADMIN — CARBIDOPA AND LEVODOPA 1 TABLET: 25; 100 TABLET, EXTENDED RELEASE ORAL at 08:48

## 2021-01-01 RX ADMIN — PANTOPRAZOLE SODIUM 40 MG: 40 TABLET, DELAYED RELEASE ORAL at 16:42

## 2021-01-01 RX ADMIN — FINASTERIDE 5 MG: 5 TABLET, FILM COATED ORAL at 10:43

## 2021-01-01 RX ADMIN — ATORVASTATIN CALCIUM 20 MG: 20 TABLET, FILM COATED ORAL at 09:46

## 2021-01-01 RX ADMIN — HYDROCODONE BITARTRATE AND ACETAMINOPHEN 1 TABLET: 5; 325 TABLET ORAL at 06:37

## 2021-01-01 RX ADMIN — ENTACAPONE 200 MG: 200 TABLET, FILM COATED ORAL at 06:52

## 2021-01-01 RX ADMIN — HYDROCODONE BITARTRATE AND ACETAMINOPHEN 1 TABLET: 5; 325 TABLET ORAL at 08:47

## 2021-01-01 RX ADMIN — INSULIN LISPRO 15 UNITS: 100 INJECTION, SOLUTION INTRAVENOUS; SUBCUTANEOUS at 13:38

## 2021-01-01 RX ADMIN — HUMAN INSULIN 45 UNITS: 100 INJECTION, SUSPENSION SUBCUTANEOUS at 09:41

## 2021-01-01 RX ADMIN — PRAMIPEXOLE DIHYDROCHLORIDE 0.25 MG: 0.25 TABLET ORAL at 10:50

## 2021-01-01 RX ADMIN — TAMSULOSIN HYDROCHLORIDE 0.4 MG: 0.4 CAPSULE ORAL at 08:47

## 2021-01-01 RX ADMIN — BUDESONIDE 250 MCG: 0.25 INHALANT RESPIRATORY (INHALATION) at 09:35

## 2021-01-01 RX ADMIN — PREDNISONE 10 MG: 5 TABLET ORAL at 10:31

## 2021-01-01 RX ADMIN — CARBIDOPA AND LEVODOPA 1 TABLET: 25; 100 TABLET, EXTENDED RELEASE ORAL at 17:52

## 2021-01-01 RX ADMIN — ENTACAPONE 200 MG: 200 TABLET, FILM COATED ORAL at 10:20

## 2021-01-01 RX ADMIN — INSULIN GLARGINE 5 UNITS: 100 INJECTION, SOLUTION SUBCUTANEOUS at 10:32

## 2021-01-01 RX ADMIN — INSULIN LISPRO 10 UNITS: 100 INJECTION, SOLUTION INTRAVENOUS; SUBCUTANEOUS at 17:59

## 2021-01-01 RX ADMIN — CARBIDOPA AND LEVODOPA 1 TABLET: 25; 100 TABLET, EXTENDED RELEASE ORAL at 16:42

## 2021-01-01 RX ADMIN — Medication 10 ML: at 22:07

## 2021-01-01 RX ADMIN — ENTACAPONE 200 MG: 200 TABLET, FILM COATED ORAL at 17:26

## 2021-01-01 RX ADMIN — PRAMIPEXOLE DIHYDROCHLORIDE 0.5 MG: 0.25 TABLET ORAL at 09:48

## 2021-01-01 RX ADMIN — MICONAZOLE NITRATE 2 % TOPICAL POWDER: at 09:00

## 2021-01-01 RX ADMIN — FINASTERIDE 5 MG: 5 TABLET, FILM COATED ORAL at 10:30

## 2021-01-01 RX ADMIN — HYDROCODONE BITARTRATE AND ACETAMINOPHEN 1 TABLET: 5; 325 TABLET ORAL at 07:25

## 2021-01-01 RX ADMIN — Medication 10 ML: at 05:18

## 2021-01-01 RX ADMIN — INSULIN LISPRO 5 UNITS: 100 INJECTION, SOLUTION INTRAVENOUS; SUBCUTANEOUS at 18:16

## 2021-01-01 RX ADMIN — PRAMIPEXOLE DIHYDROCHLORIDE 0.25 MG: 0.25 TABLET ORAL at 10:08

## 2021-01-01 RX ADMIN — FINASTERIDE 5 MG: 5 TABLET, FILM COATED ORAL at 10:24

## 2021-01-01 RX ADMIN — ENTACAPONE 200 MG: 200 TABLET, FILM COATED ORAL at 10:06

## 2021-01-01 RX ADMIN — ARFORMOTEROL TARTRATE 15 MCG: 15 SOLUTION RESPIRATORY (INHALATION) at 08:15

## 2021-01-01 RX ADMIN — KETOROLAC TROMETHAMINE 15 MG: 30 INJECTION, SOLUTION INTRAMUSCULAR at 15:32

## 2021-01-01 RX ADMIN — HYDROCODONE BITARTRATE AND ACETAMINOPHEN 1 TABLET: 5; 325 TABLET ORAL at 17:01

## 2021-01-01 RX ADMIN — CARBIDOPA AND LEVODOPA 1 TABLET: 25; 100 TABLET, EXTENDED RELEASE ORAL at 09:46

## 2021-01-01 RX ADMIN — TAMSULOSIN HYDROCHLORIDE 0.4 MG: 0.4 CAPSULE ORAL at 10:31

## 2021-01-01 RX ADMIN — HYDROCODONE BITARTRATE AND ACETAMINOPHEN 1 TABLET: 5; 325 TABLET ORAL at 21:34

## 2021-01-01 RX ADMIN — DONEPEZIL HYDROCHLORIDE 10 MG: 10 TABLET, FILM COATED ORAL at 22:53

## 2021-01-01 RX ADMIN — ATORVASTATIN CALCIUM 20 MG: 20 TABLET, FILM COATED ORAL at 10:31

## 2021-01-01 RX ADMIN — HUMAN INSULIN 40 UNITS: 100 INJECTION, SUSPENSION SUBCUTANEOUS at 16:37

## 2021-01-01 RX ADMIN — INSULIN LISPRO 3 UNITS: 100 INJECTION, SOLUTION INTRAVENOUS; SUBCUTANEOUS at 08:00

## 2021-01-01 RX ADMIN — HYDROCODONE BITARTRATE AND ACETAMINOPHEN 1 TABLET: 5; 325 TABLET ORAL at 12:43

## 2021-01-01 RX ADMIN — HYDROCODONE BITARTRATE AND ACETAMINOPHEN 1 TABLET: 5; 325 TABLET ORAL at 12:21

## 2021-01-01 RX ADMIN — ENOXAPARIN SODIUM 40 MG: 40 INJECTION SUBCUTANEOUS at 10:05

## 2021-01-01 RX ADMIN — ARFORMOTEROL TARTRATE 15 MCG: 15 SOLUTION RESPIRATORY (INHALATION) at 20:53

## 2021-01-01 RX ADMIN — HYDROCODONE BITARTRATE AND ACETAMINOPHEN 1 TABLET: 5; 325 TABLET ORAL at 12:17

## 2021-01-01 RX ADMIN — ENTACAPONE 200 MG: 200 TABLET, FILM COATED ORAL at 09:46

## 2021-01-01 RX ADMIN — FINASTERIDE 5 MG: 5 TABLET, FILM COATED ORAL at 10:22

## 2021-01-01 RX ADMIN — Medication 10 ML: at 06:52

## 2021-01-01 RX ADMIN — INSULIN LISPRO 2 UNITS: 100 INJECTION, SOLUTION INTRAVENOUS; SUBCUTANEOUS at 13:09

## 2021-01-01 RX ADMIN — HUMAN INSULIN 25 UNITS: 100 INJECTION, SUSPENSION SUBCUTANEOUS at 18:17

## 2021-01-01 RX ADMIN — NIFEDIPINE 30 MG: 30 TABLET, FILM COATED, EXTENDED RELEASE ORAL at 10:49

## 2021-01-01 RX ADMIN — GABAPENTIN 300 MG: 300 CAPSULE ORAL at 22:18

## 2021-01-01 RX ADMIN — Medication 10 ML: at 12:00

## 2021-01-01 RX ADMIN — KETOROLAC TROMETHAMINE 15 MG: 30 INJECTION, SOLUTION INTRAMUSCULAR at 15:13

## 2021-01-01 RX ADMIN — Medication 10 ML: at 05:02

## 2021-01-01 RX ADMIN — Medication 10 ML: at 16:58

## 2021-01-01 RX ADMIN — PANTOPRAZOLE SODIUM 40 MG: 40 TABLET, DELAYED RELEASE ORAL at 17:58

## 2021-01-01 RX ADMIN — ENOXAPARIN SODIUM 40 MG: 40 INJECTION SUBCUTANEOUS at 10:31

## 2021-01-01 RX ADMIN — Medication 10 ML: at 21:32

## 2021-01-01 RX ADMIN — ARFORMOTEROL TARTRATE 15 MCG: 15 SOLUTION RESPIRATORY (INHALATION) at 08:59

## 2021-01-01 RX ADMIN — CEFTRIAXONE 1 G: 1 INJECTION, POWDER, FOR SOLUTION INTRAMUSCULAR; INTRAVENOUS at 14:49

## 2021-01-01 RX ADMIN — PREDNISONE 20 MG: 20 TABLET ORAL at 10:23

## 2021-01-01 RX ADMIN — ENTACAPONE 200 MG: 200 TABLET, FILM COATED ORAL at 16:09

## 2021-01-01 RX ADMIN — HYDROCODONE BITARTRATE AND ACETAMINOPHEN 1 TABLET: 5; 325 TABLET ORAL at 14:28

## 2021-01-01 RX ADMIN — HYDROCODONE BITARTRATE AND ACETAMINOPHEN 1 TABLET: 5; 325 TABLET ORAL at 08:26

## 2021-01-01 RX ADMIN — PANTOPRAZOLE SODIUM 40 MG: 40 TABLET, DELAYED RELEASE ORAL at 10:42

## 2021-01-01 RX ADMIN — ENTACAPONE 200 MG: 200 TABLET, FILM COATED ORAL at 22:46

## 2021-01-01 RX ADMIN — CARBIDOPA AND LEVODOPA 2 TABLET: 25; 100 TABLET ORAL at 08:55

## 2021-01-01 RX ADMIN — CARVEDILOL 3.12 MG: 3.12 TABLET, FILM COATED ORAL at 12:47

## 2021-01-01 RX ADMIN — GABAPENTIN 200 MG: 100 CAPSULE ORAL at 22:07

## 2021-01-01 RX ADMIN — INSULIN LISPRO 5 UNITS: 100 INJECTION, SOLUTION INTRAVENOUS; SUBCUTANEOUS at 12:40

## 2021-01-01 RX ADMIN — PRAMIPEXOLE DIHYDROCHLORIDE 0.12 MG: 0.25 TABLET ORAL at 22:26

## 2021-01-01 RX ADMIN — GABAPENTIN 600 MG: 300 CAPSULE ORAL at 22:27

## 2021-01-01 RX ADMIN — ENOXAPARIN SODIUM 40 MG: 40 INJECTION SUBCUTANEOUS at 09:46

## 2021-01-01 RX ADMIN — PRAMIPEXOLE DIHYDROCHLORIDE 0.5 MG: 0.25 TABLET ORAL at 10:00

## 2021-01-01 RX ADMIN — SODIUM CHLORIDE 40 MG: 9 INJECTION INTRAMUSCULAR; INTRAVENOUS; SUBCUTANEOUS at 20:55

## 2021-01-01 RX ADMIN — CARVEDILOL 3.12 MG: 3.12 TABLET, FILM COATED ORAL at 10:31

## 2021-01-01 RX ADMIN — ATORVASTATIN CALCIUM 20 MG: 20 TABLET, FILM COATED ORAL at 08:55

## 2021-01-01 RX ADMIN — FINASTERIDE 5 MG: 5 TABLET, FILM COATED ORAL at 10:31

## 2021-01-01 RX ADMIN — ARFORMOTEROL TARTRATE 15 MCG: 15 SOLUTION RESPIRATORY (INHALATION) at 09:35

## 2021-01-01 RX ADMIN — BUDESONIDE 250 MCG: 0.25 INHALANT RESPIRATORY (INHALATION) at 20:18

## 2021-01-01 RX ADMIN — INSULIN LISPRO 12 UNITS: 100 INJECTION, SOLUTION INTRAVENOUS; SUBCUTANEOUS at 18:01

## 2021-01-01 RX ADMIN — CARBIDOPA AND LEVODOPA 1 TABLET: 25; 100 TABLET, EXTENDED RELEASE ORAL at 22:53

## 2021-01-01 RX ADMIN — INSULIN LISPRO 12 UNITS: 100 INJECTION, SOLUTION INTRAVENOUS; SUBCUTANEOUS at 08:51

## 2021-01-01 RX ADMIN — INSULIN LISPRO 4 UNITS: 100 INJECTION, SOLUTION INTRAVENOUS; SUBCUTANEOUS at 06:58

## 2021-01-01 RX ADMIN — DONEPEZIL HYDROCHLORIDE 10 MG: 5 TABLET, FILM COATED ORAL at 22:26

## 2021-01-01 RX ADMIN — PRAMIPEXOLE DIHYDROCHLORIDE 0.5 MG: 0.25 TABLET ORAL at 16:55

## 2021-01-01 RX ADMIN — ONDANSETRON 4 MG: 2 INJECTION INTRAMUSCULAR; INTRAVENOUS at 00:39

## 2021-01-01 RX ADMIN — QUETIAPINE FUMARATE 12.5 MG: 25 TABLET ORAL at 17:26

## 2021-01-01 RX ADMIN — HYDROCODONE BITARTRATE AND ACETAMINOPHEN 1 TABLET: 5; 325 TABLET ORAL at 12:11

## 2021-01-01 RX ADMIN — INSULIN LISPRO 10 UNITS: 100 INJECTION, SOLUTION INTRAVENOUS; SUBCUTANEOUS at 16:58

## 2021-01-01 RX ADMIN — DONEPEZIL HYDROCHLORIDE 10 MG: 10 TABLET, FILM COATED ORAL at 22:18

## 2021-01-01 RX ADMIN — GABAPENTIN 200 MG: 100 CAPSULE ORAL at 13:22

## 2021-01-01 RX ADMIN — INSULIN LISPRO 4 UNITS: 100 INJECTION, SOLUTION INTRAVENOUS; SUBCUTANEOUS at 12:39

## 2021-01-01 RX ADMIN — Medication 10 ML: at 07:27

## 2021-01-01 RX ADMIN — GABAPENTIN 600 MG: 300 CAPSULE ORAL at 15:22

## 2021-01-01 RX ADMIN — GABAPENTIN 600 MG: 300 CAPSULE ORAL at 08:49

## 2021-01-01 RX ADMIN — ENTACAPONE 200 MG: 200 TABLET, FILM COATED ORAL at 08:49

## 2021-01-01 RX ADMIN — PRAMIPEXOLE DIHYDROCHLORIDE 0.25 MG: 0.25 TABLET ORAL at 17:01

## 2021-01-01 RX ADMIN — CARBIDOPA AND LEVODOPA 1 TABLET: 25; 100 TABLET, EXTENDED RELEASE ORAL at 21:46

## 2021-01-01 RX ADMIN — HYDROCODONE BITARTRATE AND ACETAMINOPHEN 1 TABLET: 5; 325 TABLET ORAL at 17:16

## 2021-01-01 RX ADMIN — ASPIRIN 325 MG: 325 TABLET, COATED ORAL at 08:47

## 2021-01-01 RX ADMIN — PANTOPRAZOLE SODIUM 40 MG: 40 TABLET, DELAYED RELEASE ORAL at 17:52

## 2021-01-01 RX ADMIN — CARBIDOPA AND LEVODOPA 1 TABLET: 25; 100 TABLET, EXTENDED RELEASE ORAL at 10:06

## 2021-01-01 RX ADMIN — ASPIRIN 325 MG: 325 TABLET, COATED ORAL at 10:19

## 2021-01-01 RX ADMIN — BUDESONIDE 250 MCG: 0.25 INHALANT RESPIRATORY (INHALATION) at 09:37

## 2021-01-01 RX ADMIN — HYDROCODONE BITARTRATE AND ACETAMINOPHEN 1 TABLET: 5; 325 TABLET ORAL at 18:00

## 2021-01-01 RX ADMIN — HYDROCODONE BITARTRATE AND ACETAMINOPHEN 1 TABLET: 5; 325 TABLET ORAL at 04:15

## 2021-01-01 RX ADMIN — ASPIRIN 325 MG: 325 TABLET, COATED ORAL at 08:50

## 2021-01-01 RX ADMIN — ENOXAPARIN SODIUM 40 MG: 40 INJECTION SUBCUTANEOUS at 08:36

## 2021-01-01 RX ADMIN — PRAMIPEXOLE DIHYDROCHLORIDE 0.25 MG: 0.25 TABLET ORAL at 18:17

## 2021-01-01 RX ADMIN — HUMAN INSULIN 50 UNITS: 100 INJECTION, SUSPENSION SUBCUTANEOUS at 18:01

## 2021-01-01 RX ADMIN — INSULIN LISPRO 8 UNITS: 100 INJECTION, SOLUTION INTRAVENOUS; SUBCUTANEOUS at 08:01

## 2021-01-01 RX ADMIN — ENOXAPARIN SODIUM 40 MG: 40 INJECTION SUBCUTANEOUS at 10:08

## 2021-01-01 RX ADMIN — DONEPEZIL HYDROCHLORIDE 10 MG: 10 TABLET, FILM COATED ORAL at 22:30

## 2021-01-01 RX ADMIN — DONEPEZIL HYDROCHLORIDE 10 MG: 10 TABLET, FILM COATED ORAL at 21:33

## 2021-01-01 RX ADMIN — PRAMIPEXOLE DIHYDROCHLORIDE 0.5 MG: 0.25 TABLET ORAL at 21:34

## 2021-01-01 RX ADMIN — GABAPENTIN 600 MG: 300 CAPSULE ORAL at 10:06

## 2021-01-01 RX ADMIN — ARFORMOTEROL TARTRATE 15 MCG: 15 SOLUTION RESPIRATORY (INHALATION) at 07:28

## 2021-01-01 RX ADMIN — ARFORMOTEROL TARTRATE 15 MCG: 15 SOLUTION RESPIRATORY (INHALATION) at 20:18

## 2021-01-01 RX ADMIN — PANTOPRAZOLE SODIUM 40 MG: 40 TABLET, DELAYED RELEASE ORAL at 06:27

## 2021-01-01 RX ADMIN — MICONAZOLE NITRATE 2 % TOPICAL POWDER: at 18:00

## 2021-01-01 RX ADMIN — Medication 10 ML: at 08:01

## 2021-01-01 RX ADMIN — Medication 10 ML: at 13:36

## 2021-01-01 RX ADMIN — CARBIDOPA AND LEVODOPA 2 TABLET: 25; 100 TABLET ORAL at 13:21

## 2021-01-01 RX ADMIN — HUMAN INSULIN 30 UNITS: 100 INJECTION, SUSPENSION SUBCUTANEOUS at 09:00

## 2021-01-01 RX ADMIN — ENTACAPONE 200 MG: 200 TABLET, FILM COATED ORAL at 22:06

## 2021-01-01 RX ADMIN — CARBIDOPA AND LEVODOPA 1 TABLET: 25; 100 TABLET, EXTENDED RELEASE ORAL at 15:57

## 2021-01-01 RX ADMIN — VANCOMYCIN HYDROCHLORIDE 2000 MG: 10 INJECTION, POWDER, LYOPHILIZED, FOR SOLUTION INTRAVENOUS at 16:28

## 2021-01-01 RX ADMIN — HUMAN INSULIN 40 UNITS: 100 INJECTION, SUSPENSION SUBCUTANEOUS at 11:10

## 2021-01-01 RX ADMIN — FINASTERIDE 5 MG: 5 TABLET, FILM COATED ORAL at 08:55

## 2021-01-01 RX ADMIN — HYDROCODONE BITARTRATE AND ACETAMINOPHEN 1 TABLET: 5; 325 TABLET ORAL at 17:55

## 2021-01-01 RX ADMIN — PRAMIPEXOLE DIHYDROCHLORIDE 0.25 MG: 0.25 TABLET ORAL at 10:05

## 2021-01-01 RX ADMIN — INSULIN LISPRO 4 UNITS: 100 INJECTION, SOLUTION INTRAVENOUS; SUBCUTANEOUS at 06:51

## 2021-01-01 RX ADMIN — GABAPENTIN 300 MG: 300 CAPSULE ORAL at 10:19

## 2021-01-01 RX ADMIN — INSULIN LISPRO 3 UNITS: 100 INJECTION, SOLUTION INTRAVENOUS; SUBCUTANEOUS at 17:26

## 2021-01-01 RX ADMIN — ATORVASTATIN CALCIUM 20 MG: 10 TABLET, FILM COATED ORAL at 08:47

## 2021-01-01 RX ADMIN — INSULIN LISPRO 4 UNITS: 100 INJECTION, SOLUTION INTRAVENOUS; SUBCUTANEOUS at 08:01

## 2021-01-01 RX ADMIN — INSULIN LISPRO 2 UNITS: 100 INJECTION, SOLUTION INTRAVENOUS; SUBCUTANEOUS at 22:45

## 2021-01-01 RX ADMIN — ASPIRIN 325 MG: 325 TABLET, COATED ORAL at 10:06

## 2021-01-01 RX ADMIN — NIFEDIPINE 30 MG: 30 TABLET, FILM COATED, EXTENDED RELEASE ORAL at 10:06

## 2021-01-01 RX ADMIN — ENTACAPONE 200 MG: 200 TABLET, FILM COATED ORAL at 23:16

## 2021-01-01 RX ADMIN — HUMAN INSULIN 45 UNITS: 100 INJECTION, SUSPENSION SUBCUTANEOUS at 16:41

## 2021-01-01 RX ADMIN — INSULIN LISPRO 6 UNITS: 100 INJECTION, SOLUTION INTRAVENOUS; SUBCUTANEOUS at 23:01

## 2021-01-01 RX ADMIN — Medication 10 ML: at 22:00

## 2021-01-01 RX ADMIN — ARFORMOTEROL TARTRATE 15 MCG: 15 SOLUTION RESPIRATORY (INHALATION) at 21:12

## 2021-01-01 RX ADMIN — NIFEDIPINE 30 MG: 30 TABLET, FILM COATED, EXTENDED RELEASE ORAL at 09:46

## 2021-01-01 RX ADMIN — ENTACAPONE 200 MG: 200 TABLET, FILM COATED ORAL at 15:22

## 2021-01-01 RX ADMIN — ENTACAPONE 200 MG: 200 TABLET, FILM COATED ORAL at 16:45

## 2021-01-01 RX ADMIN — PANTOPRAZOLE SODIUM 40 MG: 40 TABLET, DELAYED RELEASE ORAL at 16:10

## 2021-01-01 RX ADMIN — PANTOPRAZOLE SODIUM 40 MG: 40 TABLET, DELAYED RELEASE ORAL at 06:20

## 2021-01-01 RX ADMIN — INSULIN LISPRO 12 UNITS: 100 INJECTION, SOLUTION INTRAVENOUS; SUBCUTANEOUS at 12:32

## 2021-01-01 RX ADMIN — BUDESONIDE 250 MCG: 0.25 INHALANT RESPIRATORY (INHALATION) at 08:15

## 2021-01-01 RX ADMIN — ASPIRIN 325 MG: 325 TABLET, COATED ORAL at 10:49

## 2021-01-01 RX ADMIN — PRAMIPEXOLE DIHYDROCHLORIDE 0.25 MG: 0.25 TABLET ORAL at 18:00

## 2021-01-01 RX ADMIN — ENOXAPARIN SODIUM 40 MG: 40 INJECTION SUBCUTANEOUS at 10:20

## 2021-01-01 RX ADMIN — Medication 10 ML: at 22:20

## 2021-01-01 RX ADMIN — MICONAZOLE NITRATE 2 % TOPICAL POWDER: at 10:33

## 2021-01-01 RX ADMIN — PREDNISONE 20 MG: 20 TABLET ORAL at 07:00

## 2021-01-01 RX ADMIN — Medication 10 ML: at 08:02

## 2021-01-01 RX ADMIN — ARFORMOTEROL TARTRATE 15 MCG: 15 SOLUTION RESPIRATORY (INHALATION) at 09:36

## 2021-01-01 RX ADMIN — INSULIN LISPRO 10 UNITS: 100 INJECTION, SOLUTION INTRAVENOUS; SUBCUTANEOUS at 12:16

## 2021-01-01 RX ADMIN — ENOXAPARIN SODIUM 40 MG: 40 INJECTION SUBCUTANEOUS at 08:54

## 2021-01-01 RX ADMIN — MICONAZOLE NITRATE 2 % TOPICAL POWDER: at 17:28

## 2021-01-01 RX ADMIN — PRAMIPEXOLE DIHYDROCHLORIDE 0.25 MG: 0.25 TABLET ORAL at 10:20

## 2021-01-01 RX ADMIN — PRAMIPEXOLE DIHYDROCHLORIDE 0.5 MG: 0.25 TABLET ORAL at 10:31

## 2021-01-01 RX ADMIN — PANTOPRAZOLE SODIUM 40 MG: 40 TABLET, DELAYED RELEASE ORAL at 07:00

## 2021-01-01 RX ADMIN — Medication 10 ML: at 16:26

## 2021-01-01 RX ADMIN — INSULIN LISPRO 2 UNITS: 100 INJECTION, SOLUTION INTRAVENOUS; SUBCUTANEOUS at 09:46

## 2021-01-01 RX ADMIN — ARFORMOTEROL TARTRATE 15 MCG: 15 SOLUTION RESPIRATORY (INHALATION) at 21:00

## 2021-01-01 RX ADMIN — CARBIDOPA AND LEVODOPA 1 TABLET: 25; 100 TABLET, EXTENDED RELEASE ORAL at 10:49

## 2021-01-01 RX ADMIN — PREDNISONE 10 MG: 5 TABLET ORAL at 08:55

## 2021-01-01 RX ADMIN — Medication 10 ML: at 13:10

## 2021-01-01 RX ADMIN — INSULIN LISPRO 7 UNITS: 100 INJECTION, SOLUTION INTRAVENOUS; SUBCUTANEOUS at 07:25

## 2021-01-01 RX ADMIN — HYDROCODONE BITARTRATE AND ACETAMINOPHEN 1 TABLET: 5; 325 TABLET ORAL at 06:54

## 2021-01-01 RX ADMIN — ENTACAPONE 200 MG: 200 TABLET, FILM COATED ORAL at 17:15

## 2021-01-01 RX ADMIN — ENTACAPONE 200 MG: 200 TABLET, FILM COATED ORAL at 10:09

## 2021-01-01 RX ADMIN — INSULIN LISPRO 2 UNITS: 100 INJECTION, SOLUTION INTRAVENOUS; SUBCUTANEOUS at 17:15

## 2021-01-01 RX ADMIN — CARVEDILOL 3.12 MG: 3.12 TABLET, FILM COATED ORAL at 18:49

## 2021-01-01 RX ADMIN — Medication 10 ML: at 14:36

## 2021-01-01 RX ADMIN — ENTACAPONE 200 MG: 200 TABLET, FILM COATED ORAL at 13:22

## 2021-01-01 RX ADMIN — INSULIN LISPRO 5 UNITS: 100 INJECTION, SOLUTION INTRAVENOUS; SUBCUTANEOUS at 06:38

## 2021-01-01 RX ADMIN — CARBIDOPA AND LEVODOPA 2 TABLET: 25; 100 TABLET ORAL at 09:46

## 2021-01-01 RX ADMIN — PRAMIPEXOLE DIHYDROCHLORIDE 0.12 MG: 0.25 TABLET ORAL at 22:06

## 2021-01-01 RX ADMIN — CARBIDOPA AND LEVODOPA 1.5 TABLET: 25; 100 TABLET, EXTENDED RELEASE ORAL at 08:50

## 2021-01-01 RX ADMIN — PANTOPRAZOLE SODIUM 40 MG: 40 TABLET, DELAYED RELEASE ORAL at 12:46

## 2021-01-01 RX ADMIN — Medication 10 ML: at 07:12

## 2021-01-01 RX ADMIN — DONEPEZIL HYDROCHLORIDE 10 MG: 10 TABLET, FILM COATED ORAL at 22:46

## 2021-01-01 RX ADMIN — ARFORMOTEROL TARTRATE 15 MCG: 15 SOLUTION RESPIRATORY (INHALATION) at 20:38

## 2021-01-01 RX ADMIN — CARBIDOPA AND LEVODOPA 2 TABLET: 25; 100 TABLET ORAL at 17:42

## 2021-01-01 RX ADMIN — ENTACAPONE 200 MG: 200 TABLET, FILM COATED ORAL at 22:07

## 2021-01-01 RX ADMIN — TAMSULOSIN HYDROCHLORIDE 0.4 MG: 0.4 CAPSULE ORAL at 08:54

## 2021-01-01 RX ADMIN — AMOXICILLIN AND CLAVULANATE POTASSIUM 1 TABLET: 875; 125 TABLET, FILM COATED ORAL at 22:07

## 2021-01-01 RX ADMIN — ENTACAPONE 200 MG: 200 TABLET, FILM COATED ORAL at 12:42

## 2021-01-01 RX ADMIN — ATORVASTATIN CALCIUM 20 MG: 10 TABLET, FILM COATED ORAL at 10:09

## 2021-01-01 RX ADMIN — Medication 10 ML: at 23:17

## 2021-01-01 RX ADMIN — INSULIN LISPRO 8 UNITS: 100 INJECTION, SOLUTION INTRAVENOUS; SUBCUTANEOUS at 07:59

## 2021-01-01 RX ADMIN — FINASTERIDE 5 MG: 5 TABLET, FILM COATED ORAL at 12:46

## 2021-01-01 RX ADMIN — GABAPENTIN 600 MG: 300 CAPSULE ORAL at 09:46

## 2021-01-01 RX ADMIN — Medication 10 ML: at 15:32

## 2021-01-01 RX ADMIN — ATORVASTATIN CALCIUM 20 MG: 10 TABLET, FILM COATED ORAL at 10:06

## 2021-01-01 RX ADMIN — HYDROCODONE BITARTRATE AND ACETAMINOPHEN 1 TABLET: 5; 325 TABLET ORAL at 04:56

## 2021-01-01 RX ADMIN — PANTOPRAZOLE SODIUM 40 MG: 40 TABLET, DELAYED RELEASE ORAL at 06:52

## 2021-01-01 RX ADMIN — HYDROCODONE BITARTRATE AND ACETAMINOPHEN 1 TABLET: 5; 325 TABLET ORAL at 01:32

## 2021-01-01 RX ADMIN — INSULIN LISPRO 5 UNITS: 100 INJECTION, SOLUTION INTRAVENOUS; SUBCUTANEOUS at 22:19

## 2021-01-01 RX ADMIN — INSULIN LISPRO 2 UNITS: 100 INJECTION, SOLUTION INTRAVENOUS; SUBCUTANEOUS at 14:48

## 2021-01-01 RX ADMIN — INSULIN LISPRO 8 UNITS: 100 INJECTION, SOLUTION INTRAVENOUS; SUBCUTANEOUS at 21:45

## 2021-01-01 RX ADMIN — Medication 1 CAPSULE: at 10:31

## 2021-01-01 RX ADMIN — GABAPENTIN 200 MG: 100 CAPSULE ORAL at 13:30

## 2021-01-01 RX ADMIN — VANCOMYCIN HYDROCHLORIDE 1250 MG: 1.25 INJECTION, POWDER, LYOPHILIZED, FOR SOLUTION INTRAVENOUS at 09:46

## 2021-01-01 RX ADMIN — ATORVASTATIN CALCIUM 20 MG: 10 TABLET, FILM COATED ORAL at 08:50

## 2021-01-01 RX ADMIN — ENTACAPONE 200 MG: 200 TABLET, FILM COATED ORAL at 19:08

## 2021-01-01 RX ADMIN — ENTACAPONE 200 MG: 200 TABLET, FILM COATED ORAL at 08:47

## 2021-01-01 RX ADMIN — PRAMIPEXOLE DIHYDROCHLORIDE 0.25 MG: 0.25 TABLET ORAL at 17:19

## 2021-01-01 RX ADMIN — HUMAN INSULIN 20 UNITS: 100 INJECTION, SUSPENSION SUBCUTANEOUS at 07:22

## 2021-01-01 RX ADMIN — FLUCONAZOLE 200 MG: 100 TABLET ORAL at 09:46

## 2021-01-01 RX ADMIN — INSULIN LISPRO 3 UNITS: 100 INJECTION, SOLUTION INTRAVENOUS; SUBCUTANEOUS at 11:30

## 2021-01-01 RX ADMIN — INSULIN LISPRO 3 UNITS: 100 INJECTION, SOLUTION INTRAVENOUS; SUBCUTANEOUS at 08:53

## 2021-01-01 RX ADMIN — CARBIDOPA AND LEVODOPA 1 TABLET: 25; 100 TABLET, EXTENDED RELEASE ORAL at 15:22

## 2021-01-01 RX ADMIN — HUMAN INSULIN 45 UNITS: 100 INJECTION, SUSPENSION SUBCUTANEOUS at 07:58

## 2021-01-01 RX ADMIN — ENTACAPONE 200 MG: 200 TABLET, FILM COATED ORAL at 13:09

## 2021-01-01 RX ADMIN — SODIUM CHLORIDE 40 MG: 9 INJECTION INTRAMUSCULAR; INTRAVENOUS; SUBCUTANEOUS at 10:01

## 2021-01-01 RX ADMIN — INSULIN LISPRO 14 UNITS: 100 INJECTION, SOLUTION INTRAVENOUS; SUBCUTANEOUS at 14:07

## 2021-01-01 RX ADMIN — Medication 10 ML: at 14:49

## 2021-01-01 RX ADMIN — GABAPENTIN 300 MG: 300 CAPSULE ORAL at 17:35

## 2021-01-01 RX ADMIN — CARBIDOPA AND LEVODOPA 1 TABLET: 25; 100 TABLET, EXTENDED RELEASE ORAL at 09:48

## 2021-01-01 RX ADMIN — HYDROCODONE BITARTRATE AND ACETAMINOPHEN 1 TABLET: 5; 325 TABLET ORAL at 10:53

## 2021-01-01 RX ADMIN — CARBIDOPA AND LEVODOPA 1 TABLET: 25; 100 TABLET, EXTENDED RELEASE ORAL at 10:09

## 2021-01-01 RX ADMIN — CARBIDOPA AND LEVODOPA 1 TABLET: 25; 100 TABLET, EXTENDED RELEASE ORAL at 17:08

## 2021-01-01 RX ADMIN — PREDNISONE 40 MG: 20 TABLET ORAL at 10:05

## 2021-01-01 RX ADMIN — PREDNISONE 10 MG: 5 TABLET ORAL at 08:36

## 2021-01-01 RX ADMIN — ENTACAPONE 200 MG: 200 TABLET, FILM COATED ORAL at 10:43

## 2021-01-01 RX ADMIN — CARBIDOPA AND LEVODOPA 2 TABLET: 25; 100 TABLET ORAL at 10:31

## 2021-01-01 RX ADMIN — DONEPEZIL HYDROCHLORIDE 10 MG: 10 TABLET, FILM COATED ORAL at 23:08

## 2021-01-01 RX ADMIN — PANTOPRAZOLE SODIUM 40 MG: 40 TABLET, DELAYED RELEASE ORAL at 07:11

## 2021-01-01 RX ADMIN — GABAPENTIN 600 MG: 300 CAPSULE ORAL at 16:42

## 2021-01-01 RX ADMIN — PREDNISONE 40 MG: 20 TABLET ORAL at 09:46

## 2021-01-01 RX ADMIN — ATORVASTATIN CALCIUM 20 MG: 10 TABLET, FILM COATED ORAL at 09:48

## 2021-01-01 RX ADMIN — HYDROCODONE BITARTRATE AND ACETAMINOPHEN 1 TABLET: 5; 325 TABLET ORAL at 17:38

## 2021-01-01 RX ADMIN — ENOXAPARIN SODIUM 40 MG: 40 INJECTION SUBCUTANEOUS at 10:01

## 2021-01-01 RX ADMIN — NIFEDIPINE 30 MG: 30 TABLET, FILM COATED, EXTENDED RELEASE ORAL at 10:09

## 2021-01-01 RX ADMIN — CARBIDOPA AND LEVODOPA 2 TABLET: 25; 100 TABLET ORAL at 13:09

## 2021-01-01 RX ADMIN — INSULIN LISPRO 10 UNITS: 100 INJECTION, SOLUTION INTRAVENOUS; SUBCUTANEOUS at 09:41

## 2021-01-01 RX ADMIN — Medication 10 ML: at 15:22

## 2021-01-01 RX ADMIN — Medication 10 ML: at 22:53

## 2021-01-01 RX ADMIN — PANTOPRAZOLE SODIUM 40 MG: 40 TABLET, DELAYED RELEASE ORAL at 07:25

## 2021-01-01 RX ADMIN — PANTOPRAZOLE SODIUM 40 MG: 40 TABLET, DELAYED RELEASE ORAL at 17:37

## 2021-01-01 RX ADMIN — Medication 10 ML: at 13:13

## 2021-01-01 RX ADMIN — PANTOPRAZOLE SODIUM 40 MG: 40 TABLET, DELAYED RELEASE ORAL at 15:57

## 2021-01-01 RX ADMIN — FINASTERIDE 5 MG: 5 TABLET, FILM COATED ORAL at 08:59

## 2021-01-01 RX ADMIN — HUMAN INSULIN 50 UNITS: 100 INJECTION, SUSPENSION SUBCUTANEOUS at 08:50

## 2021-01-01 RX ADMIN — INSULIN LISPRO 8 UNITS: 100 INJECTION, SOLUTION INTRAVENOUS; SUBCUTANEOUS at 16:59

## 2021-01-01 RX ADMIN — HYDROCODONE BITARTRATE AND ACETAMINOPHEN 1 TABLET: 5; 325 TABLET ORAL at 22:05

## 2021-01-01 RX ADMIN — PREDNISONE 20 MG: 20 TABLET ORAL at 17:30

## 2021-01-01 RX ADMIN — BUDESONIDE 250 MCG: 0.25 INHALANT RESPIRATORY (INHALATION) at 08:59

## 2021-01-01 RX ADMIN — TAMSULOSIN HYDROCHLORIDE 0.4 MG: 0.4 CAPSULE ORAL at 12:46

## 2021-01-01 RX ADMIN — PRAMIPEXOLE DIHYDROCHLORIDE 0.5 MG: 0.25 TABLET ORAL at 17:52

## 2021-01-01 RX ADMIN — PRAMIPEXOLE DIHYDROCHLORIDE 0.12 MG: 0.25 TABLET ORAL at 22:07

## 2021-01-01 RX ADMIN — GABAPENTIN 600 MG: 300 CAPSULE ORAL at 21:46

## 2021-01-01 RX ADMIN — ATORVASTATIN CALCIUM 20 MG: 10 TABLET, FILM COATED ORAL at 09:46

## 2021-01-01 RX ADMIN — DONEPEZIL HYDROCHLORIDE 10 MG: 10 TABLET, FILM COATED ORAL at 21:34

## 2021-01-01 RX ADMIN — CARBIDOPA AND LEVODOPA 1.5 TABLET: 25; 100 TABLET, EXTENDED RELEASE ORAL at 23:09

## 2021-01-01 RX ADMIN — PRAMIPEXOLE DIHYDROCHLORIDE 0.25 MG: 0.25 TABLET ORAL at 08:47

## 2021-01-01 RX ADMIN — CARVEDILOL 3.12 MG: 3.12 TABLET, FILM COATED ORAL at 17:41

## 2021-01-01 RX ADMIN — INSULIN GLARGINE 5 UNITS: 100 INJECTION, SOLUTION SUBCUTANEOUS at 09:49

## 2021-01-01 RX ADMIN — ENTACAPONE 200 MG: 200 TABLET, FILM COATED ORAL at 17:36

## 2021-01-01 RX ADMIN — HYDROCODONE BITARTRATE AND ACETAMINOPHEN 1 TABLET: 5; 325 TABLET ORAL at 03:10

## 2021-01-01 RX ADMIN — INSULIN LISPRO 3 UNITS: 100 INJECTION, SOLUTION INTRAVENOUS; SUBCUTANEOUS at 12:24

## 2021-01-01 RX ADMIN — LORAZEPAM 1 MG: 2 INJECTION INTRAMUSCULAR; INTRAVENOUS at 13:57

## 2021-01-01 RX ADMIN — NIFEDIPINE 30 MG: 30 TABLET, FILM COATED, EXTENDED RELEASE ORAL at 08:50

## 2021-01-01 RX ADMIN — Medication 10 ML: at 13:19

## 2021-01-01 RX ADMIN — ENTACAPONE 200 MG: 200 TABLET, FILM COATED ORAL at 17:41

## 2021-01-01 RX ADMIN — TAMSULOSIN HYDROCHLORIDE 0.4 MG: 0.4 CAPSULE ORAL at 10:06

## 2021-01-01 RX ADMIN — GABAPENTIN 600 MG: 300 CAPSULE ORAL at 16:09

## 2021-01-01 RX ADMIN — Medication 10 ML: at 06:13

## 2021-01-01 RX ADMIN — CARBIDOPA AND LEVODOPA 1 TABLET: 25; 100 TABLET, EXTENDED RELEASE ORAL at 10:31

## 2021-01-01 RX ADMIN — TAMSULOSIN HYDROCHLORIDE 0.4 MG: 0.4 CAPSULE ORAL at 10:50

## 2021-01-01 RX ADMIN — INSULIN LISPRO 12 UNITS: 100 INJECTION, SOLUTION INTRAVENOUS; SUBCUTANEOUS at 17:52

## 2021-01-01 RX ADMIN — Medication 10 ML: at 15:26

## 2021-01-01 RX ADMIN — GABAPENTIN 600 MG: 300 CAPSULE ORAL at 08:47

## 2021-01-01 RX ADMIN — FLUCONAZOLE IN SODIUM CHLORIDE 400 MG: 2 INJECTION, SOLUTION INTRAVENOUS at 13:09

## 2021-01-01 RX ADMIN — MICONAZOLE NITRATE 2 % TOPICAL POWDER: at 09:51

## 2021-01-01 RX ADMIN — INSULIN LISPRO 5 UNITS: 100 INJECTION, SOLUTION INTRAVENOUS; SUBCUTANEOUS at 23:15

## 2021-01-01 RX ADMIN — TAMSULOSIN HYDROCHLORIDE 0.4 MG: 0.4 CAPSULE ORAL at 09:46

## 2021-01-01 RX ADMIN — ATORVASTATIN CALCIUM 20 MG: 20 TABLET, FILM COATED ORAL at 12:46

## 2021-01-01 RX ADMIN — Medication 10 ML: at 22:28

## 2021-01-01 RX ADMIN — INSULIN LISPRO 4 UNITS: 100 INJECTION, SOLUTION INTRAVENOUS; SUBCUTANEOUS at 12:20

## 2021-01-01 RX ADMIN — CARBIDOPA AND LEVODOPA 2 TABLET: 25; 100 TABLET ORAL at 17:15

## 2021-01-01 RX ADMIN — ATORVASTATIN CALCIUM 20 MG: 10 TABLET, FILM COATED ORAL at 10:19

## 2021-01-01 RX ADMIN — ENTACAPONE 200 MG: 200 TABLET, FILM COATED ORAL at 17:10

## 2021-01-01 RX ADMIN — CARVEDILOL 3.12 MG: 3.12 TABLET, FILM COATED ORAL at 17:15

## 2021-01-01 RX ADMIN — DONEPEZIL HYDROCHLORIDE 10 MG: 10 TABLET, FILM COATED ORAL at 22:27

## 2021-01-01 RX ADMIN — NITROFURANTOIN MONOHYDRATE/MACROCRYSTALLINE 100 MG: 25; 75 CAPSULE ORAL at 17:41

## 2021-01-01 RX ADMIN — GABAPENTIN 300 MG: 300 CAPSULE ORAL at 10:09

## 2021-01-01 RX ADMIN — HUMAN INSULIN 7 UNITS: 100 INJECTION, SUSPENSION SUBCUTANEOUS at 15:21

## 2021-01-01 RX ADMIN — INSULIN LISPRO 10 UNITS: 100 INJECTION, SOLUTION INTRAVENOUS; SUBCUTANEOUS at 07:25

## 2021-01-01 RX ADMIN — HUMAN INSULIN 40 UNITS: 100 INJECTION, SUSPENSION SUBCUTANEOUS at 18:27

## 2021-01-01 RX ADMIN — PRAMIPEXOLE DIHYDROCHLORIDE 0.5 MG: 0.25 TABLET ORAL at 21:32

## 2021-01-01 RX ADMIN — Medication 10 ML: at 12:42

## 2021-01-01 RX ADMIN — HYDROCODONE BITARTRATE AND ACETAMINOPHEN 1 TABLET: 5; 325 TABLET ORAL at 17:36

## 2021-01-01 RX ADMIN — GABAPENTIN 300 MG: 300 CAPSULE ORAL at 10:49

## 2021-01-01 RX ADMIN — GABAPENTIN 300 MG: 300 CAPSULE ORAL at 22:52

## 2021-01-01 RX ADMIN — INSULIN LISPRO 5 UNITS: 100 INJECTION, SOLUTION INTRAVENOUS; SUBCUTANEOUS at 17:30

## 2021-01-01 RX ADMIN — PREDNISONE 40 MG: 20 TABLET ORAL at 07:11

## 2021-01-01 RX ADMIN — PREDNISONE 20 MG: 20 TABLET ORAL at 07:01

## 2021-01-01 RX ADMIN — CARBIDOPA AND LEVODOPA 2 TABLET: 25; 100 TABLET ORAL at 22:06

## 2021-01-01 RX ADMIN — CARBIDOPA AND LEVODOPA 2 TABLET: 25; 100 TABLET ORAL at 18:49

## 2021-01-01 RX ADMIN — TAMSULOSIN HYDROCHLORIDE 0.4 MG: 0.4 CAPSULE ORAL at 09:45

## 2021-01-01 RX ADMIN — INSULIN LISPRO 8 UNITS: 100 INJECTION, SOLUTION INTRAVENOUS; SUBCUTANEOUS at 12:21

## 2021-01-01 RX ADMIN — GABAPENTIN 300 MG: 300 CAPSULE ORAL at 18:28

## 2021-01-01 RX ADMIN — INSULIN LISPRO 5 UNITS: 100 INJECTION, SOLUTION INTRAVENOUS; SUBCUTANEOUS at 12:15

## 2021-01-01 RX ADMIN — INSULIN LISPRO 5 UNITS: 100 INJECTION, SOLUTION INTRAVENOUS; SUBCUTANEOUS at 07:23

## 2021-01-01 RX ADMIN — Medication 10 ML: at 06:26

## 2021-01-01 RX ADMIN — FINASTERIDE 5 MG: 5 TABLET, FILM COATED ORAL at 08:48

## 2021-01-01 RX ADMIN — INSULIN LISPRO 2 UNITS: 100 INJECTION, SOLUTION INTRAVENOUS; SUBCUTANEOUS at 22:26

## 2021-01-01 RX ADMIN — Medication 10 ML: at 22:26

## 2021-01-01 RX ADMIN — HYDROCODONE BITARTRATE AND ACETAMINOPHEN 1 TABLET: 5; 325 TABLET ORAL at 13:18

## 2021-01-01 RX ADMIN — NIFEDIPINE 30 MG: 30 TABLET, FILM COATED, EXTENDED RELEASE ORAL at 08:47

## 2021-01-01 RX ADMIN — GABAPENTIN 600 MG: 300 CAPSULE ORAL at 22:45

## 2021-01-01 RX ADMIN — CARVEDILOL 3.12 MG: 3.12 TABLET, FILM COATED ORAL at 09:46

## 2021-01-01 RX ADMIN — INSULIN LISPRO 6 UNITS: 100 INJECTION, SOLUTION INTRAVENOUS; SUBCUTANEOUS at 22:30

## 2021-01-01 RX ADMIN — ARFORMOTEROL TARTRATE 15 MCG: 15 SOLUTION RESPIRATORY (INHALATION) at 10:05

## 2021-01-01 RX ADMIN — ENTACAPONE 200 MG: 200 TABLET, FILM COATED ORAL at 12:47

## 2021-01-01 RX ADMIN — QUETIAPINE FUMARATE 12.5 MG: 25 TABLET ORAL at 17:15

## 2021-01-01 RX ADMIN — INSULIN LISPRO 2 UNITS: 100 INJECTION, SOLUTION INTRAVENOUS; SUBCUTANEOUS at 07:05

## 2021-01-01 RX ADMIN — TAMSULOSIN HYDROCHLORIDE 0.4 MG: 0.4 CAPSULE ORAL at 10:20

## 2021-01-01 RX ADMIN — PANTOPRAZOLE SODIUM 40 MG: 40 TABLET, DELAYED RELEASE ORAL at 07:24

## 2021-01-01 RX ADMIN — Medication 5 ML: at 06:00

## 2021-01-01 RX ADMIN — INSULIN LISPRO 2 UNITS: 100 INJECTION, SOLUTION INTRAVENOUS; SUBCUTANEOUS at 16:56

## 2021-01-01 RX ADMIN — CARBIDOPA AND LEVODOPA 1 TABLET: 25; 100 TABLET, EXTENDED RELEASE ORAL at 21:33

## 2021-01-01 RX ADMIN — INSULIN LISPRO 15 UNITS: 100 INJECTION, SOLUTION INTRAVENOUS; SUBCUTANEOUS at 18:32

## 2021-01-01 RX ADMIN — CARBIDOPA AND LEVODOPA 2 TABLET: 25; 100 TABLET ORAL at 12:42

## 2021-01-01 RX ADMIN — CARBIDOPA AND LEVODOPA 1 TABLET: 25; 100 TABLET, EXTENDED RELEASE ORAL at 22:46

## 2021-01-01 RX ADMIN — CARBIDOPA AND LEVODOPA 2 TABLET: 25; 100 TABLET ORAL at 12:47

## 2021-01-01 RX ADMIN — ENTACAPONE 200 MG: 200 TABLET, FILM COATED ORAL at 23:00

## 2021-01-01 RX ADMIN — KETOROLAC TROMETHAMINE 15 MG: 30 INJECTION, SOLUTION INTRAMUSCULAR at 01:09

## 2021-01-01 RX ADMIN — TAMSULOSIN HYDROCHLORIDE 0.4 MG: 0.4 CAPSULE ORAL at 10:08

## 2021-01-01 RX ADMIN — TAMSULOSIN HYDROCHLORIDE 0.4 MG: 0.4 CAPSULE ORAL at 08:49

## 2021-01-01 RX ADMIN — CARBIDOPA AND LEVODOPA 1 TABLET: 25; 100 TABLET, EXTENDED RELEASE ORAL at 21:34

## 2021-01-01 RX ADMIN — ASPIRIN 325 MG: 325 TABLET, COATED ORAL at 09:46

## 2021-01-01 RX ADMIN — HUMAN INSULIN 45 UNITS: 100 INJECTION, SUSPENSION SUBCUTANEOUS at 08:01

## 2021-01-01 RX ADMIN — ENTACAPONE 200 MG: 200 TABLET, FILM COATED ORAL at 06:20

## 2021-01-01 RX ADMIN — INSULIN LISPRO 3 UNITS: 100 INJECTION, SOLUTION INTRAVENOUS; SUBCUTANEOUS at 17:50

## 2021-01-01 RX ADMIN — BUDESONIDE 250 MCG: 0.25 INHALANT RESPIRATORY (INHALATION) at 09:00

## 2021-01-01 RX ADMIN — ENOXAPARIN SODIUM 40 MG: 40 INJECTION SUBCUTANEOUS at 10:32

## 2021-01-01 RX ADMIN — ENTACAPONE 200 MG: 200 TABLET, FILM COATED ORAL at 23:09

## 2021-01-01 RX ADMIN — ENTACAPONE 200 MG: 200 TABLET, FILM COATED ORAL at 22:27

## 2021-01-01 RX ADMIN — QUETIAPINE FUMARATE 12.5 MG: 25 TABLET ORAL at 18:48

## 2021-01-01 RX ADMIN — PANTOPRAZOLE SODIUM 40 MG: 40 TABLET, DELAYED RELEASE ORAL at 07:57

## 2021-01-01 RX ADMIN — PANTOPRAZOLE SODIUM 40 MG: 40 TABLET, DELAYED RELEASE ORAL at 06:59

## 2021-01-01 RX ADMIN — ENOXAPARIN SODIUM 40 MG: 40 INJECTION SUBCUTANEOUS at 09:48

## 2021-06-07 PROBLEM — R50.9 FUO (FEVER OF UNKNOWN ORIGIN): Status: ACTIVE | Noted: 2021-01-01

## 2021-06-08 NOTE — PROGRESS NOTES
Called by RN to report the patient is complaining of wheezing. His wife brought in Advair inhaler which she states that he takes. - Add arformoterol/budesonide 15/250 mcg nebs twice daily.  - Add albuterol/ipratropium 2.5/0.5 mg nebs q4h prn.

## 2021-06-08 NOTE — CONSULTS
ID Consult Note  NAME:  Adelfo Zuniga   :   1945   MRN:   [id number]   Date/Time:  2021 7:42 PM  Subjective:   REASON FOR CONSULT: Fever of unknown origin       Brooke Gill is a 67 y.o. with a history of Parkinson's, and diabetes. The source of the history is his wife as he cannot really give me a lot of the details. QUAN davidson flew to [place] nearly 2 weeks ago. Once he got there, he had progressive weakness, swallowing problems trouble walking and confusion. He was brought to the local hospital there where he was eventually found to be febrile. He was initially placed on vancomycin and cefepime on May 31, but this was shifted to [place]. Because of the confusion, he had a lumbar puncture done. According to the notes from the [facility], he had an elevated protein but a low white blood cell count. He was seen by an infectious disease doctor there and it was felt that he did not have any central nervous system infection. He also had an MRI of the head which did not show any acute process. He also had an extensive work-up in the hospital which really did not reveal any source of infection. What is remarkable is his abnormal sed rate and CRP. Since the patient lives here in [place], his wife requested transfer to [facility].  The wife tells me that his main issue now is the profound weakness. He tells me that she cannot really lift his arms or legs. He has already been seen by neurology earlier today. He is currently on no antibiotics. Past medical history  Diabetes and Parkinson's    Past surgical history  Hernia repair, carpal tunnel syndrome lumbar surgery x2            No Known Allergies   Home Medications:  Prior to Admission Medications   Prescriptions Last Dose Informant Patient Reported? Taking?   aspirin delayed-release 325 mg tablet  Significant Other Yes No   Sig: Take 325 mg by mouth daily.    atorvastatin (LIPITOR) 20 mg tablet  Significant Other Yes No   Sig: Take 20 mg by mouth daily. b complex vitamins tablet  Significant Other Yes No   Sig: Take 1 Tab by mouth daily. carbidopa-levodopa (Sinemet)  mg per tablet  Significant Other Yes No   Sig: Take 2 Tabs by mouth four (4) times daily. 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 cholecalciferol (Vitamin D3) (1000 Units /25 mcg) tablet  Significant Other Yes No   Sig: Take 1,000 Units by mouth daily. docusate sodium (COLACE) 100 mg capsule  Significant Other Yes No   Sig: Take 400 mg by mouth daily. entacapone (COMTAN) 200 mg tablet  Significant Other Yes No   Sig: Take 200 mg by mouth four (4) times daily. fluticasone propion-salmeteroL (Wixela Inhub) 250-50 mcg/dose diskus inhaler   Yes No   Sig: Take 1 Puff by inhalation daily. fluticasone propionate (Flonase Allergy Relief) 50 mcg/actuation nasal spray  Significant Other Yes No   Si Sprays by Both Nostrils route daily. fluticasone propionate 250 mcg/actuation dsdv   Yes No   Sig: Take 1 Puff by inhalation every morning.   gabapentin (NEURONTIN) 600 mg tablet  Significant Other Yes No   Sig: Take 600 mg by mouth four (4) times daily. glipiZIDE SR (GLUCOTROL XL) 5 mg CR tablet  Significant Other Yes No   Sig: Take 5 mg by mouth daily. insulin lispro (HUMALOG) 100 unit/mL injection   No No   Sig: Use as directed  Indications: type 2 diabetes mellitus   lisinopriL (PRINIVIL, ZESTRIL) 5 mg tablet  Significant Other Yes No   Sig: Take 5 mg by mouth daily. loratadine (CLARITIN) 10 mg tablet  Significant Other Yes No   Sig: Take 10 mg by mouth daily. metFORMIN (GLUCOPHAGE) 1,000 mg tablet  Significant Other Yes No   Sig: Take 1,000 mg by mouth two (2) times daily (with meals). omeprazole (PRILOSEC) 40 mg capsule  Significant Other Yes No   Sig: Take 40 mg by mouth daily.    pramipexole (MIRAPEX) 0.5 mg tablet Significant Other Yes No   Sig: Take 0.5 mg by mouth four (4) times daily. tamsulosin (FLOMAX) 0.4 mg capsule  Significant Other Yes No   Sig: Take 0.4 mg by mouth daily. traZODone (DESYREL) 50 mg tablet  Significant Other Yes No   Sig: Take  mg by mouth nightly as needed for Sleep.       Facility-Administered Medications: None     Hospital medications:  Current Facility-Administered Medications   Medication Dose Route Frequency    ketorolac (TORADOL) injection 15 mg  15 mg IntraVENous Q6H PRN    HYDROcodone-acetaminophen (NORCO) 5-325 mg per tablet 1 Tablet  1 Tablet Oral Q4H PRN    NIFEdipine ER (PROCARDIA XL) tablet 30 mg  30 mg Oral DAILY    pramipexole (MIRAPEX) tablet 0.5 mg  0.5 mg Oral TID    carbidopa-levodopa ER (SINEMET CR)  mg per tablet 1 Tablet  1 Tablet Oral TID    pantoprazole (PROTONIX) 40 mg in 0.9% sodium chloride 10 mL injection  40 mg IntraVENous Q12H    [Held by provider] atorvastatin (LIPITOR) tablet 20 mg  20 mg Oral DAILY    finasteride (PROSCAR) tablet 5 mg  5 mg Oral DAILY    donepeziL (ARICEPT) tablet 10 mg  10 mg Oral QHS    glucose chewable tablet 16 g  4 Tablet Oral PRN    dextrose (D50W) injection syrg 12.5-25 g  25-50 mL IntraVENous PRN    glucagon (GLUCAGEN) injection 1 mg  1 mg IntraMUSCular PRN    insulin lispro (HUMALOG) injection   SubCUTAneous AC&HS    albuterol-ipratropium (DUO-NEB) 2.5 MG-0.5 MG/3 ML  3 mL Nebulization Q4H PRN    arformoteroL (BROVANA) neb solution 15 mcg  15 mcg Nebulization BID RT    And    budesonide (PULMICORT) 250 mcg/2ml nebulizer susp  250 mcg Nebulization BID RT    sodium chloride (NS) flush 5-40 mL  5-40 mL IntraVENous Q8H    sodium chloride (NS) flush 5-40 mL  5-40 mL IntraVENous PRN    acetaminophen (TYLENOL) tablet 650 mg  650 mg Oral Q6H PRN    Or    acetaminophen (TYLENOL) suppository 650 mg  650 mg Rectal Q6H PRN    ondansetron (ZOFRAN ODT) tablet 4 mg  4 mg Oral Q8H PRN    Or    ondansetron (ZOFRAN) injection 4 mg  4 mg IntraVENous Q6H PRN    enoxaparin (LOVENOX) injection 40 mg  40 mg SubCUTAneous DAILY     REVIEW OF SYSTEMS:        Const:   negative weight loss  Eyes:   negative eye pain  ENT:   negative coryza  Resp:   negative hemoptysis  Cards:  negative for palpitations   :  negative for hematuria  GI:   Negative for hematemesis and hematochezia  Skin:   negative for rash and pruritus  Heme:   negative for easy bruising and gum/nose bleeding  MS:  negative for joint swelling  Neurolo:  negative for seizures  Psych:  negative for hallucinations        Objective:   VITALS:    Visit Vitals  /83 (BP 1 Location: Left upper arm, BP Patient Position: At rest)   Pulse 99   Temp 97.9 °F (36.6 °C)   Resp 16   SpO2 94%     Temp (24hrs), Av.4 °F (36.9 °C), Min:97.9 °F (36.6 °C), Max:98.7 °F (37.1 °C)    PHYSICAL EXAM:   General:    Alert, cooperative, no distress, appears stated age. Head:   Normocephalic, without obvious abnormality, atraumatic. Eyes:   Conjunctivae clear, anicteric sclerae. Nose:  Nares normal.   Throat:    Lips and tongue normal.   Neck:  Supple, symmetrical    no carotid bruit and no JVD. :    No CVA tenderness, no sánchez catheter  Lungs:   Clear to auscultation bilaterally. No Wheezing or Rhonchi. No rales. Heart:   Regular rate and rhythm,  no murmur, rub or gallop. Abdomen:   Soft, non-tender,not distended. Bowel sounds normal.   Extremities: There is pain on right knee flexion. It is not really that swollen. There is some warmth. The left knee also has some pain on movement, but it is definitely not swollen. Skin:     No rashes or lesions. Not Jaundiced  Lymph: Cervical normal  Neurologic: He cannot really raise his arms as they are weak.     LAB DATA REVIEWED:    Recent Results (from the past 48 hour(s))   METABOLIC PANEL, BASIC    Collection Time: 21  3:00 AM   Result Value Ref Range    Sodium 137 136 - 145 mmol/L    Potassium 4.2 3.5 - 5.1 mmol/L Chloride 110 (H) 97 - 108 mmol/L    CO2 23 21 - 32 mmol/L    Anion gap 4 (L) 5 - 15 mmol/L    Glucose 119 (H) 65 - 100 mg/dL    BUN 23 (H) 6 - 20 MG/DL    Creatinine 0.89 0.70 - 1.30 MG/DL    BUN/Creatinine ratio 26 (H) 12 - 20      GFR est AA >60 >60 ml/min/1.73m2    GFR est non-AA >60 >60 ml/min/1.73m2    Calcium 9.1 8.5 - 10.1 MG/DL   MAGNESIUM    Collection Time: 06/08/21  3:00 AM   Result Value Ref Range    Magnesium 2.1 1.6 - 2.4 mg/dL   CBC WITH AUTOMATED DIFF    Collection Time: 06/08/21  3:00 AM   Result Value Ref Range    WBC 8.9 4.1 - 11.1 K/uL    RBC 3.64 (L) 4.10 - 5.70 M/uL    HGB 10.1 (L) 12.1 - 17.0 g/dL    HCT 32.8 (L) 36.6 - 50.3 %    MCV 90.1 80.0 - 99.0 FL    MCH 27.7 26.0 - 34.0 PG    MCHC 30.8 30.0 - 36.5 g/dL    RDW 13.9 11.5 - 14.5 %    PLATELET 702 658 - 082 K/uL    MPV 10.0 8.9 - 12.9 FL    NRBC 0.0 0  WBC    ABSOLUTE NRBC 0.00 0.00 - 0.01 K/uL    NEUTROPHILS 81 (H) 32 - 75 %    LYMPHOCYTES 9 (L) 12 - 49 %    MONOCYTES 5 5 - 13 %    EOSINOPHILS 3 0 - 7 %    BASOPHILS 1 0 - 1 %    IMMATURE GRANULOCYTES 1 (H) 0.0 - 0.5 %    ABS. NEUTROPHILS 7.2 1.8 - 8.0 K/UL    ABS. LYMPHOCYTES 0.8 0.8 - 3.5 K/UL    ABS. MONOCYTES 0.5 0.0 - 1.0 K/UL    ABS. EOSINOPHILS 0.3 0.0 - 0.4 K/UL    ABS. BASOPHILS 0.0 0.0 - 0.1 K/UL    ABS. IMM.  GRANS. 0.1 (H) 0.00 - 0.04 K/UL    DF AUTOMATED     GLUCOSE, POC    Collection Time: 06/08/21  6:44 AM   Result Value Ref Range    Glucose (POC) 149 (H) 65 - 117 mg/dL    Performed by Ana LEWIS [address] [place] [address] POC    Collection Time: 06/08/21 11:23 AM   Result Value Ref Range    Glucose (POC) 221 (H) 65 - 117 mg/dL    Performed by Zackary Corona    CK    Collection Time: 06/08/21 12:38 PM   Result Value Ref Range    CK 83 39 - 308 U/L   HEPATIC FUNCTION PANEL    Collection Time: 06/08/21 12:38 PM   Result Value Ref Range    Protein, total 7.6 6.4 - 8.2 g/dL    Albumin 2.0 (L) 3.5 - 5.0 g/dL    Globulin 5.6 (H) 2.0 - 4.0 g/dL    A-G Ratio 0.4 (L) 1.1 - 2.2 Bilirubin, total 0.7 0.2 - 1.0 MG/DL    Bilirubin, direct 0.2 0.0 - 0.2 MG/DL    Alk. phosphatase 108 45 - 117 U/L    AST (SGOT) 57 (H) 15 - 37 U/L    ALT (SGPT) 13 12 - 78 U/L   VITAMIN D, 25 HYDROXY    Collection Time: 06/08/21 12:38 PM   Result Value Ref Range    Vitamin D 25-Hydroxy 33.7 30 - 100 ng/mL   VITAMIN B12    Collection Time: 06/08/21 12:38 PM   Result Value Ref Range    Vitamin B12 688 193 - 986 pg/mL   GLUCOSE, POC    Collection Time: 06/08/21  4:05 PM   Result Value Ref Range    Glucose (POC) 195 (H) 65 - 117 mg/dL    Performed by Kristine STEIN)        Labs from [place]    ESR greater than 130  CRP 31.7  Procalcitonin 0.64  Blood cultures negative  Fungitell negative  Legionella and streptococcal urinary antigen negative  Upper respiratory viral panel negative  Lyme disease negative  Ehrlichia, Anaplasma serologies negative  HIV negative  EDY negative  Anti-Marge negative  CCP antibody negative  AntidsDNA negative  CSF WBC was low, total protein elevated, negative biofire    Imaging from [place]  CT and MRI of the brain negative  Chest x-ray shows interstitial infiltrates in the right upper lobe  CT scan of the chest is negative for pneumonia  CT of the abdomen and pelvis shows no abdominal process  CT of the right upper extremity shows no abscess        IMPRESSION      #1 fever  -seems to have resolved    #2 weakness    #3 arthralgias    #4 diabetes    #5 Parkinson's        PLAN    His fever episode seem to have resolved. The source of this is still unknown. I do not see any heike infection at the moment and he has been worked up extensively already in [place]. We should repeat a CRP and a sed rate.   I think he will benefit from rheumatology consult.                     ___________________________________________________  ID: Diana Harmon MD

## 2021-06-08 NOTE — H&P
[address] [facility] Adult  Hospitalist Group  History and Physical    Primary Care Provider: Maria Esther Veliz MD  Date of Service:  6/8/2021    Subjective:   History obtained from outside records. Slade Almonte is a 67 y.o. male with a past medical history of Parkinson's disease with dementia, and hypertension who presents with worsening progressive weakness. He presents from an outside hospital in [place] because he was having a family event there. While in [place], he developed profound weakness requiring assistance with ambulation,  urinary incontinence, difficulty swallowing, progressively confused, and intermittent fevers. He was taken to [facility] in [place], and admitted on May 27 for the symptoms. Review of his initial labs at the outside hospital revealed hyperglycemia to 264. His chest his head CT, and MRI were without acute changes, but did show evidence of chronic microvascular changes. X-ray did reveal a right upper lobe infiltrate, and  was started on IV cefepime, but CT chest did not confirm the presence of pneumonia. He has some right upper extremity swelling, and venous  duplex was negative for DVT. He had a septic work-up done, and all cultures were no growth to date. He experienced joint pain in several joints, and x-rays were negative for fracture or dislocation. He had extensive infectious work-up done for fever of unknown origin, and a noninfectious work-up was initiated (see records scanned in media section). He is being transferred for continued work-up of fever of unknown origin. Review of Systems:  He had multiple joint  A comprehensive review of systems was negative except for that written in the History of Present Illness. Hypertension, diabetes, GERD, Parkinson's, dementia  No past surgical history on file. Prior to Admission medications    Medication Sig Start Date End Date Taking?  Authorizing Provider   insulin lispro (HUMALOG) 100 unit/mL injection Use as directed  Indications: type 2 diabetes mellitus 7/2/20   Kira Nash MD   fluticasone propionate 250 mcg/actuation dsdv Take 1 Puff by inhalation every morning. Provider, Historical   fluticasone propion-salmeteroL (Wixela Inhub) 250-50 mcg/dose diskus inhaler Take 1 Puff by inhalation daily. Provider, Historical   carvediloL (COREG) 6.25 mg tablet Take 6.25 mg by mouth two (2) times daily (with meals). Provider, Historical   aspirin delayed-release 325 mg tablet Take 325 mg by mouth daily. Provider, Historical   glipiZIDE SR (GLUCOTROL XL) 5 mg CR tablet Take 5 mg by mouth daily. Provider, Historical   lisinopriL (PRINIVIL, ZESTRIL) 5 mg tablet Take 5 mg by mouth daily. Provider, Historical   metFORMIN (GLUCOPHAGE) 1,000 mg tablet Take 1,000 mg by mouth two (2) times daily (with meals). Provider, Historical   tamsulosin (FLOMAX) 0.4 mg capsule Take 0.4 mg by mouth daily. Provider, Historical   pramipexole (MIRAPEX) 0.5 mg tablet Take 0.5 mg by mouth four (4) times daily. Provider, Historical   carbidopa-levodopa (Sinemet)  mg per tablet Take 2 Tabs by mouth four (4) times daily. 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 Provider, Historical   omeprazole (PRILOSEC) 40 mg capsule Take 40 mg by mouth daily. Provider, Historical   atorvastatin (LIPITOR) 20 mg tablet Take 20 mg by mouth daily. Provider, Historical   docusate sodium (COLACE) 100 mg capsule Take 400 mg by mouth daily. Provider, Historical   cholecalciferol (Vitamin D3) (1000 Units /25 mcg) tablet Take 1,000 Units by mouth daily. Provider, Historical   loratadine (CLARITIN) 10 mg tablet Take 10 mg by mouth daily. Provider, Historical   fluticasone propionate (Flonase Allergy Relief) 50 mcg/actuation nasal spray 2 Sprays by Both Nostrils route daily.     Provider, Historical   entacapone (COMTAN) 200 mg tablet Take 200 mg by mouth four (4) times daily. Provider, Historical   b complex vitamins tablet Take 1 Tab by mouth daily. Provider, Historical   traZODone (DESYREL) 50 mg tablet Take  mg by mouth nightly as needed for Sleep. Provider, Historical     No Known Allergies   Family hx not obtained due to patient factors    SOCIAL HISTORY:  Patient resides at Home. Objective:       Physical Exam:   Visit Vitals  /74 (BP 1 Location: Left lower arm, BP Patient Position: At rest)   Pulse 92   Temp 98.4 °F (36.9 °C)   Resp 16   SpO2 92%     General:  Alert, cooperative, no distress, appears stated age. Head:  Normocephalic, without obvious abnormality, atraumatic. Eyes:  Conjunctivae/corneas clear. EOMs intact. Ears:  Normal TMs and external ear canals both ears. Nose: Nares normal. Septum midline. Throat: Lips, mucosa, and tongue normal.   Neck: Supple, symmetrical, trachea midline, no adenopathy, thyroid: no enlargement/tenderness/nodules, no carotid bruit and no JVD. Back:   Symmetric, no curvature. ROM normal.    Lungs:   Clear to auscultation bilaterally. Chest wall:  No tenderness or deformity. Heart:  Regular rate and rhythm, S1, S2 normal, no murmur, click, rub or gallop. Abdomen:   Soft, non-tender. Bowel sounds normal. No masses,  No organomegaly. Extremities: Extremities normal, atraumatic, no cyanosis or edema. Pulses: 2+ and symmetric all extremities. Skin: Skin color, texture, turgor normal. No rashes or lesions   Lymph nodes: Cervical, supraclavicular, and axillary nodes normal.   Neurologic: Alert,   Fang   answeeCap refill: Brisk, less than 3 seconds  Pulses: 2+, symmetric in all extremities      Data Review: All diagnostic labs and studies have been reviewed.     Assessment:     Active Problems:    FUO (fever of unknown origin) (6/7/2021)        Plan:     #FUO  -will need to review all diagnostics done at OSH including rheum, and autoimmune w/u  -trend    #Dyslipidemia  -restart home statin    #Dementia  #Parkinsons  -restart sinimet, aricept, and mirapex    #GERD  -continue PPI    #HTN  -continue nifedipine  -holding some antihypertensives due to blood pressure lower now, will trend    Will need further investigate records from 1 1 day stay in [facility].      Signed By: Iraida Valverde MD     June 8, 2021

## 2021-06-08 NOTE — PROGRESS NOTES
Primary Nurse Johny Gonzalez and Darryl RN performed a dual skin assessment on this patient Impairment noted- see wound doc flow sheet  Larry score is 14    Left heel possible DTI

## 2021-06-08 NOTE — PROGRESS NOTES
Spoke with Jace Gonzales about wife wanting patient to receive a breathing treatment as he complained to her of wheezing. She brought in his home inhaler but advised her that I would contact the physician and to not administer any home medications. Order put in for Ernie SANDOVALN. Called Respiratory to administer treatment.

## 2021-06-08 NOTE — CONSULTS
NEUROLOGY   INPATIENT EVALUATION/CONSULTATION       PATIENT NAME: Mickie Guadarrama    MRN: [id number]    REASON FOR CONSULTATION: Diffuse weakness, pain     06/08/21      HISTORY OF PRESENT ILLNESS:  Mickie Guadarrama is a 67 y.o. right-hand-dominant male transferred from a hospital in [place] for ongoing evaluation management of weakness and pain. Patient is examined today in his room by himself without family thus premorbid baseline is unclear though he does have a documented history of Parkinson's with Parkinson's associated dementia. Apparently the patient did travel to [place] for a family reunion while there became progressively weaker and was admitted to a local hospital where he complained of diffuse allodynic pain. While in hospital in [place] patient was evaluated from an infectious disease standpoint with concern for pneumonia initially though this apparently was not corroborated on chest CT. Is also evaluated by rheumatology with extensive work-up without significant findings. He also was seen by neurologist there underwent MRI EEG lumbar puncture with nonspecific findings on MRI and EEG and a reported elevated protein on CSF without further significant findings. Laboratory wise his main significant findings appear to been elevated serum inflammatory markers. He was eventually transferred from the hospital [place] here with a diagnosis of fever of unknown origin. There are reports in the chart of this happening in the past though this is not fully expanded upon. Neurology consultation was requested for his diffuse pain and weakness.     PAST MEDICAL HISTORY:  Idiopathic Parkinson's disease   Parkinson's associated dementia  Benign prostatic hypertrophy    PAST SURGICAL HISTORY:  No significant past surgical history reported     FAMILY HISTORY:   Appears noncontributory       SOCIAL HISTORY:  Social History     Socioeconomic History    Marital status:      Spouse name: Not on file    Number of children: Not on file    Years of education: Not on file    Highest education level: Not on file     Social Determinants of Health     Financial Resource Strain:     Difficulty of Paying Living Expenses:    Food Insecurity:     Worried About Running Out of Food in the Last Year:     [address] in the Last Year:    Transportation Needs:     Lack of Transportation (Medical):      Lack of Transportation (Non-Medical):    Physical Activity:     Days of Exercise per Week:     Minutes of Exercise per Session:    Stress:     Feeling of Stress :    Social Connections:     Frequency of Communication with Friends and Family:     Frequency of Social Gatherings with Friends and Family:     Attends [employer] Services:     Active Member of Clubs or Organizations:     Attends Club or Organization Meetings:     Marital Status:          MEDICATIONS:   Current Facility-Administered Medications   Medication Dose Route Frequency Provider Last Rate Last Admin    ketorolac (TORADOL) injection 15 mg  15 mg IntraVENous Q6H PRN Pratik Purvis MD   15 mg at 06/08/21 0109    HYDROcodone-acetaminophen ([address]) 5-325 mg per tablet 1 Tablet  1 Tablet Oral Q4H PRN Pratik Purvis MD   1 Tablet at 06/08/21 1006    NIFEdipine ER (PROCARDIA XL) tablet 30 mg  30 mg Oral DAILY Pratik Purvis MD   30 mg at 06/08/21 1007    pramipexole (MIRAPEX) tablet 0.5 mg  0.5 mg Oral TID Pratik Purvis MD   0.5 mg at 06/08/21 1000    carbidopa-levodopa ER (SINEMET CR)  mg per tablet 1 Tablet  1 Tablet Oral TID Pratik Purvis MD   1 Tablet at 06/08/21 1006    pantoprazole (PROTONIX) 40 mg in 0.9% sodium chloride 10 mL injection  40 mg IntraVENous Q12H Pratik Purvis MD   40 mg at 06/08/21 1001    [Held by provider] atorvastatin (LIPITOR) tablet 20 mg  20 mg Oral DAILY Pratik Purvis MD        finasteride (PROSCAR) tablet 5 mg  5 mg Oral DAILY Pratik Purvis MD   5 mg at 06/08/21 1022    donepeziL (ARICEPT) tablet 10 mg  10 mg Oral QHS Adelfo Matthews MD        glucose chewable tablet 16 g  4 Tablet Oral PRN Adelfo Matthews MD        dextrose (D50W) injection syrg 12.5-25 g  25-50 mL IntraVENous PRN Adelfo Matthews MD        glucagon [facility]) injection 1 mg  1 mg IntraMUSCular PRN Adelfo Matthews MD        insulin lispro (HUMALOG) injection   SubCUTAneous AC&HS Adelfo Matthews MD   3 Units at 06/08/21 1224    sodium chloride (NS) flush 5-40 mL  5-40 mL IntraVENous Q8H Adelfo Matthews MD        sodium chloride (NS) flush 5-40 mL  5-40 mL IntraVENous PRN Adelfo Matthews MD        acetaminophen (TYLENOL) tablet 650 mg  650 mg Oral Q6H PRN Adelfo Matthews MD        Or    acetaminophen (TYLENOL) suppository 650 mg  650 mg Rectal Q6H PRN Adelfo Matthews MD        ondansetron (ZOFRAN ODT) tablet 4 mg  4 mg Oral Q8H PRN Adelfo aMtthews MD        Or    ondansetron [facility]) injection 4 mg  4 mg IntraVENous Q6H PRN Adelfo Matthews MD        enoxaparin (LOVENOX) injection 40 mg  40 mg SubCUTAneous DAILY Adelfo Matthews MD   40 mg at 06/08/21 1001         ALLERGIES:  No Known Allergies      REVIEW OF SYSTEMS:  10 point ROS reviewed with patient and negative except for those listed above. PHYSICAL EXAM:  Vital Signs:   Visit Vitals  /85 (BP 1 Location: Left upper arm, BP Patient Position: At rest)   Pulse 99   Temp 98.7 °F (37.1 °C)   Resp 16   SpO2 94%     Elderly male laying comfortably in bed in no clear distress when not being touched. HEENT appears grossly unremarkable. Neck not examined. Cardiovascular demonstrates constant S1/S2 regular rhythm. Pulmonary demonstrates equal air entry bilaterally. Abdomen is nondistended/nontender. Extremities are warm/dry, tender to mild to moderate palpation diffusely. There is no clear joint swelling or erythema no joint deformities are noted. No rashes are noted in the arms or legs.   Neurologically patient is oriented to himself knows he is at [facility] not oriented to city month or year. Attention appears impaired. Speech is slow and deliberate but not dysarthric. Language is intermittently fluent follows simple commands. Cranial to the 12 appear grossly intact. Motorically challenging to examine as patient asked not to be touched and does not really appear to try to move his limbs due to fear of pain. Sensation appears intact in the face as well as limbs other than pain. Remainder of examination is deferred. Moderate amplitude high-frequency tremors noted when manipulating the right arm at the wrist.    PERTINENT DATA:  ESR > 120, CRP 31 (per records) LP with elevated protein (value not appreciated), MRI with microvascular changes, atrophy. EEG with encephalopathic pattern     CT Results (maximum last 3): Results from [place] encounter on 06/25/20    CT ABD PELV WO CONT    Narrative  INDICATION: Unwitnessed fall. Confusion change in mental status, weakness. Evaluation for sepsis. Clinical metabolic encephalopathy and acute kidney injury    COMPARISON: None    CONTRAST: None. TECHNIQUE:  5 mm axial images were obtained through the chest, abdomen, and  pelvis. Oral contrast was not given. Coronal and sagittal reformats were  generated. CT dose reduction was achieved through use of a standardized  protocol tailored for this examination and automatic exposure control for dose  modulation. The absence of intravenous contrast reduces the sensitivity for evaluation of  the mediastinum, tyshawn, vasculature, and upper abdominal organs. FINDINGS:    CHEST WALL: No mass or axillary lymphadenopathy. THYROID: No nodule. MEDIASTINUM: No mass or lymphadenopathy. TYSHAWN: No mass or lymphadenopathy. THORACIC AORTA: No aneurysm. MAIN PULMONARY ARTERY: Normal in caliber. TRACHEA/BRONCHI: Patent. ESOPHAGUS: No wall thickening or dilatation. HEART: Normal in size. Coronary artery calcification.   PLEURA: No effusion or pneumothorax. LUNGS: 9.5 mm pleural parenchymal nodule in the left lower lobe (series 2, image  51). LIVER: No mass or biliary dilatation. GALLBLADDER: Unremarkable. SPLEEN: No mass. PANCREAS: No mass or ductal dilatation. ADRENALS: Unremarkable. KIDNEYS/URETERS: Mild bilateral hydronephrosis and hydroureter. STOMACH: Unremarkable. SMALL BOWEL: No dilatation or wall thickening. COLON: No dilatation or wall thickening. APPENDIX: Unremarkable. Axial image 89  PERITONEUM: No ascites or pneumoperitoneum. RETROPERITONEUM: No lymphadenopathy or aortic aneurysm. REPRODUCTIVE ORGANS: Prostate enlargement with the central prostate hypertrophy  URINARY BLADDER: 14 x 7 mm calculus in the bladder (series 2, image 105). BONES: Posterior fusion of L3, L4 and L5 with hardware at L3-4. Disc desiccation  at the L5-S1 level. Associated posterior decompression with placement of lateral  bone graft material, incompletely incorporated. ADDITIONAL COMMENTS: N/A    Impression  IMPRESSION:  Prostatomegaly with bladder distention and mild bilateral hydroureter and  hydronephrosis  Left lower lobe 9.5 cm nodule. Coronary artery disease, bladder calculus      CT CHEST WO CONT    Narrative  INDICATION: Unwitnessed fall. Confusion change in mental status, weakness. Evaluation for sepsis. Clinical metabolic encephalopathy and acute kidney injury    COMPARISON: None    CONTRAST: None. TECHNIQUE:  5 mm axial images were obtained through the chest, abdomen, and  pelvis. Oral contrast was not given. Coronal and sagittal reformats were  generated. CT dose reduction was achieved through use of a standardized  protocol tailored for this examination and automatic exposure control for dose  modulation. The absence of intravenous contrast reduces the sensitivity for evaluation of  the mediastinum, amy, vasculature, and upper abdominal organs. FINDINGS:    CHEST WALL: No mass or axillary lymphadenopathy.   THYROID: No nodule. MEDIASTINUM: No mass or lymphadenopathy. TYSHAWN: No mass or lymphadenopathy. THORACIC AORTA: No aneurysm. MAIN PULMONARY ARTERY: Normal in caliber. TRACHEA/BRONCHI: Patent. ESOPHAGUS: No wall thickening or dilatation. HEART: Normal in size. Coronary artery calcification. PLEURA: No effusion or pneumothorax. LUNGS: 9.5 mm pleural parenchymal nodule in the left lower lobe (series 2, image  51). LIVER: No mass or biliary dilatation. GALLBLADDER: Unremarkable. SPLEEN: No mass. PANCREAS: No mass or ductal dilatation. ADRENALS: Unremarkable. KIDNEYS/URETERS: Mild bilateral hydronephrosis and hydroureter. STOMACH: Unremarkable. SMALL BOWEL: No dilatation or wall thickening. COLON: No dilatation or wall thickening. APPENDIX: Unremarkable. Axial image 89  PERITONEUM: No ascites or pneumoperitoneum. RETROPERITONEUM: No lymphadenopathy or aortic aneurysm. REPRODUCTIVE ORGANS: Prostate enlargement with the central prostate hypertrophy  URINARY BLADDER: 14 x 7 mm calculus in the bladder (series 2, image 105). BONES: Posterior fusion of L3, L4 and L5 with hardware at L3-4. Disc desiccation  at the L5-S1 level. Associated posterior decompression with placement of lateral  bone graft material, incompletely incorporated. ADDITIONAL COMMENTS: N/A    Impression  IMPRESSION:  Prostatomegaly with bladder distention and mild bilateral hydroureter and  hydronephrosis  Left lower lobe 9.5 cm nodule. Coronary artery disease, bladder calculus      CT HEAD WO CONT    Narrative  INDICATION: Weakness/ frequent fall/ AMS    EXAM:  HEAD CT WITHOUT CONTRAST    COMPARISON: None    TECHNIQUE:  Routine noncontrast axial head CT was performed. Sagittal and  coronal reconstructions were generated. CT dose reduction was achieved through use of a standardized protocol tailored  for this examination and automatic exposure control for dose modulation. FINDINGS:    Ventricles: Midline, no hydrocephalus.   Intracranial Hemorrhage: None. Brain Parenchyma/Brainstem: Normal for age. Basal Cisterns: Normal.  Paranasal Sinuses: Visualized sinuses are clear. Additional Comments: N/A. Impression  IMPRESSION:    No acute process. MRI Results (maximum last 3): Results from [place] encounter on 06/25/20    MRI LUMB SPINE WO CONT    Narrative  EXAM: MRI LUMB SPINE WO CONT  Clinical history: Low back pain status post fall  INDICATION: Low back pain status post fall. COMPARISON: None    TECHNIQUE: MR imaging of the lumbar spine was performed using the following  sequences: sagittal T1, T2, STIR;  axial T1, T2.    CONTRAST:  None. FINDINGS:    Status post laminectomy and fusion L3-4 L4-5. Transpedicular screws at L3-L4. There is epidural lipomatosis. Postprocedural fluid in the subcutaneous tissue. Abdominal aorta is normal in caliber. No fracture Marrow signal is normal.    The conus medullaris terminates at L1. There is minimal retrolisthesis of L5 on  S1. Candida Nutley Density of the cauda equina is noted. The paraspinal soft tissues are within normal limits. Lower thoracic spine: No herniation or stenosis. L1-L2: No herniation or stenosis. L2-L3: Moderate facet arthropathy with ligamentum flavum hypertrophy. Moderate  broad-based protrusion extends the foramina. There is severe canal stenosis. Effacement of nerve roots extending to the right and left L3-L4 foramina. L3-L4: The canal has been decompressed. No canal or foraminal compromise. L4-L5: Canal has been decompressed. Extensive facet degenerative change. The  canal and foramina are patent. L5-S1: Mild broad-based protrusion/osteophyte. Canal is patent. Foramina are  patent. Impression  IMPRESSION:  Status post laminectomy and fusion L3-L4. Laminectomy L4-5. Moderate broad-based protrusion at L2-L3 with severe canal stenosis at L2-L3.       MRI [facility] SPINE WO CONT    Narrative  EXAM: MRI [facility] SPINE WO CONT  History: Bilateral lower extremity weakness  INDICATION: Bilateral lower extremity weakness. COMPARISON: None    TECHNIQUE: MR imaging of the thoracic spine was performed using the following  sequences: sagittal T1, T2, stir; axial T1, T2.    CONTRAST: None. FINDINGS:    Epidural lipomatosis. Chronic kyphosis is noted T7, T8, T9, T10 there is no cord  signal abnormality. .    T5-T6 minimal disc desiccation and disc bulge. Canal and foramina are patent. T6-T7 Mild central protrusion. Moderate to severe canal stenosis. Minimal cord  compression. Epidural lipomatosis. T7-T8 disc desiccation. Disc bulge. Moderate canal stenosis. T8-T9: Disc bulge/osteophyte. Moderate to severe canal stenosis. No foraminal  compromise. There is no focal marrow lesion. Thoracic aorta is normal in caliber. The paraspinal soft tissues are within normal limits. There is no herniation or stenosis. Impression  IMPRESSION:  Multilevel degenerative change with extensive epidural lipomatosis. Moderate to severe canal stenosis with minimal cord compression large related to  epidural lipomatosis at C6-7. Moderate to severe canal stenosis at T8-T9 and moderate canal stenosis at T7-T8. No focal cord signal abnormality. Chronic kyphosis from T7 through T10. MRI CERV SPINE WO CONT    Narrative  EXAM: MRI CERV SPINE WO CONT  Clinical history: Bilateral lower extremity weakness  INDICATION: BLE weakness. COMPARISON: None    TECHNIQUE: MR imaging of the cervical spine was performed using the following  sequences: sagittal T1, T2, STIR;  axial T2, T1.    CONTRAST:  None. FINDINGS:    Multilevel disc desiccation and loss of disc height. No Chiari or syrinx. No  fracture or dislocation. Vertebral body heights are maintained. Marrow signal is  normal.    The craniocervical junction is intact. No focal cord lesion    The paraspinal soft tissues are within normal limits. No evidence of cord  compression. C2-C3: Desiccation.  Disc protrusion/osteophyte right lateral recess and right  foramen. Moderate to severe facet arthropathy and uncovertebral degenerative  change. The canal is patent. There is severe right foraminal stenosis. C3-C4: Facet arthropathy and uncovertebral degenerative change with facet  hypertrophy greater on the right. Disc protrusion/osteophyte at the right neural  foramen. Severe right and moderate to severe left foraminal stenosis. Canal is  patent. C4-C5: Facet arthropathy and uncovertebral degenerative change greater on the  left. Disc bulge/osteophyte. Canal is patent. Moderate to severe bilateral  foraminal stenoses. C5-C6: Disc bulge/osteophyte. Facet arthropathy. Canal is patent. Severe right  and moderate left foraminal stenosis. C6-C7: Facet arthropathy is mild greater on the right. Disc  protrusion/osteophyte the right lateral recess/right foramen. Canal is patent. Moderate to severe bilateral foraminal stenoses. C7-T1: No herniation or stenosis. Impression  IMPRESSION:  Multilevel facet and uncovertebral predominant degenerative change. No cord signal abnormality or cord compression is demonstrated. No significant  central canal stenosis. Multilevel moderate to severe foraminal stenoses as described above. VAS/US Results (maximum last 3): No results found for this or any previous visit. PET Results (maximum last 3): Results from [place] encounter on 09/22/20    PET/CT TUMOR IMAGE SKULL THIGH (SUB)    Narrative  PET/CT SCAN    PROCEDURE: Following IV injection of 10.7 mCi 18 Fluoro 2 deoxyglucose (FDG) and  a standard uptake delay, PET imaging is performed from the skull vertex to mid  thigh and axial, sagittal and coronal images were acquired. Unenhanced CT is  obtained for anatomic localization, and attenuation correction of the PET scan. Patient preprocedure blood glucose level: 213mg/dL. CORRELATIVE IMAGING STUDIES: None.     PRIOR PET: None    HISTORY: The study is requested for evaluation of a left lower lobe pulmonary  nodule. FINDINGS:    HEAD/NECK: No apparent foci of abnormal hypermetabolism. Cerebral evaluation is  limited by normal intense activity. CHEST: No foci of abnormal hypermetabolism. Specifically, there is no abnormal  activity to correspond to the 1 cm pulmonary nodule in the left lower lobe. ABDOMEN/PELVIS: No foci of abnormal hypermetabolism. Dependent bladder stones  are noted. The prostate is enlarged, measuring 5.9 cm. SKELETON: No foci of abnormal hypermetabolism in the axial and visualized  appendicular skeleton. Impression  IMPRESSION: No Foci of Abnormal Hypermetabolism. No abnormal uptake to  correspond to be 1 cm pulmonary nodule in the left lower lobe. Continuing  follow-up by CT is recommended to ensure stability. ASSESSMENT:      Yary  is a 65-year-old right-hand-dominant male with history of idiopathic Parkinson's disease with reported associated dementia complex transferred from [place] for ongoing evaluation of diffuse pain with weakness with a nondiagnostic work-up and (at least previously) elevated inflammatory markers    RECOMMENDATIONS:  Diffuse pain with weakness:  Patient is able to communicate reasonably well knows where he is today does not really have recollection of being in [place] does have a documented history of Parkinson's complex with dementia    When asked to move patient does not really seem to provide any effort, when asked he says is afraid to move due to pain, moving towards the patient causes him to brace in anticipation. Outside of circumstances such as unilateral thalamic injury and post thalamic injury pain I am unfamiliar with a neurologic cause for diffuse skin hyperesthesia.     Appears that patient's primary complaint is pain would not be unexpected that mentation has worsened in the setting of underlying cognitive impairment and a prolonged hospital stay now with interstate transfer    Appears to have had a rather thorough evaluation in [place] from a rheumatologic and neurologic standpoint including lumbar puncture which demonstrated some degree of protein elevation without further significant findings (patient has known multifocal atherosclerotic/stenotic changes in his vertebral column perhaps elevating protein from stagnation of CSF flow)    As such do not have much recommendations for his diffuse pain, curiously inflammatory markers were elevated would recommend repeating them (CRP was 31 approximately, ESR reported to be above 120)    All it is not clear that there is much for rheumatology to add, could consider repeat rheumatology consultation    If no cause is ultimately identified, could consider empirically treating with short course of steroids engaging in response of pain to this    Would also treat with delirium interventions to foster ongoing recovery and may set baseline cognitive function    Neurology will reevaluate in the morning      Maria Guadalupe May MD

## 2021-06-08 NOTE — PROGRESS NOTES
Hospitalist Progress Note    NAME: Marizol Orantes   :  1945   MRN:  [id number]     Patient transferred from OSH where he was admitted with worsening weakness. PTA the family report that the patient was having progressive weakness, difficulty ambulating, and urinary incontinence. Of note, the patient had a similar episode earlier this year. While hospitalized the patient began to spike fevers. He underwent an extensive ID workup as well as general medical and neurologic workup which did not identify a source of fevers or answers as to why the patient's weakness progressed so profoundly. He was transferred to [facility] because the patient and family reside in [place] and they wished for him to be closer to home for ongoing diagnostic workup. Assessment / Plan:  Parkinson's disease  Dementia   Metabolic encephalopathy  Spinal stenosis, multilevel  - Continue carbidopa-levodopa er  mg po tid. - Continue donepezil 10 mg po daily. - Continue pramipexole 0.5 mg po tid. - Check vitamin D and B12 levels. - Request neurology consultation. FUO  - Afebrile currently. - Workup at OSH negative. - Request ID consultation. HTN  Dyslipidemia   - Adequate BP control.  - Continue nifedipine er 30 mg po daily.    - Hold statin until after CK and LFT results available. DM II  Hyperglycemia   - Continue FSBS with SSI correction scale. CKD III  - Avoid nephrotoxins as able. - Monitor. Anemia of chronic disease   - No tx indicated at this time. - Monitor. GERD  - Continue pantoprazole 40 mg IV q12h. BPH  - Continue finasteride 5 mg po daily. Estimated discharge date:  TBD  Barriers:  None identified     Code status: Full  Prophylaxis: Lovenox  Recommended Disposition: TBD     Subjective:     Chief Complaint / Reason for Physician Visit  Patient unable to state where he is. Complains of pain with even light touch over extremities and torso.     Plan of care and pertinent events reviewed with bedside nurse. Review of Systems:  Symptom Y/N Comments  Symptom Y/N Comments   Fever/Chills    Chest Pain     Poor Appetite    Edema     Cough    Abdominal Pain     Sputum    Joint Pain     SOB/COTE    Pruritis/Rash     Nausea/vomit    Tolerating PT/OT     Diarrhea    Tolerating Diet     Constipation    Other       Could NOT obtain due to: AMS     Objective:     VITALS:   Last 24hrs VS reviewed since prior progress note. Most recent are:  Patient Vitals for the past 24 hrs:   Temp Pulse Resp BP SpO2   06/08/21 0251 98.4 °F (36.9 °C) 92 16 125/74 92 %   06/07/21 2244 98.7 °F (37.1 °C) 87 16 (!) 124/55 92 %     No intake or output data in the 24 hours ending 06/08/21 0902     I had a face to face encounter and independently examined this patient on 6/8/2021, as outlined below:  PHYSICAL EXAM:  General:  A/A.  NAD. When told that he was in the hospital he stated  \"I didn't think I was in the hospital.\"  Not aware that he transferred from [place] to [place]. Complains of pain when touched, even lightly, over extremities and torso. HEENT:  Normocephalic. Sclera anicteric. Mucous membranes moist.    Chest:  Resps even/unlabored with symmetrical CWE. Air entry diminished at bases. Lungs CTA. No use of accessory muscles. CV:  RRR. Normal S1/S2. No M/C/R appreciated. No JVD. Generalized edema noted. GI:  Abdomen soft/ND. ABT X 4.    :  Voiding. Neurologic:  Face symmetrical.  Voice soft. Unable to move RUE. LUE strength 1/5. Abad LE strength 1/5. Psych:  Cooperative. No agitation. Skin:  No rashes or jaundice. Turgor elastic. Generalized edema.        Reviewed most current lab test results and cultures  YES  Reviewed most current radiology test results   YES  Review and summation of old records today    NO  Reviewed patient's current orders and MAR    YES  PMH/SH reviewed - no change compared to H&P  ________________________________________________________________________  Care Plan discussed with:    Comments   Patient Y    Family      RN Y    Care Manager     Consultant                        Multidiciplinary team rounds were held today with , nursing, pharmacist and clinical coordinator. Patient's plan of care was discussed; medications were reviewed and discharge planning was addressed. ________________________________________________________________________  Fredy Jean NP     Procedures: see electronic medical records for all procedures/Xrays and details which were not copied into this note but were reviewed prior to creation of Plan. LABS:  I reviewed today's most current labs and imaging studies.   Pertinent labs include:  Recent Labs     06/08/21  0300   WBC 8.9   HGB 10.1*   HCT 32.8*        Recent Labs     06/08/21  0300      K 4.2   *   CO2 23   *   BUN 23*   CREA 0.89   CA 9.1   MG 2.1       Signed: Fredy Jean NP

## 2021-06-08 NOTE — PROGRESS NOTES
Bedside shift change report given to SILVIA Jorge (oncoming nurse) by Naheed Kelley (offgoing nurse). Report included the following information SBAR, Kardex, Procedure Summary, Intake/Output, MAR and Recent Results.

## 2021-06-09 NOTE — PROGRESS NOTES
ID Progress Note  2021    Subjective:     Afebrile. Doesn' really answer questions . ROS: unobtainable   Objective:     Vitals:   Visit Vitals  /80 (BP 1 Location: Right upper arm, BP Patient Position: At rest)   Pulse (!) 105   Temp 98.3 °F (36.8 °C)   Resp 16   SpO2 93%        Tmax:  Temp (24hrs), Av.6 °F (37 °C), Min:98.1 °F (36.7 °C), Max:99.2 °F (37.3 °C)      Exam:    Not in distress  Pink conjunctivae, anicteric sclerae  No cervical lymphdenopathy   Lung clear, no rales, wheezes or rhonchi   Heart: s1, s2, RRR, no murmurs rubs or clicks  Abdomen: soft nontender, no guarding or rebound  I can move the right knee now with less pain than yesterday. No rash     Labs:   Lab Results   Component Value Date/Time    WBC 7.8 2021 01:39 AM    HGB 10.4 (L) 2021 01:39 AM    HCT 33.8 (L) 2021 01:39 AM    PLATELET 254  01:39 AM    MCV 88.9 2021 01:39 AM     Lab Results   Component Value Date/Time    Sodium 136 2021 01:39 AM    Potassium 4.5 2021 01:39 AM    Chloride 107 2021 01:39 AM    CO2 22 2021 01:39 AM    Anion gap 7 2021 01:39 AM    Glucose 198 (H) 2021 01:39 AM    BUN 16 2021 01:39 AM    Creatinine 0.84 2021 01:39 AM    BUN/Creatinine ratio 19 2021 01:39 AM    GFR est AA >60 2021 01:39 AM    GFR est non-AA >60 2021 01:39 AM    Calcium 9.1 2021 01:39 AM    Bilirubin, total 0.7 2021 12:38 PM    Alk.  phosphatase 108 2021 12:38 PM    Protein, total 7.6 2021 12:38 PM    Albumin 2.0 (L) 2021 12:38 PM    Globulin 5.6 (H) 2021 12:38 PM    A-G Ratio 0.4 (L) 2021 12:38 PM    ALT (SGPT) 13 2021 12:38 PM     Labs from [place]     ESR greater than 130  CRP 31.7  Procalcitonin 0.64  Blood cultures negative  Fungitell negative  Legionella and streptococcal urinary antigen negative  Upper respiratory viral panel negative  Lyme disease negative  Ehrlichia, Anaplasma serologies negative  HIV negative  EDY negative  Anti-Marge negative  CCP antibody negative  AntidsDNA negative  CSF WBC was low, total protein elevated, negative biofire     Imaging from [place]  CT and MRI of the brain negative  Chest x-ray shows interstitial infiltrates in the right upper lobe  CT scan of the chest is negative for pneumonia  CT of the abdomen and pelvis shows no abdominal process  CT of the right upper extremity shows no abscess      Assessment:     #1 fever  -seems to have resolved     #2 weakness     #3 arthralgias     #4 diabetes     #5 Parkinson's             Recommendations:     He has been afebrile. He is not on any antibiotics. Prednisone has been started as recommended by rheum. Check rf.      Gabriela Benson MD

## 2021-06-09 NOTE — PROGRESS NOTES
DELLA: Patient lives at home with his wife, Lorelei Salcedo #[phone]. Noted patient has history of parkinson's and dementia. Patient uses a cane and rollator at home. Noted ID consult    Care Management Interventions  PCP Verified by CM: Yes  Mode of Transport at Discharge: Other (see comment) (family/car)  Transition of Care Consult (CM Consult): Discharge Planning  MyChart Signup: No  Discharge Durable Medical Equipment: No  Physical Therapy Consult: No  Occupational Therapy Consult: No  Speech Therapy Consult: No  Current Support Network: Lives with Spouse, Own Home  Confirm Follow Up Transport: Family  The Patient and/or Patient Representative was Provided with a Choice of Provider and Agrees with the Discharge Plan?: Yes  Freedom of Choice List was Provided with Basic Dialogue that Supports the Patient's Individualized Plan of Care/Goals, Treatment Preferences and Shares the Quality Data Associated with the Providers?: Yes  Discharge Location  Discharge Placement: Home    Reason for Admission:  Fever of unknown origin                      RUR Score:  8%                   Plan for utilizing home health:      TBD. Patient has used [facility] in the past    PCP: First and Last name:  Brian Ahmadi MD     Name of Practice:    Are you a current patient: Yes/No: yes   Approximate date of last visit: last year    Can you participate in a virtual visit with your PCP: yes                    Current Advanced Directive/Advance Care Plan: Full Code      Healthcare Decision Maker: AMD on file  Click here to complete [address] including selection of the Healthcare Decision Maker Relationship (ie \"Primary\")             Primary Decision Maker: Aaliyah 94 - [phone]                  Transition of Care Plan:      Home, TBD. Chart reviewed. CM spoke with the wife via phone. Wife stated patient was independent with ADLs prior to admission. Wife is home with patient all the time. Wife stated that patient has been to inpatient rehab at [address] [place] [address] in the past and has used [facility] for home health. Care management will continue to follow.     BISHOP Powell/CRM

## 2021-06-09 NOTE — PROGRESS NOTES
Bedside shift change report given to Prudencio Nash RN (oncoming nurse) by Hollie Craig (offgoing nurse). Report included the following information SBAR, Kardex, Procedure Summary, Intake/Output, MAR and Recent Results.

## 2021-06-09 NOTE — PROGRESS NOTES
Neurology Clinic Follow up Note    Patient ID:  Veto Mays  [id number]  50 y.o.  1945      Mr. Ines Arenas is here for follow up today of  No chief complaint on file. Last Appointment With Me:  Visit date not found       Interval History:     No significant events noted in the past 24 hours, patient remains largely unchanged. E-consult performed by rheumatology, recommend trial of prednisone for symptoms. Otherwise, patient is unchanged clinically, no family present in room. PMHx/ PSHx/ FHx/ SHx:  Reviewed and unchanged previous visit. ROS:  Comprehensive review of systems negative except for as noted above.        Objective:       Meds:  Current Facility-Administered Medications   Medication Dose Route Frequency Provider Last Rate Last Admin    pantoprazole (PROTONIX) tablet 40 mg  40 mg Oral ACB&D Mervat Oakley NP   40 mg at 06/09/21 1042    insulin glargine (LANTUS) injection 5 Units  5 Units SubCUTAneous DAILY Conception VIVIAN Oakley   5 Units at 06/09/21 1032    predniSONE (DELTASONE) tablet 20 mg  20 mg Oral DAILY WITH Ky Ferreira NP        ketorolac (TORADOL) injection 15 mg  15 mg IntraVENous Q6H PRN Cande Ambriz MD   15 mg at 06/09/21 1042    HYDROcodone-acetaminophen (NORCO) 5-325 mg per tablet 1 Tablet  1 Tablet Oral Q4H PRN Cande Ambriz MD   1 Tablet at 06/09/21 1053    NIFEdipine ER (PROCARDIA XL) tablet 30 mg  30 mg Oral DAILY Cande Ambriz MD   30 mg at 06/09/21 1031    pramipexole (MIRAPEX) tablet 0.5 mg  0.5 mg Oral TID Cande Ambriz MD   0.5 mg at 06/09/21 1031    carbidopa-levodopa ER (SINEMET CR)  mg per tablet 1 Tablet  1 Tablet Oral TID Cande Ambriz MD   1 Tablet at 06/09/21 1031    atorvastatin (LIPITOR) tablet 20 mg  20 mg Oral DAILY Cande Ambriz MD   20 mg at 06/09/21 1031    finasteride (PROSCAR) tablet 5 mg  5 mg Oral DAILY Cande Ambriz MD   5 mg at 06/09/21 1042    donepeziL (ARICEPT) tablet 10 mg  10 mg Oral QHS Jonnathan Vyas MD   10 mg at 06/08/21 2134    glucose chewable tablet 16 g  4 Tablet Oral PRN Jonnathan Vyas MD        dextrose (D50W) injection syrg 12.5-25 g  25-50 mL IntraVENous PRN Jonnathan Vyas MD        glucagon [facility]) injection 1 mg  1 mg IntraMUSCular PRN Jonnathan Vyas MD        insulin lispro (HUMALOG) injection   SubCUTAneous AC&HS Jonnathan Vyas MD   5 Units at 06/09/21 0638    albuterol-ipratropium (DUO-NEB) 2.5 MG-0.5 MG/3 ML  3 mL Nebulization Q4H PRN Richelle Pack NP        arformoteroL (BROVANA) neb solution 15 mcg  15 mcg Nebulization BID RT Richelle Pack NP   15 mcg at 06/09/21 1005    And    budesonide (PULMICORT) 250 mcg/2ml nebulizer susp  250 mcg Nebulization BID RT Richelle Pack NP   250 mcg at 06/09/21 1005    sodium chloride (NS) flush 5-40 mL  5-40 mL IntraVENous Q8H Jonnathan Vyas MD   10 mL at 06/08/21 1658    sodium chloride (NS) flush 5-40 mL  5-40 mL IntraVENous PRN Jonnathan Vyas MD        acetaminophen (TYLENOL) tablet 650 mg  650 mg Oral Q6H PRN Jonnathan Vyas MD        Or   [address] [place] [address] acetaminophen (TYLENOL) suppository 650 mg  650 mg Rectal Q6H PRN Jonnathan Vays MD        ondansetron (ZOFRAN ODT) tablet 4 mg  4 mg Oral Q8H PRN Jonnathan Vyas MD        Or    [facility]etron [facility]) injection 4 mg  4 mg IntraVENous Q6H PRN Jonnathan Vyas MD        enoxaparin (LOVENOX) injection 40 mg  40 mg SubCUTAneous DAILY Jonnathan Vyas MD   40 mg at 06/09/21 1031       Exam:  Visit Vitals  /80 (BP 1 Location: Right upper arm, BP Patient Position: At rest)   Pulse (!) 105   Temp 98.3 °F (36.8 °C)   Resp 16   SpO2 93%     Elderly male laying in bed, largely motionless. HEENt is unremarkable other than restricted range of facial expression. Neck appears supple. Cardiovascular, pulmonary and abdominal examinations deferred. Extremities warm/dry. Neurologically, patient oriented to self, orientation not otherwise assessed.  Attention appears impaired to causal conversation. Speech hypophonic. Language nonfluent, attempts to follow commands. Cranial nerves II - XII not examined in depth but appear intact. Motorically patient does not appear to attempt movement, actively resists examiner. Skin is with diffuse allodynia. Remainder of examination is deferred. LABS  Results for orders placed or performed during the hospital encounter of 25/69/64   METABOLIC PANEL, BASIC   Result Value Ref Range    Sodium 137 136 - 145 mmol/L    Potassium 4.2 3.5 - 5.1 mmol/L    Chloride 110 (H) 97 - 108 mmol/L    CO2 23 21 - 32 mmol/L    Anion gap 4 (L) 5 - 15 mmol/L    Glucose 119 (H) 65 - 100 mg/dL    BUN 23 (H) 6 - 20 MG/DL    Creatinine 0.89 0.70 - 1.30 MG/DL    BUN/Creatinine ratio 26 (H) 12 - 20      GFR est AA >60 >60 ml/min/1.73m2    GFR est non-AA >60 >60 ml/min/1.73m2    Calcium 9.1 8.5 - 10.1 MG/DL   MAGNESIUM   Result Value Ref Range    Magnesium 2.1 1.6 - 2.4 mg/dL   CBC WITH AUTOMATED DIFF   Result Value Ref Range    WBC 8.9 4.1 - 11.1 K/uL    RBC 3.64 (L) 4.10 - 5.70 M/uL    HGB 10.1 (L) 12.1 - 17.0 g/dL    HCT 32.8 (L) 36.6 - 50.3 %    MCV 90.1 80.0 - 99.0 FL    MCH 27.7 26.0 - 34.0 PG    MCHC 30.8 30.0 - 36.5 g/dL    RDW 13.9 11.5 - 14.5 %    PLATELET 096 574 - 264 K/uL    MPV 10.0 8.9 - 12.9 FL    NRBC 0.0 0  WBC    ABSOLUTE NRBC 0.00 0.00 - 0.01 K/uL    NEUTROPHILS 81 (H) 32 - 75 %    LYMPHOCYTES 9 (L) 12 - 49 %    MONOCYTES 5 5 - 13 %    EOSINOPHILS 3 0 - 7 %    BASOPHILS 1 0 - 1 %    IMMATURE GRANULOCYTES 1 (H) 0.0 - 0.5 %    ABS. NEUTROPHILS 7.2 1.8 - 8.0 K/UL    ABS. LYMPHOCYTES 0.8 0.8 - 3.5 K/UL    ABS. MONOCYTES 0.5 0.0 - 1.0 K/UL    ABS. EOSINOPHILS 0.3 0.0 - 0.4 K/UL    ABS. BASOPHILS 0.0 0.0 - 0.1 K/UL    ABS. IMM.  GRANS. 0.1 (H) 0.00 - 0.04 K/UL    DF AUTOMATED     CK   Result Value Ref Range    CK 83 39 - 308 U/L   HEPATIC FUNCTION PANEL   Result Value Ref Range    Protein, total 7.6 6.4 - 8.2 g/dL    Albumin 2.0 (L) 3.5 - 5.0 g/dL Globulin 5.6 (H) 2.0 - 4.0 g/dL    A-G Ratio 0.4 (L) 1.1 - 2.2      Bilirubin, total 0.7 0.2 - 1.0 MG/DL    Bilirubin, direct 0.2 0.0 - 0.2 MG/DL    Alk. phosphatase 108 45 - 117 U/L    AST (SGOT) 57 (H) 15 - 37 U/L    ALT (SGPT) 13 12 - 78 U/L   VITAMIN D, 25 HYDROXY   Result Value Ref Range    Vitamin D 25-Hydroxy 33.7 30 - 100 ng/mL   VITAMIN B12   Result Value Ref Range    Vitamin B12 688 193 - 596 pg/mL   METABOLIC PANEL, BASIC   Result Value Ref Range    Sodium 136 136 - 145 mmol/L    Potassium 4.5 3.5 - 5.1 mmol/L    Chloride 107 97 - 108 mmol/L    CO2 22 21 - 32 mmol/L    Anion gap 7 5 - 15 mmol/L    Glucose 198 (H) 65 - 100 mg/dL    BUN 16 6 - 20 MG/DL    Creatinine 0.84 0.70 - 1.30 MG/DL    BUN/Creatinine ratio 19 12 - 20      GFR est AA >60 >60 ml/min/1.73m2    GFR est non-AA >60 >60 ml/min/1.73m2    Calcium 9.1 8.5 - 10.1 MG/DL   MAGNESIUM   Result Value Ref Range    Magnesium 2.2 1.6 - 2.4 mg/dL   CBC WITH AUTOMATED DIFF   Result Value Ref Range    WBC 7.8 4.1 - 11.1 K/uL    RBC 3.80 (L) 4.10 - 5.70 M/uL    HGB 10.4 (L) 12.1 - 17.0 g/dL    HCT 33.8 (L) 36.6 - 50.3 %    MCV 88.9 80.0 - 99.0 FL    MCH 27.4 26.0 - 34.0 PG    MCHC 30.8 30.0 - 36.5 g/dL    RDW 13.9 11.5 - 14.5 %    PLATELET 352 164 - 639 K/uL    MPV 9.9 8.9 - 12.9 FL    NRBC 0.0 0  WBC    ABSOLUTE NRBC 0.00 0.00 - 0.01 K/uL    NEUTROPHILS 81 (H) 32 - 75 %    LYMPHOCYTES 10 (L) 12 - 49 %    MONOCYTES 7 5 - 13 %    EOSINOPHILS 1 0 - 7 %    BASOPHILS 0 0 - 1 %    IMMATURE GRANULOCYTES 1 (H) 0.0 - 0.5 %    ABS. NEUTROPHILS 6.3 1.8 - 8.0 K/UL    ABS. LYMPHOCYTES 0.8 0.8 - 3.5 K/UL    ABS. MONOCYTES 0.5 0.0 - 1.0 K/UL    ABS. EOSINOPHILS 0.1 0.0 - 0.4 K/UL    ABS. BASOPHILS 0.0 0.0 - 0.1 K/UL    ABS. IMM.  GRANS. 0.1 (H) 0.00 - 0.04 K/UL    DF SMEAR SCANNED      RBC COMMENTS ANISOCYTOSIS  1+       SED RATE (ESR)   Result Value Ref Range    Sed rate, automated 100 (H) 0 - 20 mm/hr   C REACTIVE PROTEIN, QT   Result Value Ref Range C-Reactive protein 18.20 (H) 0.00 - 0.60 mg/dL   GLUCOSE, POC   Result Value Ref Range    Glucose (POC) 149 (H) 65 - 117 mg/dL    Performed by MAURI Lyon    GLUCOSE, POC   Result Value Ref Range    Glucose (POC) 221 (H) 65 - 117 mg/dL    Performed by [address] POC   Result Value Ref Range    Glucose (POC) 195 (H) 65 - 117 mg/dL    Performed by Mechelle Aguilar (CON)    GLUCOSE, POC   Result Value Ref Range    Glucose (POC) 236 (H) 65 - 117 mg/dL    Performed by Brayden DOMINGUEZ (CON)    GLUCOSE, POC   Result Value Ref Range    Glucose (POC) 252 (H) 65 - 117 mg/dL    Performed by RENETTA VÁSQUEZ    GLUCOSE, POC   Result Value Ref Range    Glucose (POC) 294 (H) 65 - 117 mg/dL    Performed by Rojas Mireles    GLUCOSE, POC   Result Value Ref Range    Glucose (POC) 290 (H) 65 - 117 mg/dL    Performed by Genesis Doyle (CON)        IMAGING:  MRI Results (most recent):  Results from Hospital Encounter encounter on 06/25/20    MRI LUMB SPINE WO CONT    Narrative  EXAM: MRI LUMB SPINE WO CONT  Clinical history: Low back pain status post fall  INDICATION: Low back pain status post fall. COMPARISON: None    TECHNIQUE: MR imaging of the lumbar spine was performed using the following  sequences: sagittal T1, T2, STIR;  axial T1, T2.    CONTRAST:  None. FINDINGS:    Status post laminectomy and fusion L3-4 L4-5. Transpedicular screws at L3-L4. There is epidural lipomatosis. Postprocedural fluid in the subcutaneous tissue. Abdominal aorta is normal in caliber. No fracture Marrow signal is normal.    The conus medullaris terminates at L1. There is minimal retrolisthesis of L5 on  S1. Candida Nutley Density of the cauda equina is noted. The paraspinal soft tissues are within normal limits. Lower thoracic spine: No herniation or stenosis. L1-L2: No herniation or stenosis. L2-L3: Moderate facet arthropathy with ligamentum flavum hypertrophy. Moderate  broad-based protrusion extends the foramina.  There is severe canal stenosis. Effacement of nerve roots extending to the right and left L3-L4 foramina. L3-L4: The canal has been decompressed. No canal or foraminal compromise. L4-L5: Canal has been decompressed. Extensive facet degenerative change. The  canal and foramina are patent. L5-S1: Mild broad-based protrusion/osteophyte. Canal is patent. Foramina are  patent. Impression  IMPRESSION:  Status post laminectomy and fusion L3-L4. Laminectomy L4-5. Moderate broad-based protrusion at L2-L3 with severe canal stenosis at L2-L3. Assessment:     Nicole Billings is a 67year old RH dominant male with history of Parkinson's disease with associated dementia who [facility] to [facility] transfer from [place] for ongoing management of diffuse pain and associated weakness    Plan:   Pain, weakness:   It is difficult to assess weakness/tone as patient guards and actively appears to splint/fight against movement with good strength to avoid pain    Rheumatology has been consulted, recommended trying of steroid given diffuse pain and ongoing elevated inflammatory markers    Will need to monitor for ongoing improvement in motor function if/when pain improves    From a neurology standpoint, could consider EMG at one point though patient unable to tolerate now    Mentation:  Patient in midst of prolonged hospitalization with underpinnings of chronic neurocognitive impairment    Would continue home medications, continue delirium precautions    Has had thorough evaluation already at this point    Will continue to follow, please call with questions, concerns             Signed:  Apollo Siu MD  6/9/2021  4:33 PM

## 2021-06-09 NOTE — PROGRESS NOTES
Bedside and Verbal shift change report given to Denis Cortez and Bianka Anthony (oncoming nurse) by Baylee Fine (offgoing nurse). Report included the following information SBAR, Kardex, Intake/Output and MAR.

## 2021-06-09 NOTE — PROGRESS NOTES
Spoke with Dr. Ni Roger of rheumatology. He recommended starting prednisone 20 mg p.o. daily.    - Monitor inflammatory markers. - Contact Dr. Ni Roger [phone] on 06/10/21 to give him update on patient's condition.

## 2021-06-10 NOTE — PROGRESS NOTES
Hospitalist Progress Note    NAME: Abdias Pereyra   :  1945   MRN:  [id number]     Patient transferred from OSH where he was admitted with worsening weakness. PTA the family report that the patient was having progressive weakness, difficulty ambulating, and urinary incontinence. Of note, the patient had a similar episode earlier this year. While hospitalized the patient began to spike fevers. He underwent an extensive ID workup as well as general medical and neurologic workup which did not identify a source of fevers or answers as to why the patient's weakness progressed so profoundly. He was transferred to [facility] because the patient and family reside in [facility] and they wished for him to be closer to home for ongoing diagnostic workup. Assessment / Plan:  Parkinson's disease  Parkinson's associated dementia    Metabolic encephalopathy  Spinal stenosis, multilevel  Allodynia   - Neuro input appreciated. - Continue carbidopa-levodopa er  mg po tid. - Continue donepezil 10 mg po daily. - Continue pramipexole 0.5 mg po tid. - Vitamin D and B12 levels WNL. Suspected polymyalgia rheumatica-elevated ESR/CRP, diffuse UE and LE pain  -per previous note, rheumatologist recommended 20 mg prednisone  Trend inflammatory markers    #Steroid induced hyperglycemia:  Diabetes  -NPH 7units BID,lispro ssi,will increase the dose based on further glucose  Check A1c      FUO-resolved  - ID input appreciated. - Afebrile currently. - Workup at OSH negative. HTN  Dyslipidemia   - Overall adequate BP control.  - Continue nifedipine er 30 mg po daily.    - Resume atorvastatin 20 mg po daily         CKD III  - Renal function WNL currently. - Continue to avoid nephrotoxins as able. - Monitor. Anemia of chronic disease   - H/H stable. - No tx indicated at this time. - Monitor.      COPD  Remote tobacco use  - Continue arformoterol/budesonide 15/250 mcg nebs bid.    - Continue albuterol/ipratropium 2.5/0.5 mg nebs q4h prn. GERD  - Change pantoprazole to 40 mg po bid. BPH  - Continue finasteride 5 mg po daily. Estimated discharge date:  TBD  Barriers:  None identified     Code status: Full  Prophylaxis: Lovenox  Recommended Disposition: TBD       Home meds need to be reconciled     Subjective:     Chief Complaint / Reason for Physician Visit  He was more alert and able to have some conversation per his wife    He was oriented X 2 -name and year. Slow to respond when we ask him questions,constantly asking for water during my eval  States that he hurts but could not describe it more    Review of Systems:  Unable to obtain due to mental status    Objective:     VITALS:   Last 24hrs VS reviewed since prior progress note. Most recent are:  Patient Vitals for the past 24 hrs:   Temp Pulse Resp BP SpO2   06/10/21 1359 98.2 °F (36.8 °C) 98 18 (!) 154/78 94 %   06/10/21 0900 98.7 °F (37.1 °C) 92 16 (!) 148/78 95 %   06/10/21 0129 98.6 °F (37 °C) 98 16 (!) 157/91 94 %   06/09/21 2109 98.5 °F (36.9 °C) 70 -- 123/68 --       Intake/Output Summary (Last 24 hours) at 6/10/2021 1809  Last data filed at 6/9/2021 2009  Gross per 24 hour   Intake 120 ml   Output --   Net 120 ml        I had a face to face encounter and independently examined this patient on 6/10/2021, as outlined below:  PHYSICAL EXAM:  General:  Elderly patient  HEENT:  Normocephalic. Sclera anicteric. Mucous membranes moist.    Chest:  Resps even/unlabored with symmetrical CWE. Air entry diminished at bases. Lungs CTA. No use of accessory muscles. CV:  RRR. Normal S1/S2. No murmurs,  No JVD. GI:  Abdomen soft/ND, non tender  :  Voiding. Neurologic:   He was awake,oriented X 2, speaks softly  muscl- he does not actively move extremities,when I moved LUE he tolerated it for sometime and then said it hurts, RUE -tolerated movement,wiggles toes  Psych:  Cooperative. No agitation.   Skin:  No rashes or jaundice. Turgor elastic    Reviewed most current lab test results and cultures  YES  Reviewed most current radiology test results   YES  Review and summation of old records today    NO  Reviewed patient's current orders and MAR    YES  PMH/SH reviewed - no change compared to H&P  ________________________________________________________________________  Care Plan discussed with:    Comments   Patient Y    Family      RN Y    Care Manager     Consultant                        Multidiciplinary team rounds were held today with , nursing, pharmacist and clinical coordinator. Patient's plan of care was discussed; medications were reviewed and discharge planning was addressed. ________________________________________________________________________  Rebel Hawkins MD     Procedures: see electronic medical records for all procedures/Xrays and details which were not copied into this note but were reviewed prior to creation of Plan. LABS:  I reviewed today's most current labs and imaging studies.   Pertinent labs include:  Recent Labs     06/10/21  0140 06/09/21  0139 06/08/21  0300   WBC 7.1 7.8 8.9   HGB 10.4* 10.4* 10.1*   HCT 33.6* 33.8* 32.8*    378 312     Recent Labs     06/10/21  0140 06/09/21  0139 06/08/21  1238 06/08/21  0300    136  --  137   K 5.2* 4.5  --  4.2   * 107  --  110*   CO2 22 22  --  23   * 198*  --  119*   BUN 18 16  --  23*   CREA 0.85 0.84  --  0.89   CA 9.1 9.1  --  9.1   MG 2.5* 2.2  --  2.1   ALB  --   --  2.0*  --    TBILI  --   --  0.7  --    ALT  --   --  13  --        Signed: Rebel Hawkins MD

## 2021-06-10 NOTE — PROGRESS NOTES
Nutrition Note    Brief Note:    Asked by nursing to check in on pt as he was not wanting to eat much of anything, but said he would drink things. Spoke with pt's wife briefly (she was on the phone), and pt was able to answer some simple questions. He likes chocolate flavored drinks, so will add Glucerna Shake to all meals; also added Boost pudding at dinner meal.  Pt was admitted on 6/7 with SEJALO, his hx includes DM2, Parkinson's, HTN and spinal stenosis. He is on a regular diet, which I will adjust to 4 carb choice/meal since he does have diabetes. RD will follow up per protocol, thank you.     Height:  160 cm    Wt Readings from Last 15 Encounters:   09/22/20 113.9 kg (251 lb)   07/02/20 115.3 kg (254 lb 3.1 oz)   06/24/20 106.6 kg (235 lb)         Electronically signed by Lydia Burgos RD, CSP on 6/10/2021 at 11:21 AM    Contact: via [facility]

## 2021-06-10 NOTE — PROGRESS NOTES
ID Progress Note  6/10/2021    Subjective:     Afebrile. Feels better. ROS: unobtainable   Objective:     Vitals:   Visit Vitals  BP (!) 154/78 (BP 1 Location: Left arm, BP Patient Position: Supine)   Pulse 98   Temp 98.2 °F (36.8 °C)   Resp 18   SpO2 94%        Tmax:  Temp (24hrs), Av.5 °F (36.9 °C), Min:98.2 °F (36.8 °C), Max:98.7 °F (37.1 °C)      Exam:    Not in distres    Lung clear, no rales, wheezes or rhonchi   Heart: s1, s2, RRR, no murmurs rubs or clicks  Abdomen: soft nontender, no guarding or rebound  I can move the right knee now with less pain than yesterday. I can also move the right and left wrist with less pain now. The aforementioned joint are not warm. No rash     Labs:   Lab Results   Component Value Date/Time    WBC 7.1 06/10/2021 01:40 AM    HGB 10.4 (L) 06/10/2021 01:40 AM    HCT 33.6 (L) 06/10/2021 01:40 AM    PLATELET 317  01:40 AM    MCV 88.9 06/10/2021 01:40 AM     Lab Results   Component Value Date/Time    Sodium 138 06/10/2021 01:40 AM    Potassium 5.2 (H) 06/10/2021 01:40 AM    Chloride 109 (H) 06/10/2021 01:40 AM    CO2 22 06/10/2021 01:40 AM    Anion gap 7 06/10/2021 01:40 AM    Glucose 286 (H) 06/10/2021 01:40 AM    BUN 18 06/10/2021 01:40 AM    Creatinine 0.85 06/10/2021 01:40 AM    BUN/Creatinine ratio 21 (H) 06/10/2021 01:40 AM    GFR est AA >60 06/10/2021 01:40 AM    GFR est non-AA >60 06/10/2021 01:40 AM    Calcium 9.1 06/10/2021 01:40 AM    Bilirubin, total 0.7 2021 12:38 PM    Alk.  phosphatase 108 2021 12:38 PM    Protein, total 7.6 2021 12:38 PM    Albumin 2.0 (L) 2021 12:38 PM    Globulin 5.6 (H) 2021 12:38 PM    A-G Ratio 0.4 (L) 2021 12:38 PM    ALT (SGPT) 13 2021 12:38 PM     Labs from [place]     ESR greater than 130  CRP 31.7  Procalcitonin 0.64  Blood cultures negative  Fungitell negative  Legionella and streptococcal urinary antigen negative  Upper respiratory viral panel negative  Lyme disease negative  Ehrlichia, Anaplasma serologies negative  HIV negative  EDY negative  Anti-Marge negative  CCP antibody negative  AntidsDNA negative  CSF WBC was low, total protein elevated, negative biofire     Imaging from [place]  CT and MRI of the brain negative  Chest x-ray shows interstitial infiltrates in the right upper lobe  CT scan of the chest is negative for pneumonia  CT of the abdomen and pelvis shows no abdominal process  CT of the right upper extremity shows no abscess      Assessment:     #1 fever  -seems to have resolved     #2 weakness     #3 arthralgias     #4 diabetes     #5 Parkinson's             Recommendations:     He has been afebrile. He is not on any antibiotics. Prednisone has been started as recommended by rheum.  RF normal.     Will check cxr of right forearm    Cuco Escobar MD

## 2021-06-10 NOTE — PROGRESS NOTES
SPEECH PATHOLOGY BEDSIDE SWALLOW EVALUATION/DISCHARGE  Patient: Slade Almonte (40 y.o. male)  Date: 6/10/2021  Primary Diagnosis: FUO (fever of unknown origin) [R50.9]       Precautions:   Fall    ASSESSMENT :  Based on the objective data described below, the patient presents with seemingly functional oropharyngeal phases of swallow at bedside with PO trials. No overt difficulty nor overt s/s of aspiration present on this date. Pt did have a delayed cough sporadically, however, do not suspect aspiration. Pt is at elevated risk for prandial aspiration given hx of Parkinson's Disease, however, given that pt's respiratory status has been stable and has been utilizing thin liquids, low suspicion for aspiration. Recommend diet as outlined below. Should pt respiratory status worsen or any other indication of aspiration pneumonia please reconsult and SLP will be happy to reassess objectively. Skilled acute therapy provided by a speech-language pathologist is not indicated at this time. PLAN :  Recommendations:  --regular diet/thin liquids  --general aspiration precautions  --wife will cut food and help feed patient  --extra sauces/gravies  Discharge Recommendations: None     SUBJECTIVE:   Patient stated, \"Please don't do that after pt's wife described that pt had had vital stim in the past.    OBJECTIVE:   No past medical history on file. No past surgical history on file.   Diet prior to admission: soft and bite sized diet and thin liquids  Current Diet:  Regular diet/thin liquids   Cognitive and Communication Status:  Neurologic State: Alert  Orientation Level: Oriented to person, Oriented to situation, Disoriented to time  Cognition: Follows commands  Perception: Appears intact  Perseveration: Perseverates during conversation  Safety/Judgement: Decreased awareness of environment, Decreased awareness of need for assistance, Decreased awareness of need for safety  Oral Assessment:  Oral Assessment  Labial: No impairment  Dentition: Natural  Oral Hygiene: oral mucosa moist and clear of secretions  Lingual: No impairment  Velum: No impairment  Mandible: No impairment  P.O. Trials:  Patient Position: upright in bed  Vocal quality prior to P.O.: No impairment  Consistency Presented: Thin liquid; Solid  How Presented: Self-fed/presented;Cup/sip;Spoon;Straw;Successive swallows     Bolus Acceptance: No impairment  Bolus Formation/Control: No impairment     Propulsion: No impairment  Oral Residue: None  Initiation of Swallow: No impairment  Laryngeal Elevation: Functional  Aspiration Signs/Symptoms: None  Pharyngeal Phase Characteristics: No impairment, issues, or problems   Effective Modifications: None          Oral Phase Severity: No impairment  Pharyngeal Phase Severity : No impairment  NOMS:   The NOMS functional outcome measure was used to quantify this patient's level of swallowing impairment. Based on the NOMS, the patient was determined to be at level 7 for swallow function     NOMS Swallowing Levels:  Level 1 (CN): NPO  Level 2 (CM): NPO but takes consistency in therapy  Level 3 (CL): Takes less than 50% of nutrition p.o. and continues with nonoral feedings; and/or safe with mod cues; and/or max diet restriction  Level 4 (CK): Safe swallow but needs mod cues; and/or mod diet restriction; and/or still requires some nonoral feeding/supplements  Level 5 (CJ): Safe swallow with min diet restriction; and/or needs min cues  Level 6 (CI): Independent with p.o.; rare cues; usually self cues; may need to avoid some foods or needs extra time  Level 7 ([address] [place] [address]): Independent for all p.o.  MARY. (2003). National Outcomes Measurement System (NOMS): Adult Speech-Language Pathology User's Guide.        Pain:  Pain Scale 1: Numeric (0 - 10)  Pain Intensity 1: 0     After treatment:   Call bell within reach and Nursing notified    COMMUNICATION/EDUCATION:     The patient's plan of care including recommendations, planned interventions, and recommended diet changes were discussed with: Registered nurse.      Thank you for this referral.  LAURENT Garcia  Time Calculation: 30 mins

## 2021-06-10 NOTE — PROGRESS NOTES
DELLA: Plan is TBD at this time. Family requesting possible transfer to [address] [place] [address], wife would eventually like patient to go to [address] [place] [address] inpatient rehab. Therapy consults placed today. Patient's wife is Eulalia Devi #[phone]. Noted patient has history of parkinson's and dementia. ID is following.       CM met with patient and wife at bedside. Patient was sleeping while CM spoke with the wife. Wife would like to look into possible transfer to [facility]. Patient is followed by Dr. Mehul Lester with neurology at [facility].  CM notified hospitalist.      Tyshawn Trevizo, ELIZAW/CRM

## 2021-06-10 NOTE — PROGRESS NOTES
Problem: Self Care Deficits Care Plan (Adult)  Goal: *Acute Goals and Plan of Care (Insert Text)  Description: FUNCTIONAL STATUS PRIOR TO ADMISSION: No family present during eval to confirm PLOF    HOME SUPPORT PRIOR TO ADMISSION: No family present during eval to confirm PLOF    Occupational Therapy Goals  Initiated 6/10/2021     1. Patient will complete bed mobility with mod A x2 in preparation for functional transfers within 7 days. 2. Patient will complete grooming while sitting in bed with min A within 7 days. 3. Patient will complete UB bathing while sitting in bed with mod A within 7 days. 4. Patient will complete LB bathing while sitting in bed with max A x1 within 7 days. Outcome: Progressing Towards Goal   OCCUPATIONAL THERAPY EVALUATION  Patient: Karey Geller (21 y.o. male)  Date: 6/10/2021  Primary Diagnosis: FUO (fever of unknown origin) [R50.9]        Precautions:   Fall    ASSESSMENT  Based on the objective data described below, the patient presents with decreased mobility and activity tolerance due to history of Parkinson's and current overall weakness. Patient required total A x2 for bed mobility in order to change brief. Patient seemed confused and required consistent cues throughout. Patient was agreeable to brushing his teeth before brief was changed, but then became too fatigued after and declined to participate, saying he would prefer to sleep. Anticipate that he will be more motivated to participate once better rested. Patient will likely require 24/7 care once discharged. Recommend discharge to rehab. Continued OT services to address ADLs and home safety. Current Level of Function Impacting Discharge (ADLs/self-care): total A    Functional Outcome Measure: The patient scored 10 on the Barthel Index outcome measure which is indicative of 90% impairment in self care skills.       Other factors to consider for discharge: cognitive status, home support     Patient will benefit from skilled therapy intervention to address the above noted impairments. PLAN :  Recommendations and Planned Interventions: self care training, functional mobility training, therapeutic exercise, therapeutic activities, endurance activities, patient education, home safety training, and family training/education    Frequency/Duration: Patient will be followed by occupational therapy 5 times a week to address goals. Recommendation for discharge: (in order for the patient to meet his/her long term goals)  Therapy 3 hours per day 5-7 days per week    This discharge recommendation:  Has been made in collaboration with the attending provider and/or case management    IF patient discharges home will need the following DME: TBD       SUBJECTIVE:   Patient stated Boseliseo Starks me a break.     OBJECTIVE DATA SUMMARY:   HISTORY:   No past medical history on file. No past surgical history on file. Expanded or extensive additional review of patient history:          Hand dominance: Right    EXAMINATION OF PERFORMANCE DEFICITS:  Cognitive/Behavioral Status:  Neurologic State: Alert  Orientation Level: Oriented to situation  Cognition: Follows commands  Perception: Appears intact  Perseveration: Perseverates during conversation  Safety/Judgement: Decreased awareness of environment;Decreased awareness of need for assistance;Decreased awareness of need for safety    Skin: appear intact    Edema: none noted    Hearing:       Vision/Perceptual:                Range of Motion:  AROM: Generally decreased, functional  PROM: Generally decreased, functional           Strength:  Strength: Grossly decreased, non-functional                Coordination:  Coordination: Generally decreased, functional  Fine Motor Skills-Upper: Left Intact; Right Intact    Gross Motor Skills-Upper: Left Intact; Right Intact    Tone & Sensation:  Tone: Normal  Sensation: Intact                      Balance:  Sitting:  (could not progress this time)  Standing:  (could not progress this time)    Functional Mobility and Transfers for ADLs:  Bed Mobility:  Rolling: Total assistance;Assist x2  Supine to Sit:  (could not progress this time)  Sit to Supine:  (could not progress this time)  Scooting:  (could not progress this time)    Transfers:  Sit to Stand:  (could not progress this time)  Stand to Sit:  (could not progress this time)    ADL Assessment:  Feeding: Minimum assistance    Oral Facial Hygiene/Grooming: Moderate assistance    Bathing: Maximum assistance    Upper Body Dressing: Moderate assistance    Lower Body Dressing: Maximum assistance    Toileting: Total assistance;Assist x2                ADL Intervention and task modifications:   Patient received supine in bed and was agreeable to brushing his teeth. PCT arrived to change brief. Patient required total A x2 in order to roll to change brief. Noted significant pain with any touch on his toes. After brief was changed, patient was too tired to participate in grooming and declined. Patient left supine in bed with call bell within reach. Cognitive Retraining  Safety/Judgement: Decreased awareness of environment;Decreased awareness of need for assistance;Decreased awareness of need for safety      Functional Measure:  Barthel Index:    Bathin  Bladder: 0  Bowels: 0  Groomin  Dressin  Feedin  Mobility: 0  Stairs: 0  Toilet Use: 0  Transfer (Bed to Chair and Back): 5  Total: 10/100        The Barthel ADL Index: Guidelines  1. The index should be used as a record of what a patient does, not as a record of what a patient could do. 2. The main aim is to establish degree of independence from any help, physical or verbal, however minor and for whatever reason. 3. The need for supervision renders the patient not independent. 4. A patient's performance should be established using the best available evidence.  Asking the patient, friends/relatives and nurses are the usual sources, but direct observation and common sense are also important. However direct testing is not needed. 5. Usually the patient's performance over the preceding 24-48 hours is important, but occasionally longer periods will be relevant. 6. Middle categories imply that the patient supplies over 50 per cent of the effort. 7. Use of aids to be independent is allowed. Lyric Fontaine., Barthel, D.W. (2547). Functional evaluation: the Barthel Index. [address] [facility] (14)2. YIFAN Gilman, Lawrence Dennis., Bernie Freedman., [place], [address] (1999). Measuring the change indisability after inpatient rehabilitation; comparison of the responsiveness of the Barthel Index and Functional Armstrong Measure. Journal of Neurology, Neurosurgery, and Psychiatry, 66(4), 175-222. LYDIA Cruz, LIDIA Jurado, & Juju Leger M.A. (2004.) Assessment of post-stroke quality of life in cost-effectiveness studies: The usefulness of the Barthel Index and the EuroQoL-5D. Quality of Life Research, 15, 52-25        Occupational Therapy Evaluation Charge Determination   History Examination Decision-Making   LOW Complexity : Brief history review  MEDIUM Complexity : 3-5 performance deficits relating to physical, cognitive , or psychosocial skils that result in activity limitations and / or participation restrictions HIGH Complexity : Patient presents with comorbidities that affect occupational performance.  Signifigant modification of tasks or assistance (eg, physical or verbal) with assessment (s) is necessary to enable patient to complete evaluation       Based on the above components, the patient evaluation is determined to be of the following complexity level: LOW   Pain Rating:  Reported pain with touch GRACIE toes    Activity Tolerance:   Poor    After treatment patient left in no apparent distress:    Supine in bed, Call bell within reach, and Side rails x 3    COMMUNICATION/EDUCATION:   The patients plan of care was discussed with: Physical therapist. Patient is unable to participate in goal setting and plan of care. This patients plan of care is appropriate for delegation to [facility]. Thank you for this referral.  Flaca Soto    Regarding student involvement in patient care:  A student participated in this treatment session. Per CMS Medicare statements and AOTA guidelines I certify that the following was true:  1. I was present and directly observed the entire session. 2. I made all skilled judgments and clinical decisions regarding care. 3. I am the practitioner responsible for assessment, treatment, and documentation.

## 2021-06-11 NOTE — PROGRESS NOTES
Admission Medication Reconciliation:      Consult received by Dr. Noah Gillis to perform medication reconciliation services. Spoke with wife Jsoe Beavers) by telephone @ [phone], unable to speak with patient face to face at this time due to general isolation precautions in the ED related to COVID-19 pandemic. Wife is a meticulous and reliable historian. RX query is not available at this time, unable to get records from [facility] in [place]. Wife has index card for each medication which she matches up to pill bottles to explain regimen and any changes. Interview included questions regarding use of:   PTA medications including prescription/OTC, vitamins, supplements, inhaled, topical, injectable, otic and ophthalmic medications   Alcohol, nicotine products, THC and related compounds, illicit drugs, stimulant use (i.e., Red Bull), agents used to assist with sleep and/or pain control issues, and whether patient uses other people's medications of any kind    PS text to Dr. Noah Gillis to update regarding completion of/changes to PTA med list.    Notes:   ETOH: Has one cocktail (gin) +/- glass of wine each evening   THC/CBD: Stopped using about a year ago   Entacapone: adjunct to Sinemet per wife- she was unsure whether he takes 3 or 4 times daily, please follow up with her (she was exhausted when we spoke, had just arrived home from being at hospital all day)   COVID vaccine [employer]): had #2 dose on 4/24/21   Influenza vaccine: rec'd 9/2020    Medication changes (since last review):   Added:   Aricept   Finasteride   Torsemide   Cetirizine   Melatonin   Lantus    Revised:   Sinemet to 1.5 tabs PO TID   Cholecalciferol to 2000 units PO daily   Docusate to 100 mg PO BID   Gabapentin to 600 mg PO TID   Pramipexole to 0.25 mg BID: reduced from TID due to drowsiness    Deleted:   Fluticasone nasal spray   Glipizide   Lisinopril   Claritin   Metformin   Trazodone    Thank you for allowing me to participate in the care of your patient. Ander Grover PharmD, RN # [phone]     [facility] pharmacy benefit data reflects medications filled and processed through the patient's insurance, however   this data does NOT capture whether the medication was picked up or is currently being taken by the patient. Allergies:  Patient has no known allergies. Significant PMH/Disease States: No past medical history on file. Chief Complaint for this Admission:  No chief complaint on file. Prior to Admission Medications:   Prior to Admission Medications   Prescriptions Last Dose Informant Taking?   aspirin delayed-release 325 mg tablet  Significant Other Yes   Sig: Take 325 mg by mouth daily. atorvastatin (LIPITOR) 20 mg tablet  Significant Other Yes   Sig: Take 20 mg by mouth daily. b complex vitamins tablet  Significant Other Yes   Sig: Take 1 Tab by mouth daily. carbidopa-levodopa (Sinemet)  mg per tablet  Significant Other Yes   Sig: Take 1.5 Tablets by mouth three (3) times daily. Indications: parkinsonism due to degeneration in the brain   carvediloL (COREG) 6.25 mg tablet  Significant Other Yes   Sig: Take 6.25 mg by mouth two (2) times daily (with meals). cetirizine (ZYRTEC) 10 mg tablet   Yes   Sig: Take 10 mg by mouth daily. cholecalciferol (Vitamin D3) (1000 Units /25 mcg) tablet  Significant Other Yes   Sig: Take 2,000 Units by mouth daily. docusate sodium (COLACE) 100 mg capsule  Significant Other Yes   Sig: Take 100 mg by mouth two (2) times a day. donepeziL (Aricept) 10 mg tablet   Yes   Sig: Take 10 mg by mouth nightly. entacapone (COMTAN) 200 mg tablet  Significant Other Yes   Sig: Take 200 mg by mouth four (4) times daily. finasteride (PROSCAR) 5 mg tablet   Yes   Sig: Take 5 mg by mouth daily. fluticasone propion-salmeteroL (Wixela Inhub) 250-50 mcg/dose diskus inhaler   Yes   Sig: Take 1 Puff by inhalation daily.    gabapentin (NEURONTIN) 600 mg tablet Significant Other Yes   Sig: Take 600 mg by mouth three (3) times daily. insulin glargine (Lantus U-100 Insulin) 100 unit/mL injection   Yes   Si Units by SubCUTAneous route two (2) times a day. insulin lispro (HUMALOG) 100 unit/mL injection   Yes   Sig: Use as directed  Indications: type 2 diabetes mellitus   melatonin 3 mg tablet   Yes   Sig: Take 6 mg by mouth nightly. omeprazole (PRILOSEC) 40 mg capsule  Significant Other Yes   Sig: Take 40 mg by mouth daily. pramipexole (MIRAPEX) 0.25 mg tablet   Yes   Sig: Take 0.25 mg by mouth two (2) times a day. tamsulosin (FLOMAX) 0.4 mg capsule  Significant Other Yes   Sig: Take 0.4 mg by mouth daily. torsemide (DEMADEX) 100 mg tablet   Yes   Sig: Take 100 mg by mouth daily. Facility-Administered Medications: None     Please contact the main inpatient pharmacy with any questions or concerns at [phone] and we will direct you to the clinical pharmacist covering this patient's care while in-house.    CYRIL Ayala

## 2021-06-11 NOTE — PROGRESS NOTES
Bedside and Verbal shift change report given to Darius Rivera RN (oncoming nurse) by Sam Elkins RN and Ho Tarango RN (offgoing nurse). Report included the following information SBAR, Kardex, Procedure Summary, Intake/Output, MAR, Accordion and Recent Results.

## 2021-06-11 NOTE — PROGRESS NOTES
Problem: Self Care Deficits Care Plan (Adult)  Goal: *Acute Goals and Plan of Care (Insert Text)  Description: FUNCTIONAL STATUS PRIOR TO ADMISSION: No family present during eval to confirm PLOF    HOME SUPPORT PRIOR TO ADMISSION: No family present during eval to confirm PLOF    Occupational Therapy Goals  Initiated 6/10/2021     1. Patient will complete bed mobility with mod A x2 in preparation for functional transfers within 7 days. 2. Patient will complete grooming while sitting in bed with min A within 7 days. 3. Patient will complete UB bathing while sitting in bed with mod A within 7 days. 4. Patient will complete LB bathing while sitting in bed with max A x1 within 7 days. 5.  Patient will complete self feeding to 25% of one meal within 7 days. Outcome: Progressing Towards Goal   OCCUPATIONAL THERAPY TREATMENT  Patient: Erin Cervantes (52 y.o. male)  Date: 6/11/2021  Diagnosis: FUO (fever of unknown origin) [R50.9] <principal problem not specified>      Precautions: Fall  Chart, occupational therapy assessment, plan of care, and goals were reviewed. ASSESSMENT  Patient continues with skilled OT services and is progressing towards goals. He required max A x2 for bed mobility and total A x2 to scoot to EOB. Once seated at EOB, he required total-max A to maintain sitting balance. He required max A x1 to brush his teeth due to delayed processing, rigidity, decreased motor planning and decreased balance. He was able to progress to sitting EOB for about 15 minutes but needing significant support while sitting EOB to maintain balance. . Noted that he had not had his Parkinson's medication yet this morning and it was due at 0900, which may be contributing to his decreased functional performance. Also noted increased edema in R hand. At this time, recommend discharge to rehab setting to maximize independence with all activities.   He is demonstrating compliance with tasks and eager but just requires significant physical support. Current Level of Function Impacting Discharge (ADLs): total-max A x2    Other factors to consider for discharge: level of home support, cognitive status         PLAN :  Patient continues to benefit from skilled intervention to address the above impairments. Continue treatment per established plan of care to address goals. Recommend with staff: Assistance with meals    Recommend next OT session: ADLs EOB    Recommendation for discharge: (in order for the patient to meet his/her long term goals)  To be determined: rehab    This discharge recommendation:  Has not yet been discussed the attending provider and/or case management    IF patient discharges home will need the following DME: TBD       SUBJECTIVE:   Patient stated I feel like I'm moving in slow motion.     OBJECTIVE DATA SUMMARY:   Cognitive/Behavioral Status:  Neurologic State: Alert;Lethargic  Orientation Level: Oriented to person;Oriented to place;Oriented to situation  Cognition: Decreased attention/concentration; Impaired decision making;Memory loss;Poor safety awareness  Perception: Appears intact  Perseveration: Perseverates during conversation;Perseverates during ADLS  Safety/Judgement: Decreased insight into deficits    Functional Mobility and Transfers for ADLs:  Bed Mobility:  Rolling: Maximum assistance;Assist x2  Supine to Sit: Maximum assistance;Assist x2  Sit to Supine: Maximum assistance;Assist x2  Scooting: Maximum assistance;Assist x2    Transfers:  Sit to Stand:  (unable to progress past EOB due to poor balance)          Balance:  Sitting: Impaired  Sitting - Static: Poor (constant support)  Sitting - Dynamic: Poor (constant support)  Standing:  (unable to progress past EOB due to poor balance)    ADL Intervention:   Patient received supine in bed and agreeable to participation in therapy. Patient required max x2 for bed mobility and total A x2 to scoot to EOB.  He required total to max A x1 to maintain sitting balance at EOB. Could not progress to chair due to poor balance. Patient brushed his teeth at EOB, requiring max A x1 to finish brushing due to fatigue from getting to EOB. He expressed that he was \"feeling like moving in slow motion\" throughout session and noted that he had not had his Parkinson's medication yet. Also noted increased edema in R hand. Patient returned to bed supine with total Ax2 with call bell left at side.     Grooming  Brushing Teeth: Maximum assistance         Cognitive Retraining  Safety/Judgement: Decreased insight into deficits        Pain:  Pain in R hand and GRACIE feet    Activity Tolerance:   Poor    After treatment patient left in no apparent distress:   Supine in bed, Call bell within reach, and Side rails x 3    COMMUNICATION/COLLABORATION:   The patients plan of care was discussed with: Physical therapist.     Cheyenne Quiñonez OT  Time Calculation: 36 mins

## 2021-06-11 NOTE — PROGRESS NOTES
Bedside shift change report given to Saji Frey RN (oncoming nurse) by Alexis Seip (offgoing nurse). Report included the following information SBAR, Kardex, Procedure Summary, Intake/Output, MAR and Recent Results.

## 2021-06-11 NOTE — PROGRESS NOTES
Hospitalist Progress Note    NAME: Ollie Duvall   :  1945   MRN:  [id number]     Patient transferred from OSH where he was admitted with worsening weakness. PTA the family report that the patient was having progressive weakness, difficulty ambulating, and urinary incontinence. Of note, the patient had a similar episode earlier this year. While hospitalized the patient began to spike fevers. He underwent an extensive ID workup as well as general medical and neurologic workup which did not identify a source of fevers or answers as to why the patient's weakness progressed so profoundly. He was transferred to [facility] because the patient and family reside in [place] and they wished for him to be closer to home for ongoing diagnostic workup. Assessment / Plan:  Parkinson's disease  Parkinson's associated dementia    Metabolic encephalopathy-resolved  Spinal stenosis, multilevel based on MRI last year    - Neuro input appreciated. - Continue carbidopa-levodopa er  mg po tid and entacapone. - Continue donepezil 10 mg po daily. - Continue pramipexole 0.25 mg po BID-home meds reconciled    - Vitamin D and B12 levels WNL  Discussed with neurologist, given his weakness and difficulty walking- recommended MRI C ,T and L spine -discussed with patient and his wife- although patient initially said he does not want scans as he feels he does not want anything done even if we find something. His wife discussed with him and later he was agreeable,    Suspected polymyalgia rheumatica-elevated ESR/CRP, diffuse UE and LE pain  -per previous note, rheumatologist recommended 20 mg prednisone  Trend inflammatory markers daily  -patient said pain is lesser today compared to yesterday  Rt wrist swelling,per patient's wife,he had some blood draws in the last hospital in that hand which caused redness/pain/swelling,negative DVT previous hospital    #Steroid induced hyperglycemia with Diabetes  -NPH 25 units BID,lispro ssi,lispro 5 units TID  A1c:10.0      FUO-resolved  - ID input appreciated. - Afebrile currently. - Workup at OSH negative. HTN  Dyslipidemia   - Overall adequate BP control.  - Continue nifedipine er 30 mg po daily.    - Resume atorvastatin 20 mg po daily         CKD  ruled out    Anemia of chronic disease   - H/H stable. - No tx indicated at this time. - Monitor. COPD  Remote tobacco use  - Continue arformoterol/budesonide 15/250 mcg nebs bid.    - Continue albuterol/ipratropium 2.5/0.5 mg nebs q4h prn. GERD  - Change pantoprazole to 40 mg po bid. BPH  - Continue finasteride 5 mg po daily. Estimated discharge date:  TBD  Barriers:  None identified     Code status: Full  Prophylaxis: Lovenox  Recommended Disposition: TBD            Subjective:     Chief Complaint / Reason for Physician Visit  He was more interactive today and able to have conversation  States that pain is less compared to yesterday  -RUE is painful      Review of Systems:  Unable to obtain due to mental status    Objective:     VITALS:   Last 24hrs VS reviewed since prior progress note. Most recent are:  Patient Vitals for the past 24 hrs:   Temp Pulse Resp BP SpO2   06/11/21 1515 97.5 °F (36.4 °C) 89 16 120/73 95 %   06/11/21 0920 97.6 °F (36.4 °C) 83 16 (!) 146/76 96 %   06/11/21 0457 97.6 °F (36.4 °C) 78 16 128/82 94 %   06/10/21 2226 97.4 °F (36.3 °C) 87 18 122/72 98 %     No intake or output data in the 24 hours ending 06/11/21 1537     I had a face to face encounter and independently examined this patient on 6/11/2021, as outlined below:  PHYSICAL EXAM:  General:  Elderly patient  HEENT:  Normocephalic. Sclera anicteric. Mucous membranes moist.    Chest:  Resps even/unlabored with symmetrical CWE. Air entry diminished at bases. Lungs CTA. No use of accessory muscles. CV:  RRR. Normal S1/S2. No murmurs,  No JVD. GI:  Abdomen soft/ND, non tender  :  Voiding.    Neurologic:   He was awake,oriented X 2, speaks softly, lifted LUE against gravity, moving LE side to side while in bed,RUE movement is painful  Psych:  Cooperative. No agitation. Skin:  No rashes or jaundice. Turgor elastic    Reviewed most current lab test results and cultures  YES  Reviewed most current radiology test results   YES  Review and summation of old records today    NO  Reviewed patient's current orders and MAR    YES  PMH/SH reviewed - no change compared to H&P  ________________________________________________________________________  Care Plan discussed with:    Comments   Patient Y    Family      RN Y    Care Manager     Consultant                        Multidiciplinary team rounds were held today with , nursing, pharmacist and clinical coordinator. Patient's plan of care was discussed; medications were reviewed and discharge planning was addressed. ________________________________________________________________________  Edson Lynn MD     Procedures: see electronic medical records for all procedures/Xrays and details which were not copied into this note but were reviewed prior to creation of Plan. LABS:  I reviewed today's most current labs and imaging studies.   Pertinent labs include:  Recent Labs     06/11/21  0502 06/10/21  0140 06/09/21  0139   WBC 5.8 7.1 7.8   HGB 9.7* 10.4* 10.4*   HCT 30.8* 33.6* 33.8*    396 378     Recent Labs     06/11/21  0502 06/10/21  0140 06/09/21  0139    138 136   K 4.2 5.2* 4.5    109* 107   CO2 23 22 22   * 286* 198*   BUN 20 18 16   CREA 0.86 0.85 0.84   CA 8.7 9.1 9.1   MG 2.1 2.5* 2.2       Signed: Edson Lynn MD

## 2021-06-11 NOTE — PROGRESS NOTES
Neurology Progress Note    Patient ID:  [facility]  [id number]  89 y.o.  1945    Subjective: The past 24 hours patient has noted some improvement in pain as well as response to inflammatory markers to prednisone therapy. He mentioned some improvement in pain though persistent.     Current Facility-Administered Medications   Medication Dose Route Frequency    insulin NPH (NOVOLIN N, HUMULIN N) injection 25 Units  25 Units SubCUTAneous ACB&D    aspirin delayed-release tablet 325 mg  325 mg Oral DAILY    tamsulosin (FLOMAX) capsule 0.4 mg  0.4 mg Oral DAILY    entacapone (COMTAN) tablet 200 mg  200 mg Oral TID    pramipexole (MIRAPEX) tablet 0.25 mg  0.25 mg Oral BID    gabapentin (NEURONTIN) capsule 300 mg  300 mg Oral TID    insulin lispro (HUMALOG) injection   SubCUTAneous AC&HS    glucose chewable tablet 16 g  4 Tablet Oral PRN    dextrose (D50W) injection syrg 12.5-25 g  12.5-25 g IntraVENous PRN    glucagon (GLUCAGEN) injection 1 mg  1 mg IntraMUSCular PRN    insulin lispro (HUMALOG) injection 5 Units  5 Units SubCUTAneous TIDAC    pantoprazole (PROTONIX) tablet 40 mg  40 mg Oral ACB&D    predniSONE (DELTASONE) tablet 20 mg  20 mg Oral DAILY WITH BREAKFAST    ketorolac (TORADOL) injection 15 mg  15 mg IntraVENous Q6H PRN    HYDROcodone-acetaminophen (NORCO) 5-325 mg per tablet 1 Tablet  1 Tablet Oral Q4H PRN    NIFEdipine ER (PROCARDIA XL) tablet 30 mg  30 mg Oral DAILY    carbidopa-levodopa ER (SINEMET CR)  mg per tablet 1 Tablet  1 Tablet Oral TID    atorvastatin (LIPITOR) tablet 20 mg  20 mg Oral DAILY    finasteride (PROSCAR) tablet 5 mg  5 mg Oral DAILY    donepeziL (ARICEPT) tablet 10 mg  10 mg Oral QHS    albuterol-ipratropium (DUO-NEB) 2.5 MG-0.5 MG/3 ML  3 mL Nebulization Q4H PRN    arformoteroL (BROVANA) neb solution 15 mcg  15 mcg Nebulization BID RT    And    budesonide (PULMICORT) 250 mcg/2ml nebulizer susp  250 mcg Nebulization BID RT    sodium chloride (NS) flush 5-40 mL  5-40 mL IntraVENous Q8H    sodium chloride (NS) flush 5-40 mL  5-40 mL IntraVENous PRN    acetaminophen (TYLENOL) tablet 650 mg  650 mg Oral Q6H PRN    Or    acetaminophen (TYLENOL) suppository 650 mg  650 mg Rectal Q6H PRN    ondansetron (ZOFRAN ODT) tablet 4 mg  4 mg Oral Q8H PRN    Or    ondansetron (ZOFRAN) injection 4 mg  4 mg IntraVENous Q6H PRN    enoxaparin (LOVENOX) injection 40 mg  40 mg SubCUTAneous DAILY          Objective:     Patient Vitals for the past 8 hrs:   BP Temp Pulse Resp SpO2   06/11/21 0920 (!) 146/76 97.6 °F (36.4 °C) 83 16 96 %   06/11/21 0457 128/82 97.6 °F (36.4 °C) 78 16 94 %       No intake/output data recorded.   06/09 1901 - 06/11 0700  In: 360 [P.O.:360]  Out: -     Lab Review   Recent Results (from the past 24 hour(s))   GLUCOSE, POC    Collection Time: 06/10/21  2:55 PM   Result Value Ref Range    Glucose (POC) 475 (H) 65 - 117 mg/dL    Performed by [address] [place] [address], POC    Collection Time: 06/10/21  4:28 PM   Result Value Ref Range    Glucose (POC) 410 (H) 65 - 117 mg/dL    Performed by Maria Victoria Rivero (CON)    GLUCOSE, POC    Collection Time: 06/10/21  4:30 PM   Result Value Ref Range    Glucose (POC) 445 (H) 65 - 117 mg/dL    Performed by Maria Victoria Rivero (CON)    HEMOGLOBIN A1C WITH EAG    Collection Time: 06/10/21  6:15 PM   Result Value Ref Range    Hemoglobin A1c 10.0 (H) 4.0 - 5.6 %    Est. average glucose 240 mg/dL   GLUCOSE, POC    Collection Time: 06/10/21  9:22 PM   Result Value Ref Range    Glucose (POC) 418 (H) 65 - 117 mg/dL    Performed by RENETTA VÁSQUEZ    CBC WITH AUTOMATED DIFF    Collection Time: 06/11/21  5:02 AM   Result Value Ref Range    WBC 5.8 4.1 - 11.1 K/uL    RBC 3.54 (L) 4.10 - 5.70 M/uL    HGB 9.7 (L) 12.1 - 17.0 g/dL    HCT 30.8 (L) 36.6 - 50.3 %    MCV 87.0 80.0 - 99.0 FL    MCH 27.4 26.0 - 34.0 PG    MCHC 31.5 30.0 - 36.5 g/dL    RDW 13.9 11.5 - 14.5 %    PLATELET 547 576 - 647 K/uL    MPV 9.5 8.9 - 12.9 FL    NRBC 0.0 0 PER 100 WBC    ABSOLUTE NRBC 0.00 0.00 - 0.01 K/uL    NEUTROPHILS 67 32 - 75 %    LYMPHOCYTES 24 12 - 49 %    MONOCYTES 5 5 - 13 %    EOSINOPHILS 2 0 - 7 %    BASOPHILS 1 0 - 1 %    IMMATURE GRANULOCYTES 1 (H) 0.0 - 0.5 %    ABS. NEUTROPHILS 3.9 1.8 - 8.0 K/UL    ABS. LYMPHOCYTES 1.4 0.8 - 3.5 K/UL    ABS. MONOCYTES 0.3 0.0 - 1.0 K/UL    ABS. EOSINOPHILS 0.1 0.0 - 0.4 K/UL    ABS. BASOPHILS 0.0 0.0 - 0.1 K/UL    ABS. IMM. GRANS. 0.0 0.00 - 0.04 K/UL    DF AUTOMATED     METABOLIC PANEL, BASIC    Collection Time: 06/11/21  5:02 AM   Result Value Ref Range    Sodium 136 136 - 145 mmol/L    Potassium 4.2 3.5 - 5.1 mmol/L    Chloride 106 97 - 108 mmol/L    CO2 23 21 - 32 mmol/L    Anion gap 7 5 - 15 mmol/L    Glucose 273 (H) 65 - 100 mg/dL    BUN 20 6 - 20 MG/DL    Creatinine 0.86 0.70 - 1.30 MG/DL    BUN/Creatinine ratio 23 (H) 12 - 20      GFR est AA >60 >60 ml/min/1.73m2    GFR est non-AA >60 >60 ml/min/1.73m2    Calcium 8.7 8.5 - 10.1 MG/DL   SAMPLES BEING HELD    Collection Time: 06/11/21  5:02 AM   Result Value Ref Range    SAMPLES BEING HELD 1PST     COMMENT        Add-on orders for these samples will be processed based on acceptable specimen integrity and analyte stability, which may vary by analyte. MAGNESIUM    Collection Time: 06/11/21  5:02 AM   Result Value Ref Range    Magnesium 2.1 1.6 - 2.4 mg/dL   C REACTIVE PROTEIN, QT    Collection Time: 06/11/21  5:02 AM   Result Value Ref Range    C-Reactive protein 7.55 (H) 0.00 - 0.60 mg/dL   GLUCOSE, POC    Collection Time: 06/11/21  6:28 AM   Result Value Ref Range    Glucose (POC) 305 (H) 65 - 117 mg/dL    Performed by Kyleigh Cody      Physical examination:  Elderly man sitting on side of bed with therapy saying is feeling okay. Appears slightly more interactive today though still with minimal verbal output. HEENT appears grossly unremarkable neck appears supple.   Cardiopulmonary exams are deferred the patient does not appear particularly short winded working with therapy. Extremities are warm/dry and there is swelling along the right hand.   Remainder of examination is deferred    Assessment:     Leona Blanco is a 67year old right dominant male history of idiopathic PD as well as additional medical conditions transferred from [place] for ongoing management of diffuse pain and weakness likely multifactorial in nature    Plan:   Diffuse pain:  While rigidity related pain from Parkinson's disease, arthritic pain is not discounted overall theme would suggest process such as polymyalgia rheumatica as suggested by primary team    Since initiation of steroids at rheumatology recommendation, repeat inflammatory markers are downtrending and patient has had improvement in pain    Weakness:  Remains difficult to get a proper exam attempted to observe physical therapist working with patient though elected to defer on standing due to instability while seated    Suspect multifactorial, related to underlying movement disorder, age, pain and prolonged hospital stay    Regarding these factors, management will be ongoing pain management, and mobilization/working with therapy    Patient did however have MRIs from [facility] as noted by hospitalist which demonstrated moderate to severe stenosis in the cervical spine as well as thoracic and even lumbar    As patient likely will be discharged to rehab before next week, will be reasonable to repeat MRIs of the spine to evaluate for progression, cord compression which could be contributing as well    If MRI demonstrates significant progression recommend consulting provider on spine coverage    Mentation:  Patient is reported to have Parkinson's associated memory impairment/dementia at baseline, likely worsened by prolonged hospital stay    Recommend those measures designed to help mitigate against worsening delirium in reverse the degree which is already present    Neurology will be available as needed please call if questions concerns    Signed:  Guillermo Hernandez MD  6/11/2021  11:53 AM

## 2021-06-11 NOTE — PROGRESS NOTES
Problem: Mobility Impaired (Adult and Pediatric)  Goal: *Acute Goals and Plan of Care (Insert Text)  Description: FUNCTIONAL STATUS PRIOR TO ADMISSION: The patient is a poor historian. He [place]. The patient ambulated at one point with a RW. HOME SUPPORT PRIOR TO ADMISSION: The patient lived with spouse. Physical Therapy Goals  Initiated 6/11/2021  1. Patient will move from supine to sit and sit to supine  in bed with maximal assistance within 7 day(s). 2.  Patient will transfer from bed to chair and chair to bed with maximal assistance using the least restrictive device within 7 day(s). 3.  Patient will perform sit to stand with maximal assistance within 7 day(s). 4.  Patient will maintain sitting balance with moderate assistance within 7 day(s). Outcome: Progressing Towards Goal   PHYSICAL THERAPY EVALUATION  Patient: Selena Ibarra (33 y.o. male)  Date: 6/11/2021  Primary Diagnosis: FUO (fever of unknown origin) [R50.9]        Precautions:   Fall    ASSESSMENT  Based on the objective data described below, the patient presents with decreased independence with functional mobility, decreased balance, decreased strength and decreased ROM S/P admission for fever of unknown origin. His PMHx is remarkable for Parkinsons, Dm2, hx of falls, and multilevel spinal stenosis. Per chart review patient transferred from another hospital and has been hospitalized 1-2 wks prior to arrival here. At the time of PT evaluation patient has not had his pain medication or Sinemet. He demonstrates increased rigidity in bilateral UE. He reports extreme pain in bilateral LE especially his feet. Patient requires assistance to move LE which demonstrate increased stiffness but demonstrates improved range v. UE. Patient demonstrates poor sitting balance EOB requiring constant support in sitting.  He demonstrates the ability to assist in posture correction x2 while at EOB but continues to require assistance to achieve and maintain some balance. Standing is deferred due to poor sitting balance and patient fatigue with increased sitting time while being assisted with ADLs    Current Level of Function Impacting Discharge (mobility/balance): Max Ax2- Total A     Functional Outcome Measure: The patient scored 10/100 on the Barthel outcome measure. Other factors to consider for discharge: medical stability, decreased balance, decreased strength, decreased endurance, hx of falls, prolonged hospitalization, increased need for assistance, PLOF ambulatory      Patient will benefit from skilled therapy intervention to address the above noted impairments. PLAN :  Recommendations and Planned Interventions: bed mobility training, transfer training, gait training, therapeutic exercises, neuromuscular re-education, edema management/control, patient and family training/education, and therapeutic activities      Frequency/Duration: Patient will be followed by physical therapy:  5 times a week to address goals. Recommendation for discharge: (in order for the patient to meet his/her long term goals)  Therapy 3 hours per day 5-7 days per week    This discharge recommendation:  Has not yet been discussed the attending provider and/or case management    IF patient discharges home will need the following DME: to be determined (TBD)         SUBJECTIVE:   Patient stated I don't know.     OBJECTIVE DATA SUMMARY:   HISTORY:    No past medical history on file. No past surgical history on file.     Personal factors and/or comorbidities impacting plan of care:          EXAMINATION/PRESENTATION/DECISION MAKING:   Critical Behavior:  Neurologic State: Alert, Lethargic  Orientation Level: Oriented to person, Oriented to place, Oriented to situation  Cognition: Decreased attention/concentration, Impaired decision making, Memory loss, Poor safety awareness  Safety/Judgement: Decreased insight into deficits  Hearing:     Skin:    Edema:   Range Of Motion:  AROM: Generally decreased, functional           PROM: Generally decreased, functional           Strength:    Strength: Generally decreased, functional                    Tone & Sensation:   Tone: Abnormal              Sensation: Intact               Coordination:  Coordination: Generally decreased, functional  Vision:      Functional Mobility:  Bed Mobility:  Rolling: Maximum assistance;Assist x2  Supine to Sit: Maximum assistance;Assist x2  Sit to Supine: Maximum assistance;Assist x2  Scooting: Maximum assistance;Assist x2  Transfers:  Sit to Stand:  (unable to progress past EOB due to poor balance)                          Balance:   Sitting: Impaired  Sitting - Static: Poor (constant support)  Sitting - Dynamic: Poor (constant support)  Standing:  (unable to progress past EOB due to poor balance)  Ambulation/Gait Training:                                                         Stairs: Therapeutic Exercises:       Functional Measure:  Barthel Index:    Bathin  Bladder: 0  Bowels: 0  Groomin  Dressin  Feedin  Mobility: 0  Stairs: 0  Toilet Use: 0  Transfer (Bed to Chair and Back): 5  Total: 10/100       The Barthel ADL Index: Guidelines  1. The index should be used as a record of what a patient does, not as a record of what a patient could do. 2. The main aim is to establish degree of independence from any help, physical or verbal, however minor and for whatever reason. 3. The need for supervision renders the patient not independent. 4. A patient's performance should be established using the best available evidence. Asking the patient, friends/relatives and nurses are the usual sources, but direct observation and common sense are also important. However direct testing is not needed. 5. Usually the patient's performance over the preceding 24-48 hours is important, but occasionally longer periods will be relevant.   6. Middle categories imply that the patient supplies over 50 per cent of the effort. 7. Use of aids to be independent is allowed. Nyla Necessary., Barthel, D.W. (1902). Functional evaluation: the Barthel Index. [address] (14)2. YIFAN Albarado, Kranthi Nino., Ksenia Leone., [place], [address] (1999). Measuring the change indisability after inpatient rehabilitation; comparison of the responsiveness of the Barthel Index and Functional [place]fe Measure. Journal of Neurology, Neurosurgery, and Psychiatry, 66(4), 604-686. SHAHLA Reynolds.NADYA, LIDIA Jurado, & Pily Hanley M.A. (2004.) Assessment of post-stroke quality of life in cost-effectiveness studies: The usefulness of the Barthel Index and the EuroQoL-5D. Quality of Life Research, 15, 290-28            Physical Therapy Evaluation Charge Determination   History Examination Presentation Decision-Making   HIGH Complexity :3+ comorbidities / personal factors will impact the outcome/ POC  LOW Complexity : 1-2 Standardized tests and measures addressing body structure, function, activity limitation and / or participation in recreation  MEDIUM Complexity : Evolving with changing characteristics  Other outcome measures Barthel  HIGH       Based on the above components, the patient evaluation is determined to be of the following complexity level: HIGH     Pain Rating:      Activity Tolerance:   Fair    After treatment patient left in no apparent distress:   Supine in bed, Call bell within reach, and Side rails x 3    COMMUNICATION/EDUCATION:   The patients plan of care was discussed with: Occupational therapist and Registered nurse. Fall prevention education was provided and the patient/caregiver indicated understanding., Patient/family have participated as able in goal setting and plan of care. , and Patient/family agree to work toward stated goals and plan of care.     Thank you for this referral.  Amrita Moulton, PT   Time Calculation: 36 mins

## 2021-06-12 NOTE — PROGRESS NOTES
Bedside and Verbal shift change report given to Shelton Alamo RN (oncoming nurse) by Ebony Duncan RN (offgoing nurse). Report included the following information SBAR, Kardex and MAR.

## 2021-06-12 NOTE — PROGRESS NOTES
Hospitalist Progress Note    NAME: Mickie Guadarrama   :  1945   MRN:  [id number]     Patient transferred from H where he was admitted with worsening weakness. PTA the family report that the patient was having progressive weakness, difficulty ambulating, and urinary incontinence. Of note, the patient had a similar episode earlier this year. While hospitalized the patient began to spike fevers. He underwent an extensive ID workup as well as general medical and neurologic workup which did not identify a source of fevers or answers as to why the patient's weakness progressed so profoundly. He was transferred to [facility] because the patient and family reside in [place] and they wished for him to be closer to home for ongoing diagnostic workup. Assessment / Plan:  Parkinson's disease  Parkinson's associated dementia    Metabolic encephalopathy-resolved  Spinal stenosis, multilevel based on MRI last year    - Neuro input appreciated. - Continue carbidopa-levodopa er  mg po tid and entacapone. - Continue donepezil 10 mg po daily. - Continue pramipexole 0.25 mg po BID-home meds reconciled    - Vitamin D and B12 levels WNL  Discussed with neurologist, given his weakness and difficulty walking- recommended MRI C ,T and L spine -discussed with patient and his wife- although patient initially said he does not want scans as he feels he does not want anything done even if we find something. This morning,he again debated about getting MRI or not but later agreed    Suspected polymyalgia rheumatica-elevated ESR/CRP, diffuse UE and LE pain  -per previous note, rheumatologist recommended 20 mg prednisone  Trend inflammatory markers  -patient said pain has improved  Rt wrist swelling has improved,per patient's wife,he had some blood draws in the last hospital in that hand which caused redness/pain/swelling,negative DVT previous hospital    #Steroid induced hyperglycemia with Diabetes  -Blood glucose still high(per nursing staff,he ate food brought by family)  -400s this evening  -NPH 40 units BID, 5 units lispro and SSI  -adjust insulin based on glucose trend      FUO-resolved  - ID input appreciated. - Afebrile currently. - Workup at OSH negative. HTN  Dyslipidemia   - Overall adequate BP control.  - Continue nifedipine er 30 mg po daily.    - Resume atorvastatin 20 mg po daily         CKD  ruled out    Anemia of chronic disease   - H/H stable. - No tx indicated at this time. - Monitor. COPD  Remote tobacco use  - Continue arformoterol/budesonide 15/250 mcg nebs bid.    - Continue albuterol/ipratropium 2.5/0.5 mg nebs q4h prn. GERD  - Change pantoprazole to 40 mg po bid. BPH  - Continue finasteride 5 mg po daily. Estimated discharge date:  TBD  Barriers:  None identified     Code status: Full  Prophylaxis: Lovenox  Recommended Disposition: TBD            Subjective:     Chief Complaint / Reason for Physician Visit  He remains awake and alert, able to have conversations and decisions about his medical care -imaging etc  States that pain has improved but he is concerned about weakness - of extremities but still states that even if we find something on MRI-he does not want anything to be done      Review of Systems:  As per HPI    Objective:     VITALS:   Last 24hrs VS reviewed since prior progress note. Most recent are:  Patient Vitals for the past 24 hrs:   Temp Pulse Resp BP SpO2   06/12/21 1442 97.9 °F (36.6 °C) (!) 106 17 131/78 96 %   06/12/21 0203 97.4 °F (36.3 °C) 91 16 125/79 97 %   06/11/21 2214 97.6 °F (36.4 °C) 91 16 111/75 96 %   06/11/21 2113 -- -- -- -- 96 %     No intake or output data in the 24 hours ending 06/12/21 1700     I had a face to face encounter and independently examined this patient on 6/12/2021, as outlined below:  PHYSICAL EXAM:  General:  Elderly patient  HEENT:  Normocephalic. Sclera anicteric.   Mucous membranes moist.    Chest:  Resps even/unlabored with symmetrical CWE. Air entry diminished at bases. Lungs CTA. No use of accessory muscles. CV:  RRR. Normal S1/S2. No murmurs,  No JVD. GI:  Abdomen soft/ND, non tender  :  Voiding. Neurologic:   He was awake,oriented, speaks softly, lifted LUE against gravity, able to bend Left knee,able to lift RUE slightly off bed, still has RUE weakness  Psych:  Cooperative. No agitation. Skin:  No rashes or jaundice. Turgor elastic    Reviewed most current lab test results and cultures  YES  Reviewed most current radiology test results   YES  Review and summation of old records today    NO  Reviewed patient's current orders and MAR    YES  PMH/SH reviewed - no change compared to H&P  ________________________________________________________________________  Care Plan discussed with:    Comments   Patient Y    Family      RN Y    Care Manager     Consultant                        Multidiciplinary team rounds were held today with , nursing, pharmacist and clinical coordinator. Patient's plan of care was discussed; medications were reviewed and discharge planning was addressed. ________________________________________________________________________  Bridgette Presley MD     Procedures: see electronic medical records for all procedures/Xrays and details which were not copied into this note but were reviewed prior to creation of Plan. LABS:  I reviewed today's most current labs and imaging studies.   Pertinent labs include:  Recent Labs     06/12/21  0304 06/11/21  0502 06/10/21  0140   WBC 5.7 5.8 7.1   HGB 10.8* 9.7* 10.4*   HCT 33.8* 30.8* 33.6*    339 396     Recent Labs     06/12/21  0304 06/11/21  0502 06/10/21  0140   * 136 138   K 4.3 4.2 5.2*    106 109*   CO2 21 23 22   * 273* 286*   BUN 18 20 18   CREA 0.88 0.86 0.85   CA 8.6 8.7 9.1   MG 2.0 2.1 2.5*       Signed: Bridgette Presley MD

## 2021-06-12 NOTE — PROGRESS NOTES
Bedside and Verbal shift change report given to Ponce Ortiz RN (oncoming nurse) by Geoff Barfield RN (offgoing nurse). Report included the following information SBAR, Procedure Summary, Intake/Output, MAR and Recent Results.

## 2021-06-13 NOTE — PROGRESS NOTES
Bedside and Verbal shift change report given to Valeria Love RN (oncoming nurse) by Corey Jiang RN (offgoing nurse). Report included the following information SBAR, Kardex and MAR.

## 2021-06-13 NOTE — PROGRESS NOTES
Hospitalist Progress Note    NAME: Riesa Aschoff   :  1945   MRN:  [id number]     Patient transferred from H where he was admitted with worsening weakness. PTA the family report that the patient was having progressive weakness, difficulty ambulating, and urinary incontinence. Of note, the patient had a similar episode earlier this year. While hospitalized the patient began to spike fevers. He underwent an extensive ID workup as well as general medical and neurologic workup which did not identify a source of fevers or answers as to why the patient's weakness progressed so profoundly. He was transferred to [facility] because the patient and family reside in [place] and they wished for him to be closer to home for ongoing diagnostic workup. Assessment / Plan:  Parkinson's disease  Parkinson's associated dementia    Metabolic encephalopathy-resolved  Spinal stenosis, multilevel based on MRI last year    - Neuro input appreciated. - Continue carbidopa-levodopa er  mg po tid and entacapone. - Continue donepezil 10 mg po daily. - Continue pramipexole 0.25 mg po BID-home meds reconciled    - Vitamin D and B12 levels WNL  Increase to 600 mg TID gabapentin-home dose,prn norco  Discussed with neurologist, given his weakness and difficulty walking- recommended MRI C ,T and L spine -will see if we can get MRI scans today    Suspected polymyalgia rheumatica-elevated ESR/CRP, diffuse UE and LE pain  -per previous note, rheumatologist recommended 20 mg prednisone  ESR ,CRP trending down  -patient said pain has improved  Rt wrist swelling has improved,per patient's wife,he had some blood draws in the last hospital in that hand which caused redness/pain/swelling,negative DVT previous hospital    #Steroid induced hyperglycemia with Diabetes    -NPH 40 units BID, 5 units lispro and SSI  -adjust insulin based on glucose trend      FUO-resolved  - ID input appreciated. - Afebrile currently.    - Workup at OSH negative. HTN  Dyslipidemia     - Continue nifedipine er 30 mg po daily.    - Resumed atorvastatin 20 mg po daily         CKD  ruled out    Anemia of chronic disease   - H/H stable. COPD  Remote tobacco use  - Continue arformoterol/budesonide 15/250 mcg nebs bid.    - Continue albuterol/ipratropium 2.5/0.5 mg nebs q4h prn. GERD  - Change pantoprazole to 40 mg po bid. BPH  - Continue finasteride 5 mg po daily. Estimated discharge date:  TBD  Barriers:  None identified     Code status: Full  Prophylaxis: Lovenox  Recommended Disposition: TBD            Subjective:     Chief Complaint / Reason for Physician Visit    Still has pain and weakness of  extremities      Review of Systems:  As per HPI    Objective:     VITALS:   Last 24hrs VS reviewed since prior progress note. Most recent are:  Patient Vitals for the past 24 hrs:   Temp Pulse Resp BP SpO2   06/13/21 1038 97.7 °F (36.5 °C) 100 18 (!) 149/83 94 %   06/13/21 0204 97.9 °F (36.6 °C) 88 18 106/78 96 %   06/12/21 2225 97.6 °F (36.4 °C) (!) 110 18 109/72 94 %   06/12/21 2158 98.6 °F (37 °C) (!) 105 17 97/62 94 %   06/12/21 2042 -- -- -- -- 95 %   06/12/21 1442 97.9 °F (36.6 °C) (!) 106 17 131/78 96 %       Intake/Output Summary (Last 24 hours) at 6/13/2021 1123  Last data filed at 6/13/2021 7058  Gross per 24 hour   Intake --   Output 300 ml   Net -300 ml        I had a face to face encounter and independently examined this patient on 6/13/2021, as outlined below:  PHYSICAL EXAM:  General:  Elderly patient  HEENT:  Normocephalic. Sclera anicteric. Mucous membranes moist.    Chest:  Clear to auscultation b/l  CV:  RRR. Normal S1/S2. No murmurs. GI:  Abdomen soft/ND, non tender  Neurologic:   He was awake,oriented, speaks softly, lifted LUE against gravity, able to bend Left knee,able to lift RUE slightly off bed, still has RUE weakness  Psych:  Cooperative. No agitation. Skin:  No rashes or jaundice.   Turgor elastic    Reviewed most current lab test results and cultures  YES  Reviewed most current radiology test results   YES  Review and summation of old records today    NO  Reviewed patient's current orders and MAR    YES  PMH/SH reviewed - no change compared to H&P  ________________________________________________________________________  Care Plan discussed with:    Comments   Patient Y    Family      RN Y    Care Manager     Consultant                        Multidiciplinary team rounds were held today with , nursing, pharmacist and clinical coordinator. Patient's plan of care was discussed; medications were reviewed and discharge planning was addressed. ________________________________________________________________________  Edilson Dior MD     Procedures: see electronic medical records for all procedures/Xrays and details which were not copied into this note but were reviewed prior to creation of Plan. LABS:  I reviewed today's most current labs and imaging studies.   Pertinent labs include:  Recent Labs     06/12/21  0304 06/11/21  0502   WBC 5.7 5.8   HGB 10.8* 9.7*   HCT 33.8* 30.8*    339     Recent Labs     06/13/21  0407 06/12/21  0304 06/11/21  0502   NA  --  135* 136   K  --  4.3 4.2   CL  --  107 106   CO2  --  21 23   GLU  --  247* 273*   BUN  --  18 20   CREA  --  0.88 0.86   CA  --  8.6 8.7   MG 2.0 2.0 2.1       Signed: Edilson Dior MD

## 2021-06-13 NOTE — PROGRESS NOTES
Problem: Pressure Injury - Risk of  Goal: *Prevention of pressure injury  Description: Document Larry Scale and appropriate interventions in the flowsheet.   Outcome: Progressing Towards Goal  Note: Pressure Injury Interventions:  Sensory Interventions: Keep linens dry and wrinkle-free    Moisture Interventions: Absorbent underpads    Activity Interventions: Pressure redistribution bed/mattress(bed type), PT/OT evaluation    Mobility Interventions: Pressure redistribution bed/mattress (bed type), PT/OT evaluation    Nutrition Interventions: Document food/fluid/supplement intake    Friction and Shear Interventions: HOB 30 degrees or less

## 2021-06-14 NOTE — WOUND CARE
WOCN Note:     New consult for left heel and buttocks    Chart shows:  Admitted for weakness  History of urinary incont and Parkinson's Disease    Assessment:   Communicative and reports no pain. PT assisting him to move from supine to sitting. External urinary device. Surface: miri gel mattress    Right heel, buttocks, and sacral skin intact and without erythema. Heels offloaded with pillows. Chaffing and MASD noted with maceration to gluteal cleft, medial buttocks, & lower sacrum. Blanching redness - no open areas. 1. POA left heel deep tissue injury  2.3 x 3 x 0 cm  50% white lifted bullae 50% burgundy bullae  no exudate  Periwound intact & without erythema; no edema to foot  Tx: offloaded with pillows     PI Prevention:  Turn/reposition approximately every 2 hours  Offload heels with heels hanging off end of pillow at all times while in bed. Sacral Foam dressing: lift to assess regularly; change as needed. Discontinue if incontinent.       Transition of Care: Plan to follow weekly and as needed while admitted to hospital.    JUAN CARLOS Gregory, RN, [facility]  Certified Wound, Ostomy, Continence Nurse  office [phone]  Available via [facility]

## 2021-06-14 NOTE — PROGRESS NOTES
Bedside and Verbal shift change report given to Shad Lock RN (oncoming nurse) by Heather Johnston RN (offgoing nurse). Report included the following information SBAR, Kardex, Procedure Summary, Intake/Output, MAR and Recent Results.

## 2021-06-14 NOTE — PROGRESS NOTES
Hospitalist Progress Note    NAME: Nimisha Ferris   :  1945   MRN:  [id number]     Patient transferred from OSH where he was admitted with worsening weakness. PTA the family report that the patient was having progressive weakness, difficulty ambulating, and urinary incontinence. Of note, the patient had a similar episode earlier this year. While hospitalized the patient began to spike fevers. He underwent an extensive ID workup as well as general medical and neurologic workup which did not identify a source of fevers or answers as to why the patient's weakness progressed so profoundly. He was transferred to [facility] because the patient and family reside in [place] and they wished for him to be closer to home for ongoing diagnostic workup. Assessment / Plan:  Parkinson's disease  Parkinson's associated dementia    Metabolic encephalopathy-resolved  Spinal stenosis, multilevel based on MRI last year    - Neuro input appreciated. - Continue carbidopa-levodopa er  mg po tid and entacapone. - Continue donepezil 10 mg po daily. - Continue pramipexole 0.25 mg po BID  - Vitamin D and B12 levels WNL  -600 mg TID gabapentin-home dose,prn norco  MRI results reviewed-will discuss with neurologist as well      Suspected polymyalgia rheumatica-elevated ESR/CRP, diffuse UE and LE pain  ESR  stable,CRP trending down  -patient said pain has improved  -discussed with Dr.Arat schneider-pain/weakness improving but still RUE pain- will increase to 40 mg prednisone and a slow taper  As patient reported h/o gout-check uric acid- only rt wrist joint and fingers swelling +,no other joints swollen or erythematous    #Steroid induced hyperglycemia with Diabetes    -NPH 40 units BID, 8 units lispro and SSI  -adjust insulin based on glucose trend      FUO-resolved  - ID input appreciated. - Afebrile currently. - Workup at OSH negative. HTN  Dyslipidemia     - Continue nifedipine er 30 mg po daily.     - Resumed atorvastatin 20 mg po daily         CKD  ruled out    Anemia of chronic disease   - H/H stable. COPD  Remote tobacco use  - Continue arformoterol/budesonide 15/250 mcg nebs bid.    - Continue albuterol/ipratropium 2.5/0.5 mg nebs q4h prn. GERD  - Change pantoprazole to 40 mg po bid. BPH  - Continue finasteride 5 mg po daily. Estimated discharge date:  TBD  Barriers:  None identified     Code status: Full  Prophylaxis: Lovenox  Recommended Disposition: TBD            Subjective:     Chief Complaint / Reason for Physician Visit    Still has pain and weakness of  extremities      Review of Systems:  As per HPI    Objective:     VITALS:   Last 24hrs VS reviewed since prior progress note. Most recent are:  Patient Vitals for the past 24 hrs:   Temp Pulse Resp BP SpO2   06/14/21 1429 98.6 °F (37 °C) (!) 107 18 134/86 97 %   06/14/21 0937 -- -- -- -- 97 %   06/14/21 0754 97.6 °F (36.4 °C) 95 18 131/79 --   06/14/21 0300 97.6 °F (36.4 °C) 100 18 135/84 99 %   06/13/21 2058 97.5 °F (36.4 °C) (!) 102 18 127/86 99 %   06/13/21 1608 97.4 °F (36.3 °C) (!) 105 18 (!) 143/91 --       Intake/Output Summary (Last 24 hours) at 6/14/2021 1520  Last data filed at 6/14/2021 0858  Gross per 24 hour   Intake --   Output 450 ml   Net -450 ml        I had a face to face encounter and independently examined this patient on 6/14/2021, as outlined below:  PHYSICAL EXAM:  General:  Elderly patient  HEENT:  Normocephalic. Sclera anicteric. Mucous membranes moist.    Chest:  Clear to auscultation b/l  CV:  RRR. Normal S1/S2. No murmurs. GI:  Abdomen soft/ND, non tender  Neurologic:   He was awake,oriented,able to lift LUE ,tries to initiate bending at knee joint, Rt wrist swollen  Psych:  Cooperative. No agitation. Skin:  No rashes or jaundice.       Reviewed most current lab test results and cultures  YES  Reviewed most current radiology test results   YES  Review and summation of old records today NO  Reviewed patient's current orders and MAR    YES  PMH/SH reviewed - no change compared to H&P  ________________________________________________________________________  Care Plan discussed with:    Comments   Patient [address]     Consultant                        Multidiciplinary team rounds were held today with , nursing, pharmacist and clinical coordinator. Patient's plan of care was discussed; medications were reviewed and discharge planning was addressed. ________________________________________________________________________  Bridgette Presley MD     Procedures: see electronic medical records for all procedures/Xrays and details which were not copied into this note but were reviewed prior to creation of Plan. LABS:  I reviewed today's most current labs and imaging studies.   Pertinent labs include:  Recent Labs     06/14/21  1123 06/12/21  0304   WBC 8.6 5.7   HGB 11.4* 10.8*   HCT 35.9* 33.8*    355     Recent Labs     06/14/21  1123 06/13/21  0407 06/12/21  0304   *  --  135*   K 4.3  --  4.3     --  107   CO2 22  --  21   *  --  247*   BUN 16  --  18   CREA 0.88  --  0.88   CA 9.0  --  8.6   MG  --  2.0 2.0   PHOS 2.8  --   --    ALB 2.5*  --   --        Signed: Bridgette Presley MD

## 2021-06-14 NOTE — PROGRESS NOTES
Problem: Mobility Impaired (Adult and Pediatric)  Goal: *Acute Goals and Plan of Care (Insert Text)  Description: FUNCTIONAL STATUS PRIOR TO ADMISSION: The patient is a poor historian. He [place]. The patient ambulated at one point with a RW. HOME SUPPORT PRIOR TO ADMISSION: The patient lived with spouse. Physical Therapy Goals  Initiated 6/11/2021  1. Patient will move from supine to sit and sit to supine  in bed with maximal assistance within 7 day(s). 2.  Patient will transfer from bed to chair and chair to bed with maximal assistance using the least restrictive device within 7 day(s). 3.  Patient will perform sit to stand with maximal assistance within 7 day(s). 4.  Patient will maintain sitting balance with moderate assistance within 7 day(s). Outcome: Progressing Towards Goal   PHYSICAL THERAPY TREATMENT  Patient: Mickie Guadarrama (2 y.o. male)  Date: 6/14/2021  Diagnosis: FUO (fever of unknown origin) [R50.9] <principal problem not specified>       Precautions: Fall  Chart, physical therapy assessment, plan of care and goals were reviewed. ASSESSMENT  Patient continues with skilled PT services and is progressing towards goals gradually. Pt and wife are motivated for therapy to work w/ PT. He continues to require heavy assist x2 a this time to complete bed mobility. Once seated EOB, pt and therapy worked on midline sitting/sitting balance. Also worked weight shifting to Right d/t lateral shift to left, especially w/ fatigue. Used visual cue (mirroring therapist) to help pt find his center and maintain. Also attempted to shift to Right elbow, placing minimal weight through as he reports discomfort of RUE. He initially required Max Ax1-2 to maintain and correct midline sit and eventually progressed to Mod A.  Attempted sit<>stand x1 w/ Max-total Ax2 and pt only able to minimally clear buttocks from bed, just enough for Wound care RN to check sacrum and place dressing. Pt /pt/s wife hoping for pt to d/c to IPR as they have in the past. Will continue to follow pt for progression of activity as able and appropriate. Current Level of Function Impacting Discharge (mobility/balance): Max-Total Ax2    Other factors to consider for discharge: PMH. PLAN :  Patient continues to benefit from skilled intervention to address the above impairments. Continue treatment per established plan of care. to address goals. Recommendation for discharge: (in order for the patient to meet his/her long term goals)  IPR vs SNF rehab pending pt progress w/therapies. This discharge recommendation:  Has been made in collaboration with the attending provider and/or case management    IF patient discharges home will need the following DME: TBD; pt does own RW       SUBJECTIVE:   Patient stated Owusu Mends you for coming.     OBJECTIVE DATA SUMMARY:   Critical Behavior:  Neurologic State: Alert, Confused  Orientation Level: Oriented to person  Cognition: Follows commands  Safety/Judgement: Decreased insight into deficits  Functional Mobility Training:  Bed Mobility:     Supine to Sit: Maximum assistance; Total assistance;Assist x2  Sit to Supine: Maximum assistance; Total assistance;Assist x2           Transfers:  Sit to Stand: Maximum assistance; Total assistance;Assist x2  Stand to Sit: Maximum assistance;Assist x2                             Balance:  Sitting: Impaired  Sitting - Static: Poor (constant support)  Sitting - Dynamic: Poor (constant support)      Therapeutic Exercises:   Seated EOB: LAQ's (x5-6 reps).    Pain Ratin/10 general soreness     Activity Tolerance:   Fair    After treatment patient left in no apparent distress:   Supine in bed, Heels elevated for pressure relief, Call bell within reach, Caregiver / family present, and Side rails x 3    COMMUNICATION/COLLABORATION:   The patients plan of care was discussed with: Registered nurse and Case management.      Marylou Adan,PTA   Time Calculation: 39 mins

## 2021-06-14 NOTE — PROGRESS NOTES
ID Progress Note  2021    Subjective:     Afebrile. Feels better. ROS: unobtainable   Objective:     Vitals:   Visit Vitals  /79 (BP 1 Location: Left upper arm, BP Patient Position: At rest)   Pulse 95   Temp 97.6 °F (36.4 °C)   Resp 18   SpO2 97%        Tmax:  Temp (24hrs), Av.5 °F (36.4 °C), Min:97.4 °F (36.3 °C), Max:97.6 °F (36.4 °C)      Exam:    Not in distres  Pink conjuntivae, anicteric sclerae  Lung clear, no rales, wheezes or rhonchi   Heart: s1, s2, RRR, no murmurs rubs or clicks  Abdomen: soft nontender, no guarding or rebound  I can move the right knee now with less pain . I can also move the right and left wrist with less pain now. The aforementioned joints are not warm. Labs:   Lab Results   Component Value Date/Time    WBC 5.7 2021 03:04 AM    HGB 10.8 (L) 2021 03:04 AM    HCT 33.8 (L) 2021 03:04 AM    PLATELET 500  03:04 AM    MCV 86.7 2021 03:04 AM     Lab Results   Component Value Date/Time    Sodium 135 (L) 2021 03:04 AM    Potassium 4.3 2021 03:04 AM    Chloride 107 2021 03:04 AM    CO2 21 2021 03:04 AM    Anion gap 7 2021 03:04 AM    Glucose 247 (H) 2021 03:04 AM    BUN 18 2021 03:04 AM    Creatinine 0.88 2021 03:04 AM    BUN/Creatinine ratio 20 2021 03:04 AM    GFR est AA >60 2021 03:04 AM    GFR est non-AA >60 2021 03:04 AM    Calcium 8.6 2021 03:04 AM    Bilirubin, total 0.7 2021 12:38 PM    Alk.  phosphatase 108 2021 12:38 PM    Protein, total 7.6 2021 12:38 PM    Albumin 2.0 (L) 2021 12:38 PM    Globulin 5.6 (H) 2021 12:38 PM    A-G Ratio 0.4 (L) 2021 12:38 PM    ALT (SGPT) 13 2021 12:38 PM     Labs from [place]     ESR greater than 130  CRP 31.7  Procalcitonin 0.64  Blood cultures negative  Fungitell negative  Legionella and streptococcal urinary antigen negative  Upper respiratory viral panel negative  Lyme disease negative  Ehrlichia, Anaplasma serologies negative  HIV negative  EDY negative  Anti-Marge negative  CCP antibody negative  AntidsDNA negative  CSF WBC was low, total protein elevated, negative biofire     Imaging from [place]  CT and MRI of the brain negative  Chest x-ray shows interstitial infiltrates in the right upper lobe  CT scan of the chest is negative for pneumonia  CT of the abdomen and pelvis shows no abdominal process  CT of the right upper extremity shows no abscess      Assessment:     #1 fever  -seems to have resolved     #2 weakness     #3 arthralgias     #4 diabetes     #5 Parkinson's             Recommendations:     He has been afebrile. He is not on any antibiotics. Prednisone has been started as recommended by rheum. RF normal.     Will sign off. Please call with questions.            Gregg Turcios MD

## 2021-06-14 NOTE — PROGRESS NOTES
DELLA: Referral pending with [address] [place] [address] inpatient rehab (no beds available until next week) and [facility]. Noted ID following. LATE ENTRY NOTE from 6/11: Chart reviewed. Family requesting IPR at [address] [place] [address]. CM discussed with MD who is in agreement. Referral was sent to [address] [place] [address] via [email or web address]. Update 6/14: Noted [facility] is reviewing, liaison is Ariana Lopez [phone]. They are requesting updated therapy notes. CM spoke with Ariana Lopez with [address] [place] [address]. They are reviewing, however will not have any beds until next week. CM sent perfect serve to MD to find out if CM should look into alternate IPR's vs. SNF. Will discuss with wife per MD.    Evans Alvarez called patient's wife Maryjo Blevins #[phone]. She is open to a referral being sent to [address] [place] [address]. She does not want SNF placement. CM sent referral to [facility] [place] via [email or web address].     Gracia Morgan, BSW/CRM

## 2021-06-14 NOTE — PROGRESS NOTES
Problem: Self Care Deficits Care Plan (Adult)  Goal: *Acute Goals and Plan of Care (Insert Text)  Description: FUNCTIONAL STATUS PRIOR TO ADMISSION: No family present during eval to confirm PLOF    HOME SUPPORT PRIOR TO ADMISSION: No family present during eval to confirm PLOF    Occupational Therapy Goals  Initiated 6/10/2021     1. Patient will complete bed mobility with mod A x2 in preparation for functional transfers within 7 days. 2. Patient will complete grooming while sitting in bed with min A within 7 days. 3. Patient will complete UB bathing while sitting in bed with mod A within 7 days. 4. Patient will complete LB bathing while sitting in bed with max A x1 within 7 days. Outcome: Progressing Towards Goal   OCCUPATIONAL THERAPY TREATMENT  Patient: Hussein Paul (19 y.o. male)  Date: 6/14/2021  Diagnosis: FUO (fever of unknown origin) [R50.9] <principal problem not specified>       Precautions: Fall  Chart, occupational therapy assessment, plan of care, and goals were reviewed. ASSESSMENT  Patient continues with skilled OT services and is progressing towards goals. Patient received in modified chair position in bed with wife present. Patient reporting pain at bilateral hands with all passive and active hand mobility. Noted edema bilateral hands with right > left. Patient is dominantly right handed. Participation impacted by impaired AROM and PROM of bilateral UEs, bilateral hands with edema and pain (6+/10), impaired functional use of bilateral hands, impaired reach to LEs, impaired reach to upper body. Patient unable to retrieve a cup to get a drink. Set patient up with a hospital cup with a minimum amount of liquid which patient was able to reach for, retrieve, and use to take a drink with set up using left hand this session. Wife reports patient is unable to use standard call bell.  Nursing ordered a pancake bell and states they will set it up on arrival. After gentle PROM of bilateral hands, patient set up for hand roll for right hand to prevent soft tissue shortening. Instructed in use of bilateral hand rolls at night, hand roll for right during the day with intermittent breaks, and pillows under bilateral UEs to decrease hand edema. Current Level of Function Impacting Discharge (ADLs): Max to total assist for all self care and mobility    Other factors to consider for discharge: Parkinson's by history         PLAN :  Patient continues to benefit from skilled intervention to address the above impairments. Continue treatment per established plan of care to address goals. Recommend with staff: bed in modified chair position for meals and self care, bed pan    Recommend next OT session: functional use of hands for self feeding, call bell, self care    Recommendation for discharge: (in order for the patient to meet his/her long term goals)  Therapy 3 hours per day 5-7 days per week    This discharge recommendation:  Has been made in collaboration with the attending provider and/or case management    IF patient discharges home will need the following DME: bedside commode, hospital bed, mechanical lift, and wheelchair       SUBJECTIVE:   Patient stated Mittie Omar are so painful.  referring to hands    OBJECTIVE DATA SUMMARY:   Cognitive/Behavioral Status:  Neurologic State: Alert  Orientation Level: Oriented to person;Oriented to place;Oriented to situation  Cognition: Appropriate for age attention/concentration; Follows commands  Perception: Appears intact  Perseveration: No perseveration noted  Safety/Judgement: Awareness of environment    Functional Mobility and Transfers for ADLs:  Bed Mobility:  Supine to Sit: Maximum assistance; Total assistance;Assist x2  Sit to Supine: Maximum assistance; Total assistance;Assist x2    Transfers:  Sit to Stand: Maximum assistance; Total assistance;Assist x2          Balance:  Sitting: Impaired  Sitting - Static: Poor (constant support)  Sitting - Dynamic: Poor (constant support)    ADL Intervention:  Feeding  Container Management: Total assistance (dependent)  Cutting Food: Total assistance (dependent)  Food to Mouth: Maximum assistance; Total assistance (dependent)  Drink to Mouth: Set-up (using left hand, hospital cup, small amount in container)  Cues: Physical assistance;Verbal cues provided  Adaptive Equipment: Other (comment); Cup with lid and handle (small amount in cup)    Grooming  Grooming Assistance: Maximum assistance  Position Performed: Long sitting on bed  Washing Face: Maximum assistance  Washing Hands: Total assistance (dependent)    Upper Body Bathing  Bathing Assistance: Maximum assistance (in simulation)  Position Performed: Long sitting on bed  Cues: Physical assistance;Verbal cues provided    Lower Body Bathing  Bathing Assistance: Total assistance(dependent) (in simulation and inferred from mobility)         Lower Body Dressing Assistance  Dressing Assistance: Total assistance(dependent) (inferred)  Leg Crossed Method Used: No  Position Performed: Long sitting on bed  Cues: Physical assistance         Cognitive Retraining  Organizing/Sequencing: Breaking task down  Attention to Task: Single task  Following Commands: Follows one step commands/directions  Safety/Judgement: Awareness of environment  Cues: Tactile cues provided;Verbal cues provided;Visual cues provided      Activity Tolerance:   Fair    After treatment patient left in no apparent distress:   Heels elevated for pressure relief, Call bell within reach, Caregiver / family present, and bed in modified chair position    COMMUNICATION/COLLABORATION:   The patients plan of care was discussed with: Registered nurse.      MART Harmon  Time Calculation: 35 mins

## 2021-06-14 NOTE — PROGRESS NOTES
Orders received to give patient 15 units of Humalog for a blood glucose of 425 via perfect serve. Will continue to monitor. Call bell within reach.

## 2021-06-15 NOTE — PROGRESS NOTES
DELLA: [facility] has accepted patient, PCR test pending. CM spoke with [facility] [phone] with [place]. They can accept patient. They will have a bed tomorrow if patient is medically stable. They require a PCR covid test for admission.  JENNIFER notified MD. Krys Lindo BSW/CRM

## 2021-06-15 NOTE — PROGRESS NOTES
Bedside and Verbal shift change report given to Veto Edmonds (oncoming nurse) by Nhung Shea RN (offgoing nurse). Report included the following information SBAR, Kardex, Intake/Output, MAR and Recent Results.

## 2021-06-15 NOTE — PROGRESS NOTES
Problem: Self Care Deficits Care Plan (Adult)  Goal: *Acute Goals and Plan of Care (Insert Text)  Description: FUNCTIONAL STATUS PRIOR TO ADMISSION: No family present during eval to confirm PLOF    HOME SUPPORT PRIOR TO ADMISSION: No family present during eval to confirm PLOF    Occupational Therapy Goals  Initiated 6/10/2021     1. Patient will complete bed mobility with mod A x2 in preparation for functional transfers within 7 days. 2. Patient will complete grooming while sitting in bed with min A within 7 days. 3. Patient will complete UB bathing while sitting in bed with mod A within 7 days. 4. Patient will complete LB bathing while sitting in bed with max A x1 within 7 days. Outcome: Progressing Towards Goal   OCCUPATIONAL THERAPY TREATMENT  Patient: Abdias Pereyra (22 y.o. male)  Date: 6/15/2021  Diagnosis: FUO (fever of unknown origin) [R50.9] <principal problem not specified>       Precautions: Fall  Chart, occupational therapy assessment, plan of care, and goals were reviewed. ASSESSMENT  Patient continues with skilled OT services and is progressing towards goals. Patient demonstrating decreased edema overall of bilateral UEs and increased tolerance for bilateral UE mobility with assist. Participation impacted by pain with bilateral UE mobility (5/10 reported), impaired AROM and PROM of bilateral UEs, impaired functional use of bilateral UEs, generalized weakness, impaired reach to LEs and UEs for self care. Current Level of Function Impacting Discharge (ADLs): Max to total assist for self care    Other factors to consider for discharge: none         PLAN :  Patient continues to benefit from skilled intervention to address the above impairments. Continue treatment per established plan of care to address goals.     Recommend with staff: bed in modified chair position for meals and self care, assist for all self care    Recommend next OT session: POC    Recommendation for discharge: (in order for the patient to meet his/her long term goals)  Therapy 3 hours per day 5-7 days per week  If discharged to home, will need 24 hour assist for cognition, self care, and iADLs, HH  This discharge recommendation:  Has been made in collaboration with the attending provider and/or case management    IF patient discharges home will need the following DME: hospital bed, mechanical lift, and wheelchair       SUBJECTIVE:   Patient stated Ermias Hutchison jania for helping me.     OBJECTIVE DATA SUMMARY:   Cognitive/Behavioral Status:  Neurologic State: Alert  Orientation Level: Oriented to person;Disoriented to time;Disoriented to place;Oriented to situation  Cognition: Decreased attention/concentration; Follows commands  Perception: Appears intact  Perseveration: No perseveration noted  Safety/Judgement: Decreased awareness of environment    Functional Mobility and Transfers for ADLs:  Bed Mobility:   Total assist      ADL Intervention:     Drinking from large hospital cup with handle and small amount of liquid with set up and min assist using left hand. Patient with tremors in hand requiring assist to complete task. Cognitive Retraining  Safety/Judgement: Decreased awareness of environment    Therapeutic Exercises:   Passive/active assisted ROM bilateral UEs with focus on shoulder, elbow, wrist and finger flexion/extension as well shoulder horizontal ab/adduction within limits of pain which patient reports at a 5/10 at end range. Patient demonstrates edema bilateral hands with more on right than left,  but decreased overall from 6/14/2021. Patient now able to rest hands on pillow with fingers extended (decreased range) without use of hand roll. Activity Tolerance:   Fair    After treatment patient left in no apparent distress:   Supine in bed, Call bell within reach, and Caregiver / family present    COMMUNICATION/COLLABORATION:   The patients plan of care was discussed with: Registered nurse.      Tosha iPña CEVALLOS  Time Calculation: 19 mins

## 2021-06-15 NOTE — PROGRESS NOTES
Hospitalist Progress Note    NAME: Hussein Paul   :  1945   MRN:  [id number]     Patient transferred from OSH where he was admitted with worsening weakness. PTA the family report that the patient was having progressive weakness, difficulty ambulating, and urinary incontinence. Of note, the patient had a similar episode earlier this year. While hospitalized the patient began to spike fevers. He underwent an extensive ID workup as well as general medical and neurologic workup which did not identify a source of fevers or answers as to why the patient's weakness progressed so profoundly. He was transferred to [facility] because the patient and family reside in [place] and they wished for him to be closer to home for ongoing diagnostic workup. Assessment / Plan:  Parkinson's disease  Parkinson's associated dementia    Metabolic encephalopathy-resolved  Spinal stenosis, multilevel     - Neuro input appreciated. - Continue carbidopa-levodopa er  mg po tid and entacapone. - Continue donepezil 10 mg po daily. - Continue pramipexole 0.25 mg po BID  - Vitamin D and B12 levels WNL  -600 mg TID gabapentin-home dose,prn norco  MRI results reviewed-discussed with neurologist -OP follow recommended      Suspected polymyalgia rheumatica-elevated ESR/CRP, diffuse UE and LE pain  ESR  stable,CRP trending down  -patient said pain has improved since admission  -on 40 mg prednisone- slow taper and OP rheum follow up  Uric acid wnl  -rt hand X ray    #Steroid induced hyperglycemia with Diabetes    -Increase to NPH 50 units BID, 12 units lispro and SSI  -adjust insulin based on glucose trend      FUO-resolved  - ID input appreciated. - Afebrile currently. - Workup at OSH negative. HTN  Dyslipidemia     - Continue nifedipine er 30 mg po daily.    - Resumed atorvastatin 20 mg po daily         CKD  ruled out    Anemia of chronic disease   - H/H stable.         COPD  Remote tobacco use  - Continue arformoterol/budesonide 15/250 mcg nebs bid.    - Continue albuterol/ipratropium 2.5/0.5 mg nebs q4h prn. GERD  - Change pantoprazole to 40 mg po bid. BPH  - Continue finasteride 5 mg po daily. Estimated discharge date:  TBD  Barriers:  None identified     Code status: Full  Prophylaxis: Lovenox  Recommended Disposition: Inpatient rehab            Subjective:     Chief Complaint / Reason for Physician Visit    Still has RUE pain, some LE pain as well      Review of Systems:  As per HPI    Objective:     VITALS:   Last 24hrs VS reviewed since prior progress note. Most recent are:  Patient Vitals for the past 24 hrs:   Temp Pulse Resp BP SpO2   06/15/21 1447 97.3 °F (36.3 °C) (!) 102 16 129/86 97 %   06/15/21 1003 97.6 °F (36.4 °C) (!) 108 18 138/84 93 %   06/15/21 0938 -- -- -- -- 94 %   06/15/21 0218 97.8 °F (36.6 °C) 100 17 113/71 93 %   06/14/21 2031 99 °F (37.2 °C) (!) 114 18 (!) 152/89 99 %       Intake/Output Summary (Last 24 hours) at 6/15/2021 1736  Last data filed at 6/15/2021 1601  Gross per 24 hour   Intake --   Output 1250 ml   Net -1250 ml        I had a face to face encounter and independently examined this patient on 6/15/2021, as outlined below:  PHYSICAL EXAM:  General:  Elderly patient  HEENT:  Normocephalic. Sclera anicteric. Mucous membranes moist.    Chest:  Clear to auscultation b/l  CV:  RRR. Normal S1/S2. No murmurs. GI:  Abdomen soft/ND, non tender  Neurologic:   He was awake,oriented,able to lift LUE ,, Rt wrist swollen  Psych:  Cooperative. No agitation. Skin:  No rashes or jaundice.       Reviewed most current lab test results and cultures  YES  Reviewed most current radiology test results   YES  Review and summation of old records today    NO  Reviewed patient's current orders and MAR    YES  PMH/SH reviewed - no change compared to H&P  ________________________________________________________________________  Care Plan discussed with:    Comments   Patient Y Family      RN Y    Care Manager     Consultant                        Multidiciplinary team rounds were held today with , nursing, pharmacist and clinical coordinator. Patient's plan of care was discussed; medications were reviewed and discharge planning was addressed. ________________________________________________________________________  Allen Wright MD     Procedures: see electronic medical records for all procedures/Xrays and details which were not copied into this note but were reviewed prior to creation of Plan. LABS:  I reviewed today's most current labs and imaging studies.   Pertinent labs include:  Recent Labs     06/14/21  1123   WBC 8.6   HGB 11.4*   HCT 35.9*        Recent Labs     06/14/21  1123 06/13/21  0407   *  --    K 4.3  --      --    CO2 22  --    *  --    BUN 16  --    CREA 0.88  --    CA 9.0  --    MG  --  2.0   PHOS 2.8  --    ALB 2.5*  --        Signed: Allen Wright MD

## 2021-06-15 NOTE — PROGRESS NOTES
Bedside and Verbal shift change report given to Julieth Tesfaye RN (oncoming nurse) by Nora Mayes RN (offgoing nurse). Report included the following information SBAR.

## 2021-06-15 NOTE — PROGRESS NOTES
Vitals w/ MEWS Score (last day)     Date/Time MEWS Score Pulse Resp Temp BP Level of Consciousness SpO2    06/14/21 2031  3  (!) 114  18  99 °F (37.2 °C)  (!) 152/89  Alert (0)  99 %    06/14/21 1429  2  (!) 107  18  98.6 °F (37 °C)  134/86  Alert (0)  97 %    06/14/21 0937  --  --  --  --  --  --  97 %    06/14/21 0754  1  95  18  97.6 °F (36.4 °C)  131/79  Alert (0)  --    06/14/21 0300  1  100  18  97.6 °F (36.4 °C)  135/84  Alert (0)  99 %    06/13/21 2058  2  (!) 102  18  97.5 °F (36.4 °C)  127/86  Alert (0)  99 %    06/13/21 1608  2  (!) 105  18  97.4 °F (36.3 °C)  (!) 143/91  Alert (0)  --    06/13/21 1038  1  100  18  97.7 °F (36.5 °C)  (!) 149/83  Alert (0)  94 %    06/13/21 0204  1  88  18  97.9 °F (36.6 °C)  106/78  Alert (0)  96 %            Axel NP notified. No new orders, will continue to closely monitor.

## 2021-06-15 NOTE — PROGRESS NOTES
Problem: Pressure Injury - Risk of  Goal: *Prevention of pressure injury  Description: Document Larry Scale and appropriate interventions in the flowsheet. Outcome: Progressing Towards Goal  Note: Pressure Injury Interventions:  Sensory Interventions: Minimize linen layers, Turn and reposition approx.  every two hours (pillows and wedges if needed)    Moisture Interventions: Absorbent underpads    Activity Interventions: Pressure redistribution bed/mattress(bed type), PT/OT evaluation    Mobility Interventions: Pressure redistribution bed/mattress (bed type), PT/OT evaluation    Nutrition Interventions: Document food/fluid/supplement intake    Friction and Shear Interventions: Lift team/patient mobility team, Minimize layers

## 2021-06-15 NOTE — PROGRESS NOTES
Dr. Lane Fitting paged concerning blood glucose being 423. Per perfect serve orders received to give patient a total of 15 units of Lispro. Will continue to monitor. Call bell within reach.

## 2021-06-16 NOTE — PROGRESS NOTES
Bedside and Verbal shift change report given to Candida Flores RN (oncoming nurse) by Ana Scanlon RN (offgoing nurse). Report included the following information SBAR.

## 2021-06-16 NOTE — PROGRESS NOTES
Bedside shift change report given to Aden Priest (oncoming nurse) by Sebastián Bailey (offgoing nurse). Report included the following information SBAR.     2832 Notified Dr. Yovany Garnett that pt's BS was 192 this AM.  Order received for NPH 45 units x1 and humalog 10 units x 1.

## 2021-06-16 NOTE — PROGRESS NOTES
Physician Progress Note      Michel Gallagher  CSN #:                  [id number]  :                       1945  ADMIT DATE:       2021 10:28 PM  [address] [place] DATE:  RESPONDING  PROVIDER #:        Medhat Gutierrez MD          QUERY TEXT:    Patient admitted with fever of unknown cause. Noted documentation of metabolic encephalopathy in H&P and subsequent Hospitalists' notes. It is also noted that the patient is more oriented during the day and less at night based on nursing assessments. In order to support the diagnosis of metabolic encephalopathy, please include additional clinical indicators in your documentation. Or please document if the diagnosis of metabolic encephalopathy has been ruled out after further study.     The medical record reflects the following:  Risk Factors: 74yo with Parkinson's disease with dementia  Clinical Indicators:  - It is noted in nursing assessments that during 7a-7p shift patient is Alert and Oriented x3 but only Oriented to self on 7p-7a shift  - This can be noted since the admission assessment to present  - Metabolic encephalopathy noted in H&P and subsequent Hospitalists' notes  - resolved as of   - Neuro:  - Consult note: Appears that patient's primary complaint is pain would not be unexpected that mentation has worsened in the setting of underlying cognitive impairment and a prolonged hospital stay now with interstate transfer  - 6/10: He was awake, oriented X 2, speaks softly  -  note: Patient is reported to have Parkinson's associated memory impairment/dementia at baseline, likely worsened by prolonged hospital stay  - Weakness in BLE and BUE  - Speaks at a whisper  Treatment: Neuro following, PT/OT, carbidopa-levodopa er  mg po tid, entacapone, donepezil 10 mg po daily, pramipexole 0.25 mg po BID    Thank you,  Ashlyn Navas  [phone]  Options provided:  -- Metabolic encephalopathy present as evidenced by, Please document evidence.   -- Metabolic encephalopathy was ruled out, disorientation is related to Parkinson's disease with dementia  -- Metabolic encephalopathy was ruled out  -- Other - I will add my own diagnosis  -- Disagree - Not applicable / Not valid  -- Disagree - Clinically unable to determine / Unknown  -- Refer to Clinical Documentation Reviewer    PROVIDER RESPONSE TEXT:    Metabolic encephalopathy is present as evidenced by lethargic at admission    Query created by: Susan Navas on 6/15/2021 10:50 AM      Electronically signed by:  Ethridge Fothergill MD Celso Coho MD 6/16/2021 5:49 PM

## 2021-06-16 NOTE — PROGRESS NOTES
Problem: Mobility Impaired (Adult and Pediatric)  Goal: *Acute Goals and Plan of Care (Insert Text)  Description: FUNCTIONAL STATUS PRIOR TO ADMISSION: The patient is a poor historian. He has [place]. The patient ambulated at one point with a RW. HOME SUPPORT PRIOR TO ADMISSION: The patient lived with spouse. Physical Therapy Goals  Initiated 6/11/2021  1. Patient will move from supine to sit and sit to supine  in bed with maximal assistance within 7 day(s). 2.  Patient will transfer from bed to chair and chair to bed with maximal assistance using the least restrictive device within 7 day(s). 3.  Patient will perform sit to stand with maximal assistance within 7 day(s). 4.  Patient will maintain sitting balance with moderate assistance within 7 day(s). Outcome: Progressing Towards Goal   PHYSICAL THERAPY TREATMENT  Patient: Slade Almonte (45 y.o. male)  Date: 6/16/2021  Diagnosis: FUO (fever of unknown origin) [R50.9] <principal problem not specified>       Precautions: Fall  Chart, physical therapy assessment, plan of care and goals were reviewed. ASSESSMENT  Patient continues with skilled PT services and is progressing towards goals. He continues to demonstrate hypersensitivity in all extremities with some reactive spasms with attempts at ROM of his LEs and trunk. Worked on assisted ROM of LEs and then elevated HOB with knees flat to fully upright position to start to stretch and attempt to alleviate some pain. He was then assisted to EOB with assist of 2 and worked on sitting balance at EOB.   His primary limitations are: RUE strength (he does not initiate movement of the RUE or hand and demonstrates approx 1-2/5 strength), RLE strength and ROM (knee extension lacking approx 15 to 20 degrees and strength of only 2-/5 compared to L 3-/5), hypersensitivity throughout (though this improves during session), poor sitting balance (leans posterior and to the L, though feels he is falling to the R). He was ultimately able to maintain sitting balance with feet planted on floor for 15-30 seconds at a time. Sitting balance improved with lateral and rotational stretching in all directions. He continues to be below his baseline in terms of mobility and continue to recommend rehab once medically ready. Current Level of Function Impacting Discharge (mobility/balance): max assist of 2 for mobility    Other factors to consider for discharge: plan for discharge to inpatient rehab         PLAN :  Patient continues to benefit from skilled intervention to address the above impairments. Continue treatment per established plan of care. to address goals. Recommendation for discharge: (in order for the patient to meet his/her long term goals)  Therapy 3 hours per day 5-7 days per week    This discharge recommendation:  Has been made in collaboration with the attending provider and/or case management    IF patient discharges home will need the following DME: to be determined (TBD)       SUBJECTIVE:   Patient stated It hurts everywhere.     OBJECTIVE DATA SUMMARY:   Critical Behavior:  Neurologic State: Alert, Confused  Orientation Level: Oriented to person, Oriented to place, Disoriented to time, Oriented to situation  Cognition: Follows commands  Safety/Judgement: Decreased awareness of environment  Functional Mobility Training:  Bed Mobility:  Rolling: Maximum assistance (of 1-2, more assist needed earlier in session)  Supine to Sit: Additional time;Bed Modified; Total assistance (HOB fully elevated)  Sit to Supine: Total assistance  Scooting: Total assistance        Transfers:                                   Balance:  Sitting: Impaired; Without support  Sitting - Static: Fair (occasional) (frequent assist needed, leans post and to R)  Sitting - Dynamic: Poor (constant support) (mvmts are very slow, leans R and post in trunk)  Ambulation/Gait Training:                                                        Stairs: Therapeutic Exercises:   Assisted ROM all extremities, sitting balance, stretching trunk in all directions  Pain Rating:  Hypersensitive throughout, but improved during therapy session    Activity Tolerance:   Fair and requires rest breaks    After treatment patient left in no apparent distress:   Supine in bed, Heels elevated for pressure relief, Call bell within reach, and Side rails x 3    COMMUNICATION/COLLABORATION:   The patients plan of care was discussed with: Registered nurse and Case management.      John Campbell, PT   Time Calculation: 48 mins

## 2021-06-16 NOTE — PROGRESS NOTES
Problem: Pressure Injury - Risk of  Goal: *Prevention of pressure injury  Description: Document Larry Scale and appropriate interventions in the flowsheet. 6/16/2021 1801 by Radha Saldana RN  Outcome: Progressing Towards Goal  Note: Pressure Injury Interventions:  Sensory Interventions: Assess changes in LOC    Moisture Interventions: Absorbent underpads    Activity Interventions: PT/OT evaluation    Mobility Interventions: Float heels    Nutrition Interventions: Document food/fluid/supplement intake    Friction and Shear Interventions: HOB 30 degrees or less, Feet elevated on foot rest             6/16/2021 1801 by Radha Saldana RN  Outcome: Progressing Towards Goal  Note: Pressure Injury Interventions:  Sensory Interventions: Assess changes in LOC    Moisture Interventions: Absorbent underpads    Activity Interventions: PT/OT evaluation    Mobility Interventions: Float heels    Nutrition Interventions: Document food/fluid/supplement intake    Friction and Shear Interventions: HOB 30 degrees or less, Feet elevated on foot rest                Problem: Falls - Risk of  Goal: *Absence of Falls  Description: Document Geovanni Fall Risk and appropriate interventions in the flowsheet.   Outcome: Progressing Towards Goal  Note: Fall Risk Interventions:  Mobility Interventions: PT Consult for mobility concerns, Communicate number of staff needed for ambulation/transfer    Mentation Interventions: Door open when patient unattended    Medication Interventions: Patient to call before getting OOB    Elimination Interventions: Call light in reach

## 2021-06-16 NOTE — PROGRESS NOTES
DELLA: [facility] has accepted patient, bed is available tomorrow. PCR test negative 6/15. BLS to transport at discharge.     Kathy Shepherd, BSW/CRM

## 2021-06-16 NOTE — PROGRESS NOTES
Bedside shift change report given to Avel Chambers RN (oncoming nurse) by Christopher Arenas RN(offgoing nurse).  Report included the following information Freddy MARTINEZ

## 2021-06-17 NOTE — PROGRESS NOTES
Neurosurgery Progress Note  Tiffany Lerma PA-C    Admit Date: 2021   LOS: 10 days      Daily Progress Note: 2021    Reason for consult: spinal stenosis, moderate to severe lumbar    HPI: Dr. Baylee Madrigal is a very pleasant 67year old gentleman. He is a retired orthopedic surgeon. Patient is not a very good historian. Per chart review, he presented to the hospital with worsening weakness and difficulty ambulating. It is also noted that he has some incontinence though patient does not endorse this to me and his nurse states he is not retaining urine and is voiding with external male catheter without issue. Patient states he was using a walker to ambulate until about a week ago when he had to start using a wheelchair. He had a spinal fusion at L3-5 over 10 years ago in [place], [place]. He was diagnosed with Parkinsons disease many years ago and is under the care of a neurologist. He has had back pain for as long as he can remember. Pain is located in his low back and radiates down both legs; apparently he typically sees a pain management doctor for his back. He also notes numbness and tingling and sensitivity in hands and feet. Subjective:     Denies chest pain, nausea, vomiting, difficulty swallowing, headache, and dyspnea. Pt resting comfortably in bed. Objective:     Vital signs  Temp (24hrs), Av.9 °F (36.6 °C), Min:97.6 °F (36.4 °C), Max:98.1 °F (36.7 °C)   No intake/output data recorded.   06/15 1901 -  0700  In: 480 [P.O.:480]  Out: 1700 [Urine:1700]    Visit Vitals  BP (!) 144/83 (BP 1 Location: Left upper arm)   Pulse 99   Temp 98.1 °F (36.7 °C)   Resp 18   Wt 89.7 kg (197 lb 12.8 oz)   SpO2 94%   BMI 35.04 kg/m²      O2 Device: None     Output (mL)  Urine Voided: 750 ml (06/15/21 021)  Last Bowel Movement Date: 21 (21)  Unmeasurable Output  Urine Occurrence(s): 1 (21 0800)  Stool Occurrence(s): 1 (21 0930)     Pain control  Pain Assessment  Pain Scale 1: Numeric (0 - 10)  Pain Intensity 1: 3  Pain Onset 1: acute  Pain Location 1: Back, Leg  Pain Orientation 1: Right  Pain Description 1: Aching  Pain Intervention(s) 1: Rest    Physical Exam:  Gen: No acute distress. Abd: soft, nontender  Neuro: A&O to self and city. Follows commands. Speech clear but slow. Affect normal. Mild upper extremity tremor noted  PERRL. EOMI. Face symmetric. CALERO spontaneously. Strength 1/5 in RUE, 3/5 LUE, 4/5 RLE, 2/5 LLE. Sensation intact; very sensitive to touch. Reflexes 2+  Negative drift. Gait deferred. Calves soft and supple; No pain with passive stretch  Skin: warm, dry    Imaging     MRI Lumbar spine 6/13/21  IMPRESSION  1. Previous posterior decompression and fusion L3-4, and interbody fusion L4-5.  2. Severe spinal canal stenosis L2-3 due to combination of minimal grade 1  retrolisthesis, diffuse disc bulge, epidural fat and facet degeneration. Mild to  moderate right greater than left foraminal stenosis at this level with  associated right foraminal disc protrusion. 3. Moderate left greater than right foraminal stenosis L5-S1. MRI Thoracic spine 6/13/21  IMPRESSION  Multilevel degenerative disc disease most significant at T6-7 where a small  right central disc protrusion slightly indents the ventral spinal cord and  causes mild spinal canal stenosis. No cord signal abnormality. MRI Cervical spine 6/13/21  IMPRESSION  1. Significant motion artifact. 2. Multilevel degenerative disc disease with significant degrees of foraminal  stenosis as above, and moderate spinal canal stenosis C5-6. No cord compression  or definite cord signal abnormality allowing for motion.     24 hour results:    Recent Results (from the past 24 hour(s))   GLUCOSE, POC    Collection Time: 06/16/21  4:36 PM   Result Value Ref Range    Glucose (POC) 309 (H) 65 - 117 mg/dL    Performed by [address], POC    Collection Time: 06/16/21  9:11 PM   Result Value Ref Range    Glucose (POC) 323 (H) 65 - 117 mg/dL    Performed by Faustino Jameson    GLUCOSE, POC    Collection Time: 06/17/21  7:09 AM   Result Value Ref Range    Glucose (POC) 165 (H) 65 - 117 mg/dL    Performed by Es Hallman    GLUCOSE, POC    Collection Time: 06/17/21 11:28 AM   Result Value Ref Range    Glucose (POC) 246 (H) 65 - 117 mg/dL    Performed by Portillo Amaya         Assessment:     Active Problems:    FUO (fever of unknown origin) (6/7/2021)      Plan:     L2-3 spinal stenosis   MRI reveals \"Severe spinal canal stenosis L2-3 due to combination of minimal grade 1 retrolisthesis, diffuse disc bulge, epidural fat and facet degeneration. Mild to  moderate right greater than left foraminal stenosis at this level with associated right foraminal disc protrusion. \" Patient certainly is weak in his legs L>R. This appears to be multifactorial as he is generally weak everywhere. Patient also does note back and leg pain and extreme sensitivity to light touch in his hands and feet. Cervical MRI results do not necessarily correspond with clinical picture of upper extremities. Regardless, patient is adamant that he not undergo any spine surgery at this time. He would like to see how he progresses at inpatient rehab and follow up with Dr. Gongora Has as an outpatient. Dr. Stroud Rear office number and address were given to the patient.      Plan d/w Dr. Gongora Has & JUSTINE Rooney

## 2021-06-17 NOTE — PROGRESS NOTES
Hospitalist Progress Note    NAME: Nimisha Ferris   :  1945   MRN:  [id number]     Patient transferred from H where he was admitted with worsening weakness. PTA the family report that the patient was having progressive weakness, difficulty ambulating, and urinary incontinence. Of note, the patient had a similar episode earlier this year. While hospitalized the patient began to spike fevers. He underwent an extensive ID workup as well as general medical and neurologic workup which did not identify a source of fevers or answers as to why the patient's weakness progressed so profoundly. He was transferred to [facility] because the patient and family reside in [place] and they wished for him to be closer to home for ongoing diagnostic workup. Assessment / Plan:  Parkinson's disease  Parkinson's associated dementia    Metabolic encephalopathy-resolved  Spinal stenosis, multilevel based on MRI last year  - Neuro input appreciated. - Continue carbidopa-levodopa er  mg po tid and entacapone. - Continue donepezil 10 mg po daily. - Continue pramipexole 0.25 mg po BID  - Vitamin D and B12 levels WNL  - 600 mg TID gabapentin-home dose,prn norco  - MRI results reviewed. Outpatient spine surgery consultation recommended. - D/W with spine team here. To evaluate imaging as well as clinically and advise further before discharge      Suspected polymyalgia rheumatica-elevated ESR/CRP, diffuse UE and LE pain  - ESR  Stable, CRP continue to downtrend. - Pain is much improved  - Discussed with Dr.Arat Llamas-pain/weakness improving but still RUE pain- will increase to 40 mg prednisone and a slow taper  As patient reported h/o gout-check uric acid- only rt wrist joint and fingers swelling +,no other joints swollen or erythematous    #Steroid induced hyperglycemia with Diabetes  - NPH 40 units BID, 8 units lispro and SSI  - adjust insulin based on glucose trend      FUO-resolved  - ID input appreciated. No antibiotics. - Afebrile currently. - Workup at OSH negative. HTN  Dyslipidemia   - Continue nifedipine er 30 mg po daily.    - Resumed atorvastatin 20 mg po daily         CKD  ruled out    Anemia of chronic disease   - H/H stable. COPD  Remote tobacco use  - Continue arformoterol/budesonide 15/250 mcg nebs bid.    - Continue albuterol/ipratropium 2.5/0.5 mg nebs q4h prn. GERD  - Change pantoprazole to 40 mg po bid. BPH  - Continue finasteride 5 mg po daily. Estimated discharge date: DC today 6/70/21  Barriers:  None identified     Code status: Full  Prophylaxis: Lovenox  Recommended Disposition: [facility]. Subjective:     Chief Complaint / Reason for Physician Visit  Follow up fever, progressive weakness-generalized. Patient seen and examined at the bedside. Labs, images and notes reviewed  Discussed with nursing staff, orders reviewed. Plan discussed with patient/Family  Patient is feeling okay. Extremity discomfort and weakness slowly improving. No worsening. Denied any chest pain, shortness of breath, palpitation, nausea, vomiting. No constipation or diarrhea or loose stools. No other overnight events or concerns. Patient is feeling ready for discharge to rehab today. D/W nursing team and case management team.  Patient is accepted at [facility] for discharge today. D/W spine surgery team.  MRI findings discussed. Spine team to evaluate the patient and recommend further. No need for holding discharge based on preliminary recommendation. Review of Systems:  As per HPI    Objective:     VITALS:   Last 24hrs VS reviewed since prior progress note.  Most recent are:  Patient Vitals for the past 24 hrs:   Temp Pulse Resp BP SpO2   06/17/21 0900 -- -- -- -- 94 %   06/17/21 0842 97.7 °F (36.5 °C) 95 18 137/81 94 %   06/17/21 0413 98 °F (36.7 °C) 99 18 132/86 95 %   06/16/21 2305 97.6 °F (36.4 °C) (!) 102 18 137/81 95 %   06/16/21 1438 98.2 °F (36.8 °C) 94 16 133/83 96 %       Intake/Output Summary (Last 24 hours) at 6/17/2021 1305  Last data filed at 6/16/2021 2239  Gross per 24 hour   Intake --   Output 650 ml   Net -650 ml        I had a face to face encounter and independently examined this patient on 6/17/2021, as outlined below:  PHYSICAL EXAM:  General:  Elderly patient, alert, O x3, comfortable. NAD. HEENT:  Normocephalic. Sclera anicteric. Mucous membranes moist.    Chest:  Clear to auscultation b/l. No RR W   CV:  RRR. Normal S1/S2. No murmurs. GI:  Abdomen soft/ND, non tender, BS +. Neurologic:   He was awake,oriented,able to lift LUE ,tries to initiate bending at knee joint, Rt wrist swollen. No worsening. No erythema. Psych:  Cooperative. No agitation. Skin:  No rashes or jaundice. Reviewed most current lab test results and cultures  YES  Reviewed most current radiology test results   YES  Review and summation of old records today    NO  Reviewed patient's current orders and MAR    YES  PMH/SH reviewed - no change compared to H&P  ________________________________________________________________________  Care Plan discussed with:    Comments   Patient [address]    Consultant                        Multidiciplinary team rounds were held today with , nursing, pharmacist and clinical coordinator. Patient's plan of care was discussed; medications were reviewed and discharge planning was addressed. ________________________________________________________________________  Roger Noriega MD     Procedures: see electronic medical records for all procedures/Xrays and details which were not copied into this note but were reviewed prior to creation of Plan. LABS:  I reviewed today's most current labs and imaging studies. Pertinent labs include:  No results for input(s): WBC, HGB, HCT, PLT, HGBEXT, HCTEXT, PLTEXT, HGBEXT, HCTEXT, PLTEXT in the last 72 hours.   No results for input(s): NA, K, CL, CO2, GLU, BUN, CREA, CA, MG, PHOS, ALB, TBIL, TBILI, ALT, INR, INREXT, INREXT in the last 72 hours.     No lab exists for component: SGOT    Signed: Mendy Corona MD

## 2021-06-17 NOTE — PROGRESS NOTES
Problem: Self Care Deficits Care Plan (Adult)  Goal: *Acute Goals and Plan of Care (Insert Text)  Description: FUNCTIONAL STATUS PRIOR TO ADMISSION: No family present during eval to confirm PLOF    HOME SUPPORT PRIOR TO ADMISSION: No family present during eval to confirm PLOF    Occupational Therapy Goals  Initiated 6/10/2021     1. Patient will complete bed mobility with mod A x2 in preparation for functional transfers within 7 days. 2. Patient will complete grooming while sitting in bed with min A within 7 days. 3. Patient will complete UB bathing while sitting in bed with mod A within 7 days. 4. Patient will complete LB bathing while sitting in bed with max A x1 within 7 days. Outcome: Progressing Towards Goal   OCCUPATIONAL THERAPY TREATMENT  Patient: Dayday Bello (89 y.o. male)  Date: 6/17/2021  Diagnosis: FUO (fever of unknown origin) [R50.9] <principal problem not specified>       Precautions: Fall  Chart, occupational therapy assessment, plan of care, and goals were reviewed. ASSESSMENT  Patient continues with skilled OT services and is progressing towards goals. Today, pt requires mod A x2 for bed mobility. He was able to sit EOB for ~8 minutes with CGA-max A x1 to maintain sitting balance (more assistance needed when fatigued). He continues to lean to his left side and posterior. He does continue to have increased tone and tremors consistent with PD. He was eager to engage with ADL activities while EOB. While seated EOB, he was able to  brush his teeth with min A using his non-dominant hand due to right hand swelling and pain, and wash his face with setup. Anticipate pt will continue to progress with therapy services in an interdisciplinary setting to progress independence with all ADL activities.   .      Current Level of Function Impacting Discharge (ADLs): total-mod A    Other factors to consider for discharge: hx of PD         PLAN :  Patient continues to benefit from skilled intervention to address the above impairments. Continue treatment per established plan of care to address goals. Recommend with staff:     Recommend next OT session: ADLs    Recommendation for discharge: (in order for the patient to meet his/her long term goals)  Therapy 3 hours per day 5-7 days per week    This discharge recommendation:  Has been made in collaboration with the attending provider and/or case management    IF patient discharges home will need the following DME: TBD       SUBJECTIVE:   Patient stated I feel so shaky.     OBJECTIVE DATA SUMMARY:   Cognitive/Behavioral Status:  Neurologic State: Alert  Orientation Level: Oriented X4  Cognition: Follows commands  Perception: Appears intact  Perseveration: Perseverates during ADLS  Safety/Judgement: Decreased insight into deficits    Functional Mobility and Transfers for ADLs:  Bed Mobility:  Rolling: Moderate assistance  Supine to Sit: Assist x2; Moderate assistance  Sit to Supine: Moderate assistance;Assist x2    Transfers:             Balance:  Sitting: Impaired; With support; Without support;High guard  Sitting - Static: Fair (occasional)    ADL Intervention:   Patient received supine in bed and agreeable to participating in therapy. Pt was able to roll to his side with mod A x1 and required mod A x2 for supine to sit. Once seated at EOB, required CGA-max A x1 to maintain sitting balance. He was able to brush his teeth with min A and wash his face with setup at EOB. Pt returned supine in bed with mod A x2. He required total A for bowel hygiene. Pt left supine in bed with call bell within reach.     Grooming  Grooming Assistance: Minimum assistance  Position Performed: Seated edge of bed  Washing Face: Set-up  Brushing Teeth: Minimum assistance                             Cognitive Retraining  Safety/Judgement: Decreased insight into deficits        Pain:  C/o back pain when rolling    Activity Tolerance:   Poor    After treatment patient left in no apparent distress:   Supine in bed, Call bell within reach, and Side rails x 3    COMMUNICATION/COLLABORATION:   The patients plan of care was discussed with: Registered nurseSinai Berrios Grief  Time Calculation: 29 mins   Regarding student involvement in patient care:  A student participated in this treatment session. Per CMS Medicare statements and AOTA guidelines I certify that the following was true:  1. I was present and directly observed the entire session. 2. I made all skilled judgments and clinical decisions regarding care. 3. I am the practitioner responsible for assessment, treatment, and documentation.

## 2021-06-17 NOTE — DISCHARGE SUMMARY
Discharge Summary       PATIENT ID: Riesa Aschoff  MRN: [id number]   YOB: 1945    DATE OF ADMISSION: 6/7/2021 10:28 PM    DATE OF DISCHARGE: 06/17/21   PRIMARY CARE PROVIDER: Elier Cleveland MD     ATTENDING PHYSICIAN: Christi Bravo MD  DISCHARGING PROVIDER: Christi Bravo MD    To contact this individual call [phone] and ask the  to page. If unavailable ask to be transferred the Adult Hospitalist Department. CONSULTATIONS: IP CONSULT TO NEUROLOGY  IP CONSULT TO INFECTIOUS DISEASES    PROCEDURES/SURGERIES: * No surgery found *    ADMITTING DIAGNOSES & HOSPITAL COURSE:   # FOU  # HDL  # Dementia  # Spinal stenosis  # GERD  # Parkinson's ds  # HTN    HPI:   History obtained from outside records.     Riesa Aschoff is a 67 y.o. male with a past medical history of Parkinson's disease with dementia, and hypertension who presents with worsening progressive weakness. He presents from an outside hospital in [place] because he was having a family event there. While in [place], he developed profound weakness requiring assistance with ambulation,  urinary incontinence, difficulty swallowing, progressively confused, and intermittent fevers. He was taken to [facility] in [place], and admitted on May 27 for the symptoms. Review of his initial labs at the outside hospital revealed hyperglycemia to 264. His chest his head CT, and MRI were without acute changes, but did show evidence of chronic microvascular changes. X-ray did reveal a right upper lobe infiltrate, and  was started on IV cefepime, but CT chest did not confirm the presence of pneumonia. He has some right upper extremity swelling, and venous  duplex was negative for DVT. He had a septic work-up done, and all cultures were no growth to date. He experienced joint pain in several joints, and x-rays were negative for fracture or dislocation.   He had extensive infectious work-up done for fever of unknown origin, and a noninfectious work-up was initiated (see records scanned in media section). He is being transferred for continued work-up of fever of unknown origin. Subjective:      Chief Complaint / Reason for Physician Visit  Follow up fever, progressive weakness-generalized. Patient seen and examined at the bedside. Labs, images and notes reviewed  Discussed with nursing staff, orders reviewed. Plan discussed with patient/Family  Patient is feeling okay. Extremity discomfort and weakness slowly improving. No worsening. Denied any chest pain, shortness of breath, palpitation, nausea, vomiting. No constipation or diarrhea or loose stools. No other overnight events or concerns. Patient is feeling ready for discharge to rehab today. D/W nursing team and case management team.  Patient is accepted at [facility] for discharge today. D/W spine surgery team.  MRI findings discussed. Spine team to evaluate the patient and recommend further. No need for holding discharge based on preliminary recommendation. DISCHARGE DIAGNOSES / PLAN:      Parkinson's disease  Parkinson's associated dementia    Metabolic encephalopathy-resolved  Spinal stenosis, multilevel based on MRI last year  - Neuro input appreciated. - Continue carbidopa-levodopa er  mg po tid and entacapone. - Continue donepezil 10 mg po daily. - Continue pramipexole 0.25 mg po BID  - Vitamin D and B12 levels WNL  - 600 mg TID gabapentin-home dose,prn norco  - MRI results reviewed. Outpatient spine surgery consultation recommended. - D/W with spine team here. To evaluate imaging as well as clinically and advise further before discharge. F/up with Dr. Addis Moore outpatient for further spine/ neurosurgical evaluation        Suspected polymyalgia rheumatica-elevated ESR/CRP, diffuse UE and LE pain  - ESR  Stable, CRP continue to downtrend.   - Pain is much improved  - Discussed with Dr.Arat Llamas-pain/weakness improving but still RUE pain- will increase to 40 mg prednisone and a slow taper  As patient reported h/o gout-check uric acid- only rt wrist joint and fingers swelling +,no other joints swollen or erythematous     #Steroid induced hyperglycemia with Diabetes  - NPH 40 units BID, 8 units lispro and SSI  - adjust insulin based on glucose trend        FUO-resolved  - ID input appreciated. No antibiotics. - Afebrile currently. - Workup at OSH negative.         HTN  Dyslipidemia   - Continue nifedipine er 30 mg po daily.    - Resumed atorvastatin 20 mg po daily            CKD  ruled out     Anemia of chronic disease   - H/H stable.          COPD  Remote tobacco use  - Continue arformoterol/budesonide 15/250 mcg nebs bid.    - Continue albuterol/ipratropium 2.5/0.5 mg nebs q4h prn.       GERD  - Change pantoprazole to 40 mg po bid.         BPH  - Continue finasteride 5 mg po daily.          Estimated discharge date: [place] today 6/70/21  Barriers:  None identified      Code status: Full  Prophylaxis: Lovenox  Recommended Disposition: Sheltering arms IPR. ADDITIONAL CARE RECOMMENDATIONS:   Follow-up as noted in the appointments. CBC, CMP monitoring. Blood pressure/blood glucose monitoring. Medication adjustment as appropriate. · It is important that you take the medication exactly as they are prescribed. · Keep your medication in the bottles provided by the pharmacist and keep a list of the medication names, dosages, and times to be taken in your wallet. · Do not take other medications without consulting your doctor. · No drinking alcohol or driving car or operating machinery if you are on narcotic pain medications. Donot take sedating mediations if you are sleepy or confused.    · Fall Precautions  · Keep Well Hydrated  · Report to your medical provider if you feel you have  developed allergies to medications  · Follow up with your PCP or Consultant for medication adjustments and refills  · Monitor for signs of fevers,chills,bleeding,chest pain and seek medical attention if you do so.          DIET: Cardiac Diet and Diabetic Diet     ACTIVITY: Activity as tolerated and PT/OT Eval and Treat     WOUND CARE: N/A     EQUIPMENT needed: N/A. As per PT/OT     PENDING TEST RESULTS:   At the time of discharge the following test results are still pending: none    FOLLOW UP APPOINTMENTS:    Follow-up Information     Follow up With Specialties Details Why Contact Info    Anh Lowe MD Family Medicine Schedule an appointment as soon as possible for a visit in 1 week PCP: Post hospital discharge follow-up within 1 week with CBC, CMP monitoring. Monitor blood pressure/blood glucose and adjust medications as appropriate. [address] [facility]      Courtney Mcmanus MD Rheumatology, Pediatric Rheumatology, Internal Medicine Schedule an appointment as soon as possible for a visit in 2 weeks Rheumatologic: Follow-up with Dr. Piedad Walter within 2-4 weeks or as per his office's recommendation. Please call to schedule the appointment.  [place] [address]  [phone]      Jorge John MD Neurology Schedule an appointment as soon as possible for a visit Neurology: Please call office to schedule follow-up appointment with neurologist as per recommendation/as needed for Parkinson's disease and other neurological concerns [address] [facility]  [address] [place] [address] Way  [phone]      [facility]   for rehab Gurinder Dayahenny [address]  [address]  Phone: [phone]      Jessie Salazar MD Neurosurgery Schedule an appointment as soon as possible for a visit  [address]  [phone]                 DISCHARGE MEDICATIONS:  Discharge Medication List as of 6/17/2021  4:21 PM      START taking these medications    Details   acetaminophen (TYLENOL) 325 mg tablet Take 2 Tablets by mouth every six (6) hours as needed for Pain or Fever., No Print, Disp-30 Tablet, R-0      albuterol-ipratropium (DUO-NEB) 2.5 mg-0.5 mg/3 ml nebu 3 mL by Nebulization route every four (4) hours as needed for Wheezing, Shortness of Breath, Respiratory Distress or Cough., No Print, Disp-30 Nebule, R-0      HYDROcodone-acetaminophen (NORCO) 5-325 mg per tablet Take 1 Tablet by mouth every six (6) hours as needed for Pain for up to 3 days. Max Daily Amount: 4 Tablets. , Print, Disp-12 Tablet, R-0      predniSONE (DELTASONE) 10 mg tablet Take 40 mg by mouth daily (with breakfast) for 7 days, THEN 30 mg daily (with breakfast) for 7 days, THEN 20 mg daily (with breakfast) for 7 days, THEN 15 mg daily (with breakfast) for 7 days, THEN 10 mg daily (with breakfast) for 7 days, THEN 5 mg daily  (with breakfast) for 7 days. Give with food, Normal, Disp-84 Tablet, R-0      pantoprazole (PROTONIX) 40 mg tablet Take 1 Tablet by mouth Before breakfast and dinner for 60 days. Continue while on steroid medication. , No Print, Disp-60 Tablet, R-1      NIFEdipine ER (PROCARDIA XL) 30 mg ER tablet Take 1 Tablet by mouth daily. Do not crush, break or chew. Swallow whole., No Print, Disp-30 Tablet, R-0         CONTINUE these medications which have CHANGED    Details   insulin lispro (HUMALOG) 100 unit/mL injection Use as directed. CORRECTIONAL SCALE only For Blood Sugar (mg/dl) of :           180-199=2 units, 200-249=4 units, 250-299=7 units, 300-349=10 units 350 or greater = Call MD. Give in addition to basal medications. Do Not Hold for NPO BEDTIME CORRECTIONAL  sliding scale when scheduled: 200-249=2 units, 250-299=4 units, 300-349=5 units, 350 or greater = Call MD Give in addition to basal medications. Do Not Hold for NPO  Indications: type 2 diabetes mellitus, No Print, Disp-1 Vial, R-0      insulin glargine (Lantus U-100 Insulin) 100 unit/mL injection 45 Units by SubCUTAneous route two (2) times a day.  Was increased from 30 units twice daily to 45 units twice daily in light of ongoing steroid use related hyperglycemia. Adjust as needed., No Print, Disp-1 Vial, R-0         CONTINUE these medications which have NOT CHANGED    Details   pramipexole (MIRAPEX) 0.25 mg tablet Take 0.25 mg by mouth two (2) times a day., Historical Med      donepeziL (Aricept) 10 mg tablet Take 10 mg by mouth nightly., Historical Med      finasteride (PROSCAR) 5 mg tablet Take 5 mg by mouth daily. , Historical Med      torsemide (DEMADEX) 100 mg tablet Take 100 mg by mouth daily. , Historical Med      cetirizine (ZYRTEC) 10 mg tablet Take 10 mg by mouth daily. , Historical Med      melatonin 3 mg tablet Take 6 mg by mouth nightly., Historical Med      fluticasone propion-salmeteroL (Wixela Inhub) 250-50 mcg/dose diskus inhaler Take 1 Puff by inhalation daily. , Historical Med      carvediloL (COREG) 6.25 mg tablet Take 6.25 mg by mouth two (2) times daily (with meals). , Historical Med      aspirin delayed-release 325 mg tablet Take 325 mg by mouth daily. , Historical Med      tamsulosin (FLOMAX) 0.4 mg capsule Take 0.4 mg by mouth daily. , Historical Med      carbidopa-levodopa (Sinemet)  mg per tablet Take 1.5 Tablets by mouth three (3) times daily. Indications: parkinsonism due to degeneration in the brain, Historical Med      gabapentin (NEURONTIN) 600 mg tablet Take 600 mg by mouth three (3) times daily. , Historical Med      omeprazole (PRILOSEC) 40 mg capsule Take 40 mg by mouth daily. , Historical Med      atorvastatin (LIPITOR) 20 mg tablet Take 20 mg by mouth daily. , Historical Med      docusate sodium (COLACE) 100 mg capsule Take 100 mg by mouth two (2) times a day., Historical Med      cholecalciferol (Vitamin D3) (1000 Units /25 mcg) tablet Take 2,000 Units by mouth daily. , Historical Med      entacapone (COMTAN) 200 mg tablet Take 200 mg by mouth four (4) times daily. , Historical Med      b complex vitamins tablet Take 1 Tab by mouth daily. , Historical Med               NOTIFY YOUR PHYSICIAN FOR ANY OF THE FOLLOWING: Fever over 101 degrees for 24 hours. Chest pain, shortness of breath, fever, chills, nausea, vomiting, diarrhea, change in mentation, falling, weakness, bleeding. Severe pain or pain not relieved by medications. Or, any other signs or symptoms that you may have questions about. DISPOSITION:    Home With:   OT  PT  HH  RN      x Long term SNF/Inpatient Rehab    Independent/assisted living    Hospice    Other:       PATIENT CONDITION AT DISCHARGE:     Functional status   x Poor     Deconditioned     Independent      Cognition     Lucid     Forgetful    x Dementia      Catheters/lines (plus indication)    Greer     PICC     PEG    x None      Code status    x Full code     DNR      PHYSICAL EXAMINATION AT DISCHARGE:  Visit Vitals  /78 (BP 1 Location: Right arm, BP Patient Position: At rest)   Pulse 96   Temp 97.6 °F (36.4 °C)   Resp 16   Wt 89.7 kg (197 lb 12.8 oz)   SpO2 96%   BMI 35.04 kg/m²       General:  Elderly patient, alert, O x3, comfortable. NAD. HEENT:  Normocephalic. Sclera anicteric. Mucous membranes moist.    Chest:  Clear to auscultation b/l. No RR W   CV:  RRR. Normal S1/S2. No murmurs. GI:  Abdomen soft/ND, non tender, BS +. Neurologic:   He was awake,oriented,able to lift LUE ,tries to initiate bending at knee joint, Rt wrist swollen. No worsening. No erythema. Psych:  Cooperative. No agitation. Skin:  No rashes or jaundice. CHRONIC MEDICAL DIAGNOSES:  Problem List as of 6/17/2021 Date Reviewed: 6/25/2020        Codes Class Noted - Resolved    FUO (fever of unknown origin) ICD-10-CM: R50.9  ICD-9-CM: 780.60  6/7/2021 - Present        Spinal stenosis, multilevel ICD-10-CM: M48.00  ICD-9-CM: 724.00  6/30/2020 - Present        Acute metabolic encephalopathy XXU-00-JY: G93.41  ICD-9-CM: 348.31  6/25/2020 - Present        Anemia ICD-10-CM: D64.9  ICD-9-CM: 285.9  6/25/2020 - Present        Fall ICD-10-CM: Gilma Addison Samuels  ICD-9-CM: Z919.8  6/25/2020 - Present        Parkinson disease ([facility] ICD-10-CM: G20  ICD-9-CM: 332.0  6/25/2020 - Present        Urinary retention ICD-10-CM: R33.9  ICD-9-CM: 788.20  6/25/2020 - Present        Type II diabetes mellitus ([facility] ICD-10-CM: E11.9  ICD-9-CM: 250.00  6/25/2020 - Present        HTN (hypertension) ICD-10-CM: I10  ICD-9-CM: 401.9  6/25/2020 - Present        RESOLVED: Renal insufficiency ICD-10-CM: N28.9  ICD-9-CM: 593.9  6/25/2020 - 6/30/2020        RESOLVED: Elevated lactic acid level ICD-10-CM: R79.89  ICD-9-CM: 276.2  6/25/2020 - 6/30/2020            Radiology  XR FOREARM RT AP/LAT    Result Date: 6/11/2021  No acute abnormality. XR HAND RT MIN 3 V    Result Date: 6/15/2021  No acute fracture. Mild dorsal soft tissue swelling. MRI CERV SPINE WO CONT    Result Date: 6/14/2021  1. Significant motion artifact. 2. Multilevel degenerative disc disease with significant degrees of foraminal stenosis as above, and moderate spinal canal stenosis C5-6. No cord compression or definite cord signal abnormality allowing for motion. MRI [facility] SPINE WO CONT    Result Date: 6/14/2021  Multilevel degenerative disc disease most significant at T6-7 where a small right central disc protrusion slightly indents the ventral spinal cord and causes mild spinal canal stenosis. No cord signal abnormality. MRI LUMB SPINE WO CONT    Result Date: 6/14/2021  1. Previous posterior decompression and fusion L3-4, and interbody fusion L4-5. 2. Severe spinal canal stenosis L2-3 due to combination of minimal grade 1 retrolisthesis, diffuse disc bulge, epidural fat and facet degeneration. Mild to moderate right greater than left foraminal stenosis at this level with associated right foraminal disc protrusion. 3. Moderate left greater than right foraminal stenosis L5-S1.       Labs:  Recent Results (from the past 24 hour(s))   GLUCOSE, POC    Collection Time: 06/17/21  7:09 AM   Result Value Ref Range    Glucose (POC) 165 (H) 65 - 117 mg/dL    Performed by Redd Love GLUCOSE, POC    Collection Time: 06/17/21 11:28 AM   Result Value Ref Range    Glucose (POC) 246 (H) 65 - 117 mg/dL    Performed by [address] [place] [address] [place] than 40 minutes were spent with the patient on counseling and coordination of care    Signed:   Kim Campoverde MD  6/17/2021  1:35 PM

## 2021-06-17 NOTE — PROGRESS NOTES
DELLA: Plan for discharge to [facility] today. Noted negative PCR 6/15. [facility] phone [phone] transport set for 4:45 PM.     Discharge folder located on hard chart to include discharge papers, MAR, kardex. CM completed CM portion of Emtala. RN to follow with printed Emtala and call report to #[phone]. Chart reviewed. CM spoke with Jovan Holbrook with [facility], they can accept patient today. CM spoke with MD, confirmed plans for discharge to [facility] today. CM requested transport with [facility], they can accommodate 4:45 PM.    CM called the wife Kwabena Melendez and notified of transport time. Medicare pt has received, reviewed, and signed 2nd IM letter informing them of their right to appeal the discharge. Signed copy has been placed on pt bedside chart.       Pastora Staff, BSW/CRM

## 2021-06-18 NOTE — DISCHARGE INSTRUCTIONS
Discharge Instructions       PATIENT ID: Shelly Velazquez  MRN: [id number]   YOB: 1945    DATE OF ADMISSION: 6/7/2021 10:28 PM    DATE OF DISCHARGE: 6/17/2021    PRIMARY CARE PROVIDER: Sal Glasgow MD     ATTENDING PHYSICIAN: Janice att. providers found  DISCHARGING PROVIDER: Jenna Cash MD    To contact this individual call [phone] and ask the  to page. If unavailable ask to be transferred the Adult Hospitalist Department. DISCHARGE DIAGNOSES   Parkinson's disease  Parkinson's associated dementia    Metabolic encephalopathy-resolved  Spinal stenosis, multilevel based on MRI last year  - Neuro input appreciated. - Continue carbidopa-levodopa er  mg po tid and entacapone. - Continue donepezil 10 mg po daily. - Continue pramipexole 0.25 mg po BID  - Vitamin D and B12 levels WNL  - 600 mg TID gabapentin-home dose,prn norco  - MRI results reviewed. Outpatient spine surgery consultation recommended. - D/W with spine team here. To evaluate imaging as well as clinically and advise further before discharge. Outpt f/up with Dr. Kenroy Du for the same. Suspected polymyalgia rheumatica-elevated ESR/CRP, diffuse UE and LE pain  - ESR  Stable, CRP continue to downtrend. - Pain is much improved  - Discussed with Dr.Arat Llamas-pain/weakness improving but still RUE pain- will increase to 40 mg prednisone and a slow taper  As patient reported h/o gout-check uric acid- only rt wrist joint and fingers swelling +,no other joints swollen or erythematous    #Steroid induced hyperglycemia with Diabetes  - NPH 40 units BID, 8 units lispro and SSI  - adjust insulin based on glucose trend      FUO-resolved  - ID input appreciated. No antibiotics. - Afebrile currently. - Workup at OSH negative. HTN  Dyslipidemia   - Continue nifedipine er 30 mg po daily.    - Resumed atorvastatin 20 mg po daily         CKD  ruled out    Anemia of chronic disease   - H/H stable. COPD  Remote tobacco use  - Continue arformoterol/budesonide 15/250 mcg nebs bid.    - Continue albuterol/ipratropium 2.5/0.5 mg nebs q4h prn. GERD  - Change pantoprazole to 40 mg po bid. BPH  - Continue finasteride 5 mg po daily. Estimated discharge date: DC today 6/70/21  Barriers:  None identified     Code status: Full  Prophylaxis: Lovenox  Recommended Disposition: [facility]. CONSULTATIONS: IP CONSULT TO NEUROLOGY  IP CONSULT TO INFECTIOUS DISEASES    PROCEDURES/SURGERIES: * No surgery found *    PENDING TEST RESULTS:   At the time of discharge the following test results are still pending: None    FOLLOW UP APPOINTMENTS:   Follow-up Information     Follow up With Specialties Details Why Contact Info    Carlton Norris MD Family Medicine Schedule an appointment as soon as possible for a visit in 1 week PCP: Post hospital discharge follow-up within 1 week with CBC, CMP monitoring. Monitor blood pressure/blood glucose and adjust medications as appropriate. [facility]      Cyndra Severance, MD Rheumatology, Pediatric Rheumatology, Internal Medicine Schedule an appointment as soon as possible for a visit in 2 weeks Rheumatologic: Follow-up with Dr. Kanika Gage within 2-4 weeks or as per his office's recommendation. Please call to schedule the appointment.  [address]  [phone]      Eric John MD Neurology Schedule an appointment as soon as possible for a visit Neurology: Please call office to schedule follow-up appointment with neurologist as per recommendation/as needed for Parkinson's disease and other neurological concerns [address] [facility]  [address]   for rehab [address]inder [address]  [place], [facility]  Phone: [phone]      Ivonne Davalos MD Neurosurgery Schedule an appointment as soon as possible for a visit  [address] [address]  [phone]             ADDITIONAL CARE RECOMMENDATIONS:   Follow-up as noted in the appointments. CBC, CMP monitoring. Blood pressure/blood glucose monitoring. Medication adjustment as appropriate. · It is important that you take the medication exactly as they are prescribed. · Keep your medication in the bottles provided by the pharmacist and keep a list of the medication names, dosages, and times to be taken in your wallet. · Do not take other medications without consulting your doctor. · No drinking alcohol or driving car or operating machinery if you are on narcotic pain medications. Donot take sedating mediations if you are sleepy or confused. · Fall Precautions  · Keep Well Hydrated  · Report to your medical provider if you feel you have  developed allergies to medications  · Follow up with your PCP or Consultant for medication adjustments and refills  · Monitor for signs of fevers,chills,bleeding,chest pain and seek medical attention if you do so. DIET: Cardiac Diet and Diabetic Diet    ACTIVITY: Activity as tolerated and PT/OT Eval and Treat    WOUND CARE: N/A    EQUIPMENT needed: N/A. As per PT/OT      Radiology:  XR FOREARM RT AP/LAT    Result Date: 6/11/2021  No acute abnormality. XR HAND RT MIN 3 V    Result Date: 6/15/2021  No acute fracture. Mild dorsal soft tissue swelling. MRI CERV SPINE WO CONT    Result Date: 6/14/2021  1. Significant motion artifact. 2. Multilevel degenerative disc disease with significant degrees of foraminal stenosis as above, and moderate spinal canal stenosis C5-6. No cord compression or definite cord signal abnormality allowing for motion. MRI [facility] SPINE WO CONT    Result Date: 6/14/2021  Multilevel degenerative disc disease most significant at T6-7 where a small right central disc protrusion slightly indents the ventral spinal cord and causes mild spinal canal stenosis. No cord signal abnormality.       MRI LUMB SPINE WO CONT    Result Date: 6/14/2021  1. Previous posterior decompression and fusion L3-4, and interbody fusion L4-5. 2. Severe spinal canal stenosis L2-3 due to combination of minimal grade 1 retrolisthesis, diffuse disc bulge, epidural fat and facet degeneration. Mild to moderate right greater than left foraminal stenosis at this level with associated right foraminal disc protrusion. 3. Moderate left greater than right foraminal stenosis L5-S1. Laboratory data:  Recent Results (from the past 24 hour(s))   GLUCOSE, POC    Collection Time: 06/17/21  7:09 AM   Result Value Ref Range    Glucose (POC) 165 (H) 65 - 117 mg/dL    Performed by Gardenia Chowdhury    GLUCOSE, POC    Collection Time: 06/17/21 11:28 AM   Result Value Ref Range    Glucose (POC) 246 (H) 65 - 117 mg/dL    Performed by Grisel Blanchard            DISCHARGE MEDICATIONS:   See Medication Reconciliation Form    · It is important that you take the medication exactly as they are prescribed. · Keep your medication in the bottles provided by the pharmacist and keep a list of the medication names, dosages, and times to be taken in your wallet. · Do not take other medications without consulting your doctor. NOTIFY YOUR PHYSICIAN FOR ANY OF THE FOLLOWING:   Fever over 101 degrees for 24 hours. Chest pain, shortness of breath, fever, chills, nausea, vomiting, diarrhea, change in mentation, falling, weakness, bleeding. Severe pain or pain not relieved by medications. Or, any other signs or symptoms that you may have questions about. DISPOSITION:    Home With:   OT  PT  HH  RN      x SNF/Inpatient Rehab/LTAC    Independent/assisted living    Hospice    Other:     CDMP Checked:   Yes x     PROBLEM LIST Updated:  Yes x        My Medications      START taking these medications      Instructions Each Dose to Equal Morning Noon Evening Bedtime   acetaminophen 325 mg tablet  Commonly known as: TYLENOL    Your last dose was:      Your next dose is: Take 2 Tablets by mouth every six (6) hours as needed for Pain or Fever. 650 mg                 albuterol-ipratropium 2.5 mg-0.5 mg/3 ml Nebu  Commonly known as: DUO-NEB    Your last dose was: Your next dose is:         3 mL by Nebulization route every four (4) hours as needed for Wheezing, Shortness of Breath, Respiratory Distress or Cough. 3 mL                 HYDROcodone-acetaminophen 5-325 mg per tablet  Commonly known as: Felipe Mcneill    Your last dose was: Your next dose is: Take 1 Tablet by mouth every six (6) hours as needed for Pain for up to 3 days. Max Daily Amount: 4 Tablets. 1 Tablet                 NIFEdipine ER 30 mg ER tablet  Commonly known as: PROCARDIA XL  Start taking on: June 18, 2021    Your last dose was: Your next dose is: Take 1 Tablet by mouth daily. Do not crush, break or chew. Swallow whole. 30 mg                 pantoprazole 40 mg tablet  Commonly known as: PROTONIX    Your last dose was: Your next dose is: Take 1 Tablet by mouth Before breakfast and dinner for 60 days. Continue while on steroid medication. 40 mg                 predniSONE 10 mg tablet  Commonly known as: Ironside Conn  Start taking on: June 18, 2021    Your last dose was: Your next dose is: Take 40 mg by mouth daily (with breakfast) for 7 days, THEN 30 mg daily (with breakfast) for 7 days, THEN 20 mg daily (with breakfast) for 7 days, THEN 15 mg daily (with breakfast) for 7 days, THEN 10 mg daily (with breakfast) for 7 days, THEN 5 mg daily (with breakfast) for 7 days. Give with food                     CHANGE how you take these medications      Instructions Each Dose to Equal Morning Noon Evening Bedtime   insulin glargine 100 unit/mL injection  Commonly known as: Lantus U-100 Insulin  What changed:   · how much to take  · additional instructions    Your last dose was: Your next dose is:         45 Units by SubCUTAneous route two (2) times a day.  Was increased from 30 units twice daily to 45 units twice daily in light of ongoing steroid use related hyperglycemia. Adjust as needed. 45 Units                 insulin lispro 100 unit/mL injection  Commonly known as: HUMALOG  What changed: additional instructions    Your last dose was: Your next dose is:         Use as directed. CORRECTIONAL SCALE only For Blood Sugar (mg/dl) of :           180-199=2 units, 200-249=4 units, 250-299=7 units, 300-349=10 units 350 or greater = Call MD. Give in addition to basal medications. Do Not Hold for NPO BEDTIME CORRECTIONAL sliding scale when scheduled: 200-249=2 units, 250-299=4 units, 300-349=5 units, 350 or greater = Call MD Give in addition to basal medications. Do Not Hold for NPO  Indications: type 2 diabetes mellitus                     CONTINUE taking these medications      Instructions Each Dose to Equal Morning Noon Evening Bedtime   Aricept 10 mg tablet  Generic drug: donepeziL    Your last dose was: Your next dose is: Take 10 mg by mouth nightly. 10 mg                 aspirin delayed-release 325 mg tablet    Your last dose was: Your next dose is: Take 325 mg by mouth daily. 325 mg                 atorvastatin 20 mg tablet  Commonly known as: LIPITOR    Your last dose was: Your next dose is: Take 20 mg by mouth daily. 20 mg                 b complex vitamins tablet    Your last dose was: Your next dose is: Take 1 Tab by mouth daily. 1 Tablet                 carvediloL 6.25 mg tablet  Commonly known as: COREG    Your last dose was: Your next dose is: Take 6.25 mg by mouth two (2) times daily (with meals). 6.25 mg                 cetirizine 10 mg tablet  Commonly known as: ZYRTEC    Your last dose was: Your next dose is: Take 10 mg by mouth daily. 10 mg                 docusate sodium 100 mg capsule  Commonly known as: COLACE    Your last dose was: Your next dose is:          Take 100 mg by mouth two (2) times a day. 100 mg                 entacapone 200 mg tablet  Commonly known as: COMTAN    Your last dose was: Your next dose is: Take 200 mg by mouth four (4) times daily. 200 mg                 finasteride 5 mg tablet  Commonly known as: PROSCAR    Your last dose was: Your next dose is: Take 5 mg by mouth daily. 5 mg                 gabapentin 600 mg tablet  Commonly known as: NEURONTIN    Your last dose was: Your next dose is: Take 600 mg by mouth three (3) times daily. 600 mg                 melatonin 3 mg tablet    Your last dose was: Your next dose is: Take 6 mg by mouth nightly. 6 mg                 omeprazole 40 mg capsule  Commonly known as: PRILOSEC    Your last dose was: Your next dose is: Take 40 mg by mouth daily. 40 mg                 pramipexole 0.25 mg tablet  Commonly known as: MIRAPEX    Your last dose was: Your next dose is: Take 0.25 mg by mouth two (2) times a day. 0.25 mg                 Sinemet  mg per tablet  Generic drug: carbidopa-levodopa    Your last dose was: Your next dose is: Take 1.5 Tablets by mouth three (3) times daily. Indications: parkinsonism due to degeneration in the brain   1.5 Tablet                 tamsulosin 0.4 mg capsule  Commonly known as: FLOMAX    Your last dose was: Your next dose is: Take 0.4 mg by mouth daily. 0.4 mg                 torsemide 100 mg tablet  Commonly known as: DEMADEX    Your last dose was: Your next dose is: Take 100 mg by mouth daily. 100 mg                 Vitamin D3 (1000 Units /25 mcg) tablet  Generic drug: cholecalciferol    Your last dose was: Your next dose is: Take 2,000 Units by mouth daily. 2,000 Units                 Wixela Inhub 250-50 mcg/dose diskus inhaler  Generic drug: fluticasone propion-salmeteroL    Your last dose was: Your next dose is:          Take 1 Puff by inhalation daily. 1 Puff                       Where to Get Your Medications      These medications were sent to [address] [facility] [address]  [facility] ([facility]  [address], [place] [phone]    Hours: 24-hours Phone: [phone]   · predniSONE 10 mg tablet     Information on where to get these meds will be given to you by the nurse or doctor.     Ask your nurse or doctor about these medications  · acetaminophen 325 mg tablet  · albuterol-ipratropium 2.5 mg-0.5 mg/3 ml Nebu  · HYDROcodone-acetaminophen 5-325 mg per tablet  · insulin glargine 100 unit/mL injection  · insulin lispro 100 unit/mL injection  · NIFEdipine ER 30 mg ER tablet  · pantoprazole 40 mg tablet         Signed:   Soheila Schuler MD  6/17/2021  1:29 PM

## 2021-09-16 NOTE — ED PROVIDER NOTES
Please note that this dictation was completed with [employer], the computer voice recognition software.  Quite often unanticipated grammatical, syntax, homophones, and other interpretive errors are inadvertently transcribed by the computer software.  Please disregard these errors.  Please excuse any errors that have escaped final proofreading. 58-year-old male past medical history remarkable for self-reported Parkinson's, recent admission to [facility] approximately 1 month ago for sepsis, discharged approximately 3 weeks ago to a nursing home for rehab presents the ER by EMS from nursing home for \"decreased responsiveness today x2. Per nursing home staff they said that a sternal rub the patient initially this morning he finally woke up ate breakfast went back to sleep. They said they had trouble waking him the second time 911 was called. Patient arrives to the ER currently no complaints, ANO x1 (incorrect day of the week incorrect month correct year unknown president). Patient states he is not hurting anywhere has a Greer present and his wife is bedside. Wife says she thinks the Greer was last changed beginning of August, but is not sure. Patient denies recent illnesses other current systemic complaints. Patient wife also states that \"he last walked approximately 1 month ago has not been able to ambulate since then. She thinks he also may have a sacral wound. \"               No past medical history on file. No past surgical history on file. No family history on file.     Social History     Socioeconomic History    Marital status:      Spouse name: Not on file    Number of children: Not on file    Years of education: Not on file    Highest education level: Not on file   Occupational History    Not on file   Tobacco Use    Smoking status: Not on file   Substance and Sexual Activity    Alcohol use: Not on file    Drug use: Not on file    Sexual activity: Not on file   Other Topics Concern    Not on file   Social History Narrative    Not on file     Social Determinants of Health     Financial Resource Strain:     Difficulty of Paying Living Expenses:    Food Insecurity:     Worried About Running Out of Food in the Last Year:     [address] in the Last Year:    Transportation Needs:     Lack of Transportation (Medical):  Lack of Transportation (Non-Medical):    Physical Activity:     Days of Exercise per Week:     Minutes of Exercise per Session:    Stress:     Feeling of Stress :    Social Connections:     Frequency of Communication with Friends and Family:     Frequency of Social Gatherings with Friends and Family:     Attends [employer] Services:     Active Member of Clubs or Organizations:     Attends Club or Organization Meetings:     Marital Status:    Intimate Partner Violence:     Fear of Current or Ex-Partner:     Emotionally Abused:     Physically Abused:     Sexually Abused: ALLERGIES: Patient has no known allergies. Review of Systems   Unable to perform ROS: Mental status change   All other systems reviewed and are negative. Vitals:    09/16/21 1329 09/16/21 1425 09/16/21 1513 09/16/21 1545   BP: 114/63      Pulse: (!) 114   (!) 116   Resp: 21   17   Temp: 98.2 °F (36.8 °C)      SpO2: 95%   90%   Weight:  83.9 kg (185 lb)     Height:   5' 3\" (1.6 m)             Physical Exam  Vitals and nursing note reviewed. Constitutional:       General: He is not in acute distress. Appearance: Normal appearance. He is well-developed. Comments: NAD, AxOx1, speaking in complete sentences    gcs = 14 (memory)     HENT:      Head: Normocephalic and atraumatic. Comments: Cn intact    No facial droop/ slurred speech/ tongue deviation     Right Ear: External ear normal.      Left Ear: External ear normal.      Nose: Nose normal. No congestion. Mouth/Throat:      Pharynx: No oropharyngeal exudate or posterior oropharyngeal erythema.    Eyes: General: No scleral icterus. Right eye: No discharge. Left eye: No discharge. Extraocular Movements: Extraocular movements intact. Conjunctiva/sclera: Conjunctivae normal.      Pupils: Pupils are equal, round, and reactive to light. Cardiovascular:      Rate and Rhythm: Normal rate and regular rhythm. Pulses: Normal pulses. Heart sounds: Normal heart sounds. No murmur heard. No friction rub. No gallop. Pulmonary:      Effort: Pulmonary effort is normal. No respiratory distress. Breath sounds: Normal breath sounds. No stridor. No wheezing, rhonchi or rales. Chest:      Chest wall: No tenderness. Abdominal:      General: Bowel sounds are normal. There is no distension. Palpations: Abdomen is soft. There is no mass. Tenderness: There is no abdominal tenderness. There is no guarding or rebound. Hernia: No hernia is present. Comments: nttp       Genitourinary:     Comments: Noted sánchez; Pt in diaper; noted  Diffuse stage 1/ dime sized stage 2 spot on sacral area  Musculoskeletal:         General: No swelling, tenderness, deformity or signs of injury. Normal range of motion. Cervical back: Normal range of motion and neck supple. Right lower leg: Edema present. Left lower leg: Edema present. Lymphadenopathy:      Cervical: No cervical adenopathy. Skin:     General: Skin is warm and dry. Capillary Refill: Capillary refill takes less than 2 seconds. Coloration: Skin is not pale. Findings: Rash present. No bruising or erythema. Neurological:      General: No focal deficit present. Mental Status: He is alert. Cranial Nerves: No cranial nerve deficit. Sensory: No sensory deficit. Motor: Weakness present.       Coordination: Coordination normal.      Gait: Gait abnormal.      Deep Tendon Reflexes: Reflexes normal.          MDM  Number of Diagnoses or Management Options  Risk of Complications, Morbidity, and/or Mortality  Presenting problems: high  Diagnostic procedures: moderate  Management options: moderate  General comments: Total critical care time spent exclusive of procedures:  45 min    Patient Progress  Patient progress: improved         Procedures    No chief complaint on file. 1:24 PM  The patients presenting problems have been discussed, and they are in agreement with the care plan formulated and outlined with them. I have encouraged them to ask questions as they arise throughout their visit. MEDICATIONS GIVEN:  Medications - No data to display    LABS REVIEWED:  Labs Reviewed - No data to display    RADIOLOGY RESULTS:  The following have been ordered and reviewed:  _____________________________________________________________________  _____________________________________________________________________    EKG interpretation:   Rhythm: sinus tachycardia rhythm; and regular . Rate (approx.): 112; Axis: normal; P wave: normal; QRS interval: normal ; ST/T wave: normal; Negative acute significant segmental elevations/ compared to study dated 06/25/2020    PROCEDURES:        CONSULTATIONS:       PROGRESS NOTES:      DIAGNOSIS:    1. Altered mental status, unspecified altered mental status type        PLAN:  1-antibiose/ admit      ED COURSE: The patients hospital course has been uncomplicated. Perfect Serve Consult for Admission  3:48 PM    ED Room Number: YO85/54  Patient Name and age:  Deann Moore 67 y.o.  male  Working Diagnosis:   1.  Altered mental status, unspecified altered mental status type        COVID-19 Suspicion:  no  Sepsis present:  no  Reassessment needed: yes  Code Status:  Do Not Resuscitate  Readmission: No  Isolation Requirements:  no  Recommended Level of Care:  telemetry  Department:[facility] ED - [phone]  Other:  Altered/ 'difficult to arouse' per NH; Pt sleeping/ has ERYN (sats fell to 70's/ placed on NC; started Vanco/ Cefepime     Pt placed on NC for hypoxia

## 2021-09-16 NOTE — ED NOTES
Pt assessed, wife at bedside, pt in no acute distress. Pt arrives with a sánchez in place and we will replace it.

## 2021-09-16 NOTE — ED NOTES
Pt is sleeping, snoring at this time. SpO2 intermittently drops in the 60s and 70s but comes right back up >92% spontaneously. Family says he has a hx of sleep apnea. Dr. Aden Higgins aware.

## 2021-09-16 NOTE — H&P
Hospitalist Admission Note    NAME: Saverio Lesches   :  1945   MRN:  [id number]     Date/Time:  2021 5:25 PM    Patient PCP: Ava Bearden MD  ________________________________________________________________________    Given the patient's current clinical presentation, I have a high level of concern for decompensation if discharged from the emergency department. Complex decision making was performed, which includes reviewing the patient's available past medical records, laboratory results, and x-ray films. My assessment of this patient's clinical condition and my plan of care is as follows. Assessment / Plan:    #Acute Metabolic Encephalopathy: At baseline, he is not very mobile but is conversant (though poor short term memory), and was this was lasnight. Initial concern was for CO2 narcosis, but ABG not c/w this. Low c/f infection w/o fever, leukocytosis (nl UA, CXR). Higher c/f toxidrome related to polypharmacy (on gabapentin, trazodone, norco, scopolamine patch etc.), though CVA can also present in this manner. Encephalitis possible, unlikely. Doubt NMS related to entacapone. No hx liver disease, but will check ammonia level. Renal fx ok. Greer replaced. - MRI brain    - Supportive Care, awaiting toxin clearance   - Hold on further abx   - If not clearing and MRI normal, will consider LP and consult Neuro given underlying PD    #Parkinson's Disease: With dementia. On sinemet 25-100mg, 2 tabs qid; entacapone 200mg, donepezil, pramipexole   - PT/OT when clear   - Neuro c/s likely tmr for AMS +/- PD mgmt   - Wife very much would like to meet with palliative    #DM2: Home glar 25u + SSI, will continue here    #Chronic Indwelling Greer: Replaced in ER, no UTI noted. On finasteride and tamsulosin, but I'm not sure of what value these are. #Spinal Stenosis/ Neuropathy: He is on gabapentin 600mg TID and I presume for this reason.  Also on oxy 5 q12h scheduled   - would d/c these altogether    #PMR: Had been on Pred 60 and weaned all the way down to pred 10mg   - Restart pred 10mg when taking PO; if not will need IV to avoid adrenal crisis d/t abrupt withdrawal    #HTN: Carvedilol    #GERD: PPI when PO    #COPD: No exacerbation        I have personally reviewed the radiographs, laboratory data in Epic and decisions and statements above are based partially on this personal interpretation. Code Status: DNR/DNI  DVT Prophylaxis: Lovenox  GI Prophylaxis: not indicated       Subjective:   CHIEF COMPLAINT: \"AMS\"    HISTORY OF PRESENT ILLNESS:     Lolita Alas is a 67 y.o. M hx DM, HTN, Parkinson's Disease w/ dementia who presents from [facility] with obtundation. Pt was in his normal state of health (baseline) yesterday, but today has been difficult to arouse, so presented via EMS to ER. Wife is present in room and provides hx of last several months. Pt had prolonged hospitalization in [place] and was then transferred to [facility] for FUO and profound weakness, leading to immobility. No clear cause elucidated, but spinal stenosis noted. He discharged 6/17 to [facility], and then transferred to [id number] [place] [address], but then was admitted to [facility] in July with urosepsis (has chronic indwelling sánchez). He then discharged to  as noted above. Wife notes recent med change, Trazodone now scheduled from PRN. In ER, he is obtunded, snoring, but protecting airway. CBC and CMP are unremarkable, as is a stat ABG. He is hypoxemic during apneic episodes (known ERYN). PMHx:  Parkinson's Disease  Dementia  DM2  HTN  BPH  Spinal Stenosis    Psurg Hx - unknown    FHx: UUnable to obtain    Former smoker    Prior to Admission medications    Medication Sig Start Date End Date Taking? Authorizing Provider   insulin lispro (HUMALOG) 100 unit/mL injection Use as directed.   CORRECTIONAL SCALE only For Blood Sugar (mg/dl) of :           180-199=2 units, 200-249=4 units, 250-299=7 units, 300-349=10 units 350 or greater = Call MD. Give in addition to basal medications. Do Not Hold for NPO BEDTIME CORRECTIONAL sliding scale when scheduled: 200-249=2 units, 250-299=4 units, 300-349=5 units, 350 or greater = Call MD Give in addition to basal medications. Do Not Hold for NPO  Indications: type 2 diabetes mellitus 6/17/21   Sarajane Prader, MD   acetaminophen (TYLENOL) 325 mg tablet Take 2 Tablets by mouth every six (6) hours as needed for Pain or Fever. 6/17/21   Sarajane Prader, MD   NIFEdipine ER (PROCARDIA XL) 30 mg ER tablet Take 1 Tablet by mouth daily. Do not crush, break or chew. Swallow whole. 6/18/21   Sarajane Prader, MD   pramipexole (MIRAPEX) 0.25 mg tablet Take 0.25 mg by mouth two (2) times a day. Provider, Historical   donepeziL (Aricept) 10 mg tablet Take 10 mg by mouth nightly. Provider, Historical   finasteride (PROSCAR) 5 mg tablet Take 5 mg by mouth daily. Provider, Historical   torsemide (DEMADEX) 100 mg tablet Take 100 mg by mouth daily. Provider, Historical   cetirizine (ZYRTEC) 10 mg tablet Take 10 mg by mouth daily. Provider, Historical   melatonin 3 mg tablet Take 6 mg by mouth nightly. Provider, Historical   tamsulosin (FLOMAX) 0.4 mg capsule Take 0.4 mg by mouth daily. Provider, Historical   carbidopa-levodopa (Sinemet)  mg per tablet Take 1.5 Tablets by mouth three (3) times daily. Indications: parkinsonism due to degeneration in the brain    Provider, Historical   gabapentin (NEURONTIN) 600 mg tablet Take 600 mg by mouth three (3) times daily. Provider, Historical   omeprazole (PRILOSEC) 40 mg capsule Take 40 mg by mouth daily. Provider, Historical   atorvastatin (LIPITOR) 20 mg tablet Take 20 mg by mouth daily. Provider, Historical   cholecalciferol (Vitamin D3) (1000 Units /25 mcg) tablet Take 2,000 Units by mouth daily. Provider, Historical   entacapone (COMTAN) 200 mg tablet Take 200 mg by mouth four (4) times daily.     Provider, Historical     REVIEW OF SYSTEMS:  See HPI for details    Patient was not able to provide review of systems due to mental status change/acute illness      Objective:   VITALS:    Visit Vitals  BP 99/73   Pulse (!) 117   Temp 98.2 °F (36.8 °C)   Resp 16   Ht 5' 3\" (1.6 m)   Wt 83.9 kg (185 lb)   SpO2 99%   BMI 32.77 kg/m²     PHYSICAL EXAM:    Physical Exam:    Gen: Obese, obtunded, agonal breathing, snoring  HEENT:  Pink conjunctivae, PERRL, hearing intact to voice, dry mucous membranes  Neck: Supple, without masses, thyroid non-tender  Resp: No accessory muscle use, clear breath sounds without wheezes rales or rhonchi, apneic  Card: No murmurs, normal S1, S2 without thrills, bruits or peripheral edema  Abd:  Soft, non-tender, non-distended, normoactive bowel sounds are present, no palpable organomegaly and no detectable hernias  Lymph:  No cervical or inguinal adenopathy  Musc: No cyanosis or clubbing  Skin: No rashes or ulcers, skin turgor is good; scopolamine patch   Neuro:  Obtunded, no rigidity or hyperreflexia  Psych:  obtunded          _______________________________________________________________________  Care Plan discussed with:    Comments   Patient x Discussed with patient in room. POC outlined and Questions answered    Family  x    RN x    Care Manager                    Consultant:  shay VALENCIA MD   _______________________________________________________________________  Recommended Disposition:   Home with Family x   HH/PT/OT/RN    SNF/LTC    CE    ________________________________________________________________________  TOTAL TIME:  90 Minutes        Comments   >50% of visit spent in counseling and coordination of care  Chart reviewed  Discussion with patient and/or family and questions answered     ________________________________________________________________________  Signed: Blanka Kilgore MD    This note will not be viewable in [address]       Procedures: see electronic medical records for all procedures/Xrays and details which were not copied into this note but were reviewed prior to creation of Plan. LAB DATA REVIEWED:    Recent Results (from the past 24 hour(s))   TROPONIN I    Collection Time: 09/16/21  2:14 PM   Result Value Ref Range    Troponin-I, Qt. <0.05 <1.39 ng/mL   METABOLIC PANEL, COMPREHENSIVE    Collection Time: 09/16/21  2:14 PM   Result Value Ref Range    Sodium 140 136 - 145 mmol/L    Potassium 4.0 3.5 - 5.1 mmol/L    Chloride 108 97 - 108 mmol/L    CO2 28 21 - 32 mmol/L    Anion gap 4 (L) 5 - 15 mmol/L    Glucose 124 (H) 65 - 100 mg/dL    BUN 8 6 - 20 MG/DL    Creatinine 0.64 (L) 0.70 - 1.30 MG/DL    BUN/Creatinine ratio 13 12 - 20      GFR est AA >60 >60 ml/min/1.73m2    GFR est non-AA >60 >60 ml/min/1.73m2    Calcium 8.8 8.5 - 10.1 MG/DL    Bilirubin, total 0.6 0.2 - 1.0 MG/DL    ALT (SGPT) 14 12 - 78 U/L    AST (SGOT) 15 15 - 37 U/L    Alk. phosphatase 101 45 - 117 U/L    Protein, total 7.6 6.4 - 8.2 g/dL    Albumin 3.1 (L) 3.5 - 5.0 g/dL    Globulin 4.5 (H) 2.0 - 4.0 g/dL    A-G Ratio 0.7 (L) 1.1 - 2.2     SAMPLES BEING HELD    Collection Time: 09/16/21  2:14 PM   Result Value Ref Range    SAMPLES BEING HELD 1RD,1SST     COMMENT        Add-on orders for these samples will be processed based on acceptable specimen integrity and analyte stability, which may vary by analyte.    LIPASE    Collection Time: 09/16/21  2:14 PM   Result Value Ref Range    Lipase 21 (L) 73 - 393 U/L   MAGNESIUM    Collection Time: 09/16/21  2:14 PM   Result Value Ref Range    Magnesium 1.9 1.6 - 2.4 mg/dL   NT-PRO BNP    Collection Time: 09/16/21  2:14 PM   Result Value Ref Range    NT pro- <450 PG/ML   CBC WITH AUTOMATED DIFF    Collection Time: 09/16/21  2:14 PM   Result Value Ref Range    WBC 8.4 4.1 - 11.1 K/uL    RBC 4.72 4.10 - 5.70 M/uL    HGB 12.1 12.1 - 17.0 g/dL    HCT 40.0 36.6 - 50.3 %    MCV 84.7 80.0 - 99.0 FL    MCH 25.6 (L) 26.0 - 34.0 PG    MCHC 30.3 30.0 - 36.5 g/dL    RDW 16.7 (H) 11.5 - 14.5 %    PLATELET 240 150 - 400 K/uL    MPV 9.2 8.9 - 12.9 FL    NRBC 0.0 0  WBC    ABSOLUTE NRBC 0.00 0.00 - 0.01 K/uL    NEUTROPHILS 69 32 - 75 %    LYMPHOCYTES 18 12 - 49 %    MONOCYTES 6 5 - 13 %    EOSINOPHILS 5 0 - 7 %    BASOPHILS 1 0 - 1 %    IMMATURE GRANULOCYTES 1 (H) 0.0 - 0.5 %    ABS. NEUTROPHILS 5.9 1.8 - 8.0 K/UL    ABS. LYMPHOCYTES 1.5 0.8 - 3.5 K/UL    ABS. MONOCYTES 0.5 0.0 - 1.0 K/UL    ABS. EOSINOPHILS 0.4 0.0 - 0.4 K/UL    ABS. BASOPHILS 0.1 0.0 - 0.1 K/UL    ABS. IMM. GRANS. 0.0 0.00 - 0.04 K/UL    DF AUTOMATED     LACTIC ACID    Collection Time: 09/16/21  2:15 PM   Result Value Ref Range    Lactic acid 1.1 0.4 - 2.0 MMOL/L   URINALYSIS W/ RFLX MICROSCOPIC    Collection Time: 09/16/21  3:29 PM   Result Value Ref Range    Color YELLOW/STRAW      Appearance CLEAR CLEAR      Specific gravity 1.015 1.003 - 1.030      pH (UA) 7.5 5.0 - 8.0      Protein 30 (A) NEG mg/dL    Glucose Negative NEG mg/dL    Ketone Negative NEG mg/dL    Bilirubin Negative NEG      Blood LARGE (A) NEG      Urobilinogen 0.2 0.2 - 1.0 EU/dL    Nitrites Negative NEG      Leukocyte Esterase MODERATE (A) NEG     URINE CULTURE HOLD SAMPLE    Collection Time: 09/16/21  3:29 PM    Specimen: Serum   Result Value Ref Range    Urine culture hold        Urine on hold in Microbiology dept for 2 days. If unpreserved urine is submitted, it cannot be used for addtional testing after 24 hours, recollection will be required.    URINE MICROSCOPIC ONLY    Collection Time: 09/16/21  3:29 PM   Result Value Ref Range    WBC 20-50 0 - 4 /hpf    RBC 20-50 0 - 5 /hpf    Epithelial cells FEW FEW /lpf    Bacteria Negative NEG /hpf   BLOOD GAS, ARTERIAL    Collection Time: 09/16/21  4:43 PM   Result Value Ref Range    pH 7.44 7.35 - 7.45      PCO2 39 35 - 45 mmHg    PO2 122 (H) 80 - 100 mmHg    O2 SAT 99 (H) 92 - 97 %    BICARBONATE 26 22 - 26 mmol/L    BASE EXCESS 1.5 mmol/L    O2 METHOD NASAL CANNULA      O2 FLOW RATE 4.00 L/min    Sample source ARTERIAL SITE LEFT RADIAL      QIAN'S TEST YES

## 2021-09-16 NOTE — ACP (ADVANCE CARE PLANNING)
[address] Adult  Hospitalist Group                                      Advance Care Planning Note    Name: Mary Garcia  YOB: 1945  MRN: [id number]  Admission Date: 9/16/2021  1:21 PM    Date of discussion: 9/16/2021    Active Diagnoses:    Hospital Problems  Date Reviewed: 6/25/2020        Codes Class Noted POA    Acute metabolic encephalopathy CHEY-49-NO: G93.41  ICD-9-CM: 348.31  6/25/2020 Unknown              These active diagnoses are of sufficient risk that focused discussion on advance care planning is indicated in order to allow the patient to thoughtfully consider personal goals of care, and if situations arise that prevent the ability to personally give input, to ensure appropriate representation of their personal desires for different levels and aggressiveness of care. Discussion:     Persons present and participating in discussion: Mary GarciaJoao MD, Pedro Lee (wife)    Topics Discussed:  Patient's medical condition and diagnosis: [x  ] yes [  ] no   Surrogate decision maker: [x] yes [  ] no   Patient's current physical function/cognitive function/frailty: [ x ] yes [  ] no   Code Status: [  x] yes [  ] no   Artificial Nutrition / Dialysis / Non-Invasive Ventilation / Blood Transfusion: [  ] yes [ x ] no  Potential Resources for home (durable medical equipment, home nursing, home O2): x  ] yes [  ] no    Overview of Discussion: Discussed his DDNR status. Discussed potential for recovery, but that overall he has continued to decline over the last several months. She would very much like to meet with palliative to learn more about it and is surprised that she hasn't been able to in the past, despite requests. Time Spent:     Total time spent face-to-face in education and discussion: 16 minutes.      Joao Salgado MD  Date of Service:  9/16/2021  5:55 PM

## 2021-09-16 NOTE — PROGRESS NOTES
CARE MANAGEMENT INITIAL ASSESSEMENT      NAME:   Julio Beckman   :     1945   MRN:     [id number]       Emergency Contact:  Extended Emergency Contact Information  Primary Emergency Contact: [address] Phone: [phone]  Mobile Phone: [phone]  Relation: Spouse   needed? No    Advance Directive:  DNR, has an advance directive. Copy on chart. .    Healthcare Decision Maker:    Primary Decision Maker: Favian Perez - Spouse - [phone]     Reason for Admission:  Mr. Isidra Gutierrez is a 67 y.o. male with history that includes dementia, DM, HTN and parkinson's disease  who was emergently admitted for:  acute metabolic encephalopathy    Patient Active Problem List   Diagnosis Code    Acute metabolic encephalopathy A02.76    Anemia D64.9    Fall W19. Hyla Pila    Parkinson disease ([facility] [address] G20    Urinary retention R33.9    Type II diabetes mellitus ([facility] [address] [address] E11.9    HTN (hypertension) I10    Spinal stenosis, multilevel M48.00    FUO (fever of unknown origin) R50.9       Assessment: In person with spouse Nafisa Saha. RUR:  11%  Risk Level:  Low  Value-based purchasing:   No  Bundle patient:  No    Residency:  SNF  Exterior Steps:  None  Interior Steps:  None    Lives With:  Other: SNF    Prior functioning:  Dependent.   Patient requires assistance with:  Bathing, Dressing, Ambulation and Toileting    Prior DME required:  None    DME available:  None    Rehab history:  SNF:  [facility] - [facility]  Date - CURRENT RESIDENT    Discharge Concerns:  None      Insurer:  Payor: [place] MED[place]ARE / Plan: [place] MEDICARE PART A & B / Product Type: Medicare /     PCP: Jus Rice MD   Name of Practice:  N/A   Current patient: N/A   Approximate date of last visit: MD at [address] to [facility] PCP visits:  N/A    Pharmacy:  [facility]     COVID-19 vaccination status:  Fully vaccinated     DC Transport:  TBD      Transition of care plan:  Home with outpatient follow-up     Comments:   CM met with Pt and spouse to complete initial assessment. Pt is sleeping. Spouse January Angulo) states that Pt is a SNF resident at [facility]. Zahira Lara states Pt will return to [facility] upon discharge. She reports that Pt was transitioning to long term care but a date for the transition had not been given. CM unable to confirm that [facility] is able to re-accept upon discharge due to time. CM will continue to follow. _____________________________________  BISHOP Santana - Care Management  9/16/2021   7:43 PM      Care Management Interventions  PCP Verified by CM: Yes (MD at [facility])  Mode of Transport at Discharge:  Other (see comment) (TBD)  Transition of Care Consult (CM Consult): SNF  Partner SNF: No  Reason Why Partner SNF Not Chosen: Positive previous encounter  MyChart Signup: No  Discharge Durable Medical Equipment: No  Physical Therapy Consult: Yes  Occupational Therapy Consult: Yes  Speech Therapy Consult: No  Support Systems: Spouse/Significant Other, [facility] Follow Up Transport: Family  Discharge Location  Discharge Placement: Skilled nursing facility

## 2021-09-16 NOTE — PROGRESS NOTES
[facility]: MED RECONCILIATION    Medications added:     Quetiapine   Trazodone 50 mg PO QHS (this was recently changed from PRN to scheduled per wife)  Nystatin powder  Prednisone  MVI  Gaviscon  KCl  Scopolamine patch  Medihoney  Oxycodone 5 mg PO Q12H scheduled  Voltaren gel  Robitussin DM    Medications removed:     mg PO daily  Vitamin B Complex PO daily  Docusate 100 mg PO BID  Nifedipine ER 30 mg daily  Torsemide 100 mg PO daily    Medications adjusted:    Carbidopa/levodopa 25/100 mg - 2 tablets (instead of 1.5 tablets) PO QID (instead of TID). Specific times are 0900/1300/1700/2100. He has not yet had his 1700 dose today. Carvedilol 3.125 mg (instead of 6.25 mg) PO BID  Wixela inhaler 100/50 (instead of 250/50)  Lantus 25 units SQ QHS (instead of 45 units BID) - It appears that his dose of Lantus was increased at one time d/t ongoing steroid use. Uncertain what dose he was on at that time, but he is currently taking prednisone 10 mg daily per transfer paperwork. Pramipexole 0.125 mg (1/2 of a 0.25 mg tablet) PO QHS    [facility]'s [facility]    Significant PMH/Disease States: No past medical history on file. Chief Complaint for this Admission:   Chief Complaint   Patient presents with    Altered mental status     per assisted living pt was very difficult to wake up and arouse. Pt doesn't remember any ot this from the morning. Pt is being treated for  UTI     Allergies: Patient has no known allergies. Prior to Admission Medications:     Medication Documentation Review Audit       Reviewed by EVERARDO SanchezD (Pharmacist) on 09/16/21 at 1733      Medication Sig Documenting Provider Last Dose Status Taking?   acetaminophen (TYLENOL) 325 mg tablet Take 2 Tablets by mouth every six (6) hours as needed for Pain or Fever.  Neha Mendieta MD  Active Yes   albuterol-ipratropium (DUO-NEB) 2.5 mg-0.5 mg/3 ml nebu 3 mL by Nebulization route every six (6) hours as needed for Wheezing. Provider, Historical  Active Yes   Aluminum Hydrox-Magnesium Carb (Gaviscon Extra Strength) 254-237.5 mg/5 mL susp Take 30 mL by mouth every four (4) hours as needed. Provider, Historical  Active Yes   atorvastatin (LIPITOR) 20 mg tablet Take 20 mg by mouth daily. Provider, Historical 9/16/2021 am Active Yes   carbidopa-levodopa (Sinemet)  mg per tablet Take 2 Tablets by mouth four (4) times daily. Takes at 0900/1300/1700/2100  Indications: parkinsonism due to degeneration in the brain Provider, Historical 9/16/2021 am Active Yes           Med Note (Wellington Solorio   u Sep 16, 2021  5:23 PM) Takes at 0900/1300/1700/2100   carvediloL (COREG) 3.125 mg tablet Take 3.125 mg by mouth two (2) times daily (with meals). Provider, Historical 9/16/2021 am Active Yes   cetirizine (ZYRTEC) 10 mg tablet Take 10 mg by mouth daily. Provider, Historical 9/16/2021 am Active Yes   cholecalciferol (Vitamin D3) (1000 Units /25 mcg) tablet Take 2,000 Units by mouth daily. Provider, Historical 9/16/2021 am Active Yes   diclofenac (Voltaren) 1 % gel Apply 4 g to affected area three (3) times daily as needed for Pain. Apply to knees Provider, Historical  Active Yes   donepeziL (Aricept) 10 mg tablet Take 10 mg by mouth nightly. Provider, Historical 9/15/2021 pm Active Yes   entacapone (COMTAN) 200 mg tablet Take 200 mg by mouth Before breakfast, lunch, dinner and at bedtime. Provider, Historical 9/16/2021 am Active Yes   finasteride (PROSCAR) 5 mg tablet Take 5 mg by mouth daily. Provider, Historical 9/16/2021 am Active Yes   fluticasone propion-salmeteroL (Wixela Inhub) 100-50 mcg/dose diskus inhaler Take 1 Puff by inhalation every twelve (12) hours. Provider, Historical 9/16/2021 am Active Yes   gabapentin (NEURONTIN) 600 mg tablet Take 600 mg by mouth three (3) times daily.  Provider, Historical 9/16/2021 am Active Yes   guaiFENesin-dextromethorphan (Adult Robitussin Peak Cold DM)  mg/5 mL liqd Take 10 mL by mouth every six (6) hours as needed for Cough or Congestion. Provider, Historical  Active Yes   honey (MEDIHONEY) 100 % topical paste Apply  to affected area daily. Provider, Historical 9/16/2021 am Active Yes   insulin glargine (Lantus U-100 Insulin) 100 unit/mL injection 25 Units by SubCUTAneous route nightly. Provider, Historical 9/15/2021 pm Active Yes   insulin lispro (HumaLOG U-100 Insulin) 100 unit/mL injection by SubCUTAneous route. Use as directed. CORRECTIONAL SCALE only For Blood Sugar (mg/dl) of :   180-200 =2 units, 201-250 =4 units, 251-300 =6 units, 301-349=8 units 351-400 = 10 units; 401 or greater = Call MD. Give in addition to basal medications. Do Not Hold for NPO BEDTIME CORRECTIONAL sliding scale when scheduled: 200-249=2 units, 250-299=4 units, 300-349=5 units, 350 or greater = Call MD Give in addition to basal medications. Do Not Hold for NPO Provider, Historical 9/16/2021 am Active Yes   melatonin 3 mg tablet Take 6 mg by mouth nightly. Provider, Historical 9/15/2021 pm Active Yes   multivitamin, tx-iron-ca-min (THERA-M w/ IRON) 9 mg iron-400 mcg tab tablet Take 1 Tablet by mouth daily. Provider, Historical 9/16/2021 am Active Yes   nystatin (MYCOSTATIN) powder Apply  to affected area two (2) times a day. Provider, Historical 9/16/2021 am Active Yes   omeprazole (PRILOSEC) 40 mg capsule Take 40 mg by mouth daily. Provider, Historical 9/16/2021 am Active Yes   oxyCODONE IR (ROXICODONE) 5 mg immediate release tablet Take 5 mg by mouth every twelve (12) hours. Provider, Historical 9/16/2021 am Active Yes   potassium chloride SR (KLOR-CON 10) 10 mEq tablet Take 20 mEq by mouth daily. Provider, Historical 9/16/2021 am Active Yes   pramipexole (MIRAPEX) 0.25 mg tablet Take 0.125 mg by mouth nightly. Provider, Historical 9/15/2021 pm Active Yes   predniSONE (DELTASONE) 10 mg tablet Take 10 mg by mouth daily (with breakfast).  Provider, Historical 9/16/2021 am Active Yes   QUEtiapine (SEROquel) 25 mg tablet Take 12.5 mg by mouth every evening. Provider, Historical 9/15/2021 pm Active Yes   scopolamine (TRANSDERM-SCOP) 1 mg over 3 days pt3d 1 Patch by TransDERmal route every seventy-two (72) hours. Provider, Historical 9/14/2021 Active Yes   tamsulosin (FLOMAX) 0.4 mg capsule Take 0.4 mg by mouth daily. Provider, Historical 9/16/2021 am Active Yes   traZODone (DESYREL) 50 mg tablet Take 50 mg by mouth nightly. Provider, Historical 9/15/2021 pm Active Yes                Thank you,  Jorge NOYOLA, [facility]

## 2021-09-16 NOTE — Clinical Note
Status[de-identified] INPATIENT [101]   Type of Bed: Medical [8]   Inpatient Hospitalization Certified Necessary for the Following Reasons: 3.  Patient receiving treatment that can only be provided in an inpatient setting (further clarification in H&P documentation)   Admitting Diagnosis: Acute metabolic encephalopathy [[id number]]   Admitting Physician: Alexei Ronquillo [[id number]]   Attending Physician: Elisabeth Cordova   Estimated Length of Stay: 2 Midnights   Discharge Plan[de-identified] Home with Office Follow-up

## 2021-09-16 NOTE — ED NOTES
Wife reports that patient recently has had an increase in his Trazadone and wonders if that is why he was sluggish this morning. Pt is participating in conversation with wife and nurse and laughing.

## 2021-09-17 NOTE — ED NOTES
TRANSFER - OUT REPORT:    Verbal report given to [facility] RN(name) on Jelani Ocasio  being transferred to [facility](unit) for routine progression of care       Report consisted of patients Situation, Background, Assessment and   Recommendations(SBAR). Information from the following report(s) SBAR, Kardex, ED Summary, MAR, Recent Results and Med Rec Status was reviewed with the receiving nurse. Lines:   Peripheral IV 09/16/21 Left Antecubital (Active)   Site Assessment Clean, dry, & intact 09/17/21 0800   Phlebitis Assessment 0 09/17/21 0800   Infiltration Assessment 0 09/17/21 0800   Dressing Status Clean, dry, & intact; New 09/17/21 0800   Dressing Type Transparent 09/17/21 0800   Hub Color/Line Status Pink;Patent; Flushed 09/17/21 0800   Action Taken Blood drawn 09/17/21 0800   Alcohol Cap Used Yes 09/17/21 0800        Opportunity for questions and clarification was provided.       Patient transported with:   Monitor  Registered Nurse

## 2021-09-17 NOTE — PROGRESS NOTES
Returned from MRI, and is more awake, alert and talkative. NSR. Afebrile. VSS. IV SL. NPO maintained. Wife called and given update on condition.

## 2021-09-17 NOTE — PROGRESS NOTES
Problem: Falls - Risk of  Goal: *Absence of Falls  Description: Document Skylar Franco Fall Risk and appropriate interventions in the flowsheet.   Outcome: Progressing Towards Goal  Note: Fall Risk Interventions:       Mentation Interventions: Adequate sleep, hydration, pain control, Bed/chair exit alarm, Door open when patient unattended, Evaluate medications/consider consulting pharmacy, Familiar objects from home, Family/sitter at bedside, Gait belt with transfers/ambulation, Increase mobility, More frequent rounding, Reorient patient, Room close to nurse's station, Toileting rounds    Medication Interventions: Assess postural VS orthostatic hypotension, Bed/chair exit alarm, Evaluate medications/consider consulting pharmacy, Patient to call before getting OOB, Teach patient to arise slowly, Utilize gait belt for transfers/ambulation    Elimination Interventions: Bed/chair exit alarm, Call light in reach, Elevated toilet seat, Patient to call for help with toileting needs, Stay With Me (per policy), Toilet paper/wipes in reach, Toileting schedule/hourly rounds, Urinal in reach              Problem: Patient Education: Go to Patient Education Activity  Goal: Patient/Family Education  Outcome: Progressing Towards Goal     Problem: General Medical Care Plan  Goal: *Vital signs within specified parameters  Outcome: Progressing Towards Goal  Goal: *Labs within defined limits  Outcome: Progressing Towards Goal  Goal: *Optimal pain control at patient's stated goal  Outcome: Progressing Towards Goal  Goal: *Skin integrity maintained  Outcome: Progressing Towards Goal  Goal: *Fluid volume balance  Outcome: Progressing Towards Goal  Goal: *Optimize nutritional status  Outcome: Progressing Towards Goal  Goal: *Progressive mobility and function (eg: ADL's)  Outcome: Progressing Towards Goal

## 2021-09-17 NOTE — PROGRESS NOTES
Primary Nurse Mela Harrington and Kady Zavala RN performed a dual skin assessment on this patient Impairment noted- see wound doc flow sheet  Larry score is 14

## 2021-09-17 NOTE — CONSULTS
Neurology Consult - Inpatient      Name:   Garry Nicolas  MRN:    [id number]    Date of Admission:  9/16/2021    Date of Consultation:  09/17/21       HISTORY OF PRESENT ILLNESS:     This is a 67 y.o. male with Parkinson's, Dementia, HTN, Vit D deficiency, DM, Chronic pain syndrome, Polymyalgia Rheumatica. Neurology is asked to see the patient for Encephalopathy and hx of Parkinson's disease (followed by [facility]). Pt is resting in bed, lethargic, only arouses briefly when trying to examine him. Per chart review including hx on Admission H&P:  Wife is present in room and provides hx of last several months. Pt had prolonged hospitalization in [place] and was then transferred to [facility] for FUO and profound weakness, leading to immobility. No clear cause elucidated, but spinal stenosis noted. He discharged 6/17 to [facility], and then transferred to [facility], but then was admitted to [address] [place] [address] in July with urosepsis (has chronic indwelling sánchez). He then discharged to [facility] / SNF/ Rehab. Pt was in his normal state of health (baseline) yesterday, but today has been difficult to arouse, so presented via EMS to ER. Wife told ER provider that patient last walked 1 month ago, hasn't been ambulatory since then. She told ER Nurse yesterday that pt had had a recent increase in his Trazodone and she wondered if that was why he was sluggish (x 2 days). Labs: WBC 11.3, normal platelet count, CMP with normal sodium, elevated glucose 156, normal/ slightly low Cr, normal LFts, Ammonia 11, Lactic Acid 0.9, UA moderate LE (? UTI). Nursing note from this AM indicates he was more alert, ate breakfast, asked for wife, and was cooperative. Brain MRI: 1. No acute findings. 2. Cerebral volume loss. Mild cerebral white matter signal abnormalities consistent with chronic small vessel ischemic change.       Complete Review of Systems: reviewed on admission H&P    ====================================     No Known Allergies      Current Facility-Administered Medications   Medication Dose Route Frequency Provider Last Rate Last Admin    predniSONE (DELTASONE) tablet 10 mg  10 mg Oral DAILY WITH Cameron Blanco MD   10 mg at 09/17/21 0836    atorvastatin (LIPITOR) tablet 20 mg  20 mg Oral DAILY Cameron Terry MD   20 mg at 09/17/21 1246    carbidopa-levodopa (SINEMET)  mg per tablet 2 Tablet  2 Tablet Oral QID Barbara Holden MD   2 Tablet at 09/17/21 1247    carvediloL (COREG) tablet 3.125 mg  3.125 mg Oral BID WITH MEALS Cameron Terry MD   3.125 mg at 09/17/21 1247    donepeziL (ARICEPT) tablet 10 mg  10 mg Oral QHS Cameron Terry MD        entacapone (COMTAN) tablet 200 mg  200 mg Oral AC&ROMEO Holden MD   200 mg at 09/17/21 1247    finasteride (PROSCAR) tablet 5 mg  5 mg Oral DAILY Cameron Terry MD   5 mg at 09/17/21 1246    pantoprazole (PROTONIX) tablet 40 mg  40 mg Oral ACB Barbara Holden MD   40 mg at 09/17/21 1246    QUEtiapine (SEROquel) tablet 12.5 mg  12.5 mg Oral QPM Barbara Holden MD        pramipexole (MIRAPEX) tablet 0.125 mg  0.125 mg Oral QHS Cameron Terry MD        tamsulosin (FLOMAX) capsule 0.4 mg  0.4 mg Oral DAILY Barbara Holden MD   0.4 mg at 09/17/21 1246    insulin lispro (HUMALOG) injection   SubCUTAneous AC&HS Barbara Holden MD   2 Units at 09/17/21 1448    glucose chewable tablet 16 g  4 Tablet Oral PRN Cameron Terry MD        dextrose (D50W) injection syrg 12.5-25 g  12.5-25 g IntraVENous PRN Barbara Holden MD        glucagon (GLUCAGEN) injection 1 mg  1 mg IntraMUSCular PRN Barbara Holden MD        cefTRIAXone (ROCEPHIN) 1 g in sterile water (preservative free) 10 mL IV syringe  1 g IntraVENous Q24H Barbara Holden MD   1 g at 09/17/21 1449    miconazole (MICOTIN) 2 % powder   Topical BID Cameron Terry MD        sodium chloride (NS) flush 5-10 mL  5-10 mL IntraVENous PRN Charu Flynn MD TULIO   10 mL at 09/16/21 1436    sodium chloride (NS) flush 5-40 mL  5-40 mL IntraVENous Q8H Bruno Eugene MD   10 mL at 09/17/21 1449    sodium chloride (NS) flush 5-40 mL  5-40 mL IntraVENous PRN Bruno Eugene MD        acetaminophen (TYLENOL) tablet 650 mg  650 mg Oral Q6H PRN Bruno Eugene MD        Or   [employer] acetaminophen (TYLENOL) suppository 650 mg  650 mg Rectal Q6H PRN Bruno Eugene MD        polyethylene glycol (MIRALAX) packet 17 g  17 g Oral DAILY PRN Bruno Eugene MD        ondansetron (ZOFRAN ODT) tablet 4 mg  4 mg Oral Q8H PRN Bruno Eugene MD        Or    ondansetron [facility]) injection 4 mg  4 mg IntraVENous Q6H PRN Bruno Eugene MD        enoxaparin (LOVENOX) injection 40 mg  40 mg SubCUTAneous DAILY Bruno Eugene MD   40 mg at 09/17/21 5918     Current Outpatient Medications   Medication Sig Dispense Refill    albuterol-ipratropium (DUO-NEB) 2.5 mg-0.5 mg/3 ml nebu 3 mL by Nebulization route every six (6) hours as needed for Wheezing.  carvediloL (COREG) 3.125 mg tablet Take 3.125 mg by mouth two (2) times daily (with meals).  fluticasone propion-salmeteroL (Wixela Inhub) 100-50 mcg/dose diskus inhaler Take 1 Puff by inhalation every twelve (12) hours.  insulin glargine (Lantus U-100 Insulin) 100 unit/mL injection 25 Units by SubCUTAneous route nightly.  insulin lispro (HumaLOG U-100 Insulin) 100 unit/mL injection by SubCUTAneous route. Use as directed. CORRECTIONAL SCALE only For Blood Sugar (mg/dl) of :   180-200 =2 units, 201-250 =4 units, 251-300 =6 units, 301-349=8 units 351-400 = 10 units; 401 or greater = Call MD. Give in addition to basal medications. Do Not Hold for NPO BEDTIME CORRECTIONAL sliding scale when scheduled: 200-249=2 units, 250-299=4 units, 300-349=5 units, 350 or greater = Call MD Give in addition to basal medications. Do Not Hold for NPO      QUEtiapine (SEROquel) 25 mg tablet Take 12.5 mg by mouth every evening.       traZODone (DESYREL) 50 mg tablet Take 50 mg by mouth nightly.  nystatin (MYCOSTATIN) powder Apply  to affected area two (2) times a day.  multivitamin, tx-iron-ca-min (THERA-M w/ IRON) 9 mg iron-400 mcg tab tablet Take 1 Tablet by mouth daily.  Aluminum Hydrox-Magnesium Carb (Gaviscon Extra Strength) 254-237.5 mg/5 mL susp Take 30 mL by mouth every four (4) hours as needed.  predniSONE (DELTASONE) 10 mg tablet Take 10 mg by mouth daily (with breakfast).  potassium chloride SR (KLOR-CON 10) 10 mEq tablet Take 20 mEq by mouth daily.  scopolamine (TRANSDERM-SCOP) 1 mg over 3 days pt3d 1 Patch by TransDERmal route every seventy-two (72) hours.  honey (MEDIHONEY) 100 % topical paste Apply  to affected area daily.  oxyCODONE IR (ROXICODONE) 5 mg immediate release tablet Take 5 mg by mouth every twelve (12) hours.  diclofenac (Voltaren) 1 % gel Apply 4 g to affected area three (3) times daily as needed for Pain. Apply to knees      guaiFENesin-dextromethorphan (Adult Robitussin Peak Cold DM)  mg/5 mL liqd Take 10 mL by mouth every six (6) hours as needed for Cough or Congestion.  acetaminophen (TYLENOL) 325 mg tablet Take 2 Tablets by mouth every six (6) hours as needed for Pain or Fever. 30 Tablet 0    pramipexole (MIRAPEX) 0.25 mg tablet Take 0.125 mg by mouth nightly.  donepeziL (Aricept) 10 mg tablet Take 10 mg by mouth nightly.  finasteride (PROSCAR) 5 mg tablet Take 5 mg by mouth daily.  cetirizine (ZYRTEC) 10 mg tablet Take 10 mg by mouth daily.  melatonin 3 mg tablet Take 6 mg by mouth nightly.  tamsulosin (FLOMAX) 0.4 mg capsule Take 0.4 mg by mouth daily.  carbidopa-levodopa (Sinemet)  mg per tablet Take 2 Tablets by mouth four (4) times daily. 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       omeprazole (PRILOSEC) 40 mg capsule Take 40 mg by mouth daily.  atorvastatin (LIPITOR) 20 mg tablet Take 20 mg by mouth daily.  cholecalciferol (Vitamin D3) (1000 Units /25 mcg) tablet Take 2,000 Units by mouth daily.  entacapone (COMTAN) 200 mg tablet Take 200 mg by mouth Before breakfast, lunch, dinner and at bedtime. PSHx  has no past surgical history on file. SocHx      FHx family history is not on file. PHYSICAL EXAM    Patient Vitals for the past 4 hrs:   Pulse Resp BP   09/17/21 1400 (!) 108 16 115/73   09/17/21 1200 (!) 109 15 126/79         Very limited exam due to current lethargy    Resting in bed, lethargic. Only briefly opens eyes to voice. Face appears symmetric. On passive eyelid opening, pupils are 2 mm/ difficulty to tell reactivity. Doesn't follow commands. Moves left hand slightly while I'm here. Withdraws to nailbed pressure in hands, weakly. Withdraws to nailbed pressure in feet weakly, but still localizes to pain. DTRs: 1+ biceps and patellars. No resting tremors    Remainder of exam cannot be accomplished    Labs/ Radiology       Imaging:    MRI BRAIN WO CONT    Result Date: 9/17/2021  1. No acute findings. 2. Cerebral volume loss. Mild cerebral white matter signal abnormalities consistent with chronic small vessel ischemic change. Assessment/ Plan       ICD-10-CM ICD-9-CM    1. Altered mental status, unspecified altered mental status type  R41.82 780.97 EEG      EEG   2.  Acute metabolic encephalopathy  O76.45 348.31        Altered mental status x 2 days  Some possible contribution from higher dose of Trazodone  Agree with Hospitalist holding all sedating medications (gabapentin, oxycodone, trazodone)  UA here shows moderate LE (? UTI); pt has chronic indwelling sánchez  Brain MRI  (-) for acute abnormalities    Will check EEG to ensure no subclinical seizure    Dr Giancarlo Dale takes over the Neurology service today      Thank you for asking the Neurology Service to evaluate Kenia Rolls.       Signed By: Jaden Cummings MD     September 17, 2021

## 2021-09-17 NOTE — CONSULTS
Palliative Medicine Consult  Bhavesh: [phone] ([phone])    Patient Name: Tuan Lemus  YOB: 1945    Date of Initial Consult: 9/17/21  Reason for Consult: care decisions  Requesting Provider: Dr. Stephanie Guillermo  Primary Care Physician: Zunilda Tracy MD     SUMMARY:   Tuan Lemus is a 67 y.o. with a medical history of Parkinson's disease with dementia, DM, hypertension, multilevel spinal stenosis and h/o lumbar laminectomy who was admitted to [facility] on 9/16/2021 with a diagnosis of acute metabolic encephalopathy after being found obtunded at [facility]. Current medical issues leading to Palliative Medicine involvement include: advancing chronic illness. I met Dr. Jonathan Elliott a year ago, he was decisional at the time. He flew to [place] last May for a family reunion. While there he developed profound weakness, urinary incontinence, progressive confusion and intermittent fevers. Workup for FUO and sepsis was ultimately unrevealing  He was eventually transferred by ambulance to [facility] where PMR was suspected and he was given a steroid taper. He made some gains and transferred to a SNF for rehab. He was re-hospitalized at some point at [facility], then to their acute rehab, then transferred to [facility]. Currently he is in process of transferring to a LTC bed. He has not returned home since he travelled in May. Functional status prior to this admission- requires a Crow lift for transfers. Alert and recognizes wife but confused. Psychosocial Hx- Retired orthopaedic surgeon.  19 yrs to [place]. He has two children, a daughter (an NP) in [address] [place] [address] and a son (nurse anesthetist). Justin Trinh has a daughter and grandchild that live locally. PALLIATIVE DIAGNOSES:   1. Goals of care counseling/discussion   2. Parkinsons disease with dementia  3. Chronic back pain dt DJD  4. Debility- requires crow lift  5. Cachexia- 29% wt loss / 1 yr (73 lbs)  6.  Anorexia       PLAN: 1. Patient assessed in the ED with wife at bedside. He is alert but confused, unaware of why he is hospitalized or what happened earlier in the day. He does not have capacity for healthcare decision making. 2. His spouse Kate Boyle is his healthcare decision maker, devoted to coordinating his care. His two biological children live out of state. 3. Kate Boyle and I met to discuss the state of her 's health and to set healthcare goals. She reviewed his course over the past 6 months, which has been taxing on her as she has coordinated a lot of his transfers and has been a strong advocate for him receiving as much rehabilitation as possible. I provided context and education about Parkinsons and dementia as illnesses which are chronic and known to be progressive, not curable. Discussed that management is to slow progression and/or provide appropriate symptom management. Shared frankly that he is approaching the end stage of his illness as evidenced by severe debility, lack of desire to eat/drink (refused all meals today despite intact swallow), progressive weight loss and hallucinations. Reviewed that in this context readmission is to be expected for new acute infections, sepsis, dehydration or failure to maintain nutrition. 4. Care goals- in context of above reviewed option for comfort / symptom focused approach with Hospice support being direct that he does qualify for this type of care. Alternative path is to continue acute hospital interventions. Kate Boyle shared with me that her  has been making statements lately about not feeling like he has good quality of life; mostly aimed at the fact that he cannot return home with her. We talked about approaching decision making as he would, using his voice to guide her. For now care goals to remain full restorative efforts.   Kate Boyle was very much surprised to hear the information I presented to her and is processing it all, but hopeful for follow up with palliative care for further support and guidance. 5. Psychosocial- pt's children are both out of state and in healthcare. Per Aliviareyes Marco they play a limited direct role in his care but are receiving updates periodically from physicians. Locally Zahira Lara is only direct support and is experiencing caregiver burnout. 6. Code status- DNR. I reaffirmed this decision with Zahira Lara as Dr. Heidi Du affirmed to me last year that he had a DDNR at home, he made this decision himself. 7. Pain- pt not experiencing back pain since this admission; this is a change for him according to wife as usually pain is a barrier to working with therapy. She would like to advocate for better pain management. Discussed the balance between maintaining alertness to work with therapy and achieving good pain control, and was heike that I do not think this is possible to have both in his case. 8. Thank you for the opportunity to participate in the care of [address] [place] [address]  9. Communicated plan of care with: Palliative [facility] Team     GOALS OF CARE / TREATMENT PREFERENCES:     GOALS OF CARE:  Patient/Health Care Proxy Stated Goals: Other (comment) (spouse is overwhelmed, processing information and not able to iterate care goals today.)    TREATMENT PREFERENCES:   Code Status: DNR    Patient's personal goals include: n/a    Important upcoming milestones or family events: none reported    The patient identifies the following as important for living well: being with his wife      Advance Care Planning:  [x] [facility] Interdisciplinary Team has updated the ACP Navigator with [place] and Patient Capacity      Primary Decision Maker (Active): Prince Baker [phone]     Advance Care Planning 7/2/2020   Patient's Healthcare Decision Maker is: Legal Next of Kin   Confirm Advance Directive -   Does the patient have other document types Do Not Resuscitate; Power of [employer] Interventions: Limited additional interventions (DNR/I)       Other:    As far as possible, the palliative care team has discussed with patient / health care proxy about goals of care / treatment preferences for patient. HISTORY:     History obtained from: spouse, EMR    CHIEF COMPLAINT: fatigue    HPI/SUBJECTIVE:    The patient is:   [x] Verbal and participatory  [] Non-participatory due to:      68 yo admitted after being found obtunded at his SNF. He has since become more alert but remains confused. He denies pain, shortness of breath or discomfort today. Clinical Pain Assessment (nonverbal scale for severity on nonverbal patients):   Clinical Pain Assessment  Severity: 0     Activity (Movement): Lying quietly, normal position    Duration: for how long has pt been experiencing pain (e.g., 2 days, 1 month, years)  Frequency: how often pain is an issue (e.g., several times per day, once every few days, constant)     FUNCTIONAL ASSESSMENT:     Palliative Performance Scale (PPS):  PPS: 30       PSYCHOSOCIAL/SPIRITUAL SCREENING:     Palliative IDT has assessed this patient for cultural preferences / practices and a referral made as appropriate to needs (Cultural Services, Patient Advocacy, Ethics, etc.)    Any spiritual / Mosque concerns:  [] Yes /  [x] No    Caregiver Burnout:  [x] Yes /  [] No /  [] No Caregiver Present - spouse as advocate     Anticipatory grief assessment:   [] Normal  / [x] Maladaptive - spouse      ESAS Anxiety: Anxiety: 0    ESAS Depression:          REVIEW OF SYSTEMS:     Positive and pertinent negative findings in ROS are noted above in HPI. The following systems were [x] reviewed / [] unable to be reviewed as noted in HPI  Other findings are noted below. Systems: constitutional, ears/nose/mouth/throat, respiratory, gastrointestinal, genitourinary, musculoskeletal, integumentary, neurologic, psychiatric, endocrine. Positive findings noted below.   Modified ESAS Completed by: provider Fatigue: 6       Pain: 0   Anxiety: 0 Nausea: 0   Anorexia: 10 Dyspnea: 0                    PHYSICAL EXAM:     From RN flowsheet:  Wt Readings from Last 3 Encounters:   09/17/21 179 lb 6.4 oz (81.4 kg)   06/17/21 197 lb 12.8 oz (89.7 kg)   09/22/20 251 lb (113.9 kg)     Blood pressure 109/78, pulse 97, temperature 97.6 °F (36.4 °C), resp. rate 16, height 5' 3\" (1.6 m), weight 179 lb 6.4 oz (81.4 kg), SpO2 94 %. Pain Scale 1: Numeric (0 - 10)  Pain Intensity 1: 0                 Constitutional: elderly white male; comfortable at rest  Eyes: pupils equal, anicteric  ENMT: no nasal discharge, moist mucous membranes  Cardiovascular: regular rhythm, distal pulses intact  Respiratory: breathing not labored, symmetric bs anteriorly  Gastrointestinal: soft non-tender, +bowel sounds  Musculoskeletal: no deformity, no tenderness to palpation  Skin: warm, dry  Neurologic: alert to self, following simple commands, moving all extremities  Psychiatric: affect, no current hallucinations       HISTORY:     Active Problems:    Acute metabolic encephalopathy (0/74/3501)      No past medical history on file. No past surgical history on file. No family history on file. History reviewed, no pertinent family history.   Social History     Tobacco Use    Smoking status: Not on file   Substance Use Topics    Alcohol use: Not on file     No Known Allergies   Current Facility-Administered Medications   Medication Dose Route Frequency    predniSONE (DELTASONE) tablet 10 mg  10 mg Oral DAILY WITH BREAKFAST    atorvastatin (LIPITOR) tablet 20 mg  20 mg Oral DAILY    carbidopa-levodopa (SINEMET)  mg per tablet 2 Tablet  2 Tablet Oral QID    carvediloL (COREG) tablet 3.125 mg  3.125 mg Oral BID WITH MEALS    donepeziL (ARICEPT) tablet 10 mg  10 mg Oral QHS    entacapone (COMTAN) tablet 200 mg  200 mg Oral AC&HS    finasteride (PROSCAR) tablet 5 mg  5 mg Oral DAILY    pantoprazole (PROTONIX) tablet 40 mg  40 mg Oral ACB  QUEtiapine (SEROquel) tablet 12.5 mg  12.5 mg Oral QPM    pramipexole (MIRAPEX) tablet 0.125 mg  0.125 mg Oral QHS    tamsulosin (FLOMAX) capsule 0.4 mg  0.4 mg Oral DAILY    insulin lispro (HUMALOG) injection   SubCUTAneous AC&HS    glucose chewable tablet 16 g  4 Tablet Oral PRN    dextrose (D50W) injection syrg 12.5-25 g  12.5-25 g IntraVENous PRN    glucagon (GLUCAGEN) injection 1 mg  1 mg IntraMUSCular PRN    cefTRIAXone (ROCEPHIN) 1 g in sterile water (preservative free) 10 mL IV syringe  1 g IntraVENous Q24H    miconazole (MICOTIN) 2 % powder   Topical BID    sodium chloride (NS) flush 5-10 mL  5-10 mL IntraVENous PRN    sodium chloride (NS) flush 5-40 mL  5-40 mL IntraVENous Q8H    sodium chloride (NS) flush 5-40 mL  5-40 mL IntraVENous PRN    acetaminophen (TYLENOL) tablet 650 mg  650 mg Oral Q6H PRN    Or    acetaminophen (TYLENOL) suppository 650 mg  650 mg Rectal Q6H PRN    polyethylene glycol (MIRALAX) packet 17 g  17 g Oral DAILY PRN    ondansetron (ZOFRAN ODT) tablet 4 mg  4 mg Oral Q8H PRN    Or    ondansetron (ZOFRAN) injection 4 mg  4 mg IntraVENous Q6H PRN    enoxaparin (LOVENOX) injection 40 mg  40 mg SubCUTAneous DAILY          LAB AND IMAGING FINDINGS:     Lab Results   Component Value Date/Time    WBC 11.3 (H) 09/17/2021 03:51 AM    HGB 11.8 (L) 09/17/2021 03:51 AM    PLATELET 698 77/95/7631 03:51 AM     Lab Results   Component Value Date/Time    Sodium 139 09/17/2021 03:51 AM    Potassium 4.0 09/17/2021 03:51 AM    Chloride 109 (H) 09/17/2021 03:51 AM    CO2 25 09/17/2021 03:51 AM    BUN 8 09/17/2021 03:51 AM    Creatinine 0.66 (L) 09/17/2021 03:51 AM    Calcium 9.1 09/17/2021 03:51 AM    Magnesium 1.9 09/16/2021 02:14 PM    Phosphorus 2.8 06/14/2021 11:23 AM      Lab Results   Component Value Date/Time    Alk.  phosphatase 92 09/17/2021 03:51 AM    Protein, total 7.3 09/17/2021 03:51 AM    Albumin 2.9 (L) 09/17/2021 03:51 AM    Globulin 4.4 (H) 09/17/2021 03:51 AM     No results found for: INR, PTMR, PTP, PT1, PT2, APTT, INREXT, INREXT   Lab Results   Component Value Date/Time    Iron 17 (L) 06/26/2020 03:26 AM    TIBC 257 06/26/2020 03:26 AM    Iron % saturation 7 (L) 06/26/2020 03:26 AM    Ferritin 397 (H) 06/10/2021 01:40 AM      Lab Results   Component Value Date/Time    pH 7.44 09/16/2021 04:43 PM    PCO2 39 09/16/2021 04:43 PM    PO2 122 (H) 09/16/2021 04:43 PM     No components found for: [place]   Lab Results   Component Value Date/Time    CK 83 06/08/2021 12:38 PM    CK - MB 1.9 06/25/2020 04:59 AM                Total time: 70m  Counseling / coordination time, spent as noted above: 70m  > 50% counseling / coordination?: y    Prolonged service was provided for  []30 min   []75 min in face to face time in the presence of the patient, spent as noted above. Time Start: 15:50  Time End: 17:00  Note: this can only be billed with  (initial) or 21 [phone] (follow up). If multiple start / stop times, list each separately.

## 2021-09-17 NOTE — WOUND CARE
Wound Consult:  New consult Visit. Chart reviewed. Consulted for sacral wound. Spoke with patient ER nurse at bedside, update given after assessment. Patient is resting on a stretcher. Heels off loaded with pillows. Patient is awake, alert, wife at bedside; requires 1 assit to move side to side in bed. incontinent of large formed brown stool. Care given. Greer in place. Yellow discharge cleaned from tip of penis. Nurse notified. Wife stated that wound care at [address] [facility] consisted of medi-honey , also patient wears briefs. Larry score 16  Assessment:  POA Left heel- blanching bright pink, skin intact. Right heel- no redness, skin intact. POA sacrum- 4x3x0.1cm 2x2 cm area of thick yellow slough 100%-partial thickness runs along left side linear in nature- 4x0.4xo.1cm - pink, no drainage noted on either wound. No odor. Surrounding tissue dry chafed, blanching pink. unstageable PI  POA Buttocks- dry chafed, raised red rash,possible yeast, blanching pink/red. painful, patient wears briefs at [facility] Right forearm- skin tear- 3x0.3x0.2cm- 80% flap intact- pink moist 20% no drainage noted  Treatment:  Buttocks- petroleum jell applied with zinc guard. Right FA- mepitel1 applied over skin tear and covered with patient's arm sleeve. Sacrum- cleansed with NSS- hydrofera ready placed over slough and mepital foam dressing applied. Wound Recommendations:  Right forearm- cleanse with NSS- cover with mepitel1, if needed may use foam dressing if drains. change every 3 days  Sacrum-cleanse with NSS-apply medi-honey gel to wound bed and cover sacral foam dressing. Change every other day  Buttocks- cleanse as needed with blue wipes and apply aloe vesta cream or zinc guard if moist.  Nystatin powder to buttock rash  Skin Care / PI Prevention Recommendations:  1. Minimize friction/shear: minimize layers of linen/pads under patient.   2. Off load pressure/reposition:  turn and reposition approximately every 2 hours; float heels with pillows or use off loading heel boots; waffle cushion for sitting; position wedge. 3. Manage Moisture - keep skin folds dry; incontinence skin care with incontinence wipes; aloe vesta/zinc guard barrier ointment; appropriate sized briefs ; sánchez in use to help contain urine. 4. Continue to monitor nutrition, pain, and skin risk scale, and skin assessment. Plan:  Spoke with Dr. Miguel Louise regarding findings and proposed orders for treatment. We will continue to reassess and as needed. Please re-consult should concerns arise despite continued skin/PI prevention measures.     [facility], Wound / [address] [facility] Office [phone]

## 2021-09-17 NOTE — PROCEDURES
Name:   Ej Kelley  Procedure:   EEG     Location:   [facility]    Date of Recordin21    Date of Interpretation:    21    Interpreting Physician: Jason Rey MD     Indication:  67 y.o. male with Parkinson's, Dementia, recent Urosepsis. Admitted for increased lethargy x 2 days. No hx of seizure disorder. Current medications:   No current facility-administered medications on file prior to encounter. Current Outpatient Medications on File Prior to Encounter   Medication Sig    albuterol-ipratropium (DUO-NEB) 2.5 mg-0.5 mg/3 ml nebu 3 mL by Nebulization route every six (6) hours as needed for Wheezing.  carvediloL (COREG) 3.125 mg tablet Take 3.125 mg by mouth two (2) times daily (with meals).  fluticasone propion-salmeteroL (Wixela Inhub) 100-50 mcg/dose diskus inhaler Take 1 Puff by inhalation every twelve (12) hours.  insulin glargine (Lantus U-100 Insulin) 100 unit/mL injection 25 Units by SubCUTAneous route nightly.  insulin lispro (HumaLOG U-100 Insulin) 100 unit/mL injection by SubCUTAneous route. Use as directed. CORRECTIONAL SCALE only For Blood Sugar (mg/dl) of :   180-200 =2 units, 201-250 =4 units, 251-300 =6 units, 301-349=8 units 351-400 = 10 units; 401 or greater = Call MD. Give in addition to basal medications. Do Not Hold for NPO BEDTIME CORRECTIONAL sliding scale when scheduled: 200-249=2 units, 250-299=4 units, 300-349=5 units, 350 or greater = Call MD Give in addition to basal medications. Do Not Hold for NPO    QUEtiapine (SEROquel) 25 mg tablet Take 12.5 mg by mouth every evening.  traZODone (DESYREL) 50 mg tablet Take 50 mg by mouth nightly.  nystatin (MYCOSTATIN) powder Apply  to affected area two (2) times a day.  multivitamin, tx-iron-ca-min (THERA-M w/ IRON) 9 mg iron-400 mcg tab tablet Take 1 Tablet by mouth daily.     Aluminum Hydrox-Magnesium Carb (Gaviscon Extra Strength) 254-237.5 mg/5 mL susp Take 30 mL by mouth every four (4) hours as needed.  predniSONE (DELTASONE) 10 mg tablet Take 10 mg by mouth daily (with breakfast).  potassium chloride SR (KLOR-CON 10) 10 mEq tablet Take 20 mEq by mouth daily.  scopolamine (TRANSDERM-SCOP) 1 mg over 3 days pt3d 1 Patch by TransDERmal route every seventy-two (72) hours.  honey (MEDIHONEY) 100 % topical paste Apply  to affected area daily.  oxyCODONE IR (ROXICODONE) 5 mg immediate release tablet Take 5 mg by mouth every twelve (12) hours.  diclofenac (Voltaren) 1 % gel Apply 4 g to affected area three (3) times daily as needed for Pain. Apply to knees    guaiFENesin-dextromethorphan (Adult Robitussin Peak Cold DM)  mg/5 mL liqd Take 10 mL by mouth every six (6) hours as needed for Cough or Congestion.  acetaminophen (TYLENOL) 325 mg tablet Take 2 Tablets by mouth every six (6) hours as needed for Pain or Fever.  pramipexole (MIRAPEX) 0.25 mg tablet Take 0.125 mg by mouth nightly.  donepeziL (Aricept) 10 mg tablet Take 10 mg by mouth nightly.  finasteride (PROSCAR) 5 mg tablet Take 5 mg by mouth daily.  cetirizine (ZYRTEC) 10 mg tablet Take 10 mg by mouth daily.  melatonin 3 mg tablet Take 6 mg by mouth nightly.  tamsulosin (FLOMAX) 0.4 mg capsule Take 0.4 mg by mouth daily.  carbidopa-levodopa (Sinemet)  mg per tablet Take 2 Tablets by mouth four (4) times daily. Takes at 0900/1300/1700/2100  Indications: parkinsonism due to degeneration in the brain    gabapentin (NEURONTIN) 600 mg tablet Take 600 mg by mouth three (3) times daily.  omeprazole (PRILOSEC) 40 mg capsule Take 40 mg by mouth daily.  atorvastatin (LIPITOR) 20 mg tablet Take 20 mg by mouth daily.  cholecalciferol (Vitamin D3) (1000 Units /25 mcg) tablet Take 2,000 Units by mouth daily.  entacapone (COMTAN) 200 mg tablet Take 200 mg by mouth Before breakfast, lunch, dinner and at bedtime.      Technical: Digital EEG recorded in 10-20 international placement system, multiple montages    Interpretation:  Patient is described as lethargic, sleeping, snoring at onset of this recording. The background pattern is low amplitude, PDR 3-4 Hz on left and 4-5 Hz on right. Photic stimulation did not result in a driving response. HV was not performed. Photic stimulation and Hyperventilation were not performed. There were no EEG findings suggestive of sleep, though the current pattern is consistent with drowsiness. Single lead EKG showed tachycardia. There were tiny, intermittent sharp waves focused at at T3 (left anterior-frontal)  No focal areas of slowing were seen  There were no electrographic seizures    There were no epileptiform discharges, focal areas of slowing, or electrographic seizures during this recording. Impression:  Drowsy EEG recording with mild slowing in left hemisphere compared to right and tiny, intermittent sharp waves in the left anterior-temporal region (T3). No electrographic seizures were recorded. Clinical and imaging correlation necessary.

## 2021-09-17 NOTE — PROGRESS NOTES
Vinicius Pruittelsen [facility] [place] [place] [address]  [address] [place] [address], [address] [place] [address]  [phone]      Medical Progress Note      NAME: Leonetta Dance   :  1945  MRM:  [id number]    Date of service: 2021  7:21 AM       Assessment and Plan:   1. Acute Metabolic Encephalopathy: At baseline, he is not very mobile but is conversant (though poor short term memory). Low c/f infection w/o fever, leukocytosis (nl UA, CXR). Most likely due to polypharmacy (on gabapentin, trazodone, norco, scopolamine patch etc.). check  MRI brain to R/O CVA. Supportive Care. MRI of the brain is without acute finding. Consult neurology given underlying parkinson's disease. More awake this morning       2. Parkinson's Disease: With dementia. On sinemet 25-100mg, 2 tabs qid; entacapone 200mg, donepezil, pramipexole. PT/OT. Neuro consult due to  AMS +/- PD mgmt. Wife very much would like to meet with palliative     3. DM2: Home glar 25u + SSI, will continue here     4. Chronic Indwelling Greer: Replaced in ER, no UTI noted. On finasteride and tamsulosin     5.  Spinal Stenosis/ Neuropathy: He is on gabapentin 600mg TID and I presume for this reason. Also on oxy 5 q12h scheduled. would d/c these altogether     6. PMR: Had been on Pred 60 and weaned all the way down to pred 10mg. On IV steroid until he eats. 7.  HTN: Carvedilol     8. GERD: PPI when PO     9. COPD: No exacerbation     10. Sinus tachycardia. Resume coreg. 11.  Pyuria(POA). Start CTx and check UC       Subjective:     Chief Complaint[de-identified] Patient was seen and examined as a follow up for metabolic encephalopathy. Chart was reviewed. more awake this morning    ROS:  (bold if positive, if negative)    Tolerating PT  Tolerating Diet        Objective:     Last 24hrs VS reviewed since prior progress note.  Most recent are:    Visit Vitals  /69   Pulse (!) 121   Temp 98.4 °F (36.9 °C)   Resp 16   Ht 5' 3\" (1.6 m)   Wt 83.9 kg (185 lb)   SpO2 90%   BMI 32.77 kg/m²     SpO2 Readings from Last 6 Encounters:   09/17/21 90%   06/17/21 96%   07/02/20 97%   06/24/20 93%            Intake/Output Summary (Last 24 hours) at 9/17/2021 4829  Last data filed at 9/17/2021 2898  Gross per 24 hour   Intake    Output 1200 ml   Net -1200 ml        Physical Exam:    Gen:  Well-developed, well-nourished, in no acute distress  HEENT:  Pink conjunctivae, PERRL, hearing intact to voice, moist mucous membranes  Neck:  Supple, without masses, thyroid non-tender  Resp:  No accessory muscle use, clear breath sounds without wheezes rales or rhonchi  Card:  No murmurs, normal S1, S2 without thrills, bruits or peripheral edema  Abd:  Soft, non-tender, non-distended, normoactive bowel sounds are present, no palpable organomegaly and no detectable hernias  Lymph:  No cervical or inguinal adenopathy  Musc:  No cyanosis or clubbing  Skin:  No rashes or ulcers, skin turgor is good  Neuro:  Cranial nerves are grossly intact, no focal motor weakness, follows commands appropriately  Psych:  poor insight   __________________________________________________________________  Medications Reviewed: (see below)  Medications:     Current Facility-Administered Medications   Medication Dose Route Frequency    sodium chloride (NS) flush 5-10 mL  5-10 mL IntraVENous PRN    sodium chloride (NS) flush 5-40 mL  5-40 mL IntraVENous Q8H    sodium chloride (NS) flush 5-40 mL  5-40 mL IntraVENous PRN    acetaminophen (TYLENOL) tablet 650 mg  650 mg Oral Q6H PRN    Or    acetaminophen (TYLENOL) suppository 650 mg  650 mg Rectal Q6H PRN    polyethylene glycol (MIRALAX) packet 17 g  17 g Oral DAILY PRN    ondansetron (ZOFRAN ODT) tablet 4 mg  4 mg Oral Q8H PRN    Or    ondansetron (ZOFRAN) injection 4 mg  4 mg IntraVENous Q6H PRN    enoxaparin (LOVENOX) injection 40 mg  40 mg SubCUTAneous DAILY     Current Outpatient Medications   Medication Sig    albuterol-ipratropium (DUO-NEB) 2.5 mg-0.5 mg/3 ml nebu 3 mL by Nebulization route every six (6) hours as needed for Wheezing.  carvediloL (COREG) 3.125 mg tablet Take 3.125 mg by mouth two (2) times daily (with meals).  fluticasone propion-salmeteroL (Wixela Inhub) 100-50 mcg/dose diskus inhaler Take 1 Puff by inhalation every twelve (12) hours.  insulin glargine (Lantus U-100 Insulin) 100 unit/mL injection 25 Units by SubCUTAneous route nightly.  insulin lispro (HumaLOG U-100 Insulin) 100 unit/mL injection by SubCUTAneous route. Use as directed. CORRECTIONAL SCALE only For Blood Sugar (mg/dl) of :   180-200 =2 units, 201-250 =4 units, 251-300 =6 units, 301-349=8 units 351-400 = 10 units; 401 or greater = Call MD. Give in addition to basal medications. Do Not Hold for NPO BEDTIME CORRECTIONAL sliding scale when scheduled: 200-249=2 units, 250-299=4 units, 300-349=5 units, 350 or greater = Call MD Give in addition to basal medications. Do Not Hold for NPO    QUEtiapine (SEROquel) 25 mg tablet Take 12.5 mg by mouth every evening.  traZODone (DESYREL) 50 mg tablet Take 50 mg by mouth nightly.  nystatin (MYCOSTATIN) powder Apply  to affected area two (2) times a day.  multivitamin, tx-iron-ca-min (THERA-M w/ IRON) 9 mg iron-400 mcg tab tablet Take 1 Tablet by mouth daily.  Aluminum Hydrox-Magnesium Carb (Gaviscon Extra Strength) 254-237.5 mg/5 mL susp Take 30 mL by mouth every four (4) hours as needed.  predniSONE (DELTASONE) 10 mg tablet Take 10 mg by mouth daily (with breakfast).  potassium chloride SR (KLOR-CON 10) 10 mEq tablet Take 20 mEq by mouth daily.  scopolamine (TRANSDERM-SCOP) 1 mg over 3 days pt3d 1 Patch by TransDERmal route every seventy-two (72) hours.  honey (MEDIHONEY) 100 % topical paste Apply  to affected area daily.  oxyCODONE IR (ROXICODONE) 5 mg immediate release tablet Take 5 mg by mouth every twelve (12) hours.  diclofenac (Voltaren) 1 % gel Apply 4 g to affected area three (3) times daily as needed for Pain. Apply to knees    guaiFENesin-dextromethorphan (Adult Robitussin Peak Cold DM)  mg/5 mL liqd Take 10 mL by mouth every six (6) hours as needed for Cough or Congestion.  acetaminophen (TYLENOL) 325 mg tablet Take 2 Tablets by mouth every six (6) hours as needed for Pain or Fever.  pramipexole (MIRAPEX) 0.25 mg tablet Take 0.125 mg by mouth nightly.  donepeziL (Aricept) 10 mg tablet Take 10 mg by mouth nightly.  finasteride (PROSCAR) 5 mg tablet Take 5 mg by mouth daily.  cetirizine (ZYRTEC) 10 mg tablet Take 10 mg by mouth daily.  melatonin 3 mg tablet Take 6 mg by mouth nightly.  tamsulosin (FLOMAX) 0.4 mg capsule Take 0.4 mg by mouth daily.  carbidopa-levodopa (Sinemet)  mg per tablet Take 2 Tablets by mouth four (4) times daily. Takes at 0900/1300/1700/2100  Indications: parkinsonism due to degeneration in the brain    gabapentin (NEURONTIN) 600 mg tablet Take 600 mg by mouth three (3) times daily.  omeprazole (PRILOSEC) 40 mg capsule Take 40 mg by mouth daily.  atorvastatin (LIPITOR) 20 mg tablet Take 20 mg by mouth daily.  cholecalciferol (Vitamin D3) (1000 Units /25 mcg) tablet Take 2,000 Units by mouth daily.  entacapone (COMTAN) 200 mg tablet Take 200 mg by mouth Before breakfast, lunch, dinner and at bedtime.         Lab Data Reviewed: (see below)  Lab Review:     Recent Labs     09/17/21  0351 09/16/21  1414   WBC 11.3* 8.4   HGB 11.8* 12.1   HCT 39.5 40.0    240     Recent Labs     09/17/21  0351 09/16/21  1414    140   K 4.0 4.0   * 108   CO2 25 28   * 124*   BUN 8 8   CREA 0.66* 0.64*   CA 9.1 8.8   MG  --  1.9   ALB 2.9* 3.1*   TBILI 1.1* 0.6   ALT 16 14     Lab Results   Component Value Date/Time    Glucose (POC) 246 (H) 06/17/2021 11:28 AM    Glucose (POC) 165 (H) 06/17/2021 07:09 AM    Glucose (POC) 323 (H) 06/16/2021 09:11 PM    Glucose (POC) 309 (H) 06/16/2021 04:36 PM    Glucose (POC) 261 (H) 06/16/2021 11:05 AM     Recent Labs     09/16/21  1643   PH 7.44   PCO2 39   PO2 122*   HCO3 26     No results for input(s): INR, INREXT in the last 72 hours. All Micro Results     Procedure Component Value Units Date/Time    CULTURE, BLOOD [[id number]] Collected: 09/16/21 1414    Order Status: Completed Specimen: Blood Updated: 09/17/21 0641     Special Requests: NO SPECIAL REQUESTS        Culture result: NO GROWTH AFTER 16 HOURS       CULTURE, BLOOD [[id number]] Collected: 09/16/21 1427    Order Status: Completed Specimen: Blood Updated: 09/17/21 0641     Special Requests: NO SPECIAL REQUESTS        Culture result: NO GROWTH AFTER 15 HOURS       CULTURE, URINE [[id number]] Collected: 09/16/21 1545    Order Status: Completed Specimen: Cath Urine Updated: 09/16/21 2325    URINE CULTURE HOLD SAMPLE [[id number]] Collected: 09/16/21 1529    Order Status: Completed Specimen: Serum Updated: 09/16/21 1552     Urine culture hold       Urine on hold in Microbiology dept for 2 days. If unpreserved urine is submitted, it cannot be used for addtional testing after 24 hours, recollection will be required. CULTURE, URINE [[id number]]     Order Status: Canceled Specimen: Cath Urine           I have reviewed notes of prior 24hr. Other pertinent lab: Total time spent with patient: 28 I personally reviewed chart, notes, data and current medications in the medical record. I have personally examined and treated the patient at bedside during this period.                  Care Plan discussed with: Patient, Family, Nursing Staff and >50% of time spent in counseling and coordination of care    Discussed:  Care Plan    Prophylaxis:  Lovenox    Disposition:  Home w/Family           ___________________________________________________    Attending Physician: Corwin Lock MD

## 2021-09-17 NOTE — PROGRESS NOTES
More awake, alert this morning, and asking for breakfast, his wife by name and his clothes to go home. Cooperative with care. Passed bedside dysphagia screen.

## 2021-09-17 NOTE — PROGRESS NOTES
Problem: Mobility Impaired (Adult and Pediatric)  Goal: *Acute Goals and Plan of Care (Insert Text)  Description: FUNCTIONAL STATUS PRIOR TO ADMISSION: Pt transferred from [facility] and transferred with mar lift. Wife present and stating that pt has not walked since May 2021with multiple hospitalizations and rehab attempts. HOME SUPPORT PRIOR TO ADMISSION: The patient lived at [facility]. Has wife and children  Physical Therapy Goals  Initiated 9/17/2021  1. Patient will move from supine to sit and sit to supine  in bed with moderate assistance x2 within 7 day(s). 2.  Patient will transfer from bed to chair and chair to bed with moderate assistancex2  using the least restrictive device within 7 day(s). 3.  Patient will perform sit to stand with moderate assistancex2  within 7 day(s). 4.  Patient will ambulate with maximal assistance x2 for 5 feet with the least restrictive device within 7 day(s). Outcome: Progressing Towards Goal   PHYSICAL THERAPY EVALUATION  Patient: Julio Beckman (28 y.o. male)  Date: 9/17/2021  Primary Diagnosis: Acute metabolic encephalopathy [G66.08]        Precautions:   Fall    ASSESSMENT  Based on the objective data described below, the patient presents with admission due to metabolic enceph from [facility]. This pt's hospital course from 6/21 [facility] to [facility] to [address] [place] [address] and now [facility]. His pmhx includes Parkinson's Dz., DM and COPD. He is a retired [employer] and well loved per his wife. She states that he has not walked since May of this year 2021 and uses and mar lift for transfers. This pt received supine in ER bed and unarousable at this time. He opens eyes to voices, yet is unable to keep open or stay awake. ER RN stating pt was awake, oriented and conversing this morning. This pt is Total A x2  or more at this time. PT eval to continue next tx day with hopes of more functional window of opportunity.   Wife stating return to  SNF expected. Current Level of Function Impacting Discharge (mobility/balance): Total x2-3/poor    Functional Outcome Measure: The patient scored 0/100 on the barthel outcome measure which is indicative of 100% functional impairment. Other factors to consider for discharge: per above     Patient will benefit from skilled therapy intervention to address the above noted impairments. PLAN :  Recommendations and Planned Interventions: bed mobility training, transfer training, gait training, therapeutic exercises, neuromuscular re-education, edema management/control, patient and family training/education, and therapeutic activities      Frequency/Duration: Patient will be followed by physical therapy:  3 times a week to address goals. Recommendation for discharge: (in order for the patient to meet his/her long term goals)  Therapy up to 5 days/week in SNF setting    This discharge recommendation:  Has not yet been discussed the attending provider and/or case management    IF patient discharges home will need the following DME: has RW and rollator         SUBJECTIVE:   Patient stated nothing.     OBJECTIVE DATA SUMMARY:   HISTORY:    Per above    Personal factors and/or comorbidities impacting plan of care: per above and below    [address]: [address] [place] [address] Name: [facility] ([facility])  One/Two Story Residence: One story  Support Systems: Spouse/Significant Other  Current DME Used/Available at Home: Walker, rollator, Walker, rolling    EXAMINATION/PRESENTATION/DECISION MAKING:   Critical Behavior:  Neurologic State: Drowsy  Orientation Level: Oriented to person, Oriented to place, Oriented to time, Disoriented to situation  Cognition: Follows commands     Hearing:     Skin:  IV; indwelling sánchez  Edema: [facility]  Range Of Motion: To be assessed                    Strength:         To be assessed                 Tone & Sensation: To be assessed                           Coordination:  to be assessed     Functional Mobility:  Bed Mobility:  Rolling: Total assistance;Assist x2           Transfers:   Was mar PTA                          Balance:    poor  Ambulation/Gait Training:      Non-ambulatory since May per wife                                                  Functional Measure:  Barthel Index:    Bathin  Bladder: 0  Bowels: 0  Groomin  Dressin  Feedin  Mobility: 0  Stairs: 0  Toilet Use: 0  Transfer (Bed to Chair and Back): 0  Total: 0/100       The Barthel ADL Index: Guidelines  1. The index should be used as a record of what a patient does, not as a record of what a patient could do. 2. The main aim is to establish degree of independence from any help, physical or verbal, however minor and for whatever reason. 3. The need for supervision renders the patient not independent. 4. A patient's performance should be established using the best available evidence. Asking the patient, friends/relatives and nurses are the usual sources, but direct observation and common sense are also important. However direct testing is not needed. 5. Usually the patient's performance over the preceding 24-48 hours is important, but occasionally longer periods will be relevant. 6. Middle categories imply that the patient supplies over 50 per cent of the effort. 7. Use of aids to be independent is allowed. Jesica Lobo., Barthel, D.W. (2793). Functional evaluation: the Barthel Index. [facility] (14)2. YIFAN Rolle, Lisa Brandt., Shanta Morgan., [place], [address] [place] [address] (). Measuring the change indisability after inpatient rehabilitation; comparison of the responsiveness of the Barthel Index and Functional [place] Measure. Journal of Neurology, Neurosurgery, and Psychiatry, 66(4), 272-678.   Ned Mojica, N.J.A, LIDIA Jurado, Courtney Martinez, M.A. (2004.) Assessment of post-stroke quality of life in cost-effectiveness studies: The usefulness of the Barthel Index and the EuroQoL-5D. Quality of Life Research, 15, 137-21           Physical Therapy Evaluation Charge Determination   History Examination Presentation Decision-Making   HIGH Complexity :3+ comorbidities / personal factors will impact the outcome/ POC  HIGH Complexity : 4+ Standardized tests and measures addressing body structure, function, activity limitation and / or participation in recreation  HIGH Complexity : Unstable and unpredictable characteristics  Other outcome measures barthel  HIGH       Based on the above components, the patient evaluation is determined to be of the following complexity level: HIGH       Activity Tolerance:   Poor    After treatment patient left in no apparent distress:   Supine in bed, Call bell within reach, Caregiver / family present, and Side rails x 3    COMMUNICATION/EDUCATION:   The patients plan of care was discussed with: Occupational therapist and Registered nurse. Fall prevention education was provided and the patient/caregiver indicated understanding., Patient/family have participated as able in goal setting and plan of care. , and Patient/family agree to work toward stated goals and plan of care.     Thank you for this referral.  Zina Rodriguez, PT   Time Calculation: 16 mins

## 2021-09-17 NOTE — PROGRESS NOTES
Problem: Self Care Deficits Care Plan (Adult)  Goal: *Acute Goals and Plan of Care (Insert Text)  Description: FUNCTIONAL STATUS PRIOR TO ADMISSION: Pt transferred from [facility] and transferred with mar lift. Wife present and stating that pt has not walked since May 2021with multiple hospitalizations and rehab attempts. HOME SUPPORT PRIOR TO ADMISSION: The patient lived at [facility]. Has wife and children    Occupational Therapy Goals  Initiated 9/17/2021  1. Patient will perform self-feeding with supervision/set-up within 7 day(s). 2.  Patient will perform grooming with minimal assistance/contact guard assist within 7 day(s). 3.  Patient will perform rolling left and right in bed with moderate assistance for placement of bed pan for toileting within 7 day(s). 4.  Patient will participate in upper extremity therapeutic exercise/activities with minimal assistance/contact guard assist for 5 minutes within 7 day(s). Outcome: Progressing Towards Goal   OCCUPATIONAL THERAPY EVALUATION  Patient: Bryce Rivera (12 y.o. male)  Date: 9/17/2021  Primary Diagnosis: Acute metabolic encephalopathy [R59.57]        Precautions:   Fall     ASSESSMENT  Based on the objective data described below, the patient presents with hospital admission secondary to metabolic encephalopathy. Patient wife present in room and provides good history for patient. Patient with multiple hospitalizations and rehab stays over past months. Patient has been using mar lift for transfers for ~4 months in rehab settings. Patient is very drowsy during OT evaluation, offering little participation. He is able to open eyes to voice but with difficulty maintaining arousal.  Patient currently total assist for all ADLs and transfers. RN reporting patient more alert and awake earlier today.     Current Level of Function Impacting Discharge (ADLs/self-care): total assist for all activity today, wife report able to perform UE tasks    Functional Outcome Measure: The patient scored 0/100 on the Barthel Index outcome measure. Other factors to consider for discharge:      Patient will benefit from skilled therapy intervention to address the above noted impairments. PLAN :  Recommendations and Planned Interventions: self care training, functional mobility training, therapeutic exercise, balance training, therapeutic activities, endurance activities, patient education, home safety training, and family training/education    Frequency/Duration: Patient will be followed by occupational therapy 3 times a week to address goals. Recommendation for discharge: (in order for the patient to meet his/her long term goals)  Therapy up to 5 days/week in SNF setting    This discharge recommendation:  Has not yet been discussed the attending provider and/or case management    IF patient discharges home will need the following DME: TBD       SUBJECTIVE:   Patient stated  .    OBJECTIVE DATA SUMMARY:   HISTORY:   No past medical history on file. No past surgical history on file.     Expanded or extensive additional review of patient history:     Home Situation  Home Environment: [address] [place] [address] Name: Jane Skinner ([facility] Skinner [facility])  One/Two Story Residence: One story  Support Systems: Spouse/Significant Other  Current DME Used/Available at Home: Walker, rollator, Walker, rolling    Hand dominance: Right    EXAMINATION OF PERFORMANCE DEFICITS:  Cognitive/Behavioral Status:  Neurologic State: Drowsy  Orientation Level: Oriented to person;Oriented to place;Oriented to time;Disoriented to situation  Cognition: Follows commands             Skin: intact as seen    Edema: none noted     Hearing:       Vision/Perceptual:                                         Strength:  Strength: Grossly decreased, non-functional             Balance:       Functional Mobility and Transfers for ADLs:  Bed Mobility:  Rolling: Total assistance;Assist x2    Transfers:       ADL Assessment:  Feeding: Total assistance    Oral Facial Hygiene/Grooming: Total assistance    Bathing: Total assistance    Upper Body Dressing: Total assistance    Lower Body Dressing: Total assistance    Toileting: Total assistance                ADL Intervention and task modifications:                                          Therapeutic Exercise:     Functional Measure:  Barthel Index:    Bathin  Bladder: 0  Bowels: 0  Groomin  Dressin  Feedin  Mobility: 0  Stairs: 0  Toilet Use: 0  Transfer (Bed to Chair and Back): 0  Total: 0/100        The Barthel ADL Index: Guidelines  1. The index should be used as a record of what a patient does, not as a record of what a patient could do. 2. The main aim is to establish degree of independence from any help, physical or verbal, however minor and for whatever reason. 3. The need for supervision renders the patient not independent. 4. A patient's performance should be established using the best available evidence. Asking the patient, friends/relatives and nurses are the usual sources, but direct observation and common sense are also important. However direct testing is not needed. 5. Usually the patient's performance over the preceding 24-48 hours is important, but occasionally longer periods will be relevant. 6. Middle categories imply that the patient supplies over 50 per cent of the effort. 7. Use of aids to be independent is allowed. Natividad Cherry., Barthel, D.W. (1478). Functional evaluation: the Barthel Index. [facility] (14)2. MUKUND ManleyF, Neymar Padron., Slim Boas., [place], [address] [place] [address] (). Measuring the change indisability after inpatient rehabilitation; comparison of the responsiveness of the Barthel Index and Functional [place] Measure. Journal of Neurology, Neurosurgery, and Psychiatry, 66(4), 405-265. Alison Lawson, N.J.A, LIDIA Jurado, Breana Brandon M.A. (2004.) Assessment of post-stroke quality of life in cost-effectiveness studies: The usefulness of the Barthel Index and the EuroQoL-5D. Quality of Life Research, 15, 152-74         Occupational Therapy Evaluation Charge Determination   History Examination Decision-Making   LOW Complexity : Brief history review  LOW Complexity : 1-3 performance deficits relating to physical, cognitive , or psychosocial skils that result in activity limitations and / or participation restrictions  MEDIUM Complexity : Patient may present with comorbidities that affect occupational performnce. Miniml to moderate modification of tasks or assistance (eg, physical or verbal ) with assesment(s) is necessary to enable patient to complete evaluation       Based on the above components, the patient evaluation is determined to be of the following complexity level: MEDIUM  Pain Rating:  None reported     Activity Tolerance:   Poor    After treatment patient left in no apparent distress:    Supine in bed, Call bell within reach, and Caregiver / family present    COMMUNICATION/EDUCATION:   The patients plan of care was discussed with: Physical therapist and Registered nurse. Home safety education was provided and the patient/caregiver indicated understanding., Patient/family have participated as able in goal setting and plan of care. , and Patient/family agree to work toward stated goals and plan of care. This patients plan of care is appropriate for delegation to [facility].     Thank you for this referral.  Manfred Sung, OTR/L  Time Calculation: 16 mins

## 2021-09-18 NOTE — PROGRESS NOTES
[facility] and [address] at [facility] at [address] [facility], [address] [place] [address] [address] [facility], [address] [place] [address]   [phone]              Chief Complaint   Patient presents with    Altered mental status     per assisted living pt was very difficult to wake up and arouse. Pt doesn't remember any ot this from the morning.   Pt is being treated for  UTI     Current Facility-Administered Medications   Medication Dose Route Frequency Provider Last Rate Last Admin    fluconazole (DIFLUCAN) 400mg/200 mL IVPB (premix)  400 mg IntraVENous Tru Hampton MD        [START ON 9/19/2021] fluconazole (DIFLUCAN) tablet 200 mg  200 mg Oral DAILY Rashel Nunez MD        predniSONE (DELTASONE) tablet 10 mg  10 mg Oral DAILY WITH Erla Cameron Ordoñez MD   10 mg at 09/18/21 0855    atorvastatin (LIPITOR) tablet 20 mg  20 mg Oral DAILY Gisele Hopper MD   20 mg at 09/18/21 0855    carbidopa-levodopa (SINEMET)  mg per tablet 2 Tablet  2 Tablet Oral QID Gisele Hopper MD   2 Tablet at 09/18/21 0855    carvediloL (COREG) tablet 3.125 mg  3.125 mg Oral BID WITH MEALS Rashel Nunez MD   3.125 mg at 09/18/21 0855    [Held by provider] donepeziL (ARICEPT) tablet 10 mg  10 mg Oral QHS Gisele Hopper MD   10 mg at 09/17/21 2226    entacapone (COMTAN) tablet 200 mg  200 mg Oral AC&HS Gisele Hopper MD   200 mg at 09/18/21 0652    finasteride (PROSCAR) tablet 5 mg  5 mg Oral DAILY Cameron Terry MD   5 mg at 09/18/21 0855    pantoprazole (PROTONIX) tablet 40 mg  40 mg Oral ACB Gisele Hopper MD   40 mg at 09/18/21 0652    QUEtiapine (SEROquel) tablet 12.5 mg  12.5 mg Oral QPM Gisele Hopper MD   12.5 mg at 09/17/21 1848    pramipexole (MIRAPEX) tablet 0.125 mg  0.125 mg Oral QHS Cameron Terry MD   0.125 mg at 09/17/21 2226    tamsulosin (FLOMAX) capsule 0.4 mg  0.4 mg Oral DAILY Mara Jennifer Velasquez MD   0.4 mg at 09/18/21 0854    insulin lispro (HUMALOG) injection   SubCUTAneous AC&HS Tano Villarreal MD   2 Units at 09/18/21 0730    glucose chewable tablet 16 g  4 Tablet Oral PRN Cameron Terry MD        dextrose (D50W) injection syrg 12.5-25 g  12.5-25 g IntraVENous PRN Cameron Terry MD        glucagon (GLUCAGEN) injection 1 mg  1 mg IntraMUSCular PRN Cameron Terry MD        miconazole (MICOTIN) 2 % powder   Topical BID Tano Villarreal MD   Given at 09/18/21 0900    sodium chloride (NS) flush 5-10 mL  5-10 mL IntraVENous PRN Denise Bowie MD   10 mL at 09/16/21 1436    sodium chloride (NS) flush 5-40 mL  5-40 mL IntraVENous Q8H Larry Nails MD   10 mL at 09/18/21 6212    sodium chloride (NS) flush 5-40 mL  5-40 mL IntraVENous PRN Larry Nails MD        acetaminophen (TYLENOL) tablet 650 mg  650 mg Oral Q6H PRN Larry Nails MD        Or   Evaristo Ramos acetaminophen (TYLENOL) suppository 650 mg  650 mg Rectal Q6H PRN Larry Nails MD        polyethylene glycol (MIRALAX) packet 17 g  17 g Oral DAILY PRN Larry Nails MD        ondansetron (ZOFRAN ODT) tablet 4 mg  4 mg Oral Q8H PRN Larry Nails MD        Or    ondansetron [facility]) injection 4 mg  4 mg IntraVENous Q6H PRN Larry Nails MD        enoxaparin (LOVENOX) injection 40 mg  40 mg SubCUTAneous DAILY Larry Nails MD   40 mg at 09/18/21 0854      No Known Allergies  Social History     Tobacco Use    Smoking status: Not on file   Substance Use Topics    Alcohol use: Not on file    Drug use: Not on file     79-year-old gentleman with Parkinson's disease and Parkinson's related dementia admitted with urinary tract infection and resultant acute encephalopathy. Case discussed with Dr. Melissa Esposito and chart reviewed. I reviewed his EEG. EEG is diffusely slow. No epileptiform.   Antibiotics have been adjusted for Enterococcus and Candida  Seroquel has been continued  His Aricept was being held secondary to him being on fluconazole  Presently he is on Sinemet 2 tablets 4 times daily as well as Comtan 4 times daily with his Sinemet  His wife Lia Denise is at the bedside today. She notes that he has had improvement since about 2:00 yesterday. He has been much more alert and awake. He says that he feels better this morning    Examination  Visit Vitals  BP (!) 145/85 (BP 1 Location: Right upper arm, BP Patient Position: At rest;Lying)   Pulse 96   Temp 97.8 °F (36.6 °C)   Resp 16   Ht 5' 3\" (1.6 m)   Wt 81.4 kg (179 lb 6.4 oz)   SpO2 97%   BMI 31.78 kg/m²     . He appears comfortable. He is lying in bed. He is awake and alert. He knows his wife. He tells me we are in the [facility] neurology clinic as he reads that from my scrub shirt. He does not know the month or the year. Follows simple commands. No tremor or cogwheeling this morning. No pronation or drift    Impression/Plan  Parkinson's disease and Parkinson's related dementia  Acute encephalopathy secondary to UTI  Maintain current doses of Sinemet and Comtan  Continue the good underlying support and treatment of his medical issues  We will return here as needed  Follow-up with Dr. Ching Collins, his primary neurologist, after discharge    Michael Acuna MD        This note was created using voice recognition software. Despite editing, there may be syntax errors.

## 2021-09-18 NOTE — PROGRESS NOTES
Progress Note      Pt Name  Leonetta Dance   Date of Birth 1945   Medical Record Number  [id number]      Age  67 y.o. PCP Effie Hilliard MD   Admit date:  9/16/2021    Room Number  406/01  @ [facility]   Date of Service  9/18/2021     Admission Diagnoses:  Acute metabolic encephalopathy      Admission Summary:  \" Casi Shepherd is a 67 y.o. M hx DM, HTN, Parkinson's Disease w/ dementia who presents from [facility] with obtundation. Pt was in his normal state of health (baseline) yesterday, but today has been difficult to arouse, so presented via EMS to ER. Wife is present in room and provides hx of last several months. Pt had prolonged hospitalization in [place] and was then transferred to [facility] for FUO and profound weakness, leading to immobility. No clear cause elucidated, but spinal stenosis noted. He discharged 6/17 to [facility], and then transferred to [address] [place] [address], but then was admitted to [facility] in July with urosepsis (has chronic indwelling sánchez). He then discharged to  as noted above.      Wife notes recent med change, Trazodone now scheduled from PRN.    In ER, he is obtunded, snoring, but protecting airway. CBC and CMP are unremarkable, as is a stat ABG. He is hypoxemic during apneic episodes (known ERYN).   \"     Assessment and plan:     Acute Metabolic Encephalopathy:   Complicated UTI (42P colonies of Enterococcus + Candida )   Chronic indwelling sánchez catheter     Started on Rocephin and we will stop  that   Start Vanc per pharmacy protocol and then possibly switch to PO ampicillin at the time of discharge      Start Fluconazole IV (due to poor PO intake)   Continue Seroquel 12.5 mg QPM (this is the lower dose and we will monitor QT with repeat EKG tomorrow )  I have placed a hold on Aricept which also interacts with Fluconazole     Parkinson's Disease  Dementia   Continue Sinemet 25-100mg TWO tabs qid  Entacapone 200mg   Donepezil   Pramipexole.    PT/OT evaluation Continue supportive care, aspiration precautions        DM2:   Continue Glar 25u   SSI      Chronic Indwelling Greer   Replaced in the ER   no UTI noted. Continue Finasteride + Tamsulosin     Spinal Stenosis/ Neuropathy:   Continue Gabapentin 600mg TID   Continue Oxycodone 5mg  q12h scheduled.      PMR:   Had been on Prednisone 60mg, weaned all the way down to 10mg daily   Rxed with IV Steroid now switched back to home dose of 10mg Prednisone        HTN:   Continue Carvedilol     GERD:   Continue PPI       COPD: Not in exacerbation     Hyperlipidemia   Continue statin as was PTA            CODE STATUS   DNR     Functional Status  Pt has been progressively declining with repeated hospitalization at least for the last four months   He has not walked in last four months.      Surrogate decision maker:  Pt's wife Bry Sol      Prophylaxis   Lovenox   Discharge Plan:  SNF/LTC,   We will ask Hospice to meet with pt's wife for informational session -   I do appreciate help from [address] [place] [address] Luis Felipe VEGA    Misc   Benefit:  Payor: Ozzie Osman / Plan: [place] MEDICARE PART A & B / Product Type: Medicare /    Isolation :  There are currently no Active Isolations   ADT status:  INPATIENT      Query   None noted today    Prognosis   Fair    Social issues  Date  Comment     9/18/21  11:27 AM I met with pt's wife in the room   We talked about clinical issues in simple language and answered all question                  Subjective Data     \"I am fine \"  Review of Systems - History obtained from spouse and the patient  Respiratory ROS: no cough, shortness of breath, or wheezing  Cardiovascular ROS: no chest pain or dyspnea on exertion    Objective Data       Comments  Pleasant gentleman lying in bed in no distress   His wife walked in few minutes after I entered the room      Patient Vitals for the past 24 hrs:   BP   09/18/21 0755 (!) 145/85   09/18/21 0423 124/75   09/17/21 2253 119/62   09/17/21 2007 109/78   09/17/21 1811 107/66   09/17/21  120/74   09/17/21 1600 102/68   09/17/21 1400 115/73   09/17/21 1200 126/79      Patient Vitals for the past 24 hrs:   Pulse   09/18/21 0755 96   09/18/21 0423 95   09/17/21 2253 90   09/17/21 2007 97   09/17/21 1811 (!) 101   09/17/21 1746 100   09/17/21 1600 (!) 105   09/17/21 1400 (!) 108   09/17/21 1200 (!) 109   09/17/21 1100 (!) 109      Patient Vitals for the past 24 hrs:   Resp   09/18/21 0755 16   09/18/21 0423 16   09/17/21 2253 16   09/17/21 2007 16   09/17/21 1811 16   09/17/21 1746 12   09/17/21 1600 16   09/17/21 1400 16   09/17/21 1200 15   09/17/21 1100 16      Patient Vitals for the past 24 hrs:   Temp   09/18/21 0755 97.8 °F (36.6 °C)   09/18/21 0423 97.5 °F (36.4 °C)   09/17/21 2253 97.4 °F (36.3 °C)   09/17/21 2007 97.6 °F (36.4 °C)   09/17/21 1811 97.8 °F (36.6 °C)   09/17/21 1746 98 °F (36.7 °C)        SpO2 Readings from Last 6 Encounters:   09/18/21 97%   06/17/21 96%   07/02/20 97%   06/24/20 93%          O2 Device: None (Room air) Body mass index is 31.78 kg/m². -  Wt Readings from Last 10 Encounters:   09/17/21 81.4 kg (179 lb 6.4 oz)   06/17/21 89.7 kg (197 lb 12.8 oz)   09/22/20 113.9 kg (251 lb)   07/02/20 115.3 kg (254 lb 3.1 oz)   06/24/20 106.6 kg (235 lb)        Intake/Output Summary (Last 24 hours) at 9/18/2021 1036  Last data filed at 9/18/2021 0423  Gross per 24 hour   Intake 10 ml   Output 300 ml   Net -290 ml         Physical Exam:             General:  Alert, cooperative,   well noursished,   well developed,   appears stated age    Ears/Eyes:  Hearing intact  Sclera anicteric.    Pupils equal   Mouth/Throat:  Mucous membranes normal pink and moist     Neck:     Lungs:  Chest excursion symmetrical   Auscultation B/L Symmetrical with   Vesicular breath sounds          CVS:  Regular rate and rhythm   no  murmur,   No click, rub or gallop  S1 normal   S2 normal   Pedal pulses  b/l symmetrical   Abdomen:  Obese  Soft, non-tender  Bowel sounds normal  No distension   Percussion note tympanitic   Extremities:    No cyanosis, jaundice  No edema noted   No sign of DVT/cord like lesion on palpation  No sign of acute trauma .     Skin:    Skin color, texture, turgor normal. no acute rash or lesions    Lymph nodes:     Musculoskeletal Muscle bulk B/L symmetrical   Neuro Cranial nerves are intact,   Bilateral LE muscle wasting noted - possibly LMN dz from Spinal stenosis   Sensory evaluation seems intact    Psych:  Alert and oriented to self and his wife's name   normal mood & affect          Medications reviewed     Current Facility-Administered Medications   Medication Dose Route Frequency    predniSONE (DELTASONE) tablet 10 mg  10 mg Oral DAILY WITH BREAKFAST    atorvastatin (LIPITOR) tablet 20 mg  20 mg Oral DAILY    carbidopa-levodopa (SINEMET)  mg per tablet 2 Tablet  2 Tablet Oral QID    carvediloL (COREG) tablet 3.125 mg  3.125 mg Oral BID WITH MEALS    donepeziL (ARICEPT) tablet 10 mg  10 mg Oral QHS    entacapone (COMTAN) tablet 200 mg  200 mg Oral AC&HS    finasteride (PROSCAR) tablet 5 mg  5 mg Oral DAILY    pantoprazole (PROTONIX) tablet 40 mg  40 mg Oral ACB    QUEtiapine (SEROquel) tablet 12.5 mg  12.5 mg Oral QPM    pramipexole (MIRAPEX) tablet 0.125 mg  0.125 mg Oral QHS    tamsulosin (FLOMAX) capsule 0.4 mg  0.4 mg Oral DAILY    insulin lispro (HUMALOG) injection   SubCUTAneous AC&HS    glucose chewable tablet 16 g  4 Tablet Oral PRN    dextrose (D50W) injection syrg 12.5-25 g  12.5-25 g IntraVENous PRN    glucagon (GLUCAGEN) injection 1 mg  1 mg IntraMUSCular PRN    cefTRIAXone (ROCEPHIN) 1 g in sterile water (preservative free) 10 mL IV syringe  1 g IntraVENous Q24H    miconazole (MICOTIN) 2 % powder   Topical BID    sodium chloride (NS) flush 5-10 mL  5-10 mL IntraVENous PRN    sodium chloride (NS) flush 5-40 mL  5-40 mL IntraVENous Q8H    sodium chloride (NS) flush 5-40 mL  5-40 mL IntraVENous PRN    acetaminophen (TYLENOL) tablet 650 mg  650 mg Oral Q6H PRN    Or    acetaminophen (TYLENOL) suppository 650 mg  650 mg Rectal Q6H PRN    polyethylene glycol (MIRALAX) packet 17 g  17 g Oral DAILY PRN    ondansetron (ZOFRAN ODT) tablet 4 mg  4 mg Oral Q8H PRN    Or    ondansetron (ZOFRAN) injection 4 mg  4 mg IntraVENous Q6H PRN    enoxaparin (LOVENOX) injection 40 mg  40 mg SubCUTAneous DAILY       Relevant other informations: Other medical conditions listed in [facility] problem list section; all of these and other pertinent data were taken into consideration when treatment plan is developed and customized to this patient's unique overall circumstances and needs. We have reviewed available old medical records within the constraints of this admission process. Data Review:   Recent Days:  All Micro Results     Procedure Component Value Units Date/Time    CULTURE, URINE [[id number]]  (Abnormal) Collected: 09/16/21 1545    Order Status: Completed Specimen: Cath Urine Updated: 09/18/21 0640     Special Requests: NO SPECIAL REQUESTS        [place] Count --        [id number]  COLONIES/mL       Culture result:       POSSIBLE ENTEROCOCCUS SPECIES (PREDOMINATING)                  YEAST, (APPARENT CANDIDA ALBICANS)          CULTURE, BLOOD [[id number]] Collected: 09/16/21 1414    Order Status: Completed Specimen: Blood Updated: 09/18/21 0612     Special Requests: NO SPECIAL REQUESTS        Culture result: NO GROWTH 2 DAYS       CULTURE, BLOOD [[id number]] Collected: 09/16/21 1427    Order Status: Completed Specimen: Blood Updated: 09/18/21 0612     Special Requests: NO SPECIAL REQUESTS        Culture result: NO GROWTH 2 DAYS       URINE CULTURE HOLD SAMPLE [[id number]] Collected: 09/16/21 1529    Order Status: Completed Specimen: Serum Updated: 09/16/21 1552     Urine culture hold       Urine on hold in Microbiology dept for 2 days. If unpreserved urine is submitted, it cannot be used for addtional testing after 24 hours, recollection will be required. CULTURE, URINE [[id number]]     Order Status: Canceled Specimen: Cath Urine           Recent Labs     09/18/21  0532 09/17/21  0351 09/16/21  1414   WBC 6.7 11.3* 8.4   HGB 11.1* 11.8* 12.1   HCT 36.6 39.5 40.0    231 240     Recent Labs     09/18/21  0532 09/17/21  0351 09/16/21  1414    139 140   K 3.4* 4.0 4.0    109* 108   CO2 25 25 28   * 156* 124*   BUN 9 8 8   CREA 0.74 0.66* 0.64*   CA 8.5 9.1 8.8   MG  --   --  1.9   ALB  --  2.9* 3.1*   TBILI  --  1.1* 0.6   ALT  --  16 14      No results found for: TSH, TSHEXT         Care Plan discussed with:Patient/Family and    Other medical conditions are listed in the active hospital problem list section; these and other pertinent data were taken into consideration when the treatment plan was developed and customized to this patient's unique overall circumstances and needs. High complexity decision making was performed for this patient who is at high risk for decompensation with multiple organ involvement. Today total floor/unit time was 35 minutes while caring for this patient and greater than 50% of that time was spent with patient (and/or family) coordinating patients clinical issues; this includes time spent during multidisciplinary rounds.         Darvin Dominguez MD MPH FACP    9/18/2021 97

## 2021-09-18 NOTE — PROGRESS NOTES
Spiritual Care Assessment/Progress Note  [address]      NAME: Shirlene Gray      MRN: [id number]  AGE: 67 y.o. SEX: male  Hoahaoism Affiliation: Orthodox   Language: English     9/18/2021     Total Time (in minutes): 25     Spiritual Assessment begun in [facility] 4M POST SURG ORT 1 through conversation with:         [x]Patient        [x] Family    [] Friend(s)        Reason for Consult: Palliative Care, Initial/Spiritual Assessment     Spiritual beliefs: (Please include comment if needed)     [x] Identifies with a natalie tradition:  Edilia Mohs        [] Supported by a natalie community:            [] Claims no spiritual orientation:           [] Seeking spiritual identity:                [] Adheres to an individual form of spirituality:           [] Not able to assess:                           Identified resources for coping:      [] Prayer                               [] Music                  [] Guided Imagery     [x] Family/friends                 [] Pet visits     [] Devotional reading                         [] Unknown     [] Other:                                            Interventions offered during this visit: (See comments for more details)    Patient Interventions: Affirmation of emotions/emotional suffering, Affirmation of natalie, Iconic (affirming the presence of God/Higher Power), Initial/Spiritual assessment, patient floor, Prayer (assurance of)     Family/Friend(s):  Affirmation of natalie, Prayer (assurance of), Iconic (affirming the presence of God/Higher Power)     Plan of Care:     [] Support spiritual and/or cultural needs    [] Support AMD and/or advance care planning process      [] Support grieving process   [] Coordinate Rites and/or Rituals    [] Coordination with community clergy   [] No spiritual needs identified at this time   [] Detailed Plan of Care below (See Comments)  [] Make referral to Music Therapy  [] Make referral to Pet Therapy     [] Make referral to Addiction services  [] Make referral to [facility]  [] Make referral to Spiritual Care Partner  [] No future visits requested        [x] Follow up upon further referrals     Comments: Initial Palliative Care spiritual assessment in 4 Post Surg. Mr. Ara Jama, and his wife of 18 years were present during visit. 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 They have an [address] in [facility] that has help them through the years. Maria Guadalupe shared he was uncomfortable and needed to move. Assisted consulting with nurse to aid in him to be repositioned. When nurse and physical therapy came in left with assurance of prayers. Contact Spiritual Care for any further referrals.   Visited by: Hamilton Gage., MS., [address] ([id number])

## 2021-09-18 NOTE — PROGRESS NOTES
[address] [address] RX Pharmacy Progress Note: Antimicrobial Stewardship    Consult for antibiotic dosing of Vancomycin by Dr. Ghada Alarcon  Indication: Enterococcus UTI  Day of Therapy: 1    Plan:  Vancomycin therapy:   Start with loading dose of vancomycin 2000 mg IV (25 mg/kg, max 2.5 gm)   Follow with maintenance dose of vancomycin 1250 mg IV every 18 hours    Dose calculated to approximate a   Target AUC/QUIN of 400-500  Trough of 10-15 mcg/mL. Plan:  level 24-48 hrs after first maintenance dose   Pharmacy to follow daily and will make changes to dose and/or frequency based on clinical status. Other   (not dosed by pharmacist)   Fluconazole 400 mg x 1 followed by 200 mg daily   Cultures     9/16 Blood x 2: NGsf  9/16 Urine: 40K enterococcus predominating,candida albicans - pending   Serum Creatinine     Lab Results   Component Value Date/Time    Creatinine 0.74 09/18/2021 05:32 AM       Creatinine Clearance Estimated Creatinine Clearance: 80.1 mL/min (based on SCr of 0.74 mg/dL). Procalcitonin    Lab Results   Component Value Date/Time    Procalcitonin 0.18 06/25/2020 04:58 AM        Temp   97.8 °F (36.6 °C)    WBC   Lab Results   Component Value Date/Time    WBC 6.7 09/18/2021 05:32 AM       For Antifungals, Metronidazole and Nafcillin: Lab Results   Component Value Date/Time    ALT (SGPT) 16 09/17/2021 03:51 AM    AST (SGOT) 10 (L) 09/17/2021 03:51 AM    Alk.  phosphatase 92 09/17/2021 03:51 AM    Bilirubin, total 1.1 (H) 09/17/2021 03:51 AM         Pharmacist: Mckenzie Gabriel

## 2021-09-18 NOTE — PROGRESS NOTES
0423: Upon entering the room; patient had stool on BUE, the bedside table, the guard rail on the bed, the floor, on the call bell, on his sheets, and he had also scooped some into his cup. Patient did not recall what happened. Patient stated, \"I don't know where this mess came from. Can you tell me what happened? \" Reoriented patient and informed him that he had a bowel movement and that his hand has some how landed in the stool. Patient apologized and asked for a snack. Called his wife to come sit with him as his bedside table had been pushed to the bathroom door. Patient bathed and room disinfected. Bed alarm reset.

## 2021-09-18 NOTE — PROGRESS NOTES
Problem: Mobility Impaired (Adult and Pediatric)  Goal: *Acute Goals and Plan of Care (Insert Text)  Description: FUNCTIONAL STATUS PRIOR TO ADMISSION: Pt transferred from [facility] and transferred with mar lift. Wife present and stating that pt has not walked since May 2021with multiple hospitalizations and rehab attempts. HOME SUPPORT PRIOR TO ADMISSION: The patient lived at [facility]. Has wife and children  Physical Therapy Goals  Initiated 9/17/2021  1. Patient will move from supine to sit and sit to supine  in bed with moderate assistance x2 within 7 day(s). 2.  Patient will transfer from bed to chair and chair to bed with moderate assistancex2  using the least restrictive device within 7 day(s). 3.  Patient will perform sit to stand with moderate assistancex2  within 7 day(s). 4.  Patient will ambulate with maximal assistance x2 for 5 feet with the least restrictive device within 7 day(s). Continued Evaluation on 9/18/2021; New Goals:  Rolling to left and right with moderate assistance x 2. Sit on EOB with min assist x 2 for 10-minutes. Perform AAROM BLE exercises in supine with moderate assistance. Patient will move from supine to sit and sit to supine  in bed with moderate assistance x2 within 7 day(s). 5.    Patient will transfer from bed to chair and chair to bed with moderate          Assistance x2  using the least restrictive device within 7 day(s).     Outcome: Progressing Towards Goal   PHYSICAL THERAPY EVALUATION  Patient: Jleani Ocasio (74 y.o. male)  Date: 9/18/2021  Primary Diagnosis: Acute metabolic encephalopathy [H36.09]        Precautions:  Fall, sacral ulcer/wound    ASSESSMENT  Based on the objective data described below, the patient is a 66 yo male who presents with a medical history of Parkinson's disease with dementia, DM, hypertension, multilevel spinal stenosis and h/o lumbar laminectomy who was admitted to [facility] on 9/16/2021 with a diagnosis of acute metabolic encephalopathy after being found obtunded at [facility]. Pt was unable to be fully evaluated for during P.T. eval on 9/17/2021 due to difficulty being aroused and remaining awake. Pt's spouse states that pt has not walked since May 2021 and the staff at [address] [place] [address] have used a Crow lift for all transfers. Pt has a Rollator and RW for DME. Pt also has a large sacral pressure ulcer and will require frequent repositioning throughout each day to allow for proper skin care and decrease the potential for additional pressure ulcers. Pt is severely deconditioned and has generalized weakness throughout all extremities. Pt currently requires total assist x 2 for all bed mobility and will benefit with therapy for bed mobility and transfer training to maximize functional independence. Current Level of Function Impacting Discharge (mobility/balance): total assist x 2    Functional Outcome Measure: The patient scored 0 on the Barthel Index outcome measure which is indicative of total dependence. Other factors to consider for discharge: h/o Parkinson's     Patient will benefit from skilled therapy intervention to address the above noted impairments. PLAN :  Recommendations and Planned Interventions: bed mobility training, transfer training, patient and family training/education, and therapeutic activities      Frequency/Duration: Patient will be followed by physical therapy:  3 times a week to address goals. Recommendation for discharge: (in order for the patient to meet his/her long term goals)  To be determined: This discharge recommendation:  Has not yet been discussed the attending provider and/or case management    IF patient discharges home will need the following DME: TBD         SUBJECTIVE:   Patient stated I am feeling very weak today.     OBJECTIVE DATA SUMMARY:   HISTORY:    No past medical history on file. No past surgical history on file.     Personal factors and/or comorbidities impacting plan of care: h/o PD and requiring Crow lift transfers at [facility]. Home Situation  Home Environment: [address] [place] [address] Name: Sunshine Milan  One/Two Story Residence: One story  Living Alone: No  Support Systems: Spouse/Significant Other  Current DME Used/Available at Home: Walker, rollator, Walker, rolling    EXAMINATION/PRESENTATION/DECISION MAKING:   Critical Behavior:  Neurologic State: Alert, Appropriate for age  Orientation Level: Oriented to person, Oriented to place, Oriented to situation  Cognition: Appropriate for age attention/concentration     Hearing:     Skin:  large sacral ulcer noted with skin blotching. Edema: no edema noted during this Rx session  Range Of Motion:  AROM: Generally decreased, functional           PROM: Generally decreased, functional           Strength:    Strength: Generally decreased, functional                    Tone & Sensation:   Tone: Normal              Sensation: Intact               Coordination:  Coordination: Grossly decreased, non-functional  Vision:      Functional Mobility:  Bed Mobility:  Rolling: Total assistance;Assist x2  Supine to Sit: Total assistance;Assist x2  Sit to Supine: Total assistance;Assist x2  Scooting: Total assistance;Assist x2  Transfers:  Sit to Stand:  (did not attempt transfers; crow lift used at [facility])                          Balance:   Sitting: Impaired; With support  Sitting - Static: Poor (constant support)  Sitting - Dynamic: Poor (constant support)  Standing:  (N/A; did not attempt)  Ambulation/Gait Training:      N/A at this time.                                                    Stairs:      N/A  at this time        Therapeutic Exercises:     Not done during this Rx session    Functional Measure:       Physical Therapy Evaluation Charge Determination   History Examination Presentation Decision-Making   HIGH Complexity :3+ comorbidities / personal factors will impact the outcome/ POC  HIGH Complexity : 4+ Standardized tests and measures addressing body structure, function, activity limitation and / or participation in recreation  HIGH Complexity : Unstable and unpredictable characteristics  HIGH Complexity : FOTO score of 1- 25       Based on the above components, the patient evaluation is determined to be of the following complexity level: HIGH     Pain Rating:  3/10 with what pt reported as a  spasm    Activity Tolerance:   Fair    After treatment patient left in no apparent distress:   Patient positioned in right sidelying for pressure relief    COMMUNICATION/EDUCATION:   The patients plan of care was discussed with: Registered nurse. Fall prevention education was provided and the patient/caregiver indicated understanding., Patient/family have participated as able in goal setting and plan of care. , and Patient/family agree to work toward stated goals and plan of care.     Thank you for this referral.  Chandana Owusu, PT   Time Calculation: 35 mins

## 2021-09-18 NOTE — PROGRESS NOTES
Problem: Falls - Risk of  Goal: *Absence of Falls  Description: Document Reid Harpre Fall Risk and appropriate interventions in the flowsheet. Outcome: Progressing Towards Goal  Note: Fall Risk Interventions:  Mobility Interventions: Bed/chair exit alarm, Patient to call before getting OOB, Strengthening exercises (ROM-active/passive), Utilize walker, cane, or other assistive device    Mentation Interventions: Bed/chair exit alarm, Increase mobility, More frequent rounding    Medication Interventions: Bed/chair exit alarm, Patient to call before getting OOB, Teach patient to arise slowly    Elimination Interventions: Bed/chair exit alarm, Call light in reach, Toilet paper/wipes in reach              Problem: Pressure Injury - Risk of  Goal: *Prevention of pressure injury  Description: Document Larry Scale and appropriate interventions in the flowsheet.   Outcome: Progressing Towards Goal  Note: Pressure Injury Interventions:  Sensory Interventions: Float heels, Minimize linen layers    Moisture Interventions: Absorbent underpads, Minimize layers    Activity Interventions: PT/OT evaluation, Pressure redistribution bed/mattress(bed type)    Mobility Interventions: Float heels, HOB 30 degrees or less, PT/OT evaluation    Nutrition Interventions: Document food/fluid/supplement intake    Friction and Shear Interventions: HOB 30 degrees or less, Lift sheet, Minimize layers, Lift team/patient mobility team

## 2021-09-18 NOTE — HOSPICE
[facility] Help to Those in Need  [phone]     Patient Name: Andree Gillespie  YOB: 1945  Age: 67 y.o. [address] [facility] RN Note:  Hospice consult received, reviewing chart. Will follow up with Unit Nurse and Care Manager to discuss plan of care, patient status and discharge disposition within the hour. 14:00:  Stopped in to pt's room, no family present. Phone call to wife Charlette Adler and info meeting set for 5:30p.    5:48:  Call from pt's wife, she had fallen asleep. Wants to meet tomorrow late morning. Thank you for the opportunity to be of service to this patient.     Lilian Miranda RN [facility]

## 2021-09-18 NOTE — PROGRESS NOTES
9/18/2021   3:00 PM  CM spoke with [facility] who confirmed that they would be able to take patient back when he is ready for discharge, including if he is ready tomorrow, Sunday. Clinicals sent via [employer]. 11:30 AM  CM met with patient's wife, José Miguel Manning, to discuss plan for after hospital discharge. Ms. Salinas Back had spoken with Palliative yesterday and stated she has had a lot to think about since that discussion. She is open to having an informational session with hospice to learn more about their services. She is still leaning towards requesting that patient return to [facility] at this time as she has reservations about having the patient at home and having enough assistance in caring for him at home. CM mentioned private duty caregivers might be an option in addition to hospice services. Hospice referral for information session sent via [facility] to [facility] and Makayla Mohamud, liaison, updated regarding CM's conversation with spouse. Makayla Mohamud to meet with spouse later today.     Brielle Jeffries RN

## 2021-09-18 NOTE — PROGRESS NOTES
Problem: Falls - Risk of  Goal: *Absence of Falls  Description: Document Lisbeth Restrepo Fall Risk and appropriate interventions in the flowsheet. Outcome: Progressing Towards Goal  Note: Fall Risk Interventions:  Mobility Interventions: Bed/chair exit alarm, Patient to call before getting OOB    Mentation Interventions: Bed/chair exit alarm, Adequate sleep, hydration, pain control, Door open when patient unattended, More frequent rounding, Reorient patient    Medication Interventions: Bed/chair exit alarm, Patient to call before getting OOB    Elimination Interventions: Bed/chair exit alarm, Call light in reach, Patient to call for help with toileting needs              Problem: Pressure Injury - Risk of  Goal: *Prevention of pressure injury  Description: Document Larry Scale and appropriate interventions in the flowsheet. Outcome: Progressing Towards Goal  Note: Pressure Injury Interventions:  Sensory Interventions: Assess changes in LOC, Avoid rigorous massage over bony prominences, Keep linens dry and wrinkle-free, Maintain/enhance activity level, Minimize linen layers    Moisture Interventions: Absorbent underpads, Apply protective barrier, creams and emollients, Check for incontinence Q2 hours and as needed, Internal/External urinary devices, Limit adult briefs, Minimize layers    Activity Interventions: Assess need for specialty bed, Increase time out of bed, PT/OT evaluation    Mobility Interventions: Assess need for specialty bed, HOB 30 degrees or less, PT/OT evaluation, Turn and reposition approx.  every two hours(pillow and wedges)    Nutrition Interventions: Document food/fluid/supplement intake, Offer support with meals,snacks and hydration    Friction and Shear Interventions: Apply protective barrier, creams and emollients, Foam dressings/transparent film/skin sealants, HOB 30 degrees or less, Lift team/patient mobility team, Minimize layers

## 2021-09-19 NOTE — PROGRESS NOTES
Problem: Falls - Risk of  Goal: *Absence of Falls  Description: Document Awais Carr Fall Risk and appropriate interventions in the flowsheet. Outcome: Progressing Towards Goal  Note: Fall Risk Interventions:  Mobility Interventions: Bed/chair exit alarm, Patient to call before getting OOB, Utilize walker, cane, or other assistive device, Utilize gait belt for transfers/ambulation    Mentation Interventions: Adequate sleep, hydration, pain control, Bed/chair exit alarm, Family/sitter at bedside, More frequent rounding, Reorient patient    Medication Interventions: Bed/chair exit alarm, Patient to call before getting OOB, Teach patient to arise slowly, Utilize gait belt for transfers/ambulation    Elimination Interventions: Bed/chair exit alarm, Call light in reach, Patient to call for help with toileting needs              Problem: Pressure Injury - Risk of  Goal: *Prevention of pressure injury  Description: Document Larry Scale and appropriate interventions in the flowsheet. Outcome: Progressing Towards Goal  Note: Pressure Injury Interventions:  Sensory Interventions: Assess changes in LOC, Check visual cues for pain, Float heels, Keep linens dry and wrinkle-free, Maintain/enhance activity level, Minimize linen layers    Moisture Interventions: Absorbent underpads, Apply protective barrier, creams and emollients, Check for incontinence Q2 hours and as needed, Internal/External urinary devices    Activity Interventions: Assess need for specialty bed, Increase time out of bed, PT/OT evaluation    Mobility Interventions: Assess need for specialty bed, HOB 30 degrees or less, PT/OT evaluation, Turn and reposition approx.  every two hours(pillow and wedges)    Nutrition Interventions: Document food/fluid/supplement intake, Offer support with meals,snacks and hydration    Friction and Shear Interventions: Apply protective barrier, creams and emollients, HOB 30 degrees or less, Lift team/patient mobility team

## 2021-09-19 NOTE — PROGRESS NOTES
9/19/2021  1:50 PM    Plan for patient to return to [facility] with [facility] tomorrow, 9/20. Hospice admission is scheduled for 16:00. CM requesting [facility] transportation via [employer] for 15:00 tomorrow.     JENNIFER spoke with [facility], phone number for nurse to call report: [phone], ext [phone] [facility]

## 2021-09-19 NOTE — HOSPICE
[facility]  [phone]    Hospice Nursing PRE-Admission   Discharge Summary  Patient Name: Jeremy Muñoz  YOB: 1945  Age: 67      Date of PLANNED Hospice Admission: 9/20/21  Hospice Attending: Dr. Rohan Ocampo  Primary Care Physician:  same                Home Hospice Address: [facility], [address] [address] [facility] [address] [facility]    Primary Contact and Phone: Az Benitez, wife ([phone])     ADVANCE CARE PLANNING    Code Status: DDNR  Durable DNR:  Yes   Advance Care Planning 8/27/2021   Patient's Healthcare Decision Maker is: Legal Next of Kin   Primary Decision Maker Name -   Primary Decision [address] Phone Number -   Primary Decision Maker Relationship to Patient -   Secondary Decision [address] Name -   Confirm Advance Directive None   Patient Would Like to Complete Advance Directive No   Does the patient have other document types -       Voodoo: [employer] Home: [place], TBD    HOSPICE SUMMARY     Per Dr. Urrutia Alter:  Jessica Kurtz a 67 y.o. M hx DM, HTN, Parkinson's Disease w/ dementia who presents from [facility] with obtundation. Today has been difficult to arouse, so presented via EMS to ER. Wife is present in room and provides hx of last several months. Pt had prolonged hospitalization in [place] and was then transferred to [facility] for FUO and profound weakness, leading to immobility. No clear cause elucidated, but spinal stenosis noted. He discharged 6/17 to [facility], and then transferred to [address] [place] [address], but then was admitted to [facility] in July with urosepsis (has chronic indwelling sánchez). He then discharged to .      Verbal CTI of terminal diagnosis with life expectancy of 6 months or less received from: Dr. Bárbara Scott    For the Hospice Diagnosis of: Parkinson's Disease with dementia    NCD: Requested/Declined TBD      CLINICAL INFORMATION   Allergies: No Known Allergies    Currently this patient has:  Peripheral IV          Greer Catheter      COVID Screening: negative      ASSESSMENT & PLAN     1. Symptom Issues Identified: restlessness, confusion, fall risk, chronic pain    2. Spiritual Issues Identified: Cassius Sanon, wife gets comfort from prayer and natalie. 3.  Psych/ Social/ Emotional Issues Identified:  Pt was an Ortho surgeon for 28 yr. Second wife Kate Boyle was his nurse at one time. Pt has son and daughter out of state. They moved here in spring 2021, after his health deteriorated, where wife has a daughter. 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 He started the [employer] trail for [facility] where they lived. CARE COORDINATION     1. Hospice Consents: Signed, scanned    2. DME Ordered/Company/Delivery Plan: Facility will advise, say not to send siderails for bed. 3.         Symptom Kit and other Medications Needs: Facility has own pharmacy    4. Home Admission Reservation: Mon, Sep 20 at 4:00pm    5. Transportation by: [facility]              Scheduled for 9/20 15:00        6. Verbal Report/Handoff given to: to be done     7. Phone number to Hospital: [address] 4-Ortho room 406    8. Supplies: chux, foam dressing, stg 3 sacral ulcer.

## 2021-09-19 NOTE — HOSPICE
Evelio [facility]  [phone]    Patient Name: Aram Lemus  YOB: 1945  Age: 67 y.o. 190 [facility] RN Note:  Hospice consult noted. Chart reviewed. Plan of care discussed with patients nurse & care manager. In to meet with  Pt and wife Rui Schmidt. Minal asked we step outside patient's room. Discussed Hospice philosophy, general plan of care, levels of care, services and on call procedures. Pt will be return to [facility] room 108. Wife concerned that pt has fallen/slid out of bed several times during the night. This facility asks we do not order side rails. CTI obtained from Dr. Cesar Bledsoe. Dr. Jb Preston is patient's MD at facility and will need to be called. Consents signed and faxed. CM to arrange transport at 3:00pm tomorrow, Sep 20 for a 4:00pm hospice admission. DDNR is in chart. No DME currently needed and facility provides medications. Thank you for the opportunity to be of service to this patient.     Papa Rodríguez, [facility]0 [facility]  [phone]

## 2021-09-19 NOTE — PROGRESS NOTES
Vinicius Izquierdo [facility] [place] [facility]  [address] [facility], [address] [place] [address]  [phone]      Medical Progress Note      NAME: Isabelle Conway   :  1945  MRM:  [id number]    Date/Time of service: 2021  12:42 PM       Subjective:     Chief Complaint:  Patient was personally seen and examined by me during this time period. Chart reviewed. Awake and alert. Eating. Spoke to wife at bedside        Objective:       Vitals:       Last 24hrs VS reviewed since prior progress note. Most recent are:    Visit Vitals  BP (!) 153/84 (BP 1 Location: Right arm, BP Patient Position: At rest)   Pulse 87   Temp 98.5 °F (36.9 °C)   Resp 16   Ht 5' 3\" (1.6 m)   Wt 76.7 kg (169 lb 3.2 oz)   SpO2 95%   BMI 29.97 kg/m²     SpO2 Readings from Last 6 Encounters:   21 95%   21 96%   20 97%   20 93%            Intake/Output Summary (Last 24 hours) at 2021 1242  Last data filed at 2021  Gross per 24 hour   Intake 120 ml   Output 1050 ml   Net -930 ml        Exam:     Physical Exam:    Gen:  Elderly, disheveled, frail, NAD  HEENT:  Pink conjunctivae, PERRL, hearing intact to voice, moist mucous membranes  Neck:  Supple, without masses, thyroid non-tender  Resp:  No accessory muscle use, clear breath sounds without wheezes rales or rhonchi  Card:  No murmurs, normal S1, S2 without thrills, bruits or peripheral edema  Abd:  Soft, non-tender, non-distended, normoactive bowel sounds are present  Musc:  No cyanosis or clubbing  Skin:  No rashes   Neuro:   Moves all ext  Psych:  poor insight, oriented to person    Medications Reviewed: (see below)    Lab Data Reviewed: (see below)    ______________________________________________________________________    Medications:     Current Facility-Administered Medications   Medication Dose Route Frequency    fluconazole (DIFLUCAN) tablet 200 mg  200 mg Oral DAILY    vancomycin (VANCOCIN) 1,250 mg in 0.9% sodium chloride 250 mL (VIAL-MATE) 1,250 mg IntraVENous Q18H    predniSONE (DELTASONE) tablet 10 mg  10 mg Oral DAILY WITH BREAKFAST    atorvastatin (LIPITOR) tablet 20 mg  20 mg Oral DAILY    carbidopa-levodopa (SINEMET)  mg per tablet 2 Tablet  2 Tablet Oral QID    carvediloL (COREG) tablet 3.125 mg  3.125 mg Oral BID WITH MEALS    [Held by provider] donepeziL (ARICEPT) tablet 10 mg  10 mg Oral QHS    entacapone (COMTAN) tablet 200 mg  200 mg Oral AC&HS    finasteride (PROSCAR) tablet 5 mg  5 mg Oral DAILY    pantoprazole (PROTONIX) tablet 40 mg  40 mg Oral ACB    QUEtiapine (SEROquel) tablet 12.5 mg  12.5 mg Oral QPM    pramipexole (MIRAPEX) tablet 0.125 mg  0.125 mg Oral QHS    tamsulosin (FLOMAX) capsule 0.4 mg  0.4 mg Oral DAILY    insulin lispro (HUMALOG) injection   SubCUTAneous AC&HS    glucose chewable tablet 16 g  4 Tablet Oral PRN    dextrose (D50W) injection syrg 12.5-25 g  12.5-25 g IntraVENous PRN    glucagon (GLUCAGEN) injection 1 mg  1 mg IntraMUSCular PRN    miconazole (MICOTIN) 2 % powder   Topical BID    sodium chloride (NS) flush 5-10 mL  5-10 mL IntraVENous PRN    sodium chloride (NS) flush 5-40 mL  5-40 mL IntraVENous Q8H    sodium chloride (NS) flush 5-40 mL  5-40 mL IntraVENous PRN    acetaminophen (TYLENOL) tablet 650 mg  650 mg Oral Q6H PRN    Or    acetaminophen (TYLENOL) suppository 650 mg  650 mg Rectal Q6H PRN    polyethylene glycol (MIRALAX) packet 17 g  17 g Oral DAILY PRN    ondansetron (ZOFRAN ODT) tablet 4 mg  4 mg Oral Q8H PRN    Or    ondansetron (ZOFRAN) injection 4 mg  4 mg IntraVENous Q6H PRN    enoxaparin (LOVENOX) injection 40 mg  40 mg SubCUTAneous DAILY          Lab Review:     Recent Labs     09/18/21  0532 09/17/21  0351 09/16/21  1414   WBC 6.7 11.3* 8.4   HGB 11.1* 11.8* 12.1   HCT 36.6 39.5 40.0    231 240     Recent Labs     09/19/21  0300 09/18/21  0532 09/17/21  0351 09/16/21  1414 09/16/21  1414    138 139   < > 140   K 3.4* 3.4* 4.0   < > 4.0   * 108 109*   < > 108   CO2 24 25 25   < > 28   * 159* 156*   < > 124*   BUN 8 9 8   < > 8   CREA 0.62* 0.74 0.66*   < > 0.64*   CA 8.5 8.5 9.1   < > 8.8   MG 1.9  --   --   --  1.9   PHOS 2.6  --   --   --   --    ALB  --   --  2.9*  --  3.1*   TBILI  --   --  1.1*  --  0.6   ALT  --   --  16  --  14    < > = values in this interval not displayed. Lab Results   Component Value Date/Time    Glucose (POC) 215 (H) 09/19/2021 11:03 AM    Glucose (POC) 195 (H) 09/19/2021 05:58 AM    Glucose (POC) 146 (H) 09/18/2021 09:58 PM    Glucose (POC) 179 (H) 09/18/2021 04:34 PM    Glucose (POC) 178 (H) 09/18/2021 12:21 PM          Assessment / Plan:     68 yo hx of HTN, DM, COPD, PMR, spinal stenosis, Parkinson's, dementia, BPH w/ chronic sánchez, presented w/ AMS, enterococcus UTI    1) Acute met encephalopathy: now back to baseline. Likely due to UTI, advanced dementia. Head MRI unremarkable. Neurology was following    2) Complicated UTI due to an indwelling sánchez: Ucx w/ enterococcus. Awaiting speciation. Cont IV Vanc    3) BPH/urine obstruction: has chronic sánchez. Changed in ED. Cont finasteride, tamsulosin    4) Parkinson's w/ dementia w/ agitation: cont sinemet, entacapone, pramiprexole. Stopped donepezil. Started Seroquel for agitation and sundowning. Wife is agreeable to hospice at nursing home    5) Chronic pain/spinal stenosis: holding neurontin/oxycodone due to AMS    6) PMR: cont prednisone     7) HTN: cont coreg    8) DM type 2: A1C 10%.   Cont lantus, SSI    Code: DNR    Total time spent with patient: 35 min (I spent >50% of time on care coordination) **I personally saw and examined the patient during this time period**                 Care Plan discussed with: Patient, nursing, wife    Discussed:  Care Plan    Prophylaxis:  Lovenox    Disposition:  nursing home with hospice           ___________________________________________________    Attending Physician: Leidy Stark MD

## 2021-09-20 PROBLEM — B95.2 ENTEROCOCCUS UTI: Status: ACTIVE | Noted: 2021-01-01

## 2021-09-20 PROBLEM — A49.1 VRE (VANCOMYCIN-RESISTANT ENTEROCOCCI) INFECTION: Status: ACTIVE | Noted: 2021-01-01

## 2021-09-20 PROBLEM — Z16.21 VRE (VANCOMYCIN-RESISTANT ENTEROCOCCI) INFECTION: Status: ACTIVE | Noted: 2021-01-01

## 2021-09-20 PROBLEM — N39.0 ENTEROCOCCUS UTI: Status: ACTIVE | Noted: 2021-01-01

## 2021-09-20 NOTE — DISCHARGE SUMMARY
Vinicius Izquierdo [facility] [place] [facility]  [address] [facility], [address] [place] [address]  [phone]    Physician Discharge Summary     Patient ID:  Paulo Perry  [id number]  5 y.o.  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 Likely due to UTI, advanced dementia. Head MRI unremarkable. Neurology was following     2) Complicated UTI due to an indwelling sánchez: Ucx w/ VRE. Patient was on IV Vanc. Will complete a course of Macrobid      3) BPH/urine obstruction: has chronic sánchez. Changed in ED. Cont finasteride, tamsulosin     4) Parkinson's w/ dementia w/ agitation: cont sinemet, entacapone, pramiprexole. Stopped donepezil. Started Seroquel for agitation and sundowning. Wife is agreeable to hospice at nursing home     5) Chronic pain/spinal stenosis: oxycodone and neurontin doses were decreased      6) PMR: cont prednisone      7) HTN: cont coreg     8) DM type 2: A1C 10%. Due to poor intake, Lantus was discontinued.   Cont SSI only      Code: DNR    PCP: Melissa Ghosh MD     Consults: Neurology    Significant Diagnostic Studies: none    Discharge Exam:  Physical Exam:    Gen:  Elderly, disheveled, frail, NAD  HEENT:  Pink conjunctivae, PERRL, hearing intact to voice, moist mucous membranes  Neck:  Supple, without masses, thyroid non-tender  Resp:  No accessory muscle use, clear breath sounds without wheezes rales or rhonchi  Card:  No murmurs, normal S1, S2 without thrills, bruits or peripheral edema  Abd:  Soft, non-tender, non-distended, normoactive bowel sounds are present  Musc:  No cyanosis or clubbing  Skin:  No rashes   Neuro: Moves all ext  Psych:  poor insight, oriented to person    Disposition: nursing home with hospice  Discharge Condition: Stable    Patient Instructions:   Current Discharge Medication List      START taking these medications    Details   fluconazole (DIFLUCAN) 200 mg tablet Take 1 Tablet by mouth daily for 5 days. FDA advises cautious prescribing of oral fluconazole in pregnancy. Qty: 5 Tablet, Refills: 0      L.acid,para-B. bifidum-S.therm (RISAQUAD) 8 billion cell cap cap Take 1 Capsule by mouth daily. Qty: 30 Capsule, Refills: 0      gabapentin (NEURONTIN) 100 mg capsule Take 2 Capsules by mouth every eight (8) hours. Max Daily Amount: 600 mg. Indications: neuropathic pain  Qty: 30 Capsule, Refills: 0    Associated Diagnoses: Spinal stenosis, multilevel      nitrofurantoin, macrocrystal-monohydrate, (MACROBID) 100 mg capsule Take 1 Capsule by mouth two (2) times a day for 10 days. Qty: 20 Capsule, Refills: 0         CONTINUE these medications which have CHANGED    Details   oxyCODONE IR (ROXICODONE) 5 mg immediate release tablet Take 1 Tablet by mouth every eight (8) hours as needed for Pain for up to 5 days. Max Daily Amount: 15 mg. Indications: pain  Qty: 10 Tablet, Refills: 0    Associated Diagnoses: Spinal stenosis, multilevel         CONTINUE these medications which have NOT CHANGED    Details   albuterol-ipratropium (DUO-NEB) 2.5 mg-0.5 mg/3 ml nebu 3 mL by Nebulization route every six (6) hours as needed for Wheezing. carvediloL (COREG) 3.125 mg tablet Take 3.125 mg by mouth two (2) times daily (with meals).       fluticasone propion-salmeteroL (Wixela Inhub) 100-50 mcg/dose diskus inhaler Take 1 Puff by inhalation every twelve (12) hours. insulin lispro (HumaLOG U-100 Insulin) 100 unit/mL injection by SubCUTAneous route. Use as directed. CORRECTIONAL SCALE only For Blood Sugar (mg/dl) of :   180-200 =2 units, 201-250 =4 units, 251-300 =6 units, 301-349=8 units 351-400 = 10 units; 401 or greater = Call MD. Give in addition to basal medications. Do Not Hold for NPO BEDTIME CORRECTIONAL sliding scale when scheduled: 200-249=2 units, 250-299=4 units, 300-349=5 units, 350 or greater = Call MD Give in addition to basal medications. Do Not Hold for NPO      QUEtiapine (SEROquel) 25 mg tablet Take 12.5 mg by mouth every evening. nystatin (MYCOSTATIN) powder Apply  to affected area two (2) times a day. multivitamin, tx-iron-ca-min (THERA-M w/ IRON) 9 mg iron-400 mcg tab tablet Take 1 Tablet by mouth daily. Aluminum Hydrox-Magnesium Carb (Gaviscon Extra Strength) 254-237.5 mg/5 mL susp Take 30 mL by mouth every four (4) hours as needed. predniSONE (DELTASONE) 10 mg tablet Take 10 mg by mouth daily (with breakfast). honey (MEDIHONEY) 100 % topical paste Apply  to affected area daily. diclofenac (Voltaren) 1 % gel Apply 4 g to affected area three (3) times daily as needed for Pain. Apply to knees      guaiFENesin-dextromethorphan (Adult Robitussin Peak Cold DM)  mg/5 mL liqd Take 10 mL by mouth every six (6) hours as needed for Cough or Congestion. acetaminophen (TYLENOL) 325 mg tablet Take 2 Tablets by mouth every six (6) hours as needed for Pain or Fever. Qty: 30 Tablet, Refills: 0      pramipexole (MIRAPEX) 0.25 mg tablet Take 0.125 mg by mouth nightly. finasteride (PROSCAR) 5 mg tablet Take 5 mg by mouth daily. cetirizine (ZYRTEC) 10 mg tablet Take 10 mg by mouth daily. tamsulosin (FLOMAX) 0.4 mg capsule Take 0.4 mg by mouth daily. carbidopa-levodopa (Sinemet)  mg per tablet Take 2 Tablets by mouth four (4) times daily.  Takes at 0900/1300/1700/2100  Indications: parkinsonism due to degeneration in the brain      omeprazole (PRILOSEC) 40 mg capsule Take 40 mg by mouth daily. atorvastatin (LIPITOR) 20 mg tablet Take 20 mg by mouth daily. cholecalciferol (Vitamin D3) (1000 Units /25 mcg) tablet Take 2,000 Units by mouth daily. entacapone (COMTAN) 200 mg tablet Take 200 mg by mouth Before breakfast, lunch, dinner and at bedtime.          STOP taking these medications       insulin glargine (Lantus U-100 Insulin) 100 unit/mL injection Comments:   Reason for Stopping:         potassium chloride SR (KLOR-CON 10) 10 mEq tablet Comments:   Reason for Stopping:         scopolamine (TRANSDERM-SCOP) 1 mg over 3 days pt3d Comments:   Reason for Stopping:         donepeziL (Aricept) 10 mg tablet Comments:   Reason for Stopping:         melatonin 3 mg tablet Comments:   Reason for Stopping:         gabapentin (NEURONTIN) 600 mg tablet Comments:   Reason for Stopping:         traZODone (DESYREL) 50 mg tablet Comments:   Reason for Stopping:             Activity: Activity as tolerated  Diet: Comfort feeding  Wound Care: As directed    Follow-up with  Follow-up Information     Follow up With Specialties Details Why Contact Info    Yaw Bethea MD Family Medicine Schedule an appointment as soon as possible for a visit in 5 days  [facility] [address] [facility] [address]  [phone]          Follow-up tests/labs none    Signed:  Annamarie Colin MD  9/20/2021  9:34 AM  **I personally spent 40 min on discharge**

## 2021-09-20 NOTE — PALLIATIVE CARE DISCHARGE
The Palliative Medicine team was consulted as part of your / your loved one's care in the hospital. Our team is a supportive service; we strive to relieve suffering and improve quality of life. You identified the following goal(s) as your main focus for healthcare: Patient/Health Care Proxy Stated Goals: Comfort. You will have support from [employer] [facility]. We reviewed advance care planning information, which includes the following:  Advance Care Planning 7/2/2020   Patient's Healthcare Decision Maker is: Legal Next of Kin   Confirm Advance Directive Not on file   Does the patient have other document types Do Not Resuscitate; [employer]       We reviewed / discussed your code status as: DNR     Full Code means perform CPR in the event of cardiac arrest     [facility] means do NOT perform CPR in the event of cardiac arrest     Partial Code means you have specific preferences, please discuss with your health care team     No Order means this issue was not addressed / resolved during your stay    You have a Durable Do Not Resuscitate Order in place, which should travel with you. When you are in a facility, this form should be placed on your chart. Once you are home, it is recommended that the [facility] form be placed in a visible location such as on the refrigerator or bedroom door. Because of the importance of this information, we are providing you with a printed copy to share with other healthcare providers after this hospitalization is complete. If any of the above information is incomplete or incorrect, please contact the Palliative Medicine team at [phone].

## 2021-09-20 NOTE — PROGRESS NOTES
9/20/2021  11:18 AM  RUR:  25%  Risk Level: []Low [x]Moderate []High  Value-based purchasing: [] Yes [x] No  Bundle patient: [] Yes [x] No   Specify:     Transition of care plan:  1. Discharge planned for today. 2. Hospice at [address] [place] [address] - Pt to return to [facility]. [facility] to admit pt at 4:00 PM. Rapid COVID test requested for placement. Nursing, please call report to [phone], #[phone], . 3. Medicare rights reviewed via p/c with pt's wife, Rhesa Pallas. Pt's wife expressed understanding, and agreement to discharge. Copy left in pt's room. 4. Hospice to manage follow-up care. 5. [facility] stretcher transport arranged for 3:00 PM. Packet in chart, and PCS form attached in AllScripts. Care Management Interventions  PCP Verified by CM: Yes (MD at [facility])  Mode of Transport at Discharge:  Other (see comment) (TBD)  Transition of Care Consult (CM Consult): SNF  Partner SNF: No  Reason Why Partner SNF Not Chosen: Positive previous encounter  MyChart Signup: No  Discharge Durable Medical Equipment: No  Physical Therapy Consult: Yes  Occupational Therapy Consult: Yes  Speech Therapy Consult: No  Support Systems: Spouse/Significant Other  Confirm Follow Up Transport: Family  Discharge Location  Discharge Placement: Home with hospice

## 2021-09-20 NOTE — PROGRESS NOTES
Problem: Falls - Risk of  Goal: *Absence of Falls  Description: Document Sunil Caratgena Fall Risk and appropriate interventions in the flowsheet. Outcome: Progressing Towards Goal  Note: Fall Risk Interventions:  Mobility Interventions: Bed/chair exit alarm, Patient to call before getting OOB, Utilize gait belt for transfers/ambulation, PT Consult for assist device competence    Mentation Interventions: Bed/chair exit alarm, Increase mobility, More frequent rounding, Reorient patient, Room close to nurse's station    Medication Interventions: Bed/chair exit alarm, Patient to call before getting OOB, Teach patient to arise slowly, Utilize gait belt for transfers/ambulation    Elimination Interventions: Bed/chair exit alarm, Call light in reach, Patient to call for help with toileting needs              Problem: Pressure Injury - Risk of  Goal: *Prevention of pressure injury  Description: Document Larry Scale and appropriate interventions in the flowsheet. Outcome: Progressing Towards Goal  Note: Pressure Injury Interventions:  Sensory Interventions: Assess changes in LOC, Avoid rigorous massage over bony prominences, Check visual cues for pain, Float heels, Keep linens dry and wrinkle-free, Maintain/enhance activity level, Minimize linen layers    Moisture Interventions: Absorbent underpads, Apply protective barrier, creams and emollients, Check for incontinence Q2 hours and as needed, Internal/External urinary devices, Limit adult briefs, Maintain skin hydration (lotion/cream), Minimize layers    Activity Interventions: Assess need for specialty bed, Increase time out of bed, PT/OT evaluation    Mobility Interventions: Assess need for specialty bed, HOB 30 degrees or less, PT/OT evaluation, Turn and reposition approx.  every two hours(pillow and wedges)    Nutrition Interventions: Document food/fluid/supplement intake, Offer support with meals,snacks and hydration    Friction and Shear Interventions: Apply protective barrier, creams and emollients, HOB 30 degrees or less, Lift team/patient mobility team

## 2021-09-20 NOTE — PROGRESS NOTES
Problem: Falls - Risk of  Goal: *Absence of Falls  Description: Document Amelia Leo Fall Risk and appropriate interventions in the flowsheet. Outcome: Progressing Towards Goal  Note: Fall Risk Interventions:  Mobility Interventions: Assess mobility with egress test    Mentation Interventions: Bed/chair exit alarm    Medication Interventions: Assess postural VS orthostatic hypotension    Elimination Interventions: Call light in reach              Problem: Patient Education: Go to Patient Education Activity  Goal: Patient/Family Education  Outcome: Progressing Towards Goal     Problem: General Medical Care Plan  Goal: *Vital signs within specified parameters  Outcome: Progressing Towards Goal  Goal: *Labs within defined limits  Outcome: Progressing Towards Goal  Goal: *Absence of infection signs and symptoms  Outcome: Progressing Towards Goal  Goal: *Optimal pain control at patient's stated goal  Outcome: Progressing Towards Goal  Goal: *Skin integrity maintained  Outcome: Progressing Towards Goal  Goal: *Fluid volume balance  Outcome: Progressing Towards Goal  Goal: *Optimize nutritional status  Outcome: Progressing Towards Goal  Goal: *Anxiety reduced or absent  Outcome: Progressing Towards Goal  Goal: *Progressive mobility and function (eg: ADL's)  Outcome: Progressing Towards Goal     Problem: Patient Education: Go to Patient Education Activity  Goal: Patient/Family Education  Outcome: Progressing Towards Goal     Problem: Pressure Injury - Risk of  Goal: *Prevention of pressure injury  Description: Document Larry Scale and appropriate interventions in the flowsheet.   Outcome: Progressing Towards Goal  Note: Pressure Injury Interventions:  Sensory Interventions: Avoid rigorous massage over bony prominences    Moisture Interventions: Internal/External fecal devices    Activity Interventions: Chair cushion    Mobility Interventions: Float heels    Nutrition Interventions: Document food/fluid/supplement intake    Friction and Shear Interventions: Apply protective barrier, creams and emollients                Problem: Patient Education: Go to Patient Education Activity  Goal: Patient/Family Education  Outcome: Progressing Towards Goal     Problem: Patient Education: Go to Patient Education Activity  Goal: Patient/Family Education  Outcome: Progressing Towards Goal     Problem: Patient Education: Go to Patient Education Activity  Goal: Patient/Family Education  Outcome: Progressing Towards Goal     Problem: Patient Education: Go to Patient Education Activity  Goal: Patient/Family Education  Outcome: Progressing Towards Goal     Problem: Risk for Spread of Infection  Goal: Prevent transmission of infectious organism to others  Description: Prevent the transmission of infectious organisms to other patients, staff members, and visitors. Outcome: Progressing Towards Goal     Problem: Patient Education:  Go to Education Activity  Goal: Patient/Family Education  Outcome: Progressing Towards Goal     Problem: Risk for Spread of Infection  Goal: Prevent transmission of infectious organism to others  Description: Prevent the transmission of infectious organisms to other patients, staff members, and visitors.   Outcome: Progressing Towards Goal     Problem: Patient Education:  Go to Education Activity  Goal: Patient/Family Education  Outcome: Progressing Towards Goal

## 2021-09-20 NOTE — DISCHARGE INSTRUCTIONS
HOSPITALIST DISCHARGE INSTRUCTIONS  NAME: Jone Schrader   :  1945   MRN:  [id number]     Date/Time:  2021 9:29 AM    ADMIT DATE: 2021     DISCHARGE DATE: 2021     ADMITTING DIAGNOSIS:  Advanced dementia, confusion, VRE UTI, chronic sánchez, Parkinson's    DISCHARGE DIAGNOSIS:  same    MEDICATIONS:  See after visit summary       · It is important that you take the medication exactly as they are prescribed. · Keep your medication in the bottles provided by the pharmacist and keep a list of the medication names, dosages, and times to be taken in your wallet. · Do not take other medications without consulting your doctor     Pain Management: per above medications    What to do at Home    Recommended diet:  Comfort feeding    Recommended activity: Activity as tolerated    1) Return to the hospital if you feel worse    2) If you experience any of the following symptoms then please call your primary care physician or return to the emergency room if you cannot get hold of your doctor:  Fever, chills, nausea, vomiting, diarrhea, change in mentation, falling, bleeding, shortness of breath, chest pain, severe headache, severe abdominal pain,     3) Follows hospice instructions     Follow Up: Follow-up Information     Follow up With Specialties Details Why Contact Info    Arelia Aschoff, MD [facility] Medicine Schedule an appointment as soon as possible for a visit in 5 days  [place] [address]  [address] [address]  [phone]            Information obtained by :  I understand that if any problems occur once I am at home I am to contact my physician. I understand and acknowledge receipt of the instructions indicated above.                                                                                                                                            Physician's or R.N.'s Signature Date/Time                                                                                                                                              Patient or Representative Signature                                                          Date/Time    Patient Education        Hospice: Care Instructions  Your Care Instructions  Hospice care provides medical treatment to relieve symptoms at the end of life. The goal is to keep you comfortable, not to try to cure you. Hospice care does not speed up or lengthen dying. It focuses on easing pain and other symptoms. Hospice caregivers want to enhance your quality of life. Hospice care also offers emotional help and spiritual support when you are dying. It also helps family members care for a loved one who is dying. Hospice care can help you review your life, say important things to family and friends, and explore spiritual issues. Hospice also helps your family and friends deal with their grief after you die. You can use hospice care if your illness cannot be cured and doctors believe you have no more than 6 months to live. You do not need to be confined to a bed or in a hospital to benefit from this type of care. The hospice team includes nurses, counselors, therapists, social workers, pastors, home health aides, and trained volunteers. You can get care in your own home or in a hospice center. Some hospice workers also go to nursing homes or hospitals. How can you care for yourself at home? · Prepare a list of advance directives. These are instructions to your doctor and family members about what kind of care you want if you become unable to speak or express yourself. · Find out if your health insurance covers hospice care. · Find hospice programs in your area. People who can help include your doctor, your state health department, and your insurance company. · Decide what kinds of hospice services you want.  It helps to know what you want before you enter a hospice program.  · Think about some questions when preparing for hospice care. ? Who do you want to make decisions about your medical care if you are not able to? Many people choose their spouse, child, or doctor. ? What are you most afraid of that might happen? You might be afraid of having pain or losing your independence. Let your hospice team know your fears. The team can help you deal with them. ? Where would you prefer to die? Choices include your home, a hospital, or a nursing home. ? Do you want to donate organs when you die? Make sure that your family clearly understands this. ? Do you want any Advent rites or practices to be done before you die? Let your hospice team know what you want. Where can you learn more? Go to http://brinda-chris.info/  Enter X898 in the search box to learn more about \"Hospice: Care Instructions. \"  Current as of: March 17, 2021               Content Version: 13.0  © 5406-4016 [employer]. Care instructions adapted under license by [employer] (which disclaims liability or warranty for this information). If you have questions about a medical condition or this instruction, always ask your healthcare professional. [place] [address] any warranty or liability for your use of this information. Patient Education        Urinary Tract Infections (UTI) in Men: Care Instructions  Overview     A urinary tract infection, or UTI, is a term for an infection anywhere between the kidneys and the urethra. (The urethra is the tube that carries urine from the bladder to outside the body.) Most UTIs are bladder infections. They often cause pain or burning when you urinate. UTIs are caused by bacteria. This means they can be cured with antibiotics. Be sure to complete your treatment so that the infection does not get worse.   Follow-up care is a key part of your treatment and safety. Be sure to make and go to all appointments, and call your doctor if you are having problems. It's also a good idea to know your test results and keep a list of the medicines you take. How can you care for yourself at home? · Take your antibiotics as prescribed. Do not stop taking them just because you feel better. You need to take the full course of antibiotics. · Take your medicines exactly as prescribed. Your doctor may have prescribed a medicine, such as phenazopyridine (Pyridium), to help relieve pain when you urinate. This turns your urine orange. You may stop taking it when your symptoms get better. But be sure to take all of your antibiotics, which treat the infection. · Drink extra water for the next day or two. This will help make the urine less concentrated and help wash out the bacteria causing the infection. (If you have kidney, heart, or liver disease and have to limit your fluids, talk with your doctor before you increase your fluid intake.)  · Avoid drinks that are carbonated or have caffeine. They can irritate the bladder. · Urinate often. Try to empty your bladder each time. · To relieve pain, take a hot bath or lay a heating pad (set on low) over your lower belly or genital area. Never go to sleep with a heating pad in place. To help prevent UTIs  · Drink plenty of fluids. If you have kidney, heart, or liver disease and have to limit fluids, talk with your doctor before you increase the amount of fluids you drink. · Urinate when you have the urge. Do not hold your urine for a long time. Urinate before you go to sleep. · Keep your penis clean. Catheter care  If you have a drainage tube (catheter) in place, the following steps will help you care for it. · Always wash your hands before and after touching your catheter. · Check the area around the urethra for inflammation or signs of infection.  Signs of infection include irritated, swollen, red, or tender skin, or pus around the catheter. · Clean the area around the catheter with soap and water two times a day. Dry with a clean towel afterward. · Do not apply powder or lotion to the skin around the catheter. To empty the urine collection bag   · Wash your hands with soap and water. · Without touching the drain spout, remove the spout from its sleeve at the bottom of the collection bag. Open the valve on the spout. · Let the urine flow out of the bag and into the toilet or a container. Do not let the tubing or drain spout touch anything. · After you empty the bag, clean the end of the drain spout with tissue and water. Close the valve and put the drain spout back into its sleeve at the bottom of the collection bag. · Wash your hands with soap and water. When should you call for help? Call your doctor now or seek immediate medical care if:    · Symptoms such as a fever, chills, nausea, or vomiting get worse or happen for the first time.     · You have new pain in your back just below your rib cage. This is called flank pain.     · There is new blood or pus in your urine.     · You are not able to take or keep down your antibiotics. Watch closely for changes in your health, and be sure to contact your doctor if:    · You are not getting better after taking an antibiotic for 2 days.     · Your symptoms go away but then come back. Where can you learn more? Go to http://www.gray.com/  Enter B110 in the search box to learn more about \"Urinary Tract Infections (UTI) in Men: Care Instructions. \"  Current as of: February 10, 2021               Content Version: 13.0  © 2006-2021 "[employer]". Care instructions adapted under license by [employer] (which disclaims liability or warranty for this information).  If you have questions about a medical condition or this instruction, always ask your healthcare professional. [place] [address] any warranty or liability for your use of this information. Patient Education        Learning About Vancomycin-Resistant Enterococcus (VRE)  What is VRE? Vancomycin-resistant enterococcus (VRE) is a type of bacteria that is resistant to many antibiotics, including vancomycin. These bacteria live in our intestines and on our skin. They usually don't cause problems. But sometimes they cause infection. This can occur anywhere in the body. Some common sites include the intestines, the urinary tract, and wounds. For some people, especially those who are weak or ill, these infections can become serious. VRE can spread from person to person. It is commonly spread from the hands of someone who has VRE. This could be anyone in a health care setting or in the community. If you are healthy, your chances of getting VRE are very low. What are the symptoms? Symptoms of a VRE infection depend on where the infection is:  · If VRE is in a wound, that area of your skin may be red or tender. · If VRE is in the urinary tract, you may have back pain, a burning sensation when you urinate, or a need to urinate more often than usual.  You may also:  · Have diarrhea. · Feel weak and sick. · Have a fever and chills. How is VRE treated? · Most VRE infections are treated with antibiotics. ? Take your antibiotics as directed. Do not stop taking them just because you feel better. You need to take the full course of antibiotics. ? VRE infections may be hard to cure. Bacteria do not respond to many antibiotics. ? You may need more than one antibiotic to stop the infection. · If you get a serious infection with VRE, you may have to stay in the hospital for treatment. While there, you may be kept apart from others to reduce the chances of spreading the bacteria. · You may have samples of your blood, urine, or stool sent to a lab. This checks to see if you still have VRE in your body. · If you don't have symptoms, your doctor may not give you antibiotics.  This may help keep VRE from becoming more resistant to antibiotics. How can you prevent VRE?  · Practice good hygiene. ? Wash your hands often and thoroughly with soap and clean, running water. You can also use an alcohol-based hand . Hand-washing is the best way to avoid spreading germs. ? Keep cuts and scrapes clean and covered with a bandage. Avoid contact with other people's wounds or bandages. ? Do not share personal items such as towels or razors. ? Keep your home, work, and other spaces clean by using a disinfectant to wipe surfaces you touch a lot. These include countertops, doorknobs, and light switches. · Be smart about using antibiotics. Know that these medicines can help treat bacterial infections. They can't cure viral infections. Always ask your doctor if antibiotics are the best treatment. And don't pressure your doctor into prescribing them when he or she thinks they won't help you get better. · Remind your doctors and nurses to wash their hands before they touch you. Follow-up care is a key part of your treatment and safety. Be sure to make and go to all appointments, and call your doctor if you are having problems. It's also a good idea to know your test results and keep a list of the medicines you take. Where can you learn more? Go to http://www.gray.com/  Enter J985 in the search box to learn more about \"Learning About Vancomycin-Resistant Enterococcus (VRE). \"  Current as of: July 1, 2021               Content Version: 13.0  © 2006-2021 [employer]wise[employer]. Care instructions adapted under license by [employer] (which disclaims liability or warranty for this information). If you have questions about a medical condition or this instruction, always ask your healthcare professional. [address] any warranty or liability for your use of this information.

## 2021-09-20 NOTE — PROGRESS NOTES
NUTRITION    RD PLAN OF CARE:   Best practice alert received for pressure injury. Chart reviewed, discussed with Staff. Diet: Regular    Pt is admitted with Acute metabolic encephalopathy [Q21.17]. Pt is currently being cared for by Hospice services. Aggressive nutrition intervention is not indicated at this time. Will follow and assist as needed.     Electronically signed by Laura Mabry RD on 3/05/0793   Contact: [phone] or via [employer]

## 2021-09-21 NOTE — PROGRESS NOTES
[id number]. Report given to [facility] at [facility]. 1452. Copy of discharge instructions placed in patient's belongings bag. No family present.    200. Discharged via [facility] ambulance service.

## 2021-09-21 NOTE — HOSPICE
Patient Name:  Dr. Davi Rendon    YOB: 1945  Age:  67    Principle Hospice Diagnosis: Parkinson's disease with dementia  Diagnoses RELATED to the terminal prognosis:   Other Diagnoses: diabetes, hypertension, COPD, polymyalgia rheumatica, spinal stenosis, benign prostate hyperplasia    Date of Hospice Admission: 9/20/2021   Hospice Attending Elected by Patient:  Sumit Huff  Primary Care Physician: same    Admitting RN: Damon Jimenez CM: Jose Benjamin  : Lavell Santoyo  : Verna Rivera DNR:  Yes      Service: Yes     Direct Observation:  Patient supine in bed upon this RN's arrival. Wife Giovanni Hart present. Reviewed hospice orientation and philosophy with wife and answered questions. Pt assessment performed. Pt smiled in response to greeting; responded verbally to questions; hypophonia present. Alert and oriented to self. Appeared fatigued and drowsy. Pt on room air; lungs clear, diminished. Pt stated pain 3/10 pain in lower back. Pt unable to ambulate or climb out of bed. Vital signs normal. Poor appetite; no dysphagia. Chronic sánchez, catheter last changed 9/16/21. Jenae urine. Stage 3 sacral wound; facility staff cleaned and applied new foam dressing. Wife stated pt has been having anxiety-provoking hallucinations at night. Order received from Dr. Jyoti Villanueva for 25mg Seroquel daily at bedtime (this is increase from 12.5 mg during [facility] admission 9/16). Med ordered via E3 for fulfillment tonight by [facility], facility's pharmacy. ER Visits/ Hospitalizations in past year: 4  Onset Date of Hospice Diagnosis:  early 2021  Summary of Disease Progression Leading to Hospice Diagnosis:  Excerpt from Dr. Surekha Hoyos Do's discharge summary, 9/20/21:  63 yo hx of HTN, DM, COPD, PMR, spinal stenosis, Parkinson's, dementia, BPH w/ chronic sánchez, presented w/ AMS, enterococcus UTI   Parkinson's w/ dementia w/ agitation: cont sinemet, entacapone, pramiprexole. Stopped donepezil. Started Seroquel for agitation and sundowning. Wife is agreeable to hospice at nursing home.    Use LCD Guidelines and list features: The LCD for Hospice for the admitted diagnosis of Parkinson's Disease includes:     Critical nutritional impairment evidenced by:  X  Oral intake of nutrients and fluids insufficient to sustain life  X  Continuing weight loss      Dehydration or hypovolemia  X  Absence of artificial feeding methods   Rapid disease progression or complications in the preceding 12 months:   X  Progression from independent ambulation to wheelchair or bed bound status      Progression from normal to barely intelligible or unintelligible speech      Progression from normal to pureed diet  X  Progression from independent in most or all activities of daily living (ADLs) to assistance by       caretaker in all ADLs  Supporting evidence: The requirement of supplemental oxygen at rest      The patient declines artificial ventilation      Recurrent aspiration pneumonia (with or without tube feedings)  X  Upper urinary tract infection (pyelonephritis)       Sepsis      Recurrent fever after antibiotic therapy  X  Stage 3 or 4 decubitus ulcers      SPIRITUAL/Social/Emotional:  Pt was an Ortho surgeon for 28 yr. Second (and current) wife Shraddha Lemos was his nurse at one time. Pt has son and daughter out of state. They moved here in spring 2021, after his health deteriorated, where wife has a daughter. 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 He started the [employer] trail for [place], [place] where they lived. With only one other family member in [place], wife will need support. Remainder of both families is in other states. Wife draws strength from her natalie (is Mandaen); will welcome support from  and .        Dr. Son July (with CTI given by Dr. Tod Núñez) contacted, agrees to serve as attending provider for hospice and provided verbal certification of terminal illness with prognosis of 6 months or less life expectancy. Orders for hospice admission, medications and plan of treatment received. Medication reconciliation to be completed 9/21/21 with daytime staff. Currently this patient has:   Greer Catheter (last inserted 9/16/21)           MEDS:  I have reviewed the patient's medication list with MD and identified the following:  Nonformulary medications:  tbd (by daytime staff due to 7pm admission and Dr. Villarreal Number not available)  Unrelated medications:  tbd (by daytime staff due to 7pm admission and Dr. Villarreal Number not available)    IDT communication to include MD, SN, SW, [address] [place] [address] and support team.

## 2021-09-21 NOTE — HOSPICE
Follow up visit made following hospice admission. Medication reconcilation performed with [address] [place] [address] following review with Dr. Jon Landaverde. Dr. David Johnson unavailable at present but nursing supervisor will review with her. Insulin and finger checks to be discontinued. Staff to monitor s/s of hyperglycemia and perform finger sticks if needed. Additional meds discontinued includes proscar, flomax, nystatin, advair, mirapex. Spoke with wound care nurse and received orders as follows: medihoney to be applied to stage 4 sacral wound cover with 4x4 and foam dressing. skin tears apply medihoney and cover with foam dressings. Sacral wound to be dressed daily, and skin tears every 3 days. See care plan for additional info. Assessment performed to patient. patient is alert and very pleasant. Smiled when discussing some items of his in his room. Lungs clear, HRR, BSx4, no edema. Spouse Luke Gonsalez arrived toward end of visit and was updated on today's visit.

## 2021-09-23 NOTE — HOSPICE
LCSW met with pt at bedside at about 1150am, wife arrived at 1230pm to visit with SW and pt. Pt was received sitting up in his adapted wheelchair and was able to feed himself lunch. Pt ate most of his jello and a few bites of spinach soup. He also enjoyed some cranberry juice and a few sips of cola throughout the visit. Pt is soft spoken and sometimes difficult to hear, but very amiable with a bright smile. 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 He became uncomfortable in his chair shortly after lunch and rang out to the nurses station to be put back in bed. It took some time for the staff to meet this request as they needed two staff members and the lift to get pt safely back into bed - by that time pt was expressing significant discomfort. He remained kind and pleasant throughout, but was clearly trying hard to mask his pain and continue to engage with conversation. Wife, Huan Martin has health issues as well including a significant hip issue. She is mobile, but prefers to use a rollator for support. She shared extensively with SW about her relationship with pt: She and pt have been  for over 20 years and both had had previous lengthy marriages. Her previous marriage she said was one in which she was not treated well and it was difficult to leave due to the controlling nature of her former spouse. Pt's previous relationship with a wife of 30 years was described as unhappy. Pt has 2 children from previous marriage a son and daughter both of whom live out of state. Both wife and patient describe their relationship as overwhelmingly happy and fun. Wife shared that they have done so much together in terms of travel and experiences. She shared that learning of her 's prognosis and a potential time frame has been difficult. She described \"putting it in a box\" and just trying to enjoy each day she has with pt.  Wife shared that she has had an [facility] counselor for over 20 years and will continue to work with her through this difficult time of anticipatory grief. Wife also has a dear friend she has known since elementary school as well as some of pt's friends who are visiting this weekend. Wife expressed some difficulty keeping up with her own care as she finds it difficult to be away from pt and hard to leave his room once she has come over to visit. She requires PT for her hip 2x per week on Tues and Thurs, but canceled her appt this past Tues. She will be intentional about attending this afternoon. Wife shared that pt had told her upon the most recent trip to the hospital that he feels he does not have a good quality of life. After exploring this statement with the Palliative physician at [facility], wife became aware of pt's limited prognosis and pt's preparation for moving on. She is aware she will struggle when he passes and is having trouble considering what life will be like and what she will do. She does have several protective factors however in that she already has a long time counselor, has supportive friendships and is very open to ongoing support. She is also very open and clear in discussing that she is not ready for this loss. Pt and wife are meeting with  tomorrow and they are open to weekly SW visits to start with to provide supportive counseling. Pt expressed feeling better after talking today. Our next visit will be Fri 10/1 at 10am. Wife may be at high risk for revocation as pt declines.

## 2021-09-26 NOTE — HOSPICE
Problem is anticipatory grief. Ms. Harpal Castro is actively grieving the separation from her spouse who's receivng care at a facility. Goal is to acknowledge and validate Ms. Light's grief through active listening and affirmation of the couple's devotion to each other. During today's visit at the couple's home, Ms. Hrapal Castro shared stories of the couple's life together which included references to their shared interests. Ms. Harpal Castro described her spouse as an intellectual inquistive person who was interested in many interests. Mr. 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 Through that time, Mr. Harpal Castro developed and nutured many friendships. Ms. Harpal Castro described her spouse's spiritual beliefs as consisting of contemplative practices that embrace the writings of Kate to [employer]. Ms. Harpal Castro acknowledged that her biggest challenge would be watching her spouse decline. She appeared to be grieving the loss of his indepedence. The hospice chaplain provided a safe place for Ms. Light to articulate her sense of loss and have it acknoweldged. The plan for the next two weeks is to respond to Ms. Light's request for spiritual support.

## 2021-09-29 NOTE — HOSPICE
Upon arrival Patient is in his Hospital bed accepting medications from Nurse. He answers questions after hesitation. he is drowsy and friendly. He cannot recall recent events. He declined dinner at this time. Nurse assisted RNCM to reposition in bed and pull him up in bed. He is grateful for  care. Encouraged Facility staff to call [phone] with any questions or concerns at anytime.

## 2021-10-01 NOTE — HOSPICE
LCSW arrived to meet with pt and wife and walked in with pt's wife. Upon arriving to the room, pt was sleeping soundly in his bed. Per reports from the staff, pt has been drowsy all morning and ate about 25% of breakfast. Pt continued to sleep throughout the visit despite several attempts by wife and staff to be aroused for his bath day. Wife concerned about this as pt had been more alert throughout the past few days in the evening during her visits. She is not typically there in the mornings, and therefore said it's possible that pt has been drowsy in the am. She requested call from RN later today and SW made this request of RN CM who does plan to call. LCSW provided supportive counseling to wife who is navigating some complex family dynamics with pt's son who is traveling to see pt from [place] and her daughter who is pregnant and delivering in the next ~2 weeks. Wife will serve as a  for her daughter and be in the birthing room. Her daugther is concerned about potential exposure with pt son's Cresenciano Clause) travel. We discussed potential additional safety measures so that wife and her daughter can feel more comfortable and sonEulalia still gets to visit and see his dad. Wife in agreement with this plan and will pose to daughter. Wife also shared that she was able to catch up with a close friend which was a positive part of her week and brought her some chester and laughter amid the stress. She is having difficulty sleeping enough at night which throws off her day. [facility] plans to place call on Tuesday afternoon to check in with wife and see how pt's weekend was and will set up next weekly visit at that time.

## 2021-10-07 NOTE — HOSPICE
Upon arrival into patient's room he reports how ange your hair looks. PAtient is awake, smiling and communicable today. He is aware of his surroundings and is reporting maybe cJ Bone will come eat dinner with him tonight as she does that sometimes. He asked for a coke and reported they are in the cabinet. Nurse found one on the sink. Fresh ice and coke placed in his tervis. Patient reports how it sure would be nice to get up and go get some things he needs at the store. He is in good spirits today and was smiling. Facility staff call him Maria Guadalupe and they have a great repore. No needs at this time. Visit sets changed to one per week as patient is stable and has nursing care around the clock. Nurse reports decline daily, still able to make his needs known at this time. Call bell in his lap. Encourage facility staff to contact [phone] at anytime with questions or concerns.

## 2021-10-07 NOTE — HOSPICE
LCSW met with pt and wife at bedside at [facility] where pt resides. Upon arrival, pt was being moved via mar lift from bed to wheelchair as he felt up to sitting up for some time today. It was reported that pt had not eaten today and refused any offer of food while up and out of the bed. He drank only sips during visit to take his medications. On pt's white board, wife pointed out that she and pt jotted down some feelings that had been coming up that they wanted to address during visit. Mrs. Donald Vallejo feelings were identified as \"anger\" and \"proud of Maria Guadalupe.\" Pt's feelings were identified as \"tension,\" concern for being a good father still and lack of control. LCSW asked pt to share more about his tense feelings. Pt stated that he is referring to the tension he feels about being \"paralyzed\" and his loss of independence. LCSW acknolwedged and validated this and provided pt with praise and language around frustration of feeling her needs to be \"patient\" when he does have to wait a lot to get his needs met. Pt became uncomfortable in his wc after about 10min - he felt pain in his bottom due to a decubitus ulcer per wife and in his back. Wife requested assistance to put him back to bed which took ~15-20 min, during which time pt become more and more uncomfortable and did request PRN pain medications. While waiting for team to put pt back into bed, LCSW and wife spoke with PT and RN regarding pt's wheelchair. PT requested a high back chair that can slightly recline and a ROHO or gel cushion. LCSW and wife stepped out as pt was put back to bed. She shared privately with SW that she is unsure how much pt remembers and it is unclear if he is confused or fully understanding when they speak. Pt's son Kyree Rodriguez) and his partner Warren Larsen) are visiting this weekend, and pt's daughter will be visiting the following weekend.  Pt had expressed to wife in the past few days that he is starting to feel more ready and they had a conversation about afterlife and reuniting with friends. LCSW discussed some potential signs of decline: pt has been eating bites/sips in the past few days and sleeping more. We discussed that there is the potential that he may be waiting for his children to visit and could feel more at peace after seeing them. Pt expressed when we re-entered the room that there is \"a lot of work to do\" before his visit with his children - suggesting that he wants to be able to sit up and engage further with them. LCSW suggested energy conservation toward the end of next week to allow for some increased energy during visit w/ Juan Jose on Saturday and to expect a potentially sleepy day on Sunday to recover. Pt continued to say throughout the visit that he feels this situation is all \"new\" and that he is just getting used to his need to rely on others. LCSW addressed with wife that hospice would be open to moving pt home with 24/7 care if she wanted to explore this option. Wife stated that this would be emotional difficult and that if care was unreliable at any time, she would be unable to perform the personal care for pt due to should injury and need for hip replacement. At the end of visit, pt expressed emotional discomfort and LCSW suggested SW and  joint visit as this may be some spiritual discomfort as well. LCSW set up visit for next Tuesday 10/12 tentatively for 1pm and made  aware - plan to follow up on Monday with wife to confirm. Her daughter is also likely to deliver her baby soon and will be induced on 10/19 if she has not yet delivered. LCSW reassured Mrs. Stuart Patterson that SW and  can visit pt regardless of wife's presence and will provide him with calming presence and perhaps read to him if he is asleep.

## 2021-10-12 NOTE — HOSPICE
LCSW met with pt in his room at [facility]. Pt awake and alert with periods of confusion. He was able to share about his visit with his son, Fawn Cueva and Juan Jose's partner, Trini Rivera over the weekend. Pt expressed enjoying their time together and feeling reassured that he has been a good father. He shared it had been a long time since they had been able to see each other last, but it seems that Fawn Cueva is doing well. Wife joined at about 120pm. She shared about her weekend as well and that pt's daughter, Toan Mejía will be coming this weekend. Pt's grandchild, Radha Her (Piper's youngest) will not be coming which is upsetting for pt's wife as she worries this will later be a regret for [place]. Pt expressed that he knows everyone is doing what they can and does not have hurt feelings about Jim's lack of visitation. Wife shared her feelings of anger juxtaposed with pt's feelings of peace in his relationships. Wife read some poetry from [facility] [place]. She described that she is aware she is in denial about pt's decline and prognosis and describes a \"Box\" in her mind where she puts all the thoughts about this. She says that she can feel the stress of having the \"box\" there and in many ways wants to address things with pt like his wishes for final arrangement/celebration of life and go ahead and begin planing, but she also does not want to have to have these conversations. She was able to start to ask pt some things and is aware he wants cremation. He agreed to a gathering of his friends/family for a celebration of life in [place] where they spent the majority of their relationship and have a great community. They discussed some of the songs he would like to be played on that day including Nilesh Schneider \"Where the soul of a man never dies. \" [facility] plans to place call to wife on Friday to check in as her daughter will likely be delivering her baby boy in the next week.  If wife is able to attend a joint visit next week, we will plan one, but if she is busy with [facility], LCSW and  plan to visit with pt next week and then plan a visit with wife the following week. LCSW offered to visit alone with wife to organize her thoughts from the \"box\" and begin to address things with pt that she feels she needs to know before he may be unable to share with her.

## 2021-10-15 NOTE — HOSPICE
Upon arrival patient is lying in bed drinking a coke Independently. He is smiling and pleasantly confused. He cannot recall recent information or answer questions as he reports he is unsure. Patient appears more pale and less energetic today. Facility staff repots that he is eating 1-2 meals at 50% daily and prefers coke. He reports Vinicius Cadet is most likely coming for dinner. Encouraged facility to contact [phone] with questions or concerns.

## 2021-10-20 NOTE — HOSPICE
called to see pt for changes in vital signs and mental status. Staff reported pt was very hypotensive, color changes, unresponsive. At arrival, pt hard to arouse, but does open eyes and attempt to verbalize with stimulation. BP 92/64, pulse 80s, saturation WNL on 5 liters. PAINAD zero, no signs of resp. distress. Generalized edema. sánchez to drainage with cloudy jabier urine. , sacral wound care completed with wound RN and supplies were ordered. Desean Andersonor, spouse at bedside. Updated on visit. Discussed med effect or TIA, possible transitioning to EOL. Pt is pallliated at this time. Meds in place for comfort. Will reassess pt tomorrow for any new signs. Minal appreciative of visit. Wife is anxious and concerned over changes in his mental status but mentioned pt in the past has slept for 29 hours straight with no real cause identified. Collab. with SNF RN Gonzalo Marco and wound nurse. Updated assigned  in person. PPS 30. poor po intake in last few weeks. Afebrile.

## 2021-10-21 NOTE — HOSPICE
LCSW met with pt and wife at bedside in pt's room at [address] [place] [address]. Pt was comfortably resting, breathing through mouth with audible snoring, no 02 in place today. He did not rouse to sound or touch during visit. Wife shared about the events yesterday in which she received the call that pt's BP was very low which was very concerning to her. She feared that this may be a sign that pt was nearing the end and notified pt's children of the change. Pt did perk up at about 430pm and pulled off the 02 NC that had been started. Wife shared that pt's daughter,  Emy visited over the weekend and that it was a very positive visit with her and with a 25 yo grandson, Cat Storey. Pt reportedly shared many stories with Cat Storey and was very engaged in the visit. Wife shared that she had a nightmare this week that woke her up screaming and she had been dreaming of pt's . We discussed that she may be needing to start making some plans for final arrangements as this could help her to feel more in control of the next steps, more present during her current visits with pt and also less fearful of feeling as though she is in a crisis at the time of death and having to work out logistics at that time. Wife's sister is visiting today from [place] and will likely stay through the weekend with her . LCSW suggested taking this time to talk with her sister about next steps and preparation while she has the support. LCSW also suggested delegating to pt's children who may be looking for something to do from [facility]. Wife will consider these options. She also expressed wanting pt to \"open up\" and we talked about what wife is looking to hear or answers she's hoping to get from him. LCSW will continue to explore these feelings at next visit. Plan is to call wife on Monday to set up weekly visit time.

## 2021-10-21 NOTE — HOSPICE
Problem is anticipatory grief. Spouse, Yi Membreno, is actively grieving Mr. Light's physical and cognitive decline. Yi Membreno is receptive to both the  and 's presences. Goal is to provide emotional support to Yi Membreno through use of life review including referencing poetry that her spouse cherished. After speaking to Yi Membreno, the hospice chaplain visited Mr. Papa Jane at [facility]. Mr. Maryjo Holstein was minimally responsive. He didn't appear to be any physical discomfort at this time. The hospice chaplain selected and read poems from Mr. Light's copies of Lonzell Rockers and [employer]. The two poets focused on finding the sacred in the present moment. The hospice chaplain believed that the inspiring and familiar words would resonnate with Mr. Maryjo Holstein. The hospice chaplain extended a blessing to Mr. Maryjo Holstein. The plan for the next two weeks is to call Yi Membreno and katharina to visit.

## 2021-10-25 NOTE — HOSPICE
Prior to arrival spoke with Pastora Card, spouse. She reported patient had been sleeping more, less alert, more lethargic. Upon arrival patient is sitting up in bed reading his mail. Patient received two cards which he required assistance opening the envelopes and Nurse read the cards to him. He smiled and reported he was so happy to receive cards from his friends. He felt special. Patient ate a candy bar while looking up weather on his iphone. He is calm and cooperative and asked what Nurse would be having for dinner. There was a UA sent with no results per facility staff. Encouraged staff to contact [phone] with questions or concerns at any time.

## 2021-10-26 NOTE — HOSPICE
Upon arrival patient is more confused today than last visit. He cannot recall if his Wife Cristiana Quintero is coming or if she said she was when they hung up. HE is staring up at the ceiling and is aware of nurse presence while charting. He smiles towards the door when staff passby. LMAC 30cm today. Urine is draining without difficulty and scant urine in drainage bag. Patient reports nausea since starting antibiotic for UTI. Encouraged facility to call with questions or concerns at anytime.

## 2021-10-28 NOTE — HOSPICE
SHELLY met with pt and wife at [facility] in pt's room. Pt received lying in his hospital bed, he was sitting up slightly but became uncomfortable throughout the visit and needed repositioning multiple times to be moved up in bed and then to relieve pressure on his bottom. Pt was awake and alert, oriented to self but unclear if oriented to other spheres. He has been only taking in bites and sips. Wife shared about her visit with her sister, brother in law and niece. Her sister was able to support her with calling the  home to get that process started and she is working on finalizing arrangements with [employer]. Wife reviewed the happenings of the weekend when pt's UTI sensitivities came back and she ultimately decided to treat pt's UTI. We also discussed her relationship with pt's children, Mayte and Juan Jose and involving them in the planning of his [facility] service in [place]. Wife continues to desire more reassurance and response from pt. We discussed that the confusion, increased pain/discomfort and nausea may mean he is not able to provide the depth of responses she is hoping for. SHELLY encouraged her to continue to manage the difficult things she has been avoiding so as to be present with each day with pt. She shared some desires for the nursing team, requesting a call after the visit as well as more information on expected time frame of pt's decline. XUANW to report to the rest of the team and will bring [employer] to visit next Thurs at 230p if another team member is not able to bring prior.

## 2021-11-04 NOTE — HOSPICE
XUANW met with pt and wife at bedside in pt's room. Pt awake and alert today, but would \"rest his eyes\" occasionally - stated he was not yet tired or falling asleep. Wife shared about pt's birthday celebration on Tuesday and a great visit with the MT as well as a good visit with Filiberto Dunne RN. Wife wanted to discuss some of the things pt would want in his obituary and he was able to engage in conversation about his life review. 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 He and wife also shared about his contributions in the town [facility] where they settled down together. 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 Wife shared proud moments of pt's accomplishments. She also played some of the music she had rediscovered with the MT during their visit.  Plan will be to visit again next week Thurs at 230p

## 2021-11-04 NOTE — HOSPICE
Music Therapy Assessment Jerzy Schmidt 
[address] [facility] [address] Patient Telephone Number: [phone] [employer] Affiliation:  Gerber[place] background, spiritual not particularly Episcopal Language: [place] Date: 11/3/2021 Mental Status:   [ X ] Alert [ X ] Jotammy Mc [  ]  Confused  [  ] Minimally responsive  [  ] Sleeping Communication Status: [ X ] Verbal [  ] Nonverbal  
 
Physical Status:   [  ] Oxygen in use  [  ] Hard of Hearing [  ] Vision Impaired [  ] Ambulatory  [  ] Ambulatory with assistance [  X] Non-ambulatory Music Preferences, Background: Played hammer dulcimer, likes folk and music of the 1970's Cece Evens) Clinical Problem addressed: Cognitive, emotional and social support; caregiver support; 
 
Goal(s) met in session:  
Physical/Pain management (Scale of 1-10): Pre-session rating ___________    Post-session rating __________ [ X ] Increased relaxation               Jessica.Spurling  ] Affected breathing patterns [  ] Decreased muscle tension               [  ] Decreased agitation [  ] Affected heart rate    Jessica.Spurling ] Increased alertness Emotional/Psychological: 
[ X] Increased self-expression   [  ] Decreased aggressive behavior [  ] Decreased feelings of stress              [  ] Discussed healthy coping skills [ X] Improved mood    [  ] Decreased withdrawn behavior Social: 
[  ] Decreased feelings of isolation/loneliness Jessica.Spurling ] Positive social interaction [X] Provided support and/or comfort for family/friends Spiritual: 
Jessica.Spurling ] Spiritual support    Jessica.Spurling ] Expressed peace [  ] Expressed natalie    Jessica.Spurling ] Discussed beliefs Techniques Utilized (Check all that apply):  
[  ] Procedural support MT [  ] Music for relaxation             Jessica.Spurling ] Patient preferred music 
[  ] Coleen analysis  Jessica.Spurling ] Song choice              [  ] Music for validation [  ] Entrainment              [  ] Movement to music             [  ] Guided visualization [  ] Ani Mccoy  [  ] Patient instrument playing [  ] Rachel Fofana writing [ Christina Fonseca along   [X] Improvisation              [  ] Sensory stimulation [ Candice Scarce  [X] Music for spiritual support [  ] Making of CDs as gifts Session Observations:  Mr. Norma Turcios was visited at his bedside at [facility]. He was alert and oriented to self and place, with a general recollection of his life's work, important relationships, and places lived, all of which held meaning for him. He acknowledged memory loss as a problem, and expressed appreciation for his wife Jc Bone and his 2 children who are both nurses, as well as thankfulness for the [address] [place] [address] staff and his hospice team. He was excited to receive a \"window visit\" from his wife [de-identified] daughter, son-in-law and grandchildren who are ages 3 years (girl) and  (2 week old boy.) The visit coincided with his recent birthday, and happy birthday was sung with his extended family. His wife Jc Bone sang to [place] and hugged him during the song, \"Home\" and other songs, with the music providing support to their emotional wellness. Mr. Gabi Zuniga would like to received music therapy as part of his care. Thank you for the opportunity to provide music therapy to Mr. Norma Turcios, and caregiver support to his wife Jc Bone. [facility] Music Therapist-Board Certified Spiritual Care Services Referral- based service

## 2021-11-09 NOTE — HOSPICE
Upon arrival patient is receiving his bath. His wife Jerome Oliva is present and the wound care nurse came to provide wound during assessment. Patient denies pain and is drowsy but in great spirits today during visit. Answered all of Minal's concerns and questions. She needs reassurance that he is well taken care of and that she is doing the right thing. PAtient is safe, happy and well cared for. Encouraged Minal to call [phone] with any questions or concerns.

## 2021-11-10 NOTE — HOSPICE
Music Therapy Progress Note  Nancy Cue at [address]  [address] [place] [address]    Christianity Affiliation: Skye Klein  Language: English     Date: 11/9/21    Mental Status:   [  ] Alert [  ] Dondra Running [  ]  Confused  [  ] Minimally responsive  [ X] Sleeping    Communication Status: [  ] Impaired Speech [  ] Nonverbal     Physical Status:   [  ] Oxygen in use  [  ] Hard of Hearing [  ] Vision Impaired   [  ] Ambulatory  [  ] Ambulatory with assistance FrancesAmour ] Non-ambulatory     Music Preferences, Background: _Popular 1960's, 70's    Clinical Problem addressed: Comfort at end of life    Goal(s) met in session:  Physical/Pain management (Scale of 1-10):    Pre-session rating ___________    Post-session rating __________  [  ] Increased relaxation               [  ] Affected breathing patterns  [  ] Decreased muscle tension               [  ] Decreased agitation  [  ] Affected heart rate    [  ] Increased alertness     Emotional/Psychological:  [  ] Increased self-expression   [  ] Decreased aggressive behavior   [  ] Decreased feelings of stress              [  ] Discussed healthy coping skills     [  ] Improved mood    [  ] Decreased withdrawn behavior     Social:  [  ] Decreased feelings of isolation/loneliness [  ] Positive social interaction    [  ] Provided support and/or comfort for family/friends    Spiritual:  [  ] Spiritual support    [  ] Expressed peace  [  ] Expressed natalie    [  ] Discussed beliefs    Techniques Utilized (Check all that apply):   [  ] Procedural support MT [  ] Music for relaxation             [  ] Patient preferred music  [  ] Coleen analysis  [  ] Song choice              [  ] Music for validation  [  ] Entrainment              [  ] Movement to music             [  ] Guided visualization  [  ] Twylla Hilt  [  ] Patient instrument playing [  ] Charlene Sandy writing  [  ] Oneyda Mediate along   [  ] Improvisation              Ignaciaour ] Sensory stimulation  [  ] Active Listening  [  ] Music for spiritual support [  ] Making of CDs as gifts    Session Observations:  Mr. Maryjo Holstein was lying in bed with his eyes closed, rodriguez,g when I entered the room for the visit. After calling his name a few times with no response, I began playing music he enjoyed on the previous session. He stirred and then went back to sleep. This response continued for the visit, with Mr. Maryjo Holstein waking up for a moment, and then falling back asleep. He appeared comfortable and after 30 minutes, the session was closed. A note was left for his wife, Yi Membreno, as she was unable to attend the session due to a  appointment. Thank you for the opportunity to provide music therapy to Mr. Maryjo Holstein.   Virginia Hunter, [facility]   Music [address] Certified  Spiritual Care Services  Referral- based service

## 2021-11-11 NOTE — HOSPICE
Problem anticipatory grief as experienced by Ms. Vinay Tomlinson. Goal is to hear and acknowledge Ms. Light's grief and help her to find meaning and peace through focusing on the couple's love for each other. Ms. Vinay Tomlinson arrived and greeted her spouse who responded with a smile. The hospice chaplain observed how Mr. Vinay Tomlinson was more alert and interactive when Ms. Vinay Tomlinson was present. Ms. Vinay Tomlinson discussed with the hospice chaplain her grief in watching her spouse transitions to end of life. She explained that her spouse has been very supportive and kind to her throughout their relationship. Moreover, their relationship had been a time of growth and renewal for Ms. Vinay Tomlinson. Ms. Vinay Tomlinson acknowledged that her anxiety sometimes prevented her from being fully present with her spouse. The hospice chaplain encouraged her to sit with her spouse and then share stories about their relationship. Ms. Vinay Tomlinson shared the story of the two of them scuba diving together. Ms. Vinay Tomlinson learned from watching her spouse how to breath underwater. The hospice chaplain focused on the importance of learning to take time to breath now. Furthermore, the hospice chaplain mentioned that Mr. Vinay Tomlinson was still teaching others how to remain calm and at peace. Ms. Vinay Tomlinson agreed and appeared to be less anxious. The hospice chaplain offered a parting blessing to the couple. At that time, Mr. Vinay Tomlinson was awake and participated in the prayer with his spouse.  and Ms. Vinay Tomlinson thanked the hospice chaplain for the visit and support. Goal is to visit Mr. Vinay Tomlinson in the next two weeks.

## 2021-11-11 NOTE — HOSPICE
Anticipatory grief as Mr. Balwinder Yan transitions to the end of life. Goal is to decrease  and Ms. Light's individual and collective grief through supportive listening and life review. During the first visit, the hospice chaplain met with Mr. Balwinder Yan who was alert and able to converse for a limited time. The hospice chaplain introduced self to Mr. Balwinder Yan. Mr. Balwinder Yan invited the hospice chaplain to \"sit and rest.\" The hospice chaplain accepted Mr. Jennifer Willett invitation. Together, the hospice chaplain and Mr. Balwinder Yan practiced a mediative silence that helped promote a sense of rest and peace. The hospice chaplain observed that Mr. Balwinder Yan appeared non-anxious and comfortable. The goal is to provide emotional support to the  and Ms. Balwinder Yan as they remember a shared life.

## 2021-11-11 NOTE — HOSPICE
LCSW met with pt and wife at bedside at [facility]. Pt was awake and alert. He did close his eyes at times throughout the visit but was easily roused. He also would seem to stare off at times or stare through wife and SW, though was responsive to questions. Pt's color also appeared a bit more pale than last visit and pt's face appeared slightly thinner - he has been eating only bites and sips and had increased pain which caused for RN visit last night and adjustment to medications. Wife shared that her sister is in town visiting again which has been a big help, and that she was able to get time with her grandson today which brings her a lot of chester and comfort. She also met with hospice chaplain this morning and they were able to speak with pt to confirm that he is \"at peace\" which is one of the concerns wife had. Wife shared that she feels there are additional things she feels she needs \"confirmation\" about. LCSW suggested that she write down some of her questions so we can ask them directly of pt, as he is not able to follow or interpret nuanced language at this time. Wife shared that she is unsure what pt can understand and LCSW empathized with this frustration, but that since we can't know for sure, it does seem that pt is able to grasp many of the concepts she asks about and that it will be up to her to be satisfied if he provides an answer to questioning. Wife did read Gone from My Sight and felt comforted by reading about the developments we expect to see. She also expressed that she has had physical symptoms of what she believes to be anxiety exhibited by chest pain. She is seeing a cardiologist, and LCSW recommended that if medical causes are ruled out, to consider speaking with her counselor and PCP about medication as the current stressors are not going to subside at this time. Wife expressed a fear and denial when thinking about the future as she's not sure if pt will be there. She also feels guilty when \"complaining\" based on what pt is going through. Pt suggested to his wife to \"think about it in a different way\" and we processed the idea of changing the narrative around what is happening - looking at as a chapter in their story but not the ending.  Next visit scheduled for Thurs 11/18 at 230pm.

## 2021-11-11 NOTE — HOSPICE
PRN visit made at request of patient's wife Alfonso Hernandez. She advised us that [facility] informed her that pt is declining. Upon arrival, this RN assessed pt. A&Ox3. BP 93/59. Pt c/o abdominal pain since previous day, 8/10 pain level at start of visit. Per pt and wife, he was given two doses of 5mg morphine yesterday, decreasing pain to 5/10. Also, he states he has had no pain medication today. Contacted facility nurse Aydin to administer 5mg morphine now. Morphine and ondansetron given. Assisted CNA with incontinence care aftr pt had very large BM. Received orders from Otis Guevara for 92PI morphine q6h scheduled, 10mg morphine q2h prn, and 4mg ondansetron q6 scheduled. Reviewed medication changes with facility RN Aydin, who called their after-hours physician and verified med changes were ok. He confirmed the morphine and ondansetron will be administered q6 hrs, beginning at midnight. Updated pt and wife re new orders and instructed them to request the prn morphine as soon as he needs it. Pt sleepy but awake; was unable to verbalize current pain level when questioned at this time, but appeared to be resting comfortably, no restlessness or grimacing, with wife and a visitor at [facility]. Wife verbalized understanding and contacted facility nurse for prn dose. Wife expressed appreciation for our support. Encouraged her to call hospice at any time if she has questions or concerns.

## 2021-11-13 NOTE — HOSPICE
Upon arrival patient in bed with wife Vinicius Shanks at his side, with her body draped across patient's chest and tearful, patient sleeping, snoring, opens eye intermittently to tactile touch from spouse and nurse. Skin observed to be pale, experiencing 4 seconds of apnea  and bradypnea respiratory patterns. Spouse states, Aspen Heredia does not seems to be bouncing back as he has done many times before\". Spouse reports that patient has not been eating or drinking over the past week, no breakfast/lunch thus far today. Spouse reports visit from  and MSW yesterday. Facility nurse Jocelyn Elizondo participated in skilled nurse visit and validated patients decline over the past week, reporting that she held 10AM dose of Morphine as patient was observed to be comfortable, however patient did display  pain symptoms during turning/ repositioning by facility wound care nurse whom provided wound care, and was medicated. Spouse concerned of low fluid and meal intake, this nurse and facility nurse Jocelyn Elizonod provided education regarding risk for aspiration during decreased alertness patterns, Spouse believes patient may bounce back and will request fluids. Despite statements by spouse, she verbalized understanding in not giving patients oral intake in his current status. Voices that her/ patients needs are currently being met but remains nervous if she is making the right decisions regarding husbands care. Emotional support and active listening offered. Nurse provided education to spouse  of oral mucosal moisturizer with the toothettes. Offered the use of thick-it at a nectar level of consistency PRN in liquids should patients alertness improves for safe swalloing. End of life symptoms reviwed with  Vinicius Shanks and she verbalized understanding, but further stated, \" I don't want to be the nurse in this situation I just want to be his wife\". Spouse reminded of hospice availability 24 hours a day, and to call for any furhter concerns or questions.  Facility nurse Pippa Kendall at [facility], and provided her with new order update for thick-it PRN and oxygen at 2L/min PRN for comfort to be delivered via [facility]. Ruben confirmation # [id number], spoke with Miquel for stat delivery. Facility orders reviewed via Pippa Kendall, no new medication changes from Mayo Franco, jeannie [facility]OS[facility] informed of patient current status.

## 2021-11-13 NOTE — HOSPICE
PRN visit made at request of faciity nurse 2/2 pt continuing decline and for support of pt's wife Sean Madison. Upon arrival, Sean Madison already departed. Assessed pt; RR 10; lungs clear and diminished; BP 83/54, O2 90%. Pt responded briefly to voice but unable to respond to inquiries about his comfort. Appears comfortable; no restlessness or grimacing. Smiled several times when spoken to. This RN discussed pt status with facility nurse Sulaiman. She states she held noon and 6pm scheduled morphine because of pt's low RR. Discussed with Dr. Ajay Rutledge and received orders to reduce scheduled morphine dose to 5mg q6h and to add order for 2L O2 prn. Reviewed new orders with facility RN, who verbalized understanding. Called pt's wife with update, reassured her pt appears to be resting comfortably and provided education about end of life s/s. Confirmed hospice nurse visits for Sat and Fatuma. and encouraged her to call hospice at any time with questions/concerns or to request support of  or MSW. She reiterated her appreciation for everyone's continued kind support. Via email update, this RN requested RNs visiting pt this weekend to confirm that facility RNs are administering scheduled morphine and O2 as needed. This is requested due to pt's pain earlier in the week not being treated appropriately with prn morphine order.

## 2021-11-13 NOTE — HOSPICE
Patient resting quietly in bed with eyes closed. No nonverbal signs of pain noted. No sob noted. Spoke with nurse Waldemar Rios who reports patient has been resting queitly since her shift started. She reports no concerns voiced by night shift report off. Reviewed medication and orders for schedule Morphine. Waldemar Rios reported patient last medicated at 0630, she will medicate as directed to maintain comfort. Reviewed medication for restlessness and O2 prn for son. Waldemar Rios verbalize understanding, reports no signs of restlessness or sob. Wife arrivied to patient room, reviewed medication and patient status. Patient response to tactile stimili, open eyes for few second, no tracking noted. Reviewed with wife patient unable at this time to take po medication, call will be place to Md on call. Spoke with Dr Lizzy Silver to reviewed meds, oxycodone discontinue and all po medication. Reviewed medication changes with staff nurse Celina Maria order provided. Active listening and support to wife provided. Wife asked about UA order, Waldemar Rios reported urine sample to be obtain on Monday she will follow up with facility Md.  No other concerns voiced, encourage wife and staff nurse to call with any question or concerns

## 2021-11-14 NOTE — HOSPICE
Pt received at facility sitting up in bed alert x3 and in NAD. Pt states he does not have any pain at this time and facility nurse Tanesha Lopez states pt has not received any pain medication today. Pt asked this nurse to pull him up in bed and adjust hob and done and patient tolerated well. Pt drinking liquids wihtout difficulty. Cushions observed on the floor around hospital bed. Greer catheter draining aamber colored urine. Spoke with wife and she states she face-timed hime earlier today and surprised that he is so alert today and improvement from yesterday. [facility] also states she does not think a urine test is necessary at his time. Dr. Denisse Rojas called to update on patient's status and states pt can take his noral oral medications and UA bot needed at this time. Tanesha Lopez RN updated and states she will also let facility MD know. Patient, patient's wife Tico Clay and Tanesha Lopez RN instructed that hospic eis available 24/7 and they all verbalize understanding.

## 2021-11-15 NOTE — HOSPICE
Upon arrival patient sleeping, with spouse sleeping at bedside. Awakens to nurses tactile stimuli, opens eyes ,minimally conversant with one word answers. Collaborated with nurse Brooklyn,whom is on duty: patient has had no breakfast or lunch today-, his AM medications were held related to groggyness,  but was able to take medications at noon. Pateint seen by wound MD today with no changes. Per nurse patient had a UA, urine C&S sent off to lab today per spouse request to check if patient has a UTI. Per nurse David Lacy, Dr. James Mcneill has resumed all medications related to patients ability to swallow safely now and increased cognition. No further care needs expressed.

## 2021-11-17 NOTE — HOSPICE
Problem is Anticipatory grief experienced by spouse, Maci Ruffin. Goal is to hear and acknowledge Ms. Light's grief and provide supportive counseling. The hospice chaplain completed a prn visit to provide support to Ms. Roger Hennessy. During today's visit, the hospice chaplain encouraged Ms. Light to read from her spouse's poetry book including some of his favorite poems. As Ms. Roger Hennessy read the poems, she appeared less anxious and more able to focus on being present. Mr. Roger Hennessy was able to respond with smiles and by holding Ms. Light's hands. The hospice chaplain read from [place] [id number] about the enduring nature of love and then offered a prayer seeking peace for  and Ms. Roger Hennessy. The plan for the next two weeks is to respond to the family's request for spiritual support.

## 2021-11-17 NOTE — HOSPICE
Virtual visit performed. Spoke with facility nurse, Nayeli Thomson, who reported patient was alert some of the time today and actually had a shower. Spoke with spouse Jojostuart Rushing, who was with patient and reported current pain, asking if PRN pain medication was available. Confirmed PRN morphine is available. Jojo Rushing made request to facility staff, who did then administer. Jojo Rushing reported patient was alert during portions ogf the day but that he was noticeably weak. She reported no other needs or concerns. Reminded Jojo Rushing to call for any needs or concerns as they arise.

## 2021-11-18 NOTE — HOSPICE
Upon arrival patient is lying in bed resting quietly. Patient responds to verbal stimuli. He answers questions appropriately. He is lethargic, sleeps between questions. HE is breathing 14 BPM on room air even an unlabored. His abdomen is soft, non distended. HE denies pain. Vital signs are WNL. Charge nurse requested to make Morphine PRN as patient is sleeping around the clock. At this time it is best to have scheduled meds as the patient may not be able to request medications of he is in need. Will remove the 0-7 visit schedule and increase weekly visits at this time. Patient is lethargic, he ate breakfast and is accepting liquids and pudding at this time. Encouraged facility staff to call with questions or concerns at any time.

## 2021-11-18 NOTE — HOSPICE
LCSW visited with pt and wife at [facility] where pt is a resident. Upon SW arrival, wife was not yet present. LCSW reintroduced self to pt as he does not typically remember our past meetings. Pt stated that he was \"miserable. \" LCSW inquired further as pt has recently had increased pain and nausea. Pt denied any physical pain, but stated that he was very unclear about where he was and why he was there. It became clear that pt was experiencing some suspicion/paranoia when he stated \"who put me here? \" Pt asked about hospice team and how long he had been at this facility. LCSW answered honestly. LCSW suggested to pt that he seemed to be \"frustrated and scared,\" and pt agreed with this assessment stating that he was very frustrated and scared. Pt then decided to call his wife as he seemed to not believe that SW had appt with her and that we had met on previous occasions as well. LCSW sat mostly quietly with pt until his wife arrived as it seemed he might be more trusting with her presence and reassurance. LCSW shared with wife, Timothy Diaz the concerns that had arisen and inquired if pt continued to feel this way. He confirmed he did, and asked his wife very clear and direct questions about his diagnosis, prognosis, medications etc. Pt also asked if he was still able to drive his car. Minal answered him honestly and empathetically. He seemed to take in this information and then stated that he did not wish to speak about it further for the moment. Timothy Joe offered to change the subject by reading him a poem, showing him a photo she wanted to share of a visit with their grandchildren and asked if she could inquire further about his feelings. All of these suggestions were flatly declined by pt. LCSW offered that SW and Timothy Diaz could step out briefly to allow pt to rest and process what he had heard. Pt agreed and stated he needed a break.  He also stated his stomach was hurting and requested some antacids, but when the nurse came, he denied any needs and reiterated that he needed a break. LCSW and Maci Ruffin exited the facility and sat on benches outside. We discussed this very sudden shift in pt's demeanor and LCSW reassured her that sharing the information she had was appropriate. In the past, pt has been easily redirected and pascified by short answers about why he is in a facility, or how his health has been, but during today's visit pt was very clear and seemed lucid but with underlying suspicions. LCSW answered Minal's questions about what will happen at the moment of pt's passing and we began to make a safety plan for her of who to call so she does not have to drive herself home or be alone in the immediate aftermath. We also were able to speak with admissions director, Wilian Morrow to request a recliner/fold out twin for Maci Ruffin in the room that she will be allowed to utilize in the final hours-days as she wants to be present for pt's final breaths. SHELLY and Glenanamaria Ruffin then reentered the facility to check on pt and reassess his mindset. In the hallway, we were greeted by the charge nurse stating that pt had begun crying out and was refusing medications. Upon entering pt's room, he was indeed calling out \"help me\" and stating that he wanted to go to the ER. He was not able to be redirected with questions like \"what would they do for you at the ER?\" Pt stated he was in pain but refused medications saying, \"you're just going to drug me. \" He used profanities and did not respond to any information being shared about the reality of his situation. Pt became distrusting of his wife as well. LCSW inquired if there is anyone he would trust and he stated his son, Eulalia Funes. Eulalia Funes did not answer as he was working, but pt's daughter, Dianna Copeland did . Mayte shared that she had noted a bit of paranoia/suspicion in his tone during their face time yesterday afternoon.  She tried to calm her dad, but it became clear that anyone attempting to redirect him became a source of his paranoia. Pt continued to insist that his daughter come to see him in person. Pt convinced that if his daughter saw him in person, she would see that the care he is receiving is not right. Pt expressed multiple times that he believed himself to be locked in a mental institution and repeatedly said to his wife Holly Mena could you do this to me? \" Leonardo Soulier questioned if this could be a paradoxical reaction to Ativan which was newly started yesterday at 9am for PRN dose due to restlessness per LPN on shift. He has not received any since, SHELLY posed this via email to pt's MD, RN and on call team. LCSROSIE explained that hospice team could consider transfer to [facility] for GIP care if approved by MD, but Teo Gaitan stated she did not want to move pt. Pt continued to refuse medications, but became quieter after about 30 min. LCSW sat with Teo Gaitan quietly during that time. Pt then whispered to Teo Gaitan \"I can't trust them\" pointing to [address] exited the room to allow pt time and hopefully rebuild a trust with wife so she can further help him. LCSW notified hospice home team as well as triage team and requested hospice RN visit this evening to assess. LCSW will f/u with a phone call to Teo Gaitan tomorrow.

## 2021-11-19 NOTE — HOSPICE
Prn visit with Felicitas Epstein. Called to see pt by  who noticed change in behaviors earlier in the day. By report, pt with paranoia, verbal outbursts, asking wife \"how long have I been here\"  Wife upset at pt reaction after he  was told today he is on hospice (likely has been told prior). He was then paranoid and distrusting with his wife and facility staff. Asking to go to hospital. Pt refusing meds. On my visit,patient is calm but very cautious of me, refusing to be touched \"leave me alone\" refused offer of food/drink, medications. Refused to engage in any conversation though he did smile when Desean Lopez spoke about his grandchildren. I question visual hallucinations as he is looking at the ceiling and looks like he is tracking something he sees. Full assessment deferred as he refused. Wife at bedside. Supportive listening and validation with wife. Collab with facility staff. No identified changes needed to POC, as pt may continue to refuse meds. Facility has on hand what is needed, if pt will agree to take meds. I encouraged pt to take medications when offered to help maintain his comfort. He did not acknowledge my statement. PPS 30, PAINAD zero at my visit. Staff at [facility] aware of our 24/7 availability. Suggest Friday visit to re-assess. Consider SW support for wife.

## 2021-11-19 NOTE — HOSPICE
Upon arrival patient in bed eating breakfast, in a pleasant mood smiling at nurse. Spouse Minal at bedside. Per facility nurse Brian Garcias LPN  patient was medicated with Lorazepam at 7:30AM for patient reports of feeling a bit anxious. Patient reports, \"I'm am feeling much better this AM, I want to apologize to everyone for the things I can't remember I said last night. I slept well\". Nurse offered active listening to patient. Patient unable to recall the events of yesterday evening, but apologizes. Skin W/D to touch. Medications reviewed with nurse Deedee Sevilla and spouse Irenajosh Joness, facility staff will continue use of PRN Lorazepam as needed, and will report to Hospice if medication is ineffective. Appreciative of visit, no further care needs expressed.

## 2021-11-21 NOTE — HOSPICE
Patient found A&Ox3 in bed with family members present. PAtient denies pain, vital signs WNL. PAtient recieved Maalox for GI distress during visit, which he reported was effective. Patient's spouse, Christianoudence Arlington is very anxious, especially about what to expect and whether or not patient is experiencing pain. Reassured her that patient is currently able to report pain and that nonverbal cues can be used when needed. Medication reconciliation performed. Reminded all to call with any concerns or needs.

## 2021-11-22 NOTE — HOSPICE
PRN visit made to assess need for antibiotics for patient after facility called in with reports of urine specimen containing VRE and Klebsiella. Patient states he has mild pain to sacrum related to wound, denies shortness of breath. Reviewed options for treatment such as oral antibiotics and discussed pros and cons of treatment with patient, spouse and son Gillian Leal via phone. Patient verbalizes that he wants to be treated with comfort medications. After discussion, as patient is not currently symptomatic of a UTI and giving antibiotic may cause other issues such as yeast infections and/or increase risk of Cdiff, family and patient in agreement to utilize medications for comfort if symptoms should arise and they do not wish to start an antibiotic at this time. Spouse also would like to stop having patient tested, which was communicated to staff nurse Tristin Barnett. Spouse states that she thinks she has a care plan meeting with facility tomorrow but is unsure of time and/or whether it is still taking place and she would like to have hospice attend if possible.

## 2021-11-23 NOTE — HOSPICE
LCSW met with pt at bedside and alerted wife that SW was present as promised to meet alone with pt first and then with wife. Pt had expressed to his wife on multiple occasions that he has been feeling depressed and wife hoped that he may open up more to SW. Pt was a bit drowsy but easily roused when SW arrived. When asked how he was feeling today, pt responded that he \"couldn't tell. \" LCSW sat with pt and he was able to have a conversation for a short period of time, about 10-15 min in which he shared that he does feel depressed and times and described that he feels trapped during those episodes. LCSW inquired about anything that helps or brings him chester. Pt agreed that visits from his family and face timing does help. He also agreed that he sometimes takes deep breaths when he is feeling down. LCSW suggested that he place a face time call if he feels depressed to reach out and receive some support from a loved one. LCSW also suggested locating some calming TV programming to escape what's going on in his mind. Pt then began to close his eyes more frequently and fell asleep. LCSW sat with pt as he slept and waited for wife's arrival.    When wife, Jc Bone arrived she had brought pt flowers and treats to brighten his room as it is their anniversary (17 years). Jc Bone shared with SW the events of the weekend. She visited with her friend and sister and was able to give pt permission to go if it is his time. She felt she was able to say some of the things she has been holding back. She has also continued to consider her safety plan for when pt does pass. She spoke to her neighbor who is willing to be supportive of her, but does not drive at night. Her daughter is local but has a  and it seems Jc Bone is hesitant to expect support of her depending on baby's needs and schedule. She has a close friend who lives in [place] but is visiting this Friday, and her sister will come back as soon as she is needed again.  Jc Bone does seem to be coping well at this time and shared about the decision to no longer test/treat UTIs as well as the awareness that pt likely has short weeks left based on most recent nursing assessment. Minal aware of SW absence next week and  visit planned for tomorrow and requested for next week as well for additional support in SW absence. LCSW will f/u with Yi Membreno upon return and team is aware that she will need support at time of pt's passing.

## 2021-11-26 NOTE — HOSPICE
Upon arrival patient is sleeping with wife sleeping laid across patient. Spoke with Charge Nurse Lolita Sparks. She reports patient is positive for VRE from the UA obtained last week. Family has decided not to treat VRE and to allow patient to remain comfortable for the remainder of his days with symptom management. Patient did not wish to answer questions or receive a physical assessment during today's visit. He became agitated during questions of appetite and how much he was eating. He replied Lets not talk about that. Patient is staring at ceiling and is talking nonsensically at times throughout the visit. Encouraged facility to call [phone] [address] with ststus updates, questions or concerns.

## 2021-11-27 NOTE — HOSPICE
Problem is anticipatory grief. Mr. Janet Thorne celebrated his 17th wedding Yosvany Levin yesterday, 11/23. Mr. Janet Thorne is aware of his life limiting diagnosis. Goal is to provide spiritual support to Mr. Janet Thorne as he transitions to the end of life. The hospice chaplain met with Mr. Janet Thorne for about forty five minutes. Ms. Janet Thorne had not arrived at the time of the visit. Mr. Janet Thorne was alert and recognized the hospice chaplain from previous visits. Mr. Janet Thorne responded positively to the hospice chaplain's statement regarding the couple's wedding anniversary. Mr. Janet Thorne responded by smiling. The hospice chaplain and Mr. Janet Thorne had a  conversation about his marriage. Mr. Janet Thorne shared that he had enjoyed his relationship with his spouse. Throughout the conversation, Mr. Janet Thorne was non-anxious.

## 2021-11-27 NOTE — HOSPICE
Problem is aniticipatoary grief as Mr. Nadine Cho nears the end of life. Ms. Nadine Cho arrived for the second visit. Ms. Nadine Cho stated that it would be helpful to talk about imagery related to heaven and the afterlife. Goal is to discuss  and Ms. Light's beliefs and understandings of heaven. Ms. Nadine Cho reviewed the events of the past week. She appeared to be emotional exhausted from those events. She commented that her natalie helped her to cope. Ms. Nadine Cho specifically referenced believing in life after death. Furthermore, Ms. Nadine Cho believed it was important for her spouse to have some understanding of it as well. For Ms. Nadine Cho life after death was an image a heaven where family members who have  gather to welcome the newly . Ms. Nadine Cho also added it was important to envision [address] as being present. Ms. Nadine Cho shared a story about her spouse's experience of seeing [employer]. Ms. Nadine Cho found relief in knowing that her spouse had a similar understanding of the after the life as she. The hospice chaplain discussed the journey to the end of life and beyond as being a time of fear and hope. The hospice chaplain ben from the [place] beliefs of the Resurrection and heaven. The hospice chaplain described the present moment as an opportunity to be spend time together and find peace to cope with the journey ahead. The hospice chaplain offered a prayer giving thanks for the gift of natalie in the [facility]. Both  and Ms. Light expressed appreciation for the visit. The goal for the next two weeks is to respond to the patient and family's requests for spiritual support.

## 2021-11-28 NOTE — HOSPICE
Upon arrival patient has three visitors during assessment. Patient refused complete assessment during visit. Patient is alert and oriented x2. Discussion with Spouse Maci Ruffin as patient has become more alert and aware since most recent decline and nursing visits will be x2 weekly. Encouraged Facility and Spouse to call [phone] with questions or concerns.

## 2021-12-02 NOTE — HOSPICE
Upon arrival patient is resting with Wife Angelito Betancourt at bedside. He denies pain, He answers questions with one word answers and a smile. He accepts sips during assessment. Spoke with  Marda Cowden. She reported that patient is on watch for a BM. Encouraged facility to call with status change, questions or concerns.

## 2021-12-07 NOTE — HOSPICE
Patient is lying in bed alone resting. He has no vistitors at this time. he is answering questions with one word answers. He is calm and cooperative. Report given to Nurse Eric Kulkarni. No needs at this time. Encouraged Facility to call [phone] with questions or concerns.

## 2021-12-09 NOTE — HOSPICE
Problem is anticpatory grief. Ms. Yari Lubin is grieving the loss of her spouse's well being because of his illness. Goal is to help Ms. Yari Lubin find meaning during this time of grief. The hospice chaplain met with  and Ms. Yari Lubin. Mr. Yari Lubin was resting and unable to participate in the conversation. Ms. Yari Lubin expressed concerns about the changes in her spouse's condition especially the moments when he was not able to communicate. Ms. Yari Lubin grieved the loss of their ability to spend time together. Ms. Yari Lubin wondered if her spouse realized that she was present. The hospice chaplain suggested that it may be helpful to reframe the situation by attributed the changes in her spouse's personality to the disease. Ms. Yari Lubin agreed to use reframing. The hospice chaplain also used a devotion to illustrate the importance of finding courage in the face of uncertainty. By using the example of the Mu-ism story of the shepherds being told not to be afraid, the hospice chaplain encouraged Ms. Light to focus on finding ways of being courageous. The hospice chaplain then offered a prayer seeking courage from God in the face of sadness. Ms. Yari Lubin thanked the hospice chaplain for the support provided. The plan for the next two weeks is to respond to Ms. Light's requests for spiritual support.

## 2021-12-09 NOTE — HOSPICE
LCSW met with pt and wife in room at [facility]. Pt very lethargic during visit and would occasionally open his eyes but for the most part did not rouse to sound or touch. Wife shared about her recent visit with her sister and cousin, her daughter's 38th birthday and time with her grandchildren. Wife shared that she Wife shared about some issues she has had with the facility care including that he has not received pain meds today but has been moaning out, grimacing and has had furrowed brows. We discussed nonverbal signs of pain and that even in this state he would be safe to receive the SL morphine. Call bell was pressed to request, and staff tech agreed to let nurse know but no nurse arrived prior to the end of the visit. Wife requested f/u w/ [phone] [facility] staff prior to the weekend and 2x weekly RN visits. LCSW able to connect with facility RN on the way out and she agreed to provide SL morphine according to the order. LCSW followed up with CM and pt is receiving 2x weekly RN visits and CM will call [phone] [facility] tomorrow.

## 2021-12-13 NOTE — HOSPICE
Mr. 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 He was admitted on 09-, Benefit period I ending 12-. RECERT assessment was 31-  This benefit period, At the time of admission he was speaking clearly and could make his needs known. He at times got up to wheelchair and loved to call friends from his cell phone, read cards, did puzzles and was happy to engage in an intelligent discussion. He could reposition in bed independently and fed himself three meals per day. He was happy, smiling and would profess his love for his wife with deep adoration on his face. He had a chronic sánchez due to a Stage II wound in his buttock area. Since that time he has become a bedbound patient unable to tolerate getting up in the chair. He is no longer able to communicate or to tract his thoughts. He does not respond with more than one word answers when asked questions. Often times he has delayed response and looks confused. 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 He is no longer watching television and now keeps the TV off. He was treated x1 for UTI and has since been diagnosed with VRE in his most recent U/A. The family has chosen not to treat this and to allow patient to decline surrounded by love and companionship. He is only accepting bites and sips as his LMAC was 30 now it is 24. He is emaciated and his abdomen is sunken. His cheeks are prominent as are his bony prominences. He is no longer making full eye contact and is preoccupied with staring blankly at the ceiling or over Nurse's head during assessment. Often times he declines to have assessment of his wound. He is easily agitated and has spoken rudely to his wife which is a complete change of his personality or their long term marriage.    SNx2 weekly  HHA declined  MSW     Music therapy   Volunteer declined   PPS 30%  Lmac 30 now 24   requires 6/6 ADL   ____x____  1. Stage seven or beyond according to the Functional Assessment Staging Scale;  ____x____  2. Unable to ambulate without assistance;  ____x____  3. Unable to dress without assistance;  ___x_____  4. Unable to bathe without assistance;  ___x_____  5. Urinary and fecal incontinence, intermittent or constant;  ___x_____  6. No consistently meaningful verbal communication: stereotypical phrases only or the ability             to speak is limited to six or fewer intelligible words. Patients should have had one of the following within the past 12 months:    ________  1. Aspiration pneumonia;  ________  2. Pyelonephritis or other upper urinary tract infection;  ________  3. Septicemia;  ________  4. Decubitus ulcers, multiple, stage 3-4;  ________  5. Fever, recurrent after antibiotics;  ____x____  6. Inability to maintain sufficient fluid and calorie intake with 10% weight loss during the            previous six months or serum albumin Note: This section is specific for Alzheimer's Disease            and related disorders, and is not appropriate for other types of dementia, such as multi-           infarct dementia.

## 2021-12-16 NOTE — HOSPICE
Patient is resting quietly with wife at bedside. He is answering questions at times, however, he is looking all around and is preoccuppied with his surroundings. He is not watching tv and prefers quiet at this time. Urine in sánchez bag is bloody and dark. He denies pain at this time. No medications needed at this time. Supplies ordered. Spoke with Viv his nurse during assessment.

## 2021-12-16 NOTE — HOSPICE
XUANW met with pt and wife, Cornelia Funes at his bedside at [facility]. Pt was received lying in hospital bed with eyes open but looking around and not fully focusing on SW or wife often. He would occasionally speak at the level of a whisper and was difficult to understand. His coloring was pale, but he did ask for sips of water throughout the visit and was able to suck on 2 orange slices as well for some juice. Mild coughing after one of the oranges. SHELLY and Cornelia Funes spoke about her anxiety regarding pt's decline and the unknown of what she will walk into each day. LCSW validated that pt has had signifcant declines, but at the same time will perk up and have good days which makes it hard to tell his timeline. Cornelia Funes is having some physical signs of anxiety including chest pain. She has spoken with her long term therapist about this and has medication which she uses infrequently, but has it available. She is also able to get a refill from her PCP and her therapist encouraged her to do so in case she needs it over the holidays or a weekend. Cornelia Funes briefly turned off pt's 02 as she feels the concentrator is so loud it does not contribute to a calming atmosphere, but after taking pt's 02 sats she reinitiated the 02. We discussed that if pt becomes unresponsive and is mouth breathing, 02 could be stopped as it is no longer helping, but it did seem to improve his sats during SW visit. LCSW revisted Minal's safety plan when pt passes. She will need SW/ support at bedside until pt is picked up by Sanya Dean and then will need a ride home from a family member or friend. She is concerned that her daughter and son in law will not respond to the request. LCSW encouraged her to discuss with her daughter prior to pt's passing so that she can be prepared for the expectation that she or her  will need to be physically present to support pt. She shared more about her relationship with CLIFF.  Cornelia Funes also expressed concerns about pt's recent increased secretions and LCSW relayed concern to RN for f/u at second weekly RN visit. LCSW will continue to follow closely and meet with wife next Wed at [phone].

## 2021-12-18 NOTE — HOSPICE
Upon arrival patient in bed sleeping , awakens to this  nurse's voice, denies pain and difficulty breathing by whispering yes/ no in response to questions, and shaking/ squeezing nurses hand. Spouse Minal at bedside, reporting patient has not been speaking to her much today, only responding in short one word answers and sleeping alot. Facility nurse Chi Hallman LPN reports patient has been sleeping majority of the day, no meal intake today, only alert enough today to take scheduled Morphine with sips of water. Jerome Oliva and Kari Li educated to continue oral care routinely with use of mouth swabs to maintain moist mucous membranes when patient is mouth breathing, offer hydration sips as tolerated or requested by patient, supervise all intake as patient remains aspiration risk during periods of fatigue and lethargy. Nurse Chi Hallman and Jerome Oliva verbalized understanding. Jerome Oliva reports she spends long days at the facility to ensure patient is not alone, and calls the facility throughout the night, Jerome Oliva reports facility is working on providing her a lounge chair that converts into a bed, which will allow her to lay down during extended visits with her . Nurse offered empathetic listening as spouse spoke of family, and expression of sadness and fear. Jerome Hazel reports that she is feeling unsupported and is requesting trini visits, as she has no one else that is able to be with patient as often other than her, related to family obligations. Spouse provided with gone from our sight booklet to aid her in recognizing  EOL symptoms as they occur. Minal encouraged to call Hospice as needed for further care needs/ concerns with hospice 24 hour availability. Notification sent to trini for follow up contact. RNCM informed.

## 2021-12-19 NOTE — HOSPICE
PRN visit to assess EOL symptoms  Provided emotional support to spouse Katie Starr. Discussed prognosis and EOL symptoms at length with Minal. Would anticipated patient expires within hours to days. On-call Tricia Aschoff will make visit with patient and spouse today. Confirmed that Matt Zaldivar is en route. Assessment:  Patient is received while lying in bed. Patient is unresponsive to painful stimuli, no facial grimmacing noted, body posture is relaxed, cheynes-aguero respirations, continuous oxygen on for comfort, tachycardic up to 150s, absent bowel sounds, reduced UOP noted to sánchez, urine is tea colored, skin is cool/mottled, FLACC 0/10. Provided EOL education with spouse Katie Starr and offered emotional support and therapuetic listening. Patient appears well palliated and has received two doses of morphine and one dose of ativan. Encoraged spouse Katie Clarks to call hospice for any needs or further support. Gave report off and impression to facility nursing staff.

## 2021-12-20 ENCOUNTER — HOME CARE VISIT (OUTPATIENT)
Dept: HOSPICE | Facility: HOSPICE | Age: 76
End: 2021-12-20
Payer: MEDICARE

## 2021-12-20 PROCEDURE — 0651 HSPC ROUTINE HOME CARE

## 2021-12-20 NOTE — HOSPICE
On-call referral for  to provide comfort and spiritual guidance to the pt, family and CG as they make their journey towards EOL. Upon arrival, greeted spouse Minal marya; patient was non-responsive, nonverbal and actively dying. Engaged in life review w/ spouse to enhance meaning and peace. Explored end of life expectations and educated on symptoms; facilitated theological reflection processing grief and transitioning a loving relationship from the temporal to the spiritual realm. Reviewed coping resources: spouse noted her daughter lives nearby and her natalie is a primary framework for healing/transformation. Read poem at the bedside during patient's final moments to honor his legacy. Assisted spouse with next steps: informed the facility of patient's death; educated on bereavement counseling and provided support as she made calls to patient's adult children. Family grieving appropriately at this time. Facility staff will notify [facility] on [address] to serve when family is ready.

## 2021-12-21 NOTE — HOSPICE
[facility] reported death of Dr. Phoenix Hart on 12- at 24-20-52-61 by [facility]. [facility] contacted by facility.

## 2021-12-23 VITALS
TEMPERATURE: 97.7 F | DIASTOLIC BLOOD PRESSURE: 59 MMHG | SYSTOLIC BLOOD PRESSURE: 106 MMHG | RESPIRATION RATE: 14 BRPM | HEART RATE: 73 BPM

## 2021-12-23 NOTE — HOSPICE
Music Therapy Progress Note  Govind Higgins  Patient Telephone Number: Robin Harley [phone]  Zoroastrian Affiliation: Not Hinduism  Language: English    Date: 12/9/21    Mental Status:   [  ] Alert [  ] Elray Fraise [  ]  Confused  [  ] Minimally responsive  [ X ] Sleeping    Communication Status: [ X ] Impaired Speech [  ] Nonverbal     Physical Status:   [  ] Oxygen in use  [  ] Hard of Hearing [  ] Vision Impaired   [  ] Ambulatory  [  ] Ambulatory with assistance [ X ] Non-ambulatory     Music Preferences, Background: __Soft rock;    Clinical Problem addressed: _Comfort at end of life; caregiver support;    Goal(s) met in session:  Physical/Pain management (Scale of 1-10):    Pre-session rating ___________    Post-session rating __________  [  ] Increased relaxation               [  ] Affected breathing patterns  [  ] Decreased muscle tension               [  ] Decreased agitation  [  ] Affected heart rate    [  ] Increased alertness     Emotional/Psychological:  [  ] Increased self-expression   [  ] Decreased aggressive behavior   [  ] Decreased feelings of stress              [  ] Discussed healthy coping skills     [  ] Improved mood    [  ] Decreased withdrawn behavior     Social:  [  ] Decreased feelings of isolation/loneliness [  ] Positive social interaction    [ X ] Provided support and/or comfort for family/friends    Spiritual:  [ X ] Spiritual support    [  ] Expressed peace  [  ] Expressed natalie    [  ] Discussed beliefs    Techniques Utilized (Check all that apply):   [  ] Procedural support MT [  ] Music for relaxation             [ X ] Patient preferred music  [  ] Coleen analysis  [  ] Song choice              [  ] Music for validation  [  ] Entrainment              [  ] Movement to music             [  ] Guided visualization  [  ] Natty Fees  [  ] Patient instrument playing [  ] Jem Ralph writing  [  ] Jorge Place along   [  ] Faisal Epps              [  ] Sensory stimulation  [  ] Active Listening  Elizabeth.Oksana  ] Music for spiritual support [  ] Making of CDs as gifts    Session Observations:  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 His wife Elizabeth Aparicio was at his bedside, and she appreciated hearing quiet meaningful music, chosen from the songs she and her  had enjoyed together in the past. Elizabeth Aparicio was able to share some of her difficulties in navigating her 's end of life, and found it helpful to have a listening presence. She sang along quietly to some of the songs, and appeared more relaxed as our time together continued. Thank you for the opportunity to provide music therapy to  and Mrs. Moi Dunne.   Ton Hsieh [place], [facility]   Music [address] Certified  Spiritual Care Services  Referral- based service

## 2022-11-01 NOTE — PROGRESS NOTES
Problem: Pressure Injury - Risk of  Goal: *Prevention of pressure injury  Description: Document Larry Scale and appropriate interventions in the flowsheet.   Outcome: Progressing Towards Goal  Note: Pressure Injury Interventions:  Sensory Interventions: Minimize linen layers    Moisture Interventions: Minimize layers, Absorbent underpads    Activity Interventions: PT/OT evaluation, Pressure redistribution bed/mattress(bed type)    Mobility Interventions: Pressure redistribution bed/mattress (bed type), PT/OT evaluation    Nutrition Interventions: Document food/fluid/supplement intake    Friction and Shear Interventions: HOB 30 degrees or less -3